# Patient Record
Sex: MALE | Race: WHITE | Employment: UNEMPLOYED | URBAN - METROPOLITAN AREA
[De-identification: names, ages, dates, MRNs, and addresses within clinical notes are randomized per-mention and may not be internally consistent; named-entity substitution may affect disease eponyms.]

---

## 2017-01-01 ENCOUNTER — APPOINTMENT (OUTPATIENT)
Dept: GENERAL RADIOLOGY | Age: 82
DRG: 329 | End: 2017-01-01
Attending: INTERNAL MEDICINE
Payer: MEDICARE

## 2017-01-01 ENCOUNTER — APPOINTMENT (OUTPATIENT)
Dept: GENERAL RADIOLOGY | Age: 82
DRG: 329 | End: 2017-01-01
Attending: PHYSICIAN ASSISTANT
Payer: MEDICARE

## 2017-01-01 ENCOUNTER — APPOINTMENT (OUTPATIENT)
Dept: CT IMAGING | Age: 82
DRG: 329 | End: 2017-01-01
Attending: NURSE PRACTITIONER
Payer: MEDICARE

## 2017-01-01 ENCOUNTER — APPOINTMENT (OUTPATIENT)
Dept: ULTRASOUND IMAGING | Age: 82
DRG: 329 | End: 2017-01-01
Attending: INTERNAL MEDICINE
Payer: MEDICARE

## 2017-01-01 ENCOUNTER — ANESTHESIA (OUTPATIENT)
Dept: SURGERY | Age: 82
DRG: 329 | End: 2017-01-01
Payer: MEDICARE

## 2017-01-01 ENCOUNTER — ANESTHESIA EVENT (OUTPATIENT)
Dept: ENDOSCOPY | Age: 82
End: 2017-01-01

## 2017-01-01 ENCOUNTER — ANESTHESIA EVENT (OUTPATIENT)
Dept: SURGERY | Age: 82
DRG: 329 | End: 2017-01-01
Payer: MEDICARE

## 2017-01-01 ENCOUNTER — APPOINTMENT (OUTPATIENT)
Dept: GENERAL RADIOLOGY | Age: 82
DRG: 329 | End: 2017-01-01
Attending: NURSE PRACTITIONER
Payer: MEDICARE

## 2017-01-01 ENCOUNTER — ANESTHESIA (OUTPATIENT)
Dept: ENDOSCOPY | Age: 82
End: 2017-01-01

## 2017-01-01 ENCOUNTER — APPOINTMENT (OUTPATIENT)
Dept: GENERAL RADIOLOGY | Age: 82
DRG: 329 | End: 2017-01-01
Attending: HOSPITALIST
Payer: MEDICARE

## 2017-01-01 ENCOUNTER — APPOINTMENT (OUTPATIENT)
Dept: CT IMAGING | Age: 82
DRG: 329 | End: 2017-01-01
Attending: PHYSICIAN ASSISTANT
Payer: MEDICARE

## 2017-01-01 ENCOUNTER — HOSPITAL ENCOUNTER (INPATIENT)
Age: 82
LOS: 27 days | DRG: 329 | End: 2017-03-12
Attending: EMERGENCY MEDICINE | Admitting: INTERNAL MEDICINE
Payer: MEDICARE

## 2017-01-01 VITALS
OXYGEN SATURATION: 68 % | HEART RATE: 92 BPM | SYSTOLIC BLOOD PRESSURE: 100 MMHG | BODY MASS INDEX: 24.52 KG/M2 | HEIGHT: 72 IN | TEMPERATURE: 97.8 F | DIASTOLIC BLOOD PRESSURE: 52 MMHG | WEIGHT: 181 LBS | RESPIRATION RATE: 22 BRPM

## 2017-01-01 DIAGNOSIS — N18.9 CHRONIC RENAL FAILURE, UNSPECIFIED STAGE: ICD-10-CM

## 2017-01-01 DIAGNOSIS — R10.12 ABDOMINAL PAIN, LUQ (LEFT UPPER QUADRANT): ICD-10-CM

## 2017-01-01 DIAGNOSIS — K56.609 SBO (SMALL BOWEL OBSTRUCTION) (HCC): Primary | ICD-10-CM

## 2017-01-01 DIAGNOSIS — N18.30 ACUTE RENAL FAILURE SUPERIMPOSED ON STAGE 3 CHRONIC KIDNEY DISEASE (HCC): ICD-10-CM

## 2017-01-01 DIAGNOSIS — D64.9 ANEMIA, UNSPECIFIED TYPE: ICD-10-CM

## 2017-01-01 DIAGNOSIS — M25.562 CHRONIC PAIN OF LEFT KNEE: ICD-10-CM

## 2017-01-01 DIAGNOSIS — N17.9 ACUTE RENAL FAILURE SUPERIMPOSED ON STAGE 3 CHRONIC KIDNEY DISEASE (HCC): ICD-10-CM

## 2017-01-01 DIAGNOSIS — R53.81 DEBILITY: ICD-10-CM

## 2017-01-01 DIAGNOSIS — G89.29 CHRONIC PAIN OF LEFT KNEE: ICD-10-CM

## 2017-01-01 DIAGNOSIS — R63.30 FEEDING DIFFICULTIES: ICD-10-CM

## 2017-01-01 LAB
ABO + RH BLD: NORMAL
ABO + RH BLD: NORMAL
ALBUMIN SERPL BCP-MCNC: 1.6 G/DL (ref 3.4–5)
ALBUMIN SERPL BCP-MCNC: 1.7 G/DL (ref 3.4–5)
ALBUMIN SERPL BCP-MCNC: 1.9 G/DL (ref 3.4–5)
ALBUMIN SERPL BCP-MCNC: 3.5 G/DL (ref 3.4–5)
ALBUMIN/GLOB SERPL: 0.5 {RATIO} (ref 0.8–1.7)
ALBUMIN/GLOB SERPL: 0.6 {RATIO} (ref 0.8–1.7)
ALBUMIN/GLOB SERPL: 0.6 {RATIO} (ref 0.8–1.7)
ALBUMIN/GLOB SERPL: 1 {RATIO} (ref 0.8–1.7)
ALP SERPL-CCNC: 147 U/L (ref 45–117)
ALP SERPL-CCNC: 70 U/L (ref 45–117)
ALP SERPL-CCNC: 93 U/L (ref 45–117)
ALP SERPL-CCNC: 97 U/L (ref 45–117)
ALT SERPL-CCNC: 16 U/L (ref 16–61)
ALT SERPL-CCNC: 16 U/L (ref 16–61)
ALT SERPL-CCNC: 17 U/L (ref 16–61)
ALT SERPL-CCNC: 23 U/L (ref 16–61)
ANION GAP BLD CALC-SCNC: 10 MMOL/L (ref 3–18)
ANION GAP BLD CALC-SCNC: 11 MMOL/L (ref 3–18)
ANION GAP BLD CALC-SCNC: 12 MMOL/L (ref 3–18)
ANION GAP BLD CALC-SCNC: 13 MMOL/L (ref 3–18)
ANION GAP BLD CALC-SCNC: 14 MMOL/L (ref 3–18)
ANION GAP BLD CALC-SCNC: 14 MMOL/L (ref 3–18)
ANION GAP BLD CALC-SCNC: 15 MMOL/L (ref 3–18)
ANION GAP BLD CALC-SCNC: 15 MMOL/L (ref 3–18)
ANION GAP BLD CALC-SCNC: 16 MMOL/L (ref 3–18)
ANION GAP BLD CALC-SCNC: 17 MMOL/L (ref 3–18)
ANION GAP BLD CALC-SCNC: 19 MMOL/L (ref 3–18)
ANION GAP BLD CALC-SCNC: 19 MMOL/L (ref 3–18)
ANION GAP BLD CALC-SCNC: 8 MMOL/L (ref 3–18)
ANION GAP BLD CALC-SCNC: 8 MMOL/L (ref 3–18)
APPEARANCE FLD: ABNORMAL
APPEARANCE UR: ABNORMAL
APPEARANCE UR: CLEAR
APTT PPP: 36.9 SEC (ref 23–36.4)
APTT PPP: 41.1 SEC (ref 23–36.4)
ARTERIAL PATENCY WRIST A: ABNORMAL
ARTERIAL PATENCY WRIST A: NO
ARTERIAL PATENCY WRIST A: NO
ARTERIAL PATENCY WRIST A: YES
AST SERPL W P-5'-P-CCNC: 11 U/L (ref 15–37)
AST SERPL W P-5'-P-CCNC: 14 U/L (ref 15–37)
AST SERPL W P-5'-P-CCNC: 20 U/L (ref 15–37)
AST SERPL W P-5'-P-CCNC: 38 U/L (ref 15–37)
ATRIAL RATE: 104 BPM
ATRIAL RATE: 119 BPM
ATRIAL RATE: 122 BPM
ATRIAL RATE: 394 BPM
ATRIAL RATE: 61 BPM
BACTERIA SPEC CULT: ABNORMAL
BACTERIA SPEC CULT: ABNORMAL
BACTERIA SPEC CULT: NORMAL
BACTERIA URNS QL MICRO: NEGATIVE /HPF
BASE DEFICIT BLD-SCNC: 4 MMOL/L
BASE DEFICIT BLD-SCNC: 4 MMOL/L
BASE DEFICIT BLD-SCNC: 5 MMOL/L
BASE DEFICIT BLD-SCNC: 6 MMOL/L
BASE DEFICIT BLD-SCNC: 6 MMOL/L
BASE DEFICIT BLD-SCNC: 8 MMOL/L
BASE DEFICIT BLD-SCNC: 8 MMOL/L
BASOPHILS # BLD AUTO: 0 K/UL (ref 0–0.06)
BASOPHILS # BLD AUTO: 0 K/UL (ref 0–0.1)
BASOPHILS # BLD AUTO: 0 K/UL (ref 0–0.1)
BASOPHILS # BLD AUTO: 0.1 K/UL (ref 0–0.06)
BASOPHILS # BLD: 0 % (ref 0–2)
BASOPHILS # BLD: 0 % (ref 0–3)
BASOPHILS # BLD: 0 % (ref 0–3)
BASOPHILS # BLD: 1 % (ref 0–2)
BDY SITE: ABNORMAL
BILIRUB DIRECT SERPL-MCNC: 0.2 MG/DL (ref 0–0.2)
BILIRUB SERPL-MCNC: 0.2 MG/DL (ref 0.2–1)
BILIRUB SERPL-MCNC: 0.3 MG/DL (ref 0.2–1)
BILIRUB SERPL-MCNC: 0.3 MG/DL (ref 0.2–1)
BILIRUB SERPL-MCNC: 0.5 MG/DL (ref 0.2–1)
BILIRUB UR QL: NEGATIVE
BILIRUB UR QL: NEGATIVE
BLD PROD TYP BPU: NORMAL
BLOOD GROUP ANTIBODIES SERPL: NORMAL
BLOOD GROUP ANTIBODIES SERPL: NORMAL
BODY TEMPERATURE: 96
BODY TEMPERATURE: 96.4
BODY TEMPERATURE: 97.4
BODY TEMPERATURE: 98
BODY TEMPERATURE: 98.4
BPU ID: NORMAL
BUN SERPL-MCNC: 103 MG/DL (ref 7–18)
BUN SERPL-MCNC: 107 MG/DL (ref 7–18)
BUN SERPL-MCNC: 108 MG/DL (ref 7–18)
BUN SERPL-MCNC: 112 MG/DL (ref 7–18)
BUN SERPL-MCNC: 116 MG/DL (ref 7–18)
BUN SERPL-MCNC: 120 MG/DL (ref 7–18)
BUN SERPL-MCNC: 121 MG/DL (ref 7–18)
BUN SERPL-MCNC: 132 MG/DL (ref 7–18)
BUN SERPL-MCNC: 134 MG/DL (ref 7–18)
BUN SERPL-MCNC: 136 MG/DL (ref 7–18)
BUN SERPL-MCNC: 149 MG/DL (ref 7–18)
BUN SERPL-MCNC: 156 MG/DL (ref 7–18)
BUN SERPL-MCNC: 157 MG/DL (ref 7–18)
BUN SERPL-MCNC: 164 MG/DL (ref 7–18)
BUN SERPL-MCNC: 32 MG/DL (ref 7–18)
BUN SERPL-MCNC: 41 MG/DL (ref 7–18)
BUN SERPL-MCNC: 49 MG/DL (ref 7–18)
BUN SERPL-MCNC: 49 MG/DL (ref 7–18)
BUN SERPL-MCNC: 50 MG/DL (ref 7–18)
BUN SERPL-MCNC: 53 MG/DL (ref 7–18)
BUN SERPL-MCNC: 56 MG/DL (ref 7–18)
BUN SERPL-MCNC: 68 MG/DL (ref 7–18)
BUN SERPL-MCNC: 91 MG/DL (ref 7–18)
BUN/CREAT SERPL: 21 (ref 12–20)
BUN/CREAT SERPL: 22 (ref 12–20)
BUN/CREAT SERPL: 23 (ref 12–20)
BUN/CREAT SERPL: 25 (ref 12–20)
BUN/CREAT SERPL: 26 (ref 12–20)
BUN/CREAT SERPL: 27 (ref 12–20)
BUN/CREAT SERPL: 28 (ref 12–20)
BUN/CREAT SERPL: 28 (ref 12–20)
BUN/CREAT SERPL: 29 (ref 12–20)
BUN/CREAT SERPL: 29 (ref 12–20)
BUN/CREAT SERPL: 30 (ref 12–20)
BUN/CREAT SERPL: 33 (ref 12–20)
BUN/CREAT SERPL: 34 (ref 12–20)
BUN/CREAT SERPL: 35 (ref 12–20)
BUN/CREAT SERPL: 36 (ref 12–20)
BUN/CREAT SERPL: 37 (ref 12–20)
BUN/CREAT SERPL: 38 (ref 12–20)
BUN/CREAT SERPL: 40 (ref 12–20)
BUN/CREAT SERPL: 41 (ref 12–20)
BUN/CREAT SERPL: 41 (ref 12–20)
BUN/CREAT SERPL: 42 (ref 12–20)
CA-I SERPL-SCNC: 0.95 MMOL/L (ref 1.12–1.32)
CA-I SERPL-SCNC: 0.99 MMOL/L (ref 1.12–1.32)
CA-I SERPL-SCNC: 1.02 MMOL/L (ref 1.12–1.32)
CA-I SERPL-SCNC: 1.03 MMOL/L (ref 1.12–1.32)
CA-I SERPL-SCNC: 1.03 MMOL/L (ref 1.12–1.32)
CA-I SERPL-SCNC: 1.04 MMOL/L (ref 1.12–1.32)
CA-I SERPL-SCNC: 1.05 MMOL/L (ref 1.12–1.32)
CA-I SERPL-SCNC: 1.06 MMOL/L (ref 1.12–1.32)
CA-I SERPL-SCNC: 1.07 MMOL/L (ref 1.12–1.32)
CA-I SERPL-SCNC: 1.07 MMOL/L (ref 1.12–1.32)
CA-I SERPL-SCNC: 1.08 MMOL/L (ref 1.12–1.32)
CA-I SERPL-SCNC: 1.09 MMOL/L (ref 1.12–1.32)
CA-I SERPL-SCNC: 1.1 MMOL/L (ref 1.12–1.32)
CA-I SERPL-SCNC: 1.1 MMOL/L (ref 1.12–1.32)
CA-I SERPL-SCNC: 1.11 MMOL/L (ref 1.12–1.32)
CA-I SERPL-SCNC: 1.11 MMOL/L (ref 1.12–1.32)
CA-I SERPL-SCNC: 1.13 MMOL/L (ref 1.12–1.32)
CALCIUM SERPL-MCNC: 6.9 MG/DL (ref 8.5–10.1)
CALCIUM SERPL-MCNC: 6.9 MG/DL (ref 8.5–10.1)
CALCIUM SERPL-MCNC: 7 MG/DL (ref 8.5–10.1)
CALCIUM SERPL-MCNC: 7.2 MG/DL (ref 8.5–10.1)
CALCIUM SERPL-MCNC: 7.2 MG/DL (ref 8.5–10.1)
CALCIUM SERPL-MCNC: 7.3 MG/DL (ref 8.5–10.1)
CALCIUM SERPL-MCNC: 7.3 MG/DL (ref 8.5–10.1)
CALCIUM SERPL-MCNC: 7.5 MG/DL (ref 8.5–10.1)
CALCIUM SERPL-MCNC: 7.6 MG/DL (ref 8.5–10.1)
CALCIUM SERPL-MCNC: 7.7 MG/DL (ref 8.5–10.1)
CALCIUM SERPL-MCNC: 7.8 MG/DL (ref 8.5–10.1)
CALCIUM SERPL-MCNC: 7.8 MG/DL (ref 8.5–10.1)
CALCIUM SERPL-MCNC: 7.9 MG/DL (ref 8.5–10.1)
CALCIUM SERPL-MCNC: 8 MG/DL (ref 8.5–10.1)
CALCIUM SERPL-MCNC: 8.1 MG/DL (ref 8.5–10.1)
CALCIUM SERPL-MCNC: 8.2 MG/DL (ref 8.5–10.1)
CALCIUM SERPL-MCNC: 8.2 MG/DL (ref 8.5–10.1)
CALCIUM SERPL-MCNC: 8.4 MG/DL (ref 8.5–10.1)
CALCIUM SERPL-MCNC: 8.9 MG/DL (ref 8.5–10.1)
CALCULATED P AXIS, ECG09: 102 DEGREES
CALCULATED R AXIS, ECG10: -66 DEGREES
CALCULATED R AXIS, ECG10: -67 DEGREES
CALCULATED R AXIS, ECG10: -75 DEGREES
CALCULATED R AXIS, ECG10: -75 DEGREES
CALCULATED R AXIS, ECG10: -76 DEGREES
CALCULATED T AXIS, ECG11: 58 DEGREES
CALCULATED T AXIS, ECG11: 63 DEGREES
CALCULATED T AXIS, ECG11: 65 DEGREES
CALCULATED T AXIS, ECG11: 81 DEGREES
CALCULATED T AXIS, ECG11: 85 DEGREES
CALLED TO:,BCALL1: NORMAL
CALLED TO:,BCALL1: NORMAL
CALLED TO:,BCALL2: NORMAL
CALLED TO:,BCALL3: NORMAL
CHLORIDE SERPL-SCNC: 102 MMOL/L (ref 100–108)
CHLORIDE SERPL-SCNC: 103 MMOL/L (ref 100–108)
CHLORIDE SERPL-SCNC: 104 MMOL/L (ref 100–108)
CHLORIDE SERPL-SCNC: 105 MMOL/L (ref 100–108)
CHLORIDE SERPL-SCNC: 106 MMOL/L (ref 100–108)
CHLORIDE SERPL-SCNC: 108 MMOL/L (ref 100–108)
CHLORIDE SERPL-SCNC: 109 MMOL/L (ref 100–108)
CHLORIDE SERPL-SCNC: 109 MMOL/L (ref 100–108)
CHLORIDE SERPL-SCNC: 110 MMOL/L (ref 100–108)
CHLORIDE SERPL-SCNC: 110 MMOL/L (ref 100–108)
CHLORIDE SERPL-SCNC: 111 MMOL/L (ref 100–108)
CHLORIDE UR-SCNC: 21 MMOL/L (ref 55–125)
CK MB CFR SERPL CALC: 7.8 % (ref 0–4)
CK MB CFR SERPL CALC: 9.2 % (ref 0–4)
CK MB SERPL-MCNC: 3.2 NG/ML (ref 5–25)
CK MB SERPL-MCNC: 4.9 NG/ML (ref 5–25)
CK SERPL-CCNC: 41 U/L (ref 39–308)
CK SERPL-CCNC: 53 U/L (ref 39–308)
CO2 SERPL-SCNC: 17 MMOL/L (ref 21–32)
CO2 SERPL-SCNC: 17 MMOL/L (ref 21–32)
CO2 SERPL-SCNC: 18 MMOL/L (ref 21–32)
CO2 SERPL-SCNC: 18 MMOL/L (ref 21–32)
CO2 SERPL-SCNC: 19 MMOL/L (ref 21–32)
CO2 SERPL-SCNC: 20 MMOL/L (ref 21–32)
CO2 SERPL-SCNC: 21 MMOL/L (ref 21–32)
CO2 SERPL-SCNC: 22 MMOL/L (ref 21–32)
CO2 SERPL-SCNC: 22 MMOL/L (ref 21–32)
CO2 SERPL-SCNC: 23 MMOL/L (ref 21–32)
CO2 SERPL-SCNC: 23 MMOL/L (ref 21–32)
CO2 SERPL-SCNC: 24 MMOL/L (ref 21–32)
CO2 SERPL-SCNC: 25 MMOL/L (ref 21–32)
CO2 SERPL-SCNC: 26 MMOL/L (ref 21–32)
COLOR FLD: YELLOW
COLOR UR: ABNORMAL
COLOR UR: YELLOW
CREAT SERPL-MCNC: 1.45 MG/DL (ref 0.6–1.3)
CREAT SERPL-MCNC: 1.85 MG/DL (ref 0.6–1.3)
CREAT SERPL-MCNC: 1.88 MG/DL (ref 0.6–1.3)
CREAT SERPL-MCNC: 1.93 MG/DL (ref 0.6–1.3)
CREAT SERPL-MCNC: 1.98 MG/DL (ref 0.6–1.3)
CREAT SERPL-MCNC: 1.99 MG/DL (ref 0.6–1.3)
CREAT SERPL-MCNC: 2 MG/DL (ref 0.6–1.3)
CREAT SERPL-MCNC: 2.29 MG/DL (ref 0.6–1.3)
CREAT SERPL-MCNC: 2.45 MG/DL (ref 0.6–1.3)
CREAT SERPL-MCNC: 2.47 MG/DL (ref 0.6–1.3)
CREAT SERPL-MCNC: 2.58 MG/DL (ref 0.6–1.3)
CREAT SERPL-MCNC: 2.62 MG/DL (ref 0.6–1.3)
CREAT SERPL-MCNC: 2.68 MG/DL (ref 0.6–1.3)
CREAT SERPL-MCNC: 2.84 MG/DL (ref 0.6–1.3)
CREAT SERPL-MCNC: 3.13 MG/DL (ref 0.6–1.3)
CREAT SERPL-MCNC: 3.3 MG/DL (ref 0.6–1.3)
CREAT SERPL-MCNC: 3.71 MG/DL (ref 0.6–1.3)
CREAT SERPL-MCNC: 3.98 MG/DL (ref 0.6–1.3)
CREAT SERPL-MCNC: 4.38 MG/DL (ref 0.6–1.3)
CREAT SERPL-MCNC: 4.55 MG/DL (ref 0.6–1.3)
CREAT SERPL-MCNC: 4.8 MG/DL (ref 0.6–1.3)
CREAT SERPL-MCNC: 5.02 MG/DL (ref 0.6–1.3)
CREAT SERPL-MCNC: 5.27 MG/DL (ref 0.6–1.3)
CREAT SERPL-MCNC: 5.3 MG/DL (ref 0.6–1.3)
CREAT SERPL-MCNC: 5.36 MG/DL (ref 0.6–1.3)
CREAT SERPL-MCNC: 5.41 MG/DL (ref 0.6–1.3)
CREAT SERPL-MCNC: 5.53 MG/DL (ref 0.6–1.3)
CREAT UR-MCNC: 45.6 MG/DL (ref 30–125)
CROSSMATCH RESULT,%XM: NORMAL
DATE LAST DOSE: NORMAL
DATE LAST DOSE: NORMAL
DIAGNOSIS, 93000: NORMAL
DIFFERENTIAL METHOD BLD: ABNORMAL
DIGOXIN SERPL-MCNC: 0.8 NG/ML (ref 0.9–2)
DIGOXIN SERPL-MCNC: 1.7 NG/ML (ref 0.9–2)
EOSINOPHIL # BLD: 0 K/UL (ref 0–0.4)
EOSINOPHIL # BLD: 0.1 K/UL (ref 0–0.4)
EOSINOPHIL # FLD: 0 %
EOSINOPHIL NFR BLD: 0 % (ref 0–5)
EOSINOPHIL NFR BLD: 1 % (ref 0–5)
EPITH CASTS URNS QL MICRO: ABNORMAL /LPF (ref 0–5)
ERYTHROCYTE [DISTWIDTH] IN BLOOD BY AUTOMATED COUNT: 15.1 % (ref 11.6–14.5)
ERYTHROCYTE [DISTWIDTH] IN BLOOD BY AUTOMATED COUNT: 15.2 % (ref 11.6–14.5)
ERYTHROCYTE [DISTWIDTH] IN BLOOD BY AUTOMATED COUNT: 15.2 % (ref 11.6–14.5)
ERYTHROCYTE [DISTWIDTH] IN BLOOD BY AUTOMATED COUNT: 15.3 % (ref 11.6–14.5)
ERYTHROCYTE [DISTWIDTH] IN BLOOD BY AUTOMATED COUNT: 15.3 % (ref 11.6–14.5)
ERYTHROCYTE [DISTWIDTH] IN BLOOD BY AUTOMATED COUNT: 15.4 % (ref 11.6–14.5)
ERYTHROCYTE [DISTWIDTH] IN BLOOD BY AUTOMATED COUNT: 15.4 % (ref 11.6–14.5)
ERYTHROCYTE [DISTWIDTH] IN BLOOD BY AUTOMATED COUNT: 15.5 % (ref 11.6–14.5)
ERYTHROCYTE [DISTWIDTH] IN BLOOD BY AUTOMATED COUNT: 15.7 % (ref 11.6–14.5)
ERYTHROCYTE [DISTWIDTH] IN BLOOD BY AUTOMATED COUNT: 15.7 % (ref 11.6–14.5)
ERYTHROCYTE [DISTWIDTH] IN BLOOD BY AUTOMATED COUNT: 15.8 % (ref 11.6–14.5)
ERYTHROCYTE [DISTWIDTH] IN BLOOD BY AUTOMATED COUNT: 15.9 % (ref 11.6–14.5)
ERYTHROCYTE [DISTWIDTH] IN BLOOD BY AUTOMATED COUNT: 15.9 % (ref 11.6–14.5)
ERYTHROCYTE [DISTWIDTH] IN BLOOD BY AUTOMATED COUNT: 16 % (ref 11.6–14.5)
ERYTHROCYTE [DISTWIDTH] IN BLOOD BY AUTOMATED COUNT: 16.1 % (ref 11.6–14.5)
ERYTHROCYTE [DISTWIDTH] IN BLOOD BY AUTOMATED COUNT: 16.2 % (ref 11.6–14.5)
ERYTHROCYTE [DISTWIDTH] IN BLOOD BY AUTOMATED COUNT: 16.3 % (ref 11.6–14.5)
ERYTHROCYTE [DISTWIDTH] IN BLOOD BY AUTOMATED COUNT: 16.5 % (ref 11.6–14.5)
ERYTHROCYTE [DISTWIDTH] IN BLOOD BY AUTOMATED COUNT: 16.8 % (ref 11.6–14.5)
ERYTHROCYTE [DISTWIDTH] IN BLOOD BY AUTOMATED COUNT: 17.6 % (ref 11.6–14.5)
ERYTHROCYTE [DISTWIDTH] IN BLOOD BY AUTOMATED COUNT: 18.5 % (ref 11.6–14.5)
EST. AVERAGE GLUCOSE BLD GHB EST-MCNC: 126 MG/DL
GAS FLOW.O2 O2 DELIVERY SYS: ABNORMAL L/MIN
GAS FLOW.O2 SETTING OXYMISER: 1 BPM
GAS FLOW.O2 SETTING OXYMISER: 10 BPM
GAS FLOW.O2 SETTING OXYMISER: 10 BPM
GAS FLOW.O2 SETTING OXYMISER: 10 L/M
GAS FLOW.O2 SETTING OXYMISER: 15 L/M
GLOBULIN SER CALC-MCNC: 2.6 G/DL (ref 2–4)
GLOBULIN SER CALC-MCNC: 3.1 G/DL (ref 2–4)
GLOBULIN SER CALC-MCNC: 3.4 G/DL (ref 2–4)
GLOBULIN SER CALC-MCNC: 3.5 G/DL (ref 2–4)
GLUCOSE BLD STRIP.AUTO-MCNC: 103 MG/DL (ref 70–110)
GLUCOSE BLD STRIP.AUTO-MCNC: 111 MG/DL (ref 70–110)
GLUCOSE BLD STRIP.AUTO-MCNC: 112 MG/DL (ref 70–110)
GLUCOSE BLD STRIP.AUTO-MCNC: 119 MG/DL (ref 70–110)
GLUCOSE BLD STRIP.AUTO-MCNC: 121 MG/DL (ref 70–110)
GLUCOSE BLD STRIP.AUTO-MCNC: 123 MG/DL (ref 70–110)
GLUCOSE BLD STRIP.AUTO-MCNC: 123 MG/DL (ref 70–110)
GLUCOSE BLD STRIP.AUTO-MCNC: 124 MG/DL (ref 70–110)
GLUCOSE BLD STRIP.AUTO-MCNC: 126 MG/DL (ref 70–110)
GLUCOSE BLD STRIP.AUTO-MCNC: 127 MG/DL (ref 70–110)
GLUCOSE BLD STRIP.AUTO-MCNC: 127 MG/DL (ref 70–110)
GLUCOSE BLD STRIP.AUTO-MCNC: 129 MG/DL (ref 70–110)
GLUCOSE BLD STRIP.AUTO-MCNC: 131 MG/DL (ref 70–110)
GLUCOSE BLD STRIP.AUTO-MCNC: 131 MG/DL (ref 70–110)
GLUCOSE BLD STRIP.AUTO-MCNC: 132 MG/DL (ref 70–110)
GLUCOSE BLD STRIP.AUTO-MCNC: 137 MG/DL (ref 70–110)
GLUCOSE BLD STRIP.AUTO-MCNC: 141 MG/DL (ref 70–110)
GLUCOSE BLD STRIP.AUTO-MCNC: 143 MG/DL (ref 70–110)
GLUCOSE BLD STRIP.AUTO-MCNC: 143 MG/DL (ref 70–110)
GLUCOSE BLD STRIP.AUTO-MCNC: 146 MG/DL (ref 70–110)
GLUCOSE BLD STRIP.AUTO-MCNC: 148 MG/DL (ref 70–110)
GLUCOSE BLD STRIP.AUTO-MCNC: 149 MG/DL (ref 70–110)
GLUCOSE BLD STRIP.AUTO-MCNC: 152 MG/DL (ref 70–110)
GLUCOSE BLD STRIP.AUTO-MCNC: 152 MG/DL (ref 70–110)
GLUCOSE BLD STRIP.AUTO-MCNC: 153 MG/DL (ref 70–110)
GLUCOSE BLD STRIP.AUTO-MCNC: 155 MG/DL (ref 70–110)
GLUCOSE BLD STRIP.AUTO-MCNC: 155 MG/DL (ref 70–110)
GLUCOSE BLD STRIP.AUTO-MCNC: 156 MG/DL (ref 70–110)
GLUCOSE BLD STRIP.AUTO-MCNC: 156 MG/DL (ref 70–110)
GLUCOSE BLD STRIP.AUTO-MCNC: 157 MG/DL (ref 70–110)
GLUCOSE BLD STRIP.AUTO-MCNC: 158 MG/DL (ref 70–110)
GLUCOSE BLD STRIP.AUTO-MCNC: 161 MG/DL (ref 70–110)
GLUCOSE BLD STRIP.AUTO-MCNC: 162 MG/DL (ref 70–110)
GLUCOSE BLD STRIP.AUTO-MCNC: 163 MG/DL (ref 70–110)
GLUCOSE BLD STRIP.AUTO-MCNC: 164 MG/DL (ref 70–110)
GLUCOSE BLD STRIP.AUTO-MCNC: 164 MG/DL (ref 70–110)
GLUCOSE BLD STRIP.AUTO-MCNC: 165 MG/DL (ref 70–110)
GLUCOSE BLD STRIP.AUTO-MCNC: 165 MG/DL (ref 70–110)
GLUCOSE BLD STRIP.AUTO-MCNC: 167 MG/DL (ref 70–110)
GLUCOSE BLD STRIP.AUTO-MCNC: 170 MG/DL (ref 70–110)
GLUCOSE BLD STRIP.AUTO-MCNC: 170 MG/DL (ref 70–110)
GLUCOSE BLD STRIP.AUTO-MCNC: 171 MG/DL (ref 70–110)
GLUCOSE BLD STRIP.AUTO-MCNC: 172 MG/DL (ref 70–110)
GLUCOSE BLD STRIP.AUTO-MCNC: 173 MG/DL (ref 70–110)
GLUCOSE BLD STRIP.AUTO-MCNC: 173 MG/DL (ref 70–110)
GLUCOSE BLD STRIP.AUTO-MCNC: 174 MG/DL (ref 70–110)
GLUCOSE BLD STRIP.AUTO-MCNC: 174 MG/DL (ref 70–110)
GLUCOSE BLD STRIP.AUTO-MCNC: 175 MG/DL (ref 70–110)
GLUCOSE BLD STRIP.AUTO-MCNC: 176 MG/DL (ref 70–110)
GLUCOSE BLD STRIP.AUTO-MCNC: 177 MG/DL (ref 70–110)
GLUCOSE BLD STRIP.AUTO-MCNC: 178 MG/DL (ref 70–110)
GLUCOSE BLD STRIP.AUTO-MCNC: 178 MG/DL (ref 70–110)
GLUCOSE BLD STRIP.AUTO-MCNC: 183 MG/DL (ref 70–110)
GLUCOSE BLD STRIP.AUTO-MCNC: 183 MG/DL (ref 70–110)
GLUCOSE BLD STRIP.AUTO-MCNC: 184 MG/DL (ref 70–110)
GLUCOSE BLD STRIP.AUTO-MCNC: 185 MG/DL (ref 70–110)
GLUCOSE BLD STRIP.AUTO-MCNC: 186 MG/DL (ref 70–110)
GLUCOSE BLD STRIP.AUTO-MCNC: 189 MG/DL (ref 70–110)
GLUCOSE BLD STRIP.AUTO-MCNC: 191 MG/DL (ref 70–110)
GLUCOSE BLD STRIP.AUTO-MCNC: 194 MG/DL (ref 70–110)
GLUCOSE BLD STRIP.AUTO-MCNC: 194 MG/DL (ref 70–110)
GLUCOSE BLD STRIP.AUTO-MCNC: 195 MG/DL (ref 70–110)
GLUCOSE BLD STRIP.AUTO-MCNC: 199 MG/DL (ref 70–110)
GLUCOSE BLD STRIP.AUTO-MCNC: 199 MG/DL (ref 70–110)
GLUCOSE BLD STRIP.AUTO-MCNC: 200 MG/DL (ref 70–110)
GLUCOSE BLD STRIP.AUTO-MCNC: 202 MG/DL (ref 70–110)
GLUCOSE BLD STRIP.AUTO-MCNC: 205 MG/DL (ref 70–110)
GLUCOSE BLD STRIP.AUTO-MCNC: 205 MG/DL (ref 70–110)
GLUCOSE BLD STRIP.AUTO-MCNC: 208 MG/DL (ref 70–110)
GLUCOSE BLD STRIP.AUTO-MCNC: 208 MG/DL (ref 70–110)
GLUCOSE BLD STRIP.AUTO-MCNC: 212 MG/DL (ref 70–110)
GLUCOSE BLD STRIP.AUTO-MCNC: 213 MG/DL (ref 70–110)
GLUCOSE BLD STRIP.AUTO-MCNC: 218 MG/DL (ref 70–110)
GLUCOSE BLD STRIP.AUTO-MCNC: 236 MG/DL (ref 70–110)
GLUCOSE BLD STRIP.AUTO-MCNC: 249 MG/DL (ref 70–110)
GLUCOSE BLD STRIP.AUTO-MCNC: 250 MG/DL (ref 70–110)
GLUCOSE BLD STRIP.AUTO-MCNC: 258 MG/DL (ref 70–110)
GLUCOSE BLD STRIP.AUTO-MCNC: 261 MG/DL (ref 70–110)
GLUCOSE BLD STRIP.AUTO-MCNC: 265 MG/DL (ref 70–110)
GLUCOSE BLD STRIP.AUTO-MCNC: 267 MG/DL (ref 70–110)
GLUCOSE BLD STRIP.AUTO-MCNC: 55 MG/DL (ref 70–110)
GLUCOSE BLD STRIP.AUTO-MCNC: 70 MG/DL (ref 70–110)
GLUCOSE BLD STRIP.AUTO-MCNC: 78 MG/DL (ref 70–110)
GLUCOSE BLD STRIP.AUTO-MCNC: 94 MG/DL (ref 70–110)
GLUCOSE BLD STRIP.AUTO-MCNC: 98 MG/DL (ref 70–110)
GLUCOSE BLD STRIP.AUTO-MCNC: <30 MG/DL (ref 70–110)
GLUCOSE FLD-MCNC: 197 MG/DL
GLUCOSE SERPL-MCNC: 103 MG/DL (ref 74–99)
GLUCOSE SERPL-MCNC: 106 MG/DL (ref 74–99)
GLUCOSE SERPL-MCNC: 109 MG/DL (ref 74–99)
GLUCOSE SERPL-MCNC: 111 MG/DL (ref 74–99)
GLUCOSE SERPL-MCNC: 115 MG/DL (ref 74–99)
GLUCOSE SERPL-MCNC: 130 MG/DL (ref 74–99)
GLUCOSE SERPL-MCNC: 131 MG/DL (ref 74–99)
GLUCOSE SERPL-MCNC: 131 MG/DL (ref 74–99)
GLUCOSE SERPL-MCNC: 133 MG/DL (ref 74–99)
GLUCOSE SERPL-MCNC: 134 MG/DL (ref 74–99)
GLUCOSE SERPL-MCNC: 143 MG/DL (ref 74–99)
GLUCOSE SERPL-MCNC: 145 MG/DL (ref 74–99)
GLUCOSE SERPL-MCNC: 145 MG/DL (ref 74–99)
GLUCOSE SERPL-MCNC: 149 MG/DL (ref 74–99)
GLUCOSE SERPL-MCNC: 149 MG/DL (ref 74–99)
GLUCOSE SERPL-MCNC: 150 MG/DL (ref 74–99)
GLUCOSE SERPL-MCNC: 155 MG/DL (ref 74–99)
GLUCOSE SERPL-MCNC: 163 MG/DL (ref 74–99)
GLUCOSE SERPL-MCNC: 174 MG/DL (ref 74–99)
GLUCOSE SERPL-MCNC: 175 MG/DL (ref 74–99)
GLUCOSE SERPL-MCNC: 189 MG/DL (ref 74–99)
GLUCOSE SERPL-MCNC: 198 MG/DL (ref 74–99)
GLUCOSE SERPL-MCNC: 230 MG/DL (ref 74–99)
GLUCOSE SERPL-MCNC: 236 MG/DL (ref 74–99)
GLUCOSE SERPL-MCNC: 31 MG/DL (ref 74–99)
GLUCOSE SERPL-MCNC: 340 MG/DL (ref 74–99)
GLUCOSE SERPL-MCNC: 50 MG/DL (ref 74–99)
GLUCOSE UR STRIP.AUTO-MCNC: NEGATIVE MG/DL
GLUCOSE UR STRIP.AUTO-MCNC: NEGATIVE MG/DL
GRAM STN SPEC: ABNORMAL
GRAM STN SPEC: NORMAL
GRAN CASTS URNS QL MICRO: ABNORMAL /LPF
HBA1C MFR BLD: 6 % (ref 4.2–5.6)
HCO3 BLD-SCNC: 16.3 MMOL/L (ref 22–26)
HCO3 BLD-SCNC: 16.7 MMOL/L (ref 22–26)
HCO3 BLD-SCNC: 18.8 MMOL/L (ref 22–26)
HCO3 BLD-SCNC: 19.2 MMOL/L (ref 22–26)
HCO3 BLD-SCNC: 19.9 MMOL/L (ref 22–26)
HCO3 BLD-SCNC: 21.8 MMOL/L (ref 22–26)
HCO3 BLD-SCNC: 23.2 MMOL/L (ref 22–26)
HCT VFR BLD AUTO: 22.5 % (ref 36–48)
HCT VFR BLD AUTO: 23.5 % (ref 36–48)
HCT VFR BLD AUTO: 23.5 % (ref 36–48)
HCT VFR BLD AUTO: 23.9 % (ref 36–48)
HCT VFR BLD AUTO: 24.1 % (ref 36–48)
HCT VFR BLD AUTO: 24.3 % (ref 36–48)
HCT VFR BLD AUTO: 24.5 % (ref 36–48)
HCT VFR BLD AUTO: 24.5 % (ref 36–48)
HCT VFR BLD AUTO: 24.6 % (ref 36–48)
HCT VFR BLD AUTO: 25.1 % (ref 36–48)
HCT VFR BLD AUTO: 25.2 % (ref 36–48)
HCT VFR BLD AUTO: 25.4 % (ref 36–48)
HCT VFR BLD AUTO: 25.6 % (ref 36–48)
HCT VFR BLD AUTO: 25.7 % (ref 36–48)
HCT VFR BLD AUTO: 25.8 % (ref 36–48)
HCT VFR BLD AUTO: 27.3 % (ref 36–48)
HCT VFR BLD AUTO: 27.4 % (ref 36–48)
HCT VFR BLD AUTO: 27.6 % (ref 36–48)
HCT VFR BLD AUTO: 29 % (ref 36–48)
HCT VFR BLD AUTO: 29.7 % (ref 36–48)
HCT VFR BLD AUTO: 30.5 % (ref 36–48)
HCT VFR BLD AUTO: 30.6 % (ref 36–48)
HCT VFR BLD AUTO: 30.7 % (ref 36–48)
HCT VFR BLD AUTO: 31.2 % (ref 36–48)
HCT VFR BLD AUTO: 31.4 % (ref 36–48)
HCT VFR BLD AUTO: 33 % (ref 36–48)
HCT VFR BLD AUTO: 33.1 % (ref 36–48)
HCT VFR BLD AUTO: 36.1 % (ref 36–48)
HGB BLD-MCNC: 10 G/DL (ref 13–16)
HGB BLD-MCNC: 10 G/DL (ref 13–16)
HGB BLD-MCNC: 10.1 G/DL (ref 13–16)
HGB BLD-MCNC: 10.4 G/DL (ref 13–16)
HGB BLD-MCNC: 10.7 G/DL (ref 13–16)
HGB BLD-MCNC: 11.1 G/DL (ref 13–16)
HGB BLD-MCNC: 12 G/DL (ref 13–16)
HGB BLD-MCNC: 7.3 G/DL (ref 13–16)
HGB BLD-MCNC: 7.5 G/DL (ref 13–16)
HGB BLD-MCNC: 7.7 G/DL (ref 13–16)
HGB BLD-MCNC: 7.8 G/DL (ref 13–16)
HGB BLD-MCNC: 7.9 G/DL (ref 13–16)
HGB BLD-MCNC: 8 G/DL (ref 13–16)
HGB BLD-MCNC: 8.2 G/DL (ref 13–16)
HGB BLD-MCNC: 8.3 G/DL (ref 13–16)
HGB BLD-MCNC: 8.5 G/DL (ref 13–16)
HGB BLD-MCNC: 8.6 G/DL (ref 13–16)
HGB BLD-MCNC: 8.6 G/DL (ref 13–16)
HGB BLD-MCNC: 8.8 G/DL (ref 13–16)
HGB BLD-MCNC: 9.2 G/DL (ref 13–16)
HGB BLD-MCNC: 9.6 G/DL (ref 13–16)
HGB BLD-MCNC: 9.7 G/DL (ref 13–16)
HGB UR QL STRIP: ABNORMAL
HGB UR QL STRIP: NEGATIVE
INR PPP: 1.2 (ref 0.8–1.2)
INR PPP: 1.4 (ref 0.8–1.2)
INR PPP: 1.4 (ref 0.8–1.2)
INR PPP: 1.6 (ref 0.8–1.2)
INSPIRATION.DURATION SETTING TIME VENT: 1 SEC
INSPIRATION.DURATION SETTING TIME VENT: 1.71 SEC
KETONES UR QL STRIP.AUTO: NEGATIVE MG/DL
KETONES UR QL STRIP.AUTO: NEGATIVE MG/DL
LACTATE SERPL-SCNC: 0.8 MMOL/L (ref 0.4–2)
LACTATE SERPL-SCNC: 1.3 MMOL/L (ref 0.4–2)
LDH FLD L TO P-CCNC: 143 U/L
LEUKOCYTE ESTERASE UR QL STRIP.AUTO: ABNORMAL
LEUKOCYTE ESTERASE UR QL STRIP.AUTO: NEGATIVE
LIPASE SERPL-CCNC: 186 U/L (ref 73–393)
LYMPHOCYTES # BLD AUTO: 10 % (ref 21–52)
LYMPHOCYTES # BLD AUTO: 3 % (ref 20–51)
LYMPHOCYTES # BLD AUTO: 3 % (ref 21–52)
LYMPHOCYTES # BLD AUTO: 4 % (ref 21–52)
LYMPHOCYTES # BLD AUTO: 5 % (ref 21–52)
LYMPHOCYTES # BLD AUTO: 6 % (ref 20–51)
LYMPHOCYTES # BLD AUTO: 6 % (ref 21–52)
LYMPHOCYTES # BLD AUTO: 7 % (ref 21–52)
LYMPHOCYTES # BLD AUTO: 8 % (ref 21–52)
LYMPHOCYTES # BLD AUTO: 8 % (ref 21–52)
LYMPHOCYTES # BLD AUTO: 9 % (ref 21–52)
LYMPHOCYTES # BLD: 0.3 K/UL (ref 0.9–3.6)
LYMPHOCYTES # BLD: 0.4 K/UL (ref 0.9–3.6)
LYMPHOCYTES # BLD: 0.5 K/UL (ref 0.8–3.5)
LYMPHOCYTES # BLD: 0.5 K/UL (ref 0.9–3.6)
LYMPHOCYTES # BLD: 0.6 K/UL (ref 0.8–3.5)
LYMPHOCYTES # BLD: 0.6 K/UL (ref 0.9–3.6)
LYMPHOCYTES # BLD: 0.9 K/UL (ref 0.9–3.6)
LYMPHOCYTES # BLD: 1.1 K/UL (ref 0.9–3.6)
LYMPHOCYTES # BLD: 1.2 K/UL (ref 0.9–3.6)
LYMPHOCYTES # BLD: 1.3 K/UL (ref 0.9–3.6)
LYMPHOCYTES NFR FLD: 21 %
MACROPHAGES NFR FLD: 13 %
MAGNESIUM SERPL-MCNC: 1.8 MG/DL (ref 1.8–2.4)
MAGNESIUM SERPL-MCNC: 1.9 MG/DL (ref 1.8–2.4)
MAGNESIUM SERPL-MCNC: 2 MG/DL (ref 1.8–2.4)
MAGNESIUM SERPL-MCNC: 2.2 MG/DL (ref 1.8–2.4)
MAGNESIUM SERPL-MCNC: 2.2 MG/DL (ref 1.8–2.4)
MAGNESIUM SERPL-MCNC: 2.3 MG/DL (ref 1.8–2.4)
MAGNESIUM SERPL-MCNC: 2.4 MG/DL (ref 1.8–2.4)
MAGNESIUM SERPL-MCNC: 2.7 MG/DL (ref 1.8–2.4)
MCH RBC QN AUTO: 27.1 PG (ref 24–34)
MCH RBC QN AUTO: 27.4 PG (ref 24–34)
MCH RBC QN AUTO: 27.5 PG (ref 24–34)
MCH RBC QN AUTO: 27.6 PG (ref 24–34)
MCH RBC QN AUTO: 27.6 PG (ref 24–34)
MCH RBC QN AUTO: 27.7 PG (ref 24–34)
MCH RBC QN AUTO: 27.8 PG (ref 24–34)
MCH RBC QN AUTO: 27.9 PG (ref 24–34)
MCH RBC QN AUTO: 28 PG (ref 24–34)
MCH RBC QN AUTO: 28.1 PG (ref 24–34)
MCH RBC QN AUTO: 28.1 PG (ref 24–34)
MCH RBC QN AUTO: 28.2 PG (ref 24–34)
MCH RBC QN AUTO: 28.4 PG (ref 24–34)
MCH RBC QN AUTO: 28.5 PG (ref 24–34)
MCHC RBC AUTO-ENTMCNC: 31.1 G/DL (ref 31–37)
MCHC RBC AUTO-ENTMCNC: 31.2 G/DL (ref 31–37)
MCHC RBC AUTO-ENTMCNC: 31.5 G/DL (ref 31–37)
MCHC RBC AUTO-ENTMCNC: 31.7 G/DL (ref 31–37)
MCHC RBC AUTO-ENTMCNC: 31.7 G/DL (ref 31–37)
MCHC RBC AUTO-ENTMCNC: 31.9 G/DL (ref 31–37)
MCHC RBC AUTO-ENTMCNC: 32 G/DL (ref 31–37)
MCHC RBC AUTO-ENTMCNC: 32 G/DL (ref 31–37)
MCHC RBC AUTO-ENTMCNC: 32.1 G/DL (ref 31–37)
MCHC RBC AUTO-ENTMCNC: 32.1 G/DL (ref 31–37)
MCHC RBC AUTO-ENTMCNC: 32.2 G/DL (ref 31–37)
MCHC RBC AUTO-ENTMCNC: 32.3 G/DL (ref 31–37)
MCHC RBC AUTO-ENTMCNC: 32.5 G/DL (ref 31–37)
MCHC RBC AUTO-ENTMCNC: 32.5 G/DL (ref 31–37)
MCHC RBC AUTO-ENTMCNC: 32.7 G/DL (ref 31–37)
MCHC RBC AUTO-ENTMCNC: 32.8 G/DL (ref 31–37)
MCHC RBC AUTO-ENTMCNC: 32.9 G/DL (ref 31–37)
MCHC RBC AUTO-ENTMCNC: 32.9 G/DL (ref 31–37)
MCHC RBC AUTO-ENTMCNC: 33.1 G/DL (ref 31–37)
MCHC RBC AUTO-ENTMCNC: 33.2 G/DL (ref 31–37)
MCHC RBC AUTO-ENTMCNC: 33.5 G/DL (ref 31–37)
MCV RBC AUTO: 84.2 FL (ref 74–97)
MCV RBC AUTO: 84.2 FL (ref 74–97)
MCV RBC AUTO: 84.4 FL (ref 74–97)
MCV RBC AUTO: 84.5 FL (ref 74–97)
MCV RBC AUTO: 84.9 FL (ref 74–97)
MCV RBC AUTO: 85.4 FL (ref 74–97)
MCV RBC AUTO: 85.9 FL (ref 74–97)
MCV RBC AUTO: 86 FL (ref 74–97)
MCV RBC AUTO: 86.4 FL (ref 74–97)
MCV RBC AUTO: 86.5 FL (ref 74–97)
MCV RBC AUTO: 86.7 FL (ref 74–97)
MCV RBC AUTO: 86.8 FL (ref 74–97)
MCV RBC AUTO: 87.1 FL (ref 74–97)
MCV RBC AUTO: 87.3 FL (ref 74–97)
MCV RBC AUTO: 87.4 FL (ref 74–97)
MCV RBC AUTO: 87.5 FL (ref 74–97)
MCV RBC AUTO: 87.7 FL (ref 74–97)
MCV RBC AUTO: 87.8 FL (ref 74–97)
MCV RBC AUTO: 87.9 FL (ref 74–97)
MCV RBC AUTO: 88.1 FL (ref 74–97)
MCV RBC AUTO: 88.1 FL (ref 74–97)
MCV RBC AUTO: 89 FL (ref 74–97)
MONOCYTES # BLD: 0 K/UL (ref 0.05–1.2)
MONOCYTES # BLD: 0.1 K/UL (ref 0.05–1.2)
MONOCYTES # BLD: 0.2 K/UL (ref 0.05–1.2)
MONOCYTES # BLD: 0.3 K/UL (ref 0.05–1.2)
MONOCYTES # BLD: 0.3 K/UL (ref 0.05–1.2)
MONOCYTES # BLD: 0.4 K/UL (ref 0.05–1.2)
MONOCYTES # BLD: 0.4 K/UL (ref 0.05–1.2)
MONOCYTES # BLD: 0.4 K/UL (ref 0–1)
MONOCYTES # BLD: 0.5 K/UL (ref 0.05–1.2)
MONOCYTES # BLD: 0.6 K/UL (ref 0.05–1.2)
MONOCYTES # BLD: 0.7 K/UL (ref 0–1)
MONOCYTES # BLD: 0.8 K/UL (ref 0.05–1.2)
MONOCYTES NFR BLD AUTO: 0 % (ref 3–10)
MONOCYTES NFR BLD AUTO: 1 % (ref 3–10)
MONOCYTES NFR BLD AUTO: 13 % (ref 3–10)
MONOCYTES NFR BLD AUTO: 2 % (ref 3–10)
MONOCYTES NFR BLD AUTO: 3 % (ref 3–10)
MONOCYTES NFR BLD AUTO: 4 % (ref 2–9)
MONOCYTES NFR BLD AUTO: 4 % (ref 2–9)
MONOCYTES NFR BLD AUTO: 5 % (ref 3–10)
MONOCYTES NFR FLD: 12 %
NEUTS BAND # FLD: 0 %
NEUTS BAND NFR BLD MANUAL: 1 % (ref 0–5)
NEUTS SEG # BLD: 10.3 K/UL (ref 1.8–8)
NEUTS SEG # BLD: 12.2 K/UL (ref 1.8–8)
NEUTS SEG # BLD: 12.4 K/UL (ref 1.8–8)
NEUTS SEG # BLD: 12.8 K/UL (ref 1.8–8)
NEUTS SEG # BLD: 13.6 K/UL (ref 1.8–8)
NEUTS SEG # BLD: 15.8 K/UL (ref 1.8–8)
NEUTS SEG # BLD: 17.4 K/UL (ref 1.8–8)
NEUTS SEG # BLD: 19.2 K/UL (ref 1.8–8)
NEUTS SEG # BLD: 4.2 K/UL (ref 1.8–8)
NEUTS SEG # BLD: 6 K/UL (ref 1.8–8)
NEUTS SEG # BLD: 6.6 K/UL (ref 1.8–8)
NEUTS SEG # BLD: 7 K/UL (ref 1.8–8)
NEUTS SEG # BLD: 7 K/UL (ref 1.8–8)
NEUTS SEG # BLD: 7.2 K/UL (ref 1.8–8)
NEUTS SEG # BLD: 7.2 K/UL (ref 1.8–8)
NEUTS SEG # BLD: 7.3 K/UL (ref 1.8–8)
NEUTS SEG # BLD: 7.4 K/UL (ref 1.8–8)
NEUTS SEG # BLD: 7.6 K/UL (ref 1.8–8)
NEUTS SEG # BLD: 7.7 K/UL (ref 1.8–8)
NEUTS SEG # BLD: 7.9 K/UL (ref 1.8–8)
NEUTS SEG # BLD: 8.2 K/UL (ref 1.8–8)
NEUTS SEG # BLD: 8.2 K/UL (ref 1.8–8)
NEUTS SEG # BLD: 8.5 K/UL (ref 1.8–8)
NEUTS SEG # BLD: 8.7 K/UL (ref 1.8–8)
NEUTS SEG NFR BLD AUTO: 75 % (ref 40–73)
NEUTS SEG NFR BLD AUTO: 89 % (ref 42–75)
NEUTS SEG NFR BLD AUTO: 90 % (ref 40–73)
NEUTS SEG NFR BLD AUTO: 91 % (ref 40–73)
NEUTS SEG NFR BLD AUTO: 92 % (ref 40–73)
NEUTS SEG NFR BLD AUTO: 93 % (ref 40–73)
NEUTS SEG NFR BLD AUTO: 93 % (ref 42–75)
NEUTS SEG NFR BLD AUTO: 94 % (ref 40–73)
NEUTS SEG NFR FLD: 54 %
NITRITE UR QL STRIP.AUTO: NEGATIVE
NITRITE UR QL STRIP.AUTO: NEGATIVE
NUC CELL # FLD: 35 /CU MM
O2/TOTAL GAS SETTING VFR VENT: 0.3 %
O2/TOTAL GAS SETTING VFR VENT: 0.35 %
O2/TOTAL GAS SETTING VFR VENT: 0.4 %
O2/TOTAL GAS SETTING VFR VENT: 100 %
O2/TOTAL GAS SETTING VFR VENT: 100 %
O2/TOTAL GAS SETTING VFR VENT: 30 %
O2/TOTAL GAS SETTING VFR VENT: 60 %
P-R INTERVAL, ECG05: 222 MS
PCO2 BLD: 25.3 MMHG (ref 35–45)
PCO2 BLD: 25.8 MMHG (ref 35–45)
PCO2 BLD: 26.2 MMHG (ref 35–45)
PCO2 BLD: 30.4 MMHG (ref 35–45)
PCO2 BLD: 34 MMHG (ref 35–45)
PCO2 BLD: 38.5 MMHG (ref 35–45)
PCO2 BLD: 47.8 MMHG (ref 35–45)
PEEP RESPIRATORY: 5 CMH2O
PH BLD: 7.29 [PH] (ref 7.35–7.45)
PH BLD: 7.36 [PH] (ref 7.35–7.45)
PH BLD: 7.37 [PH] (ref 7.35–7.45)
PH BLD: 7.4 [PH] (ref 7.35–7.45)
PH BLD: 7.41 [PH] (ref 7.35–7.45)
PH BLD: 7.43 [PH] (ref 7.35–7.45)
PH BLD: 7.47 [PH] (ref 7.35–7.45)
PH UR STRIP: 5 [PH] (ref 5–8)
PH UR STRIP: 5 [PH] (ref 5–8)
PHOSPHATE SERPL-MCNC: 2 MG/DL (ref 2.5–4.9)
PHOSPHATE SERPL-MCNC: 2.6 MG/DL (ref 2.5–4.9)
PHOSPHATE SERPL-MCNC: 3.3 MG/DL (ref 2.5–4.9)
PHOSPHATE SERPL-MCNC: 3.5 MG/DL (ref 2.5–4.9)
PHOSPHATE SERPL-MCNC: 4.7 MG/DL (ref 2.5–4.9)
PHOSPHATE SERPL-MCNC: 4.8 MG/DL (ref 2.5–4.9)
PHOSPHATE SERPL-MCNC: 5 MG/DL (ref 2.5–4.9)
PHOSPHATE SERPL-MCNC: 5 MG/DL (ref 2.5–4.9)
PHOSPHATE SERPL-MCNC: 5.1 MG/DL (ref 2.5–4.9)
PHOSPHATE SERPL-MCNC: 5.1 MG/DL (ref 2.5–4.9)
PHOSPHATE SERPL-MCNC: 5.3 MG/DL (ref 2.5–4.9)
PHOSPHATE SERPL-MCNC: 5.5 MG/DL (ref 2.5–4.9)
PHOSPHATE SERPL-MCNC: 5.6 MG/DL (ref 2.5–4.9)
PHOSPHATE SERPL-MCNC: 5.7 MG/DL (ref 2.5–4.9)
PHOSPHATE SERPL-MCNC: 5.9 MG/DL (ref 2.5–4.9)
PHOSPHATE SERPL-MCNC: 6.2 MG/DL (ref 2.5–4.9)
PHOSPHATE SERPL-MCNC: 6.3 MG/DL (ref 2.5–4.9)
PHOSPHATE SERPL-MCNC: 6.5 MG/DL (ref 2.5–4.9)
PHOSPHATE SERPL-MCNC: 6.9 MG/DL (ref 2.5–4.9)
PHOSPHATE SERPL-MCNC: 7.1 MG/DL (ref 2.5–4.9)
PIP ISTAT,IPIP: 18
PLATELET # BLD AUTO: 236 K/UL (ref 135–420)
PLATELET # BLD AUTO: 243 K/UL (ref 135–420)
PLATELET # BLD AUTO: 245 K/UL (ref 135–420)
PLATELET # BLD AUTO: 245 K/UL (ref 135–420)
PLATELET # BLD AUTO: 248 K/UL (ref 135–420)
PLATELET # BLD AUTO: 259 K/UL (ref 135–420)
PLATELET # BLD AUTO: 262 K/UL (ref 135–420)
PLATELET # BLD AUTO: 262 K/UL (ref 135–420)
PLATELET # BLD AUTO: 267 K/UL (ref 135–420)
PLATELET # BLD AUTO: 270 K/UL (ref 135–420)
PLATELET # BLD AUTO: 278 K/UL (ref 135–420)
PLATELET # BLD AUTO: 281 K/UL (ref 135–420)
PLATELET # BLD AUTO: 292 K/UL (ref 135–420)
PLATELET # BLD AUTO: 295 K/UL (ref 135–420)
PLATELET # BLD AUTO: 298 K/UL (ref 135–420)
PLATELET # BLD AUTO: 304 K/UL (ref 135–420)
PLATELET # BLD AUTO: 323 K/UL (ref 135–420)
PLATELET # BLD AUTO: 331 K/UL (ref 135–420)
PLATELET # BLD AUTO: 332 K/UL (ref 135–420)
PLATELET # BLD AUTO: 352 K/UL (ref 135–420)
PLATELET # BLD AUTO: 362 K/UL (ref 135–420)
PLATELET # BLD AUTO: 366 K/UL (ref 135–420)
PLATELET # BLD AUTO: 371 K/UL (ref 135–420)
PLATELET # BLD AUTO: 385 K/UL (ref 135–420)
PLATELET # BLD AUTO: 387 K/UL (ref 135–420)
PLATELET COMMENTS,PCOM: ABNORMAL
PMV BLD AUTO: 10 FL (ref 9.2–11.8)
PMV BLD AUTO: 10.2 FL (ref 9.2–11.8)
PMV BLD AUTO: 10.3 FL (ref 9.2–11.8)
PMV BLD AUTO: 10.5 FL (ref 9.2–11.8)
PMV BLD AUTO: 10.6 FL (ref 9.2–11.8)
PMV BLD AUTO: 10.7 FL (ref 9.2–11.8)
PMV BLD AUTO: 10.8 FL (ref 9.2–11.8)
PMV BLD AUTO: 10.9 FL (ref 9.2–11.8)
PMV BLD AUTO: 10.9 FL (ref 9.2–11.8)
PMV BLD AUTO: 11 FL (ref 9.2–11.8)
PMV BLD AUTO: 11.1 FL (ref 9.2–11.8)
PMV BLD AUTO: 11.1 FL (ref 9.2–11.8)
PMV BLD AUTO: 11.2 FL (ref 9.2–11.8)
PMV BLD AUTO: 11.3 FL (ref 9.2–11.8)
PMV BLD AUTO: 11.4 FL (ref 9.2–11.8)
PMV BLD AUTO: 11.4 FL (ref 9.2–11.8)
PMV BLD AUTO: 9.5 FL (ref 9.2–11.8)
PMV BLD AUTO: 9.6 FL (ref 9.2–11.8)
PO2 BLD: 120 MMHG (ref 80–100)
PO2 BLD: 135 MMHG (ref 80–100)
PO2 BLD: 147 MMHG (ref 80–100)
PO2 BLD: 76 MMHG (ref 80–100)
PO2 BLD: 82 MMHG (ref 80–100)
PO2 BLD: 82 MMHG (ref 80–100)
PO2 BLD: 99 MMHG (ref 80–100)
POTASSIUM SERPL-SCNC: 2.8 MMOL/L (ref 3.5–5.5)
POTASSIUM SERPL-SCNC: 2.9 MMOL/L (ref 3.5–5.5)
POTASSIUM SERPL-SCNC: 3.1 MMOL/L (ref 3.5–5.5)
POTASSIUM SERPL-SCNC: 3.3 MMOL/L (ref 3.5–5.5)
POTASSIUM SERPL-SCNC: 3.4 MMOL/L (ref 3.5–5.5)
POTASSIUM SERPL-SCNC: 3.4 MMOL/L (ref 3.5–5.5)
POTASSIUM SERPL-SCNC: 3.5 MMOL/L (ref 3.5–5.5)
POTASSIUM SERPL-SCNC: 3.5 MMOL/L (ref 3.5–5.5)
POTASSIUM SERPL-SCNC: 3.6 MMOL/L (ref 3.5–5.5)
POTASSIUM SERPL-SCNC: 3.6 MMOL/L (ref 3.5–5.5)
POTASSIUM SERPL-SCNC: 3.7 MMOL/L (ref 3.5–5.5)
POTASSIUM SERPL-SCNC: 3.8 MMOL/L (ref 3.5–5.5)
POTASSIUM SERPL-SCNC: 3.9 MMOL/L (ref 3.5–5.5)
POTASSIUM SERPL-SCNC: 4 MMOL/L (ref 3.5–5.5)
POTASSIUM SERPL-SCNC: 4 MMOL/L (ref 3.5–5.5)
POTASSIUM SERPL-SCNC: 4.1 MMOL/L (ref 3.5–5.5)
POTASSIUM SERPL-SCNC: 4.1 MMOL/L (ref 3.5–5.5)
POTASSIUM SERPL-SCNC: 4.2 MMOL/L (ref 3.5–5.5)
POTASSIUM SERPL-SCNC: 4.8 MMOL/L (ref 3.5–5.5)
POTASSIUM SERPL-SCNC: 4.8 MMOL/L (ref 3.5–5.5)
POTASSIUM SERPL-SCNC: 5 MMOL/L (ref 3.5–5.5)
POTASSIUM SERPL-SCNC: 5.2 MMOL/L (ref 3.5–5.5)
POTASSIUM SERPL-SCNC: 5.3 MMOL/L (ref 3.5–5.5)
POTASSIUM SERPL-SCNC: 5.3 MMOL/L (ref 3.5–5.5)
POTASSIUM SERPL-SCNC: 5.9 MMOL/L (ref 3.5–5.5)
POTASSIUM SERPL-SCNC: 6 MMOL/L (ref 3.5–5.5)
POTASSIUM UR-SCNC: 61 MMOL/L (ref 12–62)
PRESSURE SUPPORT SETTING VENT: 7 CMH2O
PROT FLD-MCNC: 2.1 G/DL
PROT SERPL-MCNC: 4.2 G/DL (ref 6.4–8.2)
PROT SERPL-MCNC: 5 G/DL (ref 6.4–8.2)
PROT SERPL-MCNC: 5.1 G/DL (ref 6.4–8.2)
PROT SERPL-MCNC: 7 G/DL (ref 6.4–8.2)
PROT UR STRIP-MCNC: 30 MG/DL
PROT UR STRIP-MCNC: NEGATIVE MG/DL
PROTHROMBIN TIME: 15.1 SEC (ref 11.5–15.2)
PROTHROMBIN TIME: 16.7 SEC (ref 11.5–15.2)
PROTHROMBIN TIME: 16.8 SEC (ref 11.5–15.2)
PROTHROMBIN TIME: 18.3 SEC (ref 11.5–15.2)
Q-T INTERVAL, ECG07: 340 MS
Q-T INTERVAL, ECG07: 342 MS
Q-T INTERVAL, ECG07: 344 MS
Q-T INTERVAL, ECG07: 352 MS
Q-T INTERVAL, ECG07: 472 MS
QRS DURATION, ECG06: 110 MS
QRS DURATION, ECG06: 132 MS
QRS DURATION, ECG06: 136 MS
QRS DURATION, ECG06: 138 MS
QRS DURATION, ECG06: 140 MS
QTC CALCULATION (BEZET), ECG08: 467 MS
QTC CALCULATION (BEZET), ECG08: 474 MS
QTC CALCULATION (BEZET), ECG08: 475 MS
QTC CALCULATION (BEZET), ECG08: 480 MS
QTC CALCULATION (BEZET), ECG08: 513 MS
RBC # BLD AUTO: 2.71 M/UL (ref 4.7–5.5)
RBC # BLD AUTO: 2.79 M/UL (ref 4.7–5.5)
RBC # BLD AUTO: 2.79 M/UL (ref 4.7–5.5)
RBC # BLD AUTO: 2.83 M/UL (ref 4.7–5.5)
RBC # BLD AUTO: 2.85 M/UL (ref 4.7–5.5)
RBC # BLD AUTO: 2.85 M/UL (ref 4.7–5.5)
RBC # BLD AUTO: 2.86 M/UL (ref 4.7–5.5)
RBC # BLD AUTO: 2.88 M/UL (ref 4.7–5.5)
RBC # BLD AUTO: 2.92 M/UL (ref 4.7–5.5)
RBC # BLD AUTO: 2.93 M/UL (ref 4.7–5.5)
RBC # BLD AUTO: 2.95 M/UL (ref 4.7–5.5)
RBC # BLD AUTO: 2.95 M/UL (ref 4.7–5.5)
RBC # BLD AUTO: 2.96 M/UL (ref 4.7–5.5)
RBC # BLD AUTO: 2.98 M/UL (ref 4.7–5.5)
RBC # BLD AUTO: 2.99 M/UL (ref 4.7–5.5)
RBC # BLD AUTO: 3.01 M/UL (ref 4.7–5.5)
RBC # BLD AUTO: 3.1 M/UL (ref 4.7–5.5)
RBC # BLD AUTO: 3.12 M/UL (ref 4.7–5.5)
RBC # BLD AUTO: 3.14 M/UL (ref 4.7–5.5)
RBC # BLD AUTO: 3.32 M/UL (ref 4.7–5.5)
RBC # BLD AUTO: 3.4 M/UL (ref 4.7–5.5)
RBC # BLD AUTO: 3.44 M/UL (ref 4.7–5.5)
RBC # BLD AUTO: 3.54 M/UL (ref 4.7–5.5)
RBC # BLD AUTO: 3.55 M/UL (ref 4.7–5.5)
RBC # BLD AUTO: 3.55 M/UL (ref 4.7–5.5)
RBC # FLD: 3425 /CU MM
RBC #/AREA URNS HPF: ABNORMAL /HPF (ref 0–5)
RBC MORPH BLD: ABNORMAL
REPORTED DOSE,DOSE: 1000 UNITS
REPORTED DOSE,DOSE: NORMAL UNITS
REPORTED DOSE/TIME,TMG: 206
REPORTED DOSE/TIME,TMG: 500
SAO2 % BLD: 96 % (ref 92–97)
SAO2 % BLD: 96 % (ref 92–97)
SAO2 % BLD: 97 % (ref 92–97)
SAO2 % BLD: 98 % (ref 92–97)
SAO2 % BLD: 99 % (ref 92–97)
SERVICE CMNT-IMP: ABNORMAL
SERVICE CMNT-IMP: NORMAL
SODIUM SERPL-SCNC: 136 MMOL/L (ref 136–145)
SODIUM SERPL-SCNC: 137 MMOL/L (ref 136–145)
SODIUM SERPL-SCNC: 138 MMOL/L (ref 136–145)
SODIUM SERPL-SCNC: 139 MMOL/L (ref 136–145)
SODIUM SERPL-SCNC: 140 MMOL/L (ref 136–145)
SODIUM SERPL-SCNC: 140 MMOL/L (ref 136–145)
SODIUM SERPL-SCNC: 141 MMOL/L (ref 136–145)
SODIUM SERPL-SCNC: 143 MMOL/L (ref 136–145)
SODIUM SERPL-SCNC: 145 MMOL/L (ref 136–145)
SODIUM SERPL-SCNC: 146 MMOL/L (ref 136–145)
SODIUM SERPL-SCNC: 147 MMOL/L (ref 136–145)
SODIUM UR-SCNC: 15 MMOL/L (ref 20–110)
SP GR UR REFRACTOMETRY: 1.02 (ref 1–1.03)
SP GR UR REFRACTOMETRY: 1.02 (ref 1–1.03)
SPECIMEN EXP DATE BLD: NORMAL
SPECIMEN EXP DATE BLD: NORMAL
SPECIMEN SOURCE FLD: ABNORMAL
SPECIMEN SOURCE FLD: NORMAL
SPECIMEN TYPE: ABNORMAL
STATUS OF UNIT,%ST: NORMAL
T3FREE SERPL-MCNC: 0.9 PG/ML (ref 2.3–4.2)
T4 FREE SERPL-MCNC: 0.8 NG/DL (ref 0.7–1.5)
TOTAL RESP. RATE, ITRR: 19
TOTAL RESP. RATE, ITRR: 23
TOTAL RESP. RATE, ITRR: 26
TOTAL RESP. RATE, ITRR: 27
TOTAL RESP. RATE, ITRR: 28
TOTAL RESP. RATE, ITRR: 29
TOTAL RESP. RATE, ITRR: 40
TROPONIN I SERPL-MCNC: 0.14 NG/ML (ref 0–0.04)
TROPONIN I SERPL-MCNC: 0.14 NG/ML (ref 0–0.04)
TSH SERPL DL<=0.05 MIU/L-ACNC: 3.92 UIU/ML (ref 0.36–3.74)
TSH SERPL DL<=0.05 MIU/L-ACNC: 8.07 UIU/ML (ref 0.36–3.74)
UNIT DIVISION, %UDIV: 0
UROBILINOGEN UR QL STRIP.AUTO: 0.2 EU/DL (ref 0.2–1)
UROBILINOGEN UR QL STRIP.AUTO: 0.2 EU/DL (ref 0.2–1)
VANCOMYCIN TROUGH SERPL-MCNC: 16.2 UG/ML (ref 10–20)
VANCOMYCIN TROUGH SERPL-MCNC: 19.7 UG/ML (ref 10–20)
VENTILATION MODE VENT: ABNORMAL
VENTRICULAR RATE, ECG03: 111 BPM
VENTRICULAR RATE, ECG03: 112 BPM
VENTRICULAR RATE, ECG03: 117 BPM
VENTRICULAR RATE, ECG03: 135 BPM
VENTRICULAR RATE, ECG03: 61 BPM
VOLUME CONTROL PLUS IVLCP: YES
VT SETTING VENT: 500 ML
WBC # BLD AUTO: 11.3 K/UL (ref 4.6–13.2)
WBC # BLD AUTO: 12.7 K/UL (ref 4.6–13.2)
WBC # BLD AUTO: 13 K/UL (ref 4.6–13.2)
WBC # BLD AUTO: 13.7 K/UL (ref 4.6–13.2)
WBC # BLD AUTO: 14.2 K/UL (ref 4.6–13.2)
WBC # BLD AUTO: 14.6 K/UL (ref 4.6–13.2)
WBC # BLD AUTO: 17 K/UL (ref 4.6–13.2)
WBC # BLD AUTO: 18.6 K/UL (ref 4.6–13.2)
WBC # BLD AUTO: 20.9 K/UL (ref 4.6–13.2)
WBC # BLD AUTO: 5.6 K/UL (ref 4.6–13.2)
WBC # BLD AUTO: 6.6 K/UL (ref 4.6–13.2)
WBC # BLD AUTO: 7.3 K/UL (ref 4.6–13.2)
WBC # BLD AUTO: 7.6 K/UL (ref 4.6–13.2)
WBC # BLD AUTO: 7.7 K/UL (ref 4.6–13.2)
WBC # BLD AUTO: 7.8 K/UL (ref 4.6–13.2)
WBC # BLD AUTO: 7.8 K/UL (ref 4.6–13.2)
WBC # BLD AUTO: 8 K/UL (ref 4.6–13.2)
WBC # BLD AUTO: 8.3 K/UL (ref 4.6–13.2)
WBC # BLD AUTO: 8.3 K/UL (ref 4.6–13.2)
WBC # BLD AUTO: 8.4 K/UL (ref 4.6–13.2)
WBC # BLD AUTO: 8.7 K/UL (ref 4.6–13.2)
WBC # BLD AUTO: 8.8 K/UL (ref 4.6–13.2)
WBC # BLD AUTO: 9 K/UL (ref 4.6–13.2)
WBC # BLD AUTO: 9.5 K/UL (ref 4.6–13.2)
WBC # BLD AUTO: 9.6 K/UL (ref 4.6–13.2)
WBC URNS QL MICRO: ABNORMAL /HPF (ref 0–4)

## 2017-01-01 PROCEDURE — 74011250636 HC RX REV CODE- 250/636: Performed by: HOSPITALIST

## 2017-01-01 PROCEDURE — 80048 BASIC METABOLIC PNL TOTAL CA: CPT | Performed by: NURSE PRACTITIONER

## 2017-01-01 PROCEDURE — 96374 THER/PROPH/DIAG INJ IV PUSH: CPT

## 2017-01-01 PROCEDURE — 71010 XR CHEST PORT: CPT

## 2017-01-01 PROCEDURE — 74011250636 HC RX REV CODE- 250/636: Performed by: PHYSICIAN ASSISTANT

## 2017-01-01 PROCEDURE — 74011250636 HC RX REV CODE- 250/636: Performed by: SPECIALIST

## 2017-01-01 PROCEDURE — C9113 INJ PANTOPRAZOLE SODIUM, VIA: HCPCS | Performed by: HOSPITALIST

## 2017-01-01 PROCEDURE — 73564 X-RAY EXAM KNEE 4 OR MORE: CPT

## 2017-01-01 PROCEDURE — 74011250636 HC RX REV CODE- 250/636: Performed by: INTERNAL MEDICINE

## 2017-01-01 PROCEDURE — 71010 XR CHEST SNGL V: CPT

## 2017-01-01 PROCEDURE — 77030020263 HC SOL INJ SOD CL0.9% LFCR 1000ML

## 2017-01-01 PROCEDURE — 74011250637 HC RX REV CODE- 250/637: Performed by: INTERNAL MEDICINE

## 2017-01-01 PROCEDURE — 84100 ASSAY OF PHOSPHORUS: CPT | Performed by: HOSPITALIST

## 2017-01-01 PROCEDURE — 77030018702 HC CLP LIG TI TELE -A: Performed by: SPECIALIST

## 2017-01-01 PROCEDURE — 36592 COLLECT BLOOD FROM PICC: CPT

## 2017-01-01 PROCEDURE — 80048 BASIC METABOLIC PNL TOTAL CA: CPT | Performed by: INTERNAL MEDICINE

## 2017-01-01 PROCEDURE — 0DH63UZ INSERTION OF FEEDING DEVICE INTO STOMACH, PERCUTANEOUS APPROACH: ICD-10-PCS | Performed by: INTERNAL MEDICINE

## 2017-01-01 PROCEDURE — 82962 GLUCOSE BLOOD TEST: CPT

## 2017-01-01 PROCEDURE — 83735 ASSAY OF MAGNESIUM: CPT | Performed by: PHYSICIAN ASSISTANT

## 2017-01-01 PROCEDURE — 83735 ASSAY OF MAGNESIUM: CPT | Performed by: SURGERY

## 2017-01-01 PROCEDURE — 74011250636 HC RX REV CODE- 250/636

## 2017-01-01 PROCEDURE — 65660000000 HC RM CCU STEPDOWN

## 2017-01-01 PROCEDURE — 74011250637 HC RX REV CODE- 250/637: Performed by: PHYSICIAN ASSISTANT

## 2017-01-01 PROCEDURE — 82803 BLOOD GASES ANY COMBINATION: CPT

## 2017-01-01 PROCEDURE — 92610 EVALUATE SWALLOWING FUNCTION: CPT

## 2017-01-01 PROCEDURE — 76937 US GUIDE VASCULAR ACCESS: CPT | Performed by: INTERNAL MEDICINE

## 2017-01-01 PROCEDURE — 36600 WITHDRAWAL OF ARTERIAL BLOOD: CPT

## 2017-01-01 PROCEDURE — 77030008477 HC STYL SATN SLP COVD -A: Performed by: ANESTHESIOLOGY

## 2017-01-01 PROCEDURE — 77030020262 HC SOL INJ SOD CL 0.9% 100ML

## 2017-01-01 PROCEDURE — 77030010293 HC STPLR INT TI J&J -B: Performed by: SPECIALIST

## 2017-01-01 PROCEDURE — 74011000258 HC RX REV CODE- 258: Performed by: HOSPITALIST

## 2017-01-01 PROCEDURE — 85025 COMPLETE CBC W/AUTO DIFF WBC: CPT | Performed by: HOSPITALIST

## 2017-01-01 PROCEDURE — 84100 ASSAY OF PHOSPHORUS: CPT | Performed by: INTERNAL MEDICINE

## 2017-01-01 PROCEDURE — 74011000258 HC RX REV CODE- 258: Performed by: PHYSICIAN ASSISTANT

## 2017-01-01 PROCEDURE — 31500 INSERT EMERGENCY AIRWAY: CPT

## 2017-01-01 PROCEDURE — 82330 ASSAY OF CALCIUM: CPT | Performed by: INTERNAL MEDICINE

## 2017-01-01 PROCEDURE — 83735 ASSAY OF MAGNESIUM: CPT | Performed by: INTERNAL MEDICINE

## 2017-01-01 PROCEDURE — 99024 POSTOP FOLLOW-UP VISIT: CPT | Performed by: PHYSICIAN ASSISTANT

## 2017-01-01 PROCEDURE — 80202 ASSAY OF VANCOMYCIN: CPT | Performed by: INTERNAL MEDICINE

## 2017-01-01 PROCEDURE — 65610000006 HC RM INTENSIVE CARE

## 2017-01-01 PROCEDURE — 74011250637 HC RX REV CODE- 250/637: Performed by: HOSPITALIST

## 2017-01-01 PROCEDURE — 74011000250 HC RX REV CODE- 250: Performed by: HOSPITALIST

## 2017-01-01 PROCEDURE — 77030018521 HC STPLR ENDOSCOPIC J&J -C: Performed by: SPECIALIST

## 2017-01-01 PROCEDURE — 74011250637 HC RX REV CODE- 250/637: Performed by: FAMILY MEDICINE

## 2017-01-01 PROCEDURE — 77010033678 HC OXYGEN DAILY

## 2017-01-01 PROCEDURE — C9113 INJ PANTOPRAZOLE SODIUM, VIA: HCPCS | Performed by: INTERNAL MEDICINE

## 2017-01-01 PROCEDURE — 77030009978 HC RELD STPLR TCR J&J -B: Performed by: SPECIALIST

## 2017-01-01 PROCEDURE — 84132 ASSAY OF SERUM POTASSIUM: CPT | Performed by: NURSE PRACTITIONER

## 2017-01-01 PROCEDURE — 84132 ASSAY OF SERUM POTASSIUM: CPT | Performed by: INTERNAL MEDICINE

## 2017-01-01 PROCEDURE — 77030011256 HC DRSG MEPILEX <16IN NO BORD MOLN -A

## 2017-01-01 PROCEDURE — 77030032490 HC SLV COMPR SCD KNE COVD -B: Performed by: SPECIALIST

## 2017-01-01 PROCEDURE — P9016 RBC LEUKOCYTES REDUCED: HCPCS | Performed by: NURSE ANESTHETIST, CERTIFIED REGISTERED

## 2017-01-01 PROCEDURE — 36415 COLL VENOUS BLD VENIPUNCTURE: CPT | Performed by: INTERNAL MEDICINE

## 2017-01-01 PROCEDURE — 93005 ELECTROCARDIOGRAM TRACING: CPT

## 2017-01-01 PROCEDURE — 87070 CULTURE OTHR SPECIMN AEROBIC: CPT | Performed by: INTERNAL MEDICINE

## 2017-01-01 PROCEDURE — 83735 ASSAY OF MAGNESIUM: CPT | Performed by: HOSPITALIST

## 2017-01-01 PROCEDURE — 97530 THERAPEUTIC ACTIVITIES: CPT

## 2017-01-01 PROCEDURE — 74011000250 HC RX REV CODE- 250: Performed by: INTERNAL MEDICINE

## 2017-01-01 PROCEDURE — 77030020253 HC SOL INJ D545NS .05 DEX .45 SAL

## 2017-01-01 PROCEDURE — 97535 SELF CARE MNGMENT TRAINING: CPT

## 2017-01-01 PROCEDURE — 65270000029 HC RM PRIVATE

## 2017-01-01 PROCEDURE — 93306 TTE W/DOPPLER COMPLETE: CPT

## 2017-01-01 PROCEDURE — 77030020245 HC SOL INJ 5% D/0.9%NACL

## 2017-01-01 PROCEDURE — P9047 ALBUMIN (HUMAN), 25%, 50ML: HCPCS | Performed by: INTERNAL MEDICINE

## 2017-01-01 PROCEDURE — 74011000258 HC RX REV CODE- 258: Performed by: INTERNAL MEDICINE

## 2017-01-01 PROCEDURE — 77030018846 HC SOL IRR STRL H20 ICUM -A

## 2017-01-01 PROCEDURE — 86920 COMPATIBILITY TEST SPIN: CPT | Performed by: NURSE ANESTHETIST, CERTIFIED REGISTERED

## 2017-01-01 PROCEDURE — 36415 COLL VENOUS BLD VENIPUNCTURE: CPT | Performed by: HOSPITALIST

## 2017-01-01 PROCEDURE — 87186 SC STD MICRODIL/AGAR DIL: CPT | Performed by: PHYSICIAN ASSISTANT

## 2017-01-01 PROCEDURE — 82550 ASSAY OF CK (CPK): CPT | Performed by: PHYSICIAN ASSISTANT

## 2017-01-01 PROCEDURE — 85025 COMPLETE CBC W/AUTO DIFF WBC: CPT | Performed by: NURSE PRACTITIONER

## 2017-01-01 PROCEDURE — 87077 CULTURE AEROBIC IDENTIFY: CPT | Performed by: PHYSICIAN ASSISTANT

## 2017-01-01 PROCEDURE — 0DB80ZZ EXCISION OF SMALL INTESTINE, OPEN APPROACH: ICD-10-PCS | Performed by: SPECIALIST

## 2017-01-01 PROCEDURE — 92611 MOTION FLUOROSCOPY/SWALLOW: CPT

## 2017-01-01 PROCEDURE — 85025 COMPLETE CBC W/AUTO DIFF WBC: CPT | Performed by: INTERNAL MEDICINE

## 2017-01-01 PROCEDURE — 92526 ORAL FUNCTION THERAPY: CPT

## 2017-01-01 PROCEDURE — 85025 COMPLETE CBC W/AUTO DIFF WBC: CPT | Performed by: PHYSICIAN ASSISTANT

## 2017-01-01 PROCEDURE — 83735 ASSAY OF MAGNESIUM: CPT | Performed by: SPECIALIST

## 2017-01-01 PROCEDURE — 77030033263 HC DRSG MEPILEX 16-48IN BORD MOLN -B

## 2017-01-01 PROCEDURE — C1751 CATH, INF, PER/CENT/MIDLINE: HCPCS

## 2017-01-01 PROCEDURE — 83690 ASSAY OF LIPASE: CPT | Performed by: NURSE PRACTITIONER

## 2017-01-01 PROCEDURE — 77030010516 HC APPL HEMA CLP TELE -B: Performed by: SPECIALIST

## 2017-01-01 PROCEDURE — 77030031139 HC SUT VCRL2 J&J -A: Performed by: SPECIALIST

## 2017-01-01 PROCEDURE — 74011636637 HC RX REV CODE- 636/637: Performed by: HOSPITALIST

## 2017-01-01 PROCEDURE — 74011000258 HC RX REV CODE- 258: Performed by: FAMILY MEDICINE

## 2017-01-01 PROCEDURE — 74011000250 HC RX REV CODE- 250

## 2017-01-01 PROCEDURE — 5A1955Z RESPIRATORY VENTILATION, GREATER THAN 96 CONSECUTIVE HOURS: ICD-10-PCS | Performed by: INTERNAL MEDICINE

## 2017-01-01 PROCEDURE — 76450000000

## 2017-01-01 PROCEDURE — 74011000250 HC RX REV CODE- 250: Performed by: PHYSICIAN ASSISTANT

## 2017-01-01 PROCEDURE — 84157 ASSAY OF PROTEIN OTHER: CPT | Performed by: INTERNAL MEDICINE

## 2017-01-01 PROCEDURE — 74011250636 HC RX REV CODE- 250/636: Performed by: NURSE PRACTITIONER

## 2017-01-01 PROCEDURE — 97110 THERAPEUTIC EXERCISES: CPT

## 2017-01-01 PROCEDURE — 84133 ASSAY OF URINE POTASSIUM: CPT | Performed by: INTERNAL MEDICINE

## 2017-01-01 PROCEDURE — 74011000255 HC RX REV CODE- 255: Performed by: RADIOLOGY

## 2017-01-01 PROCEDURE — 77030013079 HC BLNKT BAIR HGGR 3M -A: Performed by: ANESTHESIOLOGY

## 2017-01-01 PROCEDURE — 87086 URINE CULTURE/COLONY COUNT: CPT | Performed by: PHYSICIAN ASSISTANT

## 2017-01-01 PROCEDURE — 80048 BASIC METABOLIC PNL TOTAL CA: CPT | Performed by: PHYSICIAN ASSISTANT

## 2017-01-01 PROCEDURE — 87070 CULTURE OTHR SPECIMN AEROBIC: CPT | Performed by: SPECIALIST

## 2017-01-01 PROCEDURE — 86920 COMPATIBILITY TEST SPIN: CPT | Performed by: NURSE PRACTITIONER

## 2017-01-01 PROCEDURE — 83615 LACTATE (LD) (LDH) ENZYME: CPT | Performed by: INTERNAL MEDICINE

## 2017-01-01 PROCEDURE — 85018 HEMOGLOBIN: CPT | Performed by: INTERNAL MEDICINE

## 2017-01-01 PROCEDURE — 74011000258 HC RX REV CODE- 258: Performed by: SPECIALIST

## 2017-01-01 PROCEDURE — 77030018846 HC SOL IRR STRL H20 ICUM -A: Performed by: SPECIALIST

## 2017-01-01 PROCEDURE — 74011250636 HC RX REV CODE- 250/636: Performed by: FAMILY MEDICINE

## 2017-01-01 PROCEDURE — 96372 THER/PROPH/DIAG INJ SC/IM: CPT

## 2017-01-01 PROCEDURE — 76060000034 HC ANESTHESIA 1.5 TO 2 HR: Performed by: SPECIALIST

## 2017-01-01 PROCEDURE — 99218 HC RM OBSERVATION: CPT

## 2017-01-01 PROCEDURE — 80076 HEPATIC FUNCTION PANEL: CPT | Performed by: NURSE PRACTITIONER

## 2017-01-01 PROCEDURE — 0BH17EZ INSERTION OF ENDOTRACHEAL AIRWAY INTO TRACHEA, VIA NATURAL OR ARTIFICIAL OPENING: ICD-10-PCS | Performed by: INTERNAL MEDICINE

## 2017-01-01 PROCEDURE — 82945 GLUCOSE OTHER FLUID: CPT | Performed by: INTERNAL MEDICINE

## 2017-01-01 PROCEDURE — 89051 BODY FLUID CELL COUNT: CPT | Performed by: INTERNAL MEDICINE

## 2017-01-01 PROCEDURE — 77030018836 HC SOL IRR NACL ICUM -A: Performed by: SPECIALIST

## 2017-01-01 PROCEDURE — 85027 COMPLETE CBC AUTOMATED: CPT | Performed by: SPECIALIST

## 2017-01-01 PROCEDURE — 94660 CPAP INITIATION&MGMT: CPT

## 2017-01-01 PROCEDURE — 86900 BLOOD TYPING SEROLOGIC ABO: CPT | Performed by: NURSE ANESTHETIST, CERTIFIED REGISTERED

## 2017-01-01 PROCEDURE — 85610 PROTHROMBIN TIME: CPT | Performed by: PHYSICIAN ASSISTANT

## 2017-01-01 PROCEDURE — 74011636637 HC RX REV CODE- 636/637: Performed by: INTERNAL MEDICINE

## 2017-01-01 PROCEDURE — 74176 CT ABD & PELVIS W/O CONTRAST: CPT

## 2017-01-01 PROCEDURE — 87086 URINE CULTURE/COLONY COUNT: CPT | Performed by: INTERNAL MEDICINE

## 2017-01-01 PROCEDURE — 94760 N-INVAS EAR/PLS OXIMETRY 1: CPT

## 2017-01-01 PROCEDURE — 77030018719 HC DRSG PTCH ANTIMIC J&J -A

## 2017-01-01 PROCEDURE — 43752 NASAL/OROGASTRIC W/TUBE PLMT: CPT

## 2017-01-01 PROCEDURE — 82570 ASSAY OF URINE CREATININE: CPT | Performed by: INTERNAL MEDICINE

## 2017-01-01 PROCEDURE — 74011000255 HC RX REV CODE- 255: Performed by: INTERNAL MEDICINE

## 2017-01-01 PROCEDURE — P9016 RBC LEUKOCYTES REDUCED: HCPCS | Performed by: NURSE PRACTITIONER

## 2017-01-01 PROCEDURE — 76210000016 HC OR PH I REC 1 TO 1.5 HR: Performed by: SPECIALIST

## 2017-01-01 PROCEDURE — 36415 COLL VENOUS BLD VENIPUNCTURE: CPT | Performed by: PHYSICIAN ASSISTANT

## 2017-01-01 PROCEDURE — 77030020256 HC SOL INJ NACL 0.9%  500ML

## 2017-01-01 PROCEDURE — 87070 CULTURE OTHR SPECIMN AEROBIC: CPT | Performed by: PHYSICIAN ASSISTANT

## 2017-01-01 PROCEDURE — 80053 COMPREHEN METABOLIC PANEL: CPT | Performed by: PHYSICIAN ASSISTANT

## 2017-01-01 PROCEDURE — 85730 THROMBOPLASTIN TIME PARTIAL: CPT | Performed by: INTERNAL MEDICINE

## 2017-01-01 PROCEDURE — 76010000162 HC OR TIME 1.5 TO 2 HR INTENSV-TIER 1: Performed by: SPECIALIST

## 2017-01-01 PROCEDURE — 85610 PROTHROMBIN TIME: CPT | Performed by: SPECIALIST

## 2017-01-01 PROCEDURE — 83605 ASSAY OF LACTIC ACID: CPT | Performed by: PHYSICIAN ASSISTANT

## 2017-01-01 PROCEDURE — 94003 VENT MGMT INPAT SUBQ DAY: CPT

## 2017-01-01 PROCEDURE — 77030020250 HC SOL INJ D 5% LFCR 1000ML BG LF

## 2017-01-01 PROCEDURE — 77030010545

## 2017-01-01 PROCEDURE — 82436 ASSAY OF URINE CHLORIDE: CPT | Performed by: INTERNAL MEDICINE

## 2017-01-01 PROCEDURE — 36415 COLL VENOUS BLD VENIPUNCTURE: CPT | Performed by: SPECIALIST

## 2017-01-01 PROCEDURE — 74230 X-RAY XM SWLNG FUNCJ C+: CPT

## 2017-01-01 PROCEDURE — 82330 ASSAY OF CALCIUM: CPT | Performed by: HOSPITALIST

## 2017-01-01 PROCEDURE — 77030032490 HC SLV COMPR SCD KNE COVD -B

## 2017-01-01 PROCEDURE — 77030002996 HC SUT SLK J&J -A: Performed by: SPECIALIST

## 2017-01-01 PROCEDURE — 77030011266 HC ELECTRD BLD INSL COVD -A: Performed by: SPECIALIST

## 2017-01-01 PROCEDURE — 84443 ASSAY THYROID STIM HORMONE: CPT | Performed by: NURSE PRACTITIONER

## 2017-01-01 PROCEDURE — 0WUF0JZ SUPPLEMENT ABDOMINAL WALL WITH SYNTHETIC SUBSTITUTE, OPEN APPROACH: ICD-10-PCS | Performed by: SPECIALIST

## 2017-01-01 PROCEDURE — 85730 THROMBOPLASTIN TIME PARTIAL: CPT | Performed by: PHYSICIAN ASSISTANT

## 2017-01-01 PROCEDURE — 51701 INSERT BLADDER CATHETER: CPT

## 2017-01-01 PROCEDURE — 77030008462 HC STPLR SKN PROX J&J -A: Performed by: SPECIALIST

## 2017-01-01 PROCEDURE — 74011636320 HC RX REV CODE- 636/320: Performed by: HOSPITALIST

## 2017-01-01 PROCEDURE — 84439 ASSAY OF FREE THYROXINE: CPT | Performed by: NURSE PRACTITIONER

## 2017-01-01 PROCEDURE — 96361 HYDRATE IV INFUSION ADD-ON: CPT

## 2017-01-01 PROCEDURE — 77030034850

## 2017-01-01 PROCEDURE — 77010033711 HC HIGH FLOW OXYGEN

## 2017-01-01 PROCEDURE — 80048 BASIC METABOLIC PNL TOTAL CA: CPT | Performed by: HOSPITALIST

## 2017-01-01 PROCEDURE — 77030027138 HC INCENT SPIROMETER -A

## 2017-01-01 PROCEDURE — 80053 COMPREHEN METABOLIC PANEL: CPT | Performed by: INTERNAL MEDICINE

## 2017-01-01 PROCEDURE — 84100 ASSAY OF PHOSPHORUS: CPT | Performed by: PHYSICIAN ASSISTANT

## 2017-01-01 PROCEDURE — 99285 EMERGENCY DEPT VISIT HI MDM: CPT

## 2017-01-01 PROCEDURE — 77030012058: Performed by: INTERNAL MEDICINE

## 2017-01-01 PROCEDURE — 77030008771 HC TU NG SALEM SUMP -A: Performed by: ANESTHESIOLOGY

## 2017-01-01 PROCEDURE — P9041 ALBUMIN (HUMAN),5%, 50ML: HCPCS

## 2017-01-01 PROCEDURE — 74011000250 HC RX REV CODE- 250: Performed by: FAMILY MEDICINE

## 2017-01-01 PROCEDURE — 87040 BLOOD CULTURE FOR BACTERIA: CPT | Performed by: PHYSICIAN ASSISTANT

## 2017-01-01 PROCEDURE — 97163 PT EVAL HIGH COMPLEX 45 MIN: CPT

## 2017-01-01 PROCEDURE — 74011000258 HC RX REV CODE- 258: Performed by: NURSE PRACTITIONER

## 2017-01-01 PROCEDURE — 77030003028 HC SUT VCRL J&J -A: Performed by: SPECIALIST

## 2017-01-01 PROCEDURE — 76040000019: Performed by: INTERNAL MEDICINE

## 2017-01-01 PROCEDURE — 77030020257 HC SOL INJ SOD CL 0.45% 1000ML BG

## 2017-01-01 PROCEDURE — 93308 TTE F-UP OR LMTD: CPT

## 2017-01-01 PROCEDURE — 97165 OT EVAL LOW COMPLEX 30 MIN: CPT

## 2017-01-01 PROCEDURE — 36430 TRANSFUSION BLD/BLD COMPNT: CPT

## 2017-01-01 PROCEDURE — 81003 URINALYSIS AUTO W/O SCOPE: CPT | Performed by: NURSE PRACTITIONER

## 2017-01-01 PROCEDURE — 77030005122 HC CATH GASTMY PEG BSC -B: Performed by: INTERNAL MEDICINE

## 2017-01-01 PROCEDURE — 77030008683 HC TU ET CUF COVD -A: Performed by: ANESTHESIOLOGY

## 2017-01-01 PROCEDURE — 82330 ASSAY OF CALCIUM: CPT | Performed by: SURGERY

## 2017-01-01 PROCEDURE — P9045 ALBUMIN (HUMAN), 5%, 250 ML: HCPCS | Performed by: PHYSICIAN ASSISTANT

## 2017-01-01 PROCEDURE — 88307 TISSUE EXAM BY PATHOLOGIST: CPT | Performed by: SPECIALIST

## 2017-01-01 PROCEDURE — 83036 HEMOGLOBIN GLYCOSYLATED A1C: CPT | Performed by: INTERNAL MEDICINE

## 2017-01-01 PROCEDURE — 77030009834 HC MSK O2 TRACH VYRM -A

## 2017-01-01 PROCEDURE — 96375 TX/PRO/DX INJ NEW DRUG ADDON: CPT

## 2017-01-01 PROCEDURE — 3E0336Z INTRODUCTION OF NUTRITIONAL SUBSTANCE INTO PERIPHERAL VEIN, PERCUTANEOUS APPROACH: ICD-10-PCS | Performed by: INTERNAL MEDICINE

## 2017-01-01 PROCEDURE — 93971 EXTREMITY STUDY: CPT

## 2017-01-01 PROCEDURE — 77030019908 HC STETH ESOPH SIMS -A: Performed by: ANESTHESIOLOGY

## 2017-01-01 PROCEDURE — 97162 PT EVAL MOD COMPLEX 30 MIN: CPT

## 2017-01-01 PROCEDURE — L4396 STATIC OR DYNAMI AFO PRE CST: HCPCS

## 2017-01-01 PROCEDURE — 51798 US URINE CAPACITY MEASURE: CPT

## 2017-01-01 PROCEDURE — 77030018720 HC DVC LIGSR ATLS COVD -E: Performed by: SPECIALIST

## 2017-01-01 PROCEDURE — 84100 ASSAY OF PHOSPHORUS: CPT | Performed by: SPECIALIST

## 2017-01-01 PROCEDURE — 80162 ASSAY OF DIGOXIN TOTAL: CPT | Performed by: INTERNAL MEDICINE

## 2017-01-01 PROCEDURE — 36569 INSJ PICC 5 YR+ W/O IMAGING: CPT | Performed by: INTERNAL MEDICINE

## 2017-01-01 PROCEDURE — 77030018836 HC SOL IRR NACL ICUM -A

## 2017-01-01 PROCEDURE — 80048 BASIC METABOLIC PNL TOTAL CA: CPT | Performed by: SPECIALIST

## 2017-01-01 PROCEDURE — 74011250636 HC RX REV CODE- 250/636: Performed by: SURGERY

## 2017-01-01 PROCEDURE — 81001 URINALYSIS AUTO W/SCOPE: CPT | Performed by: PHYSICIAN ASSISTANT

## 2017-01-01 PROCEDURE — 77030020365 HC SOL INJ SOD CL 0.9% 50ML

## 2017-01-01 PROCEDURE — 87075 CULTR BACTERIA EXCEPT BLOOD: CPT | Performed by: SPECIALIST

## 2017-01-01 PROCEDURE — 97164 PT RE-EVAL EST PLAN CARE: CPT

## 2017-01-01 PROCEDURE — 82330 ASSAY OF CALCIUM: CPT | Performed by: PHYSICIAN ASSISTANT

## 2017-01-01 PROCEDURE — 77030008771 HC TU NG SALEM SUMP -A

## 2017-01-01 PROCEDURE — 74011000250 HC RX REV CODE- 250: Performed by: ANESTHESIOLOGY

## 2017-01-01 PROCEDURE — 80202 ASSAY OF VANCOMYCIN: CPT | Performed by: HOSPITALIST

## 2017-01-01 PROCEDURE — 94002 VENT MGMT INPAT INIT DAY: CPT

## 2017-01-01 PROCEDURE — 77030004927 HC CATH ELECHEMSTAS BSC -C: Performed by: INTERNAL MEDICINE

## 2017-01-01 PROCEDURE — 86900 BLOOD TYPING SEROLOGIC ABO: CPT | Performed by: NURSE PRACTITIONER

## 2017-01-01 PROCEDURE — 84300 ASSAY OF URINE SODIUM: CPT | Performed by: INTERNAL MEDICINE

## 2017-01-01 PROCEDURE — 84443 ASSAY THYROID STIM HORMONE: CPT | Performed by: PHYSICIAN ASSISTANT

## 2017-01-01 PROCEDURE — 84481 FREE ASSAY (FT-3): CPT | Performed by: NURSE PRACTITIONER

## 2017-01-01 PROCEDURE — 76604 US EXAM CHEST: CPT

## 2017-01-01 PROCEDURE — 76775 US EXAM ABDO BACK WALL LIM: CPT

## 2017-01-01 PROCEDURE — 77030011943

## 2017-01-01 RX ORDER — HEPARIN SODIUM 5000 [USP'U]/ML
5000 INJECTION, SOLUTION INTRAVENOUS; SUBCUTANEOUS EVERY 12 HOURS
Status: DISCONTINUED | OUTPATIENT
Start: 2017-01-01 | End: 2017-01-01

## 2017-01-01 RX ORDER — CALCIUM GLUCONATE 94 MG/ML
INJECTION, SOLUTION INTRAVENOUS
Status: COMPLETED
Start: 2017-01-01 | End: 2017-01-01

## 2017-01-01 RX ORDER — LANOLIN ALCOHOL/MO/W.PET/CERES
1 CREAM (GRAM) TOPICAL
Status: DISCONTINUED | OUTPATIENT
Start: 2017-01-01 | End: 2017-01-01

## 2017-01-01 RX ORDER — INSULIN LISPRO 100 [IU]/ML
3 INJECTION, SOLUTION INTRAVENOUS; SUBCUTANEOUS EVERY 6 HOURS
Status: DISCONTINUED | OUTPATIENT
Start: 2017-01-01 | End: 2017-01-01

## 2017-01-01 RX ORDER — TAMSULOSIN HYDROCHLORIDE 0.4 MG/1
0.4 CAPSULE ORAL DAILY
Status: DISCONTINUED | OUTPATIENT
Start: 2017-01-01 | End: 2017-01-01 | Stop reason: HOSPADM

## 2017-01-01 RX ORDER — MAGNESIUM SULFATE 100 %
4 CRYSTALS MISCELLANEOUS AS NEEDED
Status: DISCONTINUED | OUTPATIENT
Start: 2017-01-01 | End: 2017-01-01 | Stop reason: HOSPADM

## 2017-01-01 RX ORDER — FENTANYL CITRATE 50 UG/ML
INJECTION, SOLUTION INTRAMUSCULAR; INTRAVENOUS
Status: DISPENSED
Start: 2017-01-01 | End: 2017-01-01

## 2017-01-01 RX ORDER — MAGNESIUM SULFATE 1 G/100ML
1 INJECTION INTRAVENOUS ONCE
Status: COMPLETED | OUTPATIENT
Start: 2017-01-01 | End: 2017-01-01

## 2017-01-01 RX ORDER — DEXTROSE MONOHYDRATE AND SODIUM CHLORIDE 5; .9 G/100ML; G/100ML
100 INJECTION, SOLUTION INTRAVENOUS CONTINUOUS
Status: DISCONTINUED | OUTPATIENT
Start: 2017-01-01 | End: 2017-01-01

## 2017-01-01 RX ORDER — ACETAMINOPHEN 650 MG/1
650 SUPPOSITORY RECTAL
Status: DISCONTINUED | OUTPATIENT
Start: 2017-01-01 | End: 2017-01-01 | Stop reason: HOSPADM

## 2017-01-01 RX ORDER — DEXTROSE, SODIUM CHLORIDE, AND POTASSIUM CHLORIDE 5; .45; .3 G/100ML; G/100ML; G/100ML
INJECTION INTRAVENOUS CONTINUOUS
Status: DISCONTINUED | OUTPATIENT
Start: 2017-01-01 | End: 2017-01-01

## 2017-01-01 RX ORDER — MIDAZOLAM HYDROCHLORIDE 1 MG/ML
1-4 INJECTION, SOLUTION INTRAMUSCULAR; INTRAVENOUS
Status: DISCONTINUED | OUTPATIENT
Start: 2017-01-01 | End: 2017-01-01

## 2017-01-01 RX ORDER — SODIUM CHLORIDE 0.9 % (FLUSH) 0.9 %
5-10 SYRINGE (ML) INJECTION AS NEEDED
Status: DISCONTINUED | OUTPATIENT
Start: 2017-01-01 | End: 2017-01-01 | Stop reason: HOSPADM

## 2017-01-01 RX ORDER — POTASSIUM CHLORIDE 7.45 MG/ML
10 INJECTION INTRAVENOUS ONCE
Status: COMPLETED | OUTPATIENT
Start: 2017-01-01 | End: 2017-01-01

## 2017-01-01 RX ORDER — LANOLIN ALCOHOL/MO/W.PET/CERES
1 CREAM (GRAM) TOPICAL
Status: DISCONTINUED | OUTPATIENT
Start: 2017-01-01 | End: 2017-01-01 | Stop reason: HOSPADM

## 2017-01-01 RX ORDER — POTASSIUM CHLORIDE 750 MG/1
30 TABLET, EXTENDED RELEASE ORAL
Status: DISCONTINUED | OUTPATIENT
Start: 2017-01-01 | End: 2017-01-01 | Stop reason: CLARIF

## 2017-01-01 RX ORDER — ALBUMIN HUMAN 50 G/1000ML
SOLUTION INTRAVENOUS AS NEEDED
Status: DISCONTINUED | OUTPATIENT
Start: 2017-01-01 | End: 2017-01-01 | Stop reason: HOSPADM

## 2017-01-01 RX ORDER — DEXTROSE 50 % IN WATER (D50W) INTRAVENOUS SYRINGE
50 ONCE
Status: COMPLETED | OUTPATIENT
Start: 2017-01-01 | End: 2017-01-01

## 2017-01-01 RX ORDER — FENTANYL CITRATE 50 UG/ML
25 INJECTION, SOLUTION INTRAMUSCULAR; INTRAVENOUS AS NEEDED
Status: DISCONTINUED | OUTPATIENT
Start: 2017-01-01 | End: 2017-01-01

## 2017-01-01 RX ORDER — SODIUM CHLORIDE 0.9 % (FLUSH) 0.9 %
5-10 SYRINGE (ML) INJECTION EVERY 8 HOURS
Status: DISPENSED | OUTPATIENT
Start: 2017-01-01 | End: 2017-01-01

## 2017-01-01 RX ORDER — BACITRACIN 500 UNIT/G
1 PACKET (EA) TOPICAL AS NEEDED
Status: DISCONTINUED | OUTPATIENT
Start: 2017-01-01 | End: 2017-01-01 | Stop reason: HOSPADM

## 2017-01-01 RX ORDER — POTASSIUM CHLORIDE 750 MG/1
10 TABLET, EXTENDED RELEASE ORAL ONCE
Status: DISCONTINUED | OUTPATIENT
Start: 2017-01-01 | End: 2017-01-01

## 2017-01-01 RX ORDER — HYDROMORPHONE HYDROCHLORIDE 1 MG/ML
0.5 INJECTION, SOLUTION INTRAMUSCULAR; INTRAVENOUS; SUBCUTANEOUS
Status: DISCONTINUED | OUTPATIENT
Start: 2017-01-01 | End: 2017-01-01 | Stop reason: HOSPADM

## 2017-01-01 RX ORDER — FENTANYL CITRATE 50 UG/ML
INJECTION, SOLUTION INTRAMUSCULAR; INTRAVENOUS AS NEEDED
Status: DISCONTINUED | OUTPATIENT
Start: 2017-01-01 | End: 2017-01-01 | Stop reason: HOSPADM

## 2017-01-01 RX ORDER — ONDANSETRON 2 MG/ML
4 INJECTION INTRAMUSCULAR; INTRAVENOUS
Status: DISCONTINUED | OUTPATIENT
Start: 2017-01-01 | End: 2017-01-01

## 2017-01-01 RX ORDER — HEPARIN SODIUM 5000 [USP'U]/ML
5000 INJECTION, SOLUTION INTRAVENOUS; SUBCUTANEOUS EVERY 8 HOURS
Status: DISCONTINUED | OUTPATIENT
Start: 2017-01-01 | End: 2017-01-01 | Stop reason: HOSPADM

## 2017-01-01 RX ORDER — SODIUM CHLORIDE, SODIUM LACTATE, POTASSIUM CHLORIDE, CALCIUM CHLORIDE 600; 310; 30; 20 MG/100ML; MG/100ML; MG/100ML; MG/100ML
75 INJECTION, SOLUTION INTRAVENOUS CONTINUOUS
Status: DISCONTINUED | OUTPATIENT
Start: 2017-01-01 | End: 2017-01-01

## 2017-01-01 RX ORDER — MIDAZOLAM HYDROCHLORIDE 1 MG/ML
.5-1 INJECTION, SOLUTION INTRAMUSCULAR; INTRAVENOUS
Status: DISCONTINUED | OUTPATIENT
Start: 2017-01-01 | End: 2017-01-01

## 2017-01-01 RX ORDER — SODIUM CHLORIDE 0.9 % (FLUSH) 0.9 %
5-10 SYRINGE (ML) INJECTION AS NEEDED
Status: DISCONTINUED | OUTPATIENT
Start: 2017-01-01 | End: 2017-01-01

## 2017-01-01 RX ORDER — ONDANSETRON 2 MG/ML
4 INJECTION INTRAMUSCULAR; INTRAVENOUS
Status: DISCONTINUED | OUTPATIENT
Start: 2017-01-01 | End: 2017-01-01 | Stop reason: HOSPADM

## 2017-01-01 RX ORDER — SODIUM CHLORIDE 9 MG/ML
250 INJECTION, SOLUTION INTRAVENOUS AS NEEDED
Status: DISCONTINUED | OUTPATIENT
Start: 2017-01-01 | End: 2017-01-01 | Stop reason: HOSPADM

## 2017-01-01 RX ORDER — FUROSEMIDE 10 MG/ML
40 INJECTION INTRAMUSCULAR; INTRAVENOUS ONCE
Status: COMPLETED | OUTPATIENT
Start: 2017-01-01 | End: 2017-01-01

## 2017-01-01 RX ORDER — FUROSEMIDE 10 MG/ML
20 INJECTION INTRAMUSCULAR; INTRAVENOUS ONCE
Status: COMPLETED | OUTPATIENT
Start: 2017-01-01 | End: 2017-01-01

## 2017-01-01 RX ORDER — SEVELAMER CARBONATE FOR ORAL SUSPENSION 2400 MG/1
2.4 POWDER, FOR SUSPENSION ORAL
Status: DISCONTINUED | OUTPATIENT
Start: 2017-01-01 | End: 2017-01-01 | Stop reason: HOSPADM

## 2017-01-01 RX ORDER — HYDROMORPHONE HYDROCHLORIDE 1 MG/ML
1 INJECTION, SOLUTION INTRAMUSCULAR; INTRAVENOUS; SUBCUTANEOUS
Status: DISCONTINUED | OUTPATIENT
Start: 2017-01-01 | End: 2017-01-01

## 2017-01-01 RX ORDER — SODIUM CHLORIDE 0.9 % (FLUSH) 0.9 %
5-10 SYRINGE (ML) INJECTION EVERY 8 HOURS
Status: DISCONTINUED | OUTPATIENT
Start: 2017-01-01 | End: 2017-01-01

## 2017-01-01 RX ORDER — ACETAMINOPHEN 650 MG/1
650 SUPPOSITORY RECTAL
Status: DISCONTINUED | OUTPATIENT
Start: 2017-01-01 | End: 2017-01-01

## 2017-01-01 RX ORDER — MIDAZOLAM HYDROCHLORIDE 5 MG/ML
INJECTION INTRAMUSCULAR; INTRAVENOUS AS NEEDED
Status: DISCONTINUED | OUTPATIENT
Start: 2017-01-01 | End: 2017-01-01

## 2017-01-01 RX ORDER — ONDANSETRON 2 MG/ML
4 INJECTION INTRAMUSCULAR; INTRAVENOUS
Status: COMPLETED | OUTPATIENT
Start: 2017-01-01 | End: 2017-01-01

## 2017-01-01 RX ORDER — METOPROLOL TARTRATE 5 MG/5ML
1.25 INJECTION INTRAVENOUS ONCE
Status: COMPLETED | OUTPATIENT
Start: 2017-01-01 | End: 2017-01-01

## 2017-01-01 RX ORDER — SODIUM CHLORIDE 0.9 % (FLUSH) 0.9 %
5-10 SYRINGE (ML) INJECTION EVERY 8 HOURS
Status: DISCONTINUED | OUTPATIENT
Start: 2017-01-01 | End: 2017-01-01 | Stop reason: HOSPADM

## 2017-01-01 RX ORDER — SODIUM CHLORIDE 450 MG/100ML
75 INJECTION, SOLUTION INTRAVENOUS CONTINUOUS
Status: DISPENSED | OUTPATIENT
Start: 2017-01-01 | End: 2017-01-01

## 2017-01-01 RX ORDER — METOPROLOL TARTRATE 25 MG/1
12.5 TABLET, FILM COATED ORAL EVERY 12 HOURS
Status: DISCONTINUED | OUTPATIENT
Start: 2017-01-01 | End: 2017-01-01

## 2017-01-01 RX ORDER — SODIUM CHLORIDE 9 MG/ML
100 INJECTION, SOLUTION INTRAVENOUS CONTINUOUS
Status: DISCONTINUED | OUTPATIENT
Start: 2017-01-01 | End: 2017-01-01

## 2017-01-01 RX ORDER — CEFAZOLIN SODIUM 2 G/50ML
2 SOLUTION INTRAVENOUS ONCE
Status: COMPLETED | OUTPATIENT
Start: 2017-01-01 | End: 2017-01-01

## 2017-01-01 RX ORDER — MIDAZOLAM IN 0.9 % SOD.CHLORID 1 MG/ML
1-4 PLASTIC BAG, INJECTION (ML) INTRAVENOUS CONTINUOUS
Status: DISCONTINUED | OUTPATIENT
Start: 2017-01-01 | End: 2017-01-01

## 2017-01-01 RX ORDER — POTASSIUM CHLORIDE 20 MEQ/1
40 TABLET, EXTENDED RELEASE ORAL DAILY
Status: DISCONTINUED | OUTPATIENT
Start: 2017-01-01 | End: 2017-01-01

## 2017-01-01 RX ORDER — LANOLIN ALCOHOL/MO/W.PET/CERES
325 CREAM (GRAM) TOPICAL
Status: DISCONTINUED | OUTPATIENT
Start: 2017-01-01 | End: 2017-01-01

## 2017-01-01 RX ORDER — ALBUMIN HUMAN 250 G/1000ML
25 SOLUTION INTRAVENOUS ONCE
Status: COMPLETED | OUTPATIENT
Start: 2017-01-01 | End: 2017-01-01

## 2017-01-01 RX ORDER — GLYCOPYRROLATE 0.2 MG/ML
INJECTION INTRAMUSCULAR; INTRAVENOUS AS NEEDED
Status: DISCONTINUED | OUTPATIENT
Start: 2017-01-01 | End: 2017-01-01 | Stop reason: HOSPADM

## 2017-01-01 RX ORDER — SUCCINYLCHOLINE CHLORIDE 20 MG/ML
INJECTION INTRAMUSCULAR; INTRAVENOUS AS NEEDED
Status: DISCONTINUED | OUTPATIENT
Start: 2017-01-01 | End: 2017-01-01 | Stop reason: HOSPADM

## 2017-01-01 RX ORDER — HALOPERIDOL 5 MG/ML
1-2 INJECTION INTRAMUSCULAR
Status: DISCONTINUED | OUTPATIENT
Start: 2017-01-01 | End: 2017-01-01 | Stop reason: HOSPADM

## 2017-01-01 RX ORDER — FENTANYL CITRATE 50 UG/ML
50 INJECTION, SOLUTION INTRAMUSCULAR; INTRAVENOUS
Status: DISCONTINUED | OUTPATIENT
Start: 2017-01-01 | End: 2017-01-01

## 2017-01-01 RX ORDER — PANTOPRAZOLE SODIUM 40 MG/1
40 GRANULE, DELAYED RELEASE ORAL 2 TIMES DAILY
Status: DISCONTINUED | OUTPATIENT
Start: 2017-01-01 | End: 2017-01-01 | Stop reason: HOSPADM

## 2017-01-01 RX ORDER — POTASSIUM CHLORIDE 7.45 MG/ML
10 INJECTION INTRAVENOUS
Status: DISCONTINUED | OUTPATIENT
Start: 2017-01-01 | End: 2017-01-01

## 2017-01-01 RX ORDER — POTASSIUM CHLORIDE 1.5 G/1.77G
10 POWDER, FOR SOLUTION ORAL ONCE
Status: COMPLETED | OUTPATIENT
Start: 2017-01-01 | End: 2017-01-01

## 2017-01-01 RX ORDER — SODIUM CHLORIDE 9 MG/ML
400 INJECTION, SOLUTION INTRAVENOUS ONCE
Status: COMPLETED | OUTPATIENT
Start: 2017-01-01 | End: 2017-01-01

## 2017-01-01 RX ORDER — HYDROCODONE BITARTRATE AND ACETAMINOPHEN 7.5; 325 MG/15ML; MG/15ML
5-7.5 SOLUTION ORAL
Status: DISCONTINUED | OUTPATIENT
Start: 2017-01-01 | End: 2017-01-01 | Stop reason: HOSPADM

## 2017-01-01 RX ORDER — DEXTROSE 50 % IN WATER (D50W) INTRAVENOUS SYRINGE
25 ONCE
Status: COMPLETED | OUTPATIENT
Start: 2017-01-01 | End: 2017-01-01

## 2017-01-01 RX ORDER — VASOPRESSIN 20 U/ML
INJECTION PARENTERAL AS NEEDED
Status: DISCONTINUED | OUTPATIENT
Start: 2017-01-01 | End: 2017-01-01 | Stop reason: HOSPADM

## 2017-01-01 RX ORDER — ZOLPIDEM TARTRATE 5 MG/1
5 TABLET ORAL
Status: DISCONTINUED | OUTPATIENT
Start: 2017-01-01 | End: 2017-01-01 | Stop reason: HOSPADM

## 2017-01-01 RX ORDER — ALBUMIN HUMAN 50 G/1000ML
25 SOLUTION INTRAVENOUS ONCE
Status: COMPLETED | OUTPATIENT
Start: 2017-01-01 | End: 2017-01-01

## 2017-01-01 RX ORDER — SODIUM CHLORIDE 0.9 % (FLUSH) 0.9 %
20 SYRINGE (ML) INJECTION EVERY 24 HOURS
Status: DISCONTINUED | OUTPATIENT
Start: 2017-01-01 | End: 2017-01-01

## 2017-01-01 RX ORDER — POTASSIUM CHLORIDE 1.5 G/1.77G
30 POWDER, FOR SOLUTION ORAL
Status: COMPLETED | OUTPATIENT
Start: 2017-01-01 | End: 2017-01-01

## 2017-01-01 RX ORDER — DIGOXIN 125 MCG
0.12 TABLET ORAL EVERY OTHER DAY
Status: DISCONTINUED | OUTPATIENT
Start: 2017-01-01 | End: 2017-01-01

## 2017-01-01 RX ORDER — NOREPINEPHRINE BITARTRATE/D5W 8 MG/250ML
2-30 PLASTIC BAG, INJECTION (ML) INTRAVENOUS
Status: DISCONTINUED | OUTPATIENT
Start: 2017-01-01 | End: 2017-01-01

## 2017-01-01 RX ORDER — ZOLPIDEM TARTRATE 5 MG/1
5 TABLET ORAL
Status: DISCONTINUED | OUTPATIENT
Start: 2017-01-01 | End: 2017-01-01 | Stop reason: SDUPTHER

## 2017-01-01 RX ORDER — GUAIFENESIN 100 MG/5ML
100 SOLUTION ORAL
Status: DISCONTINUED | OUTPATIENT
Start: 2017-01-01 | End: 2017-01-01 | Stop reason: HOSPADM

## 2017-01-01 RX ORDER — METOPROLOL TARTRATE 5 MG/5ML
2.5 INJECTION INTRAVENOUS EVERY 6 HOURS
Status: DISCONTINUED | OUTPATIENT
Start: 2017-01-01 | End: 2017-01-01

## 2017-01-01 RX ORDER — CEFAZOLIN SODIUM 2 G/50ML
2 SOLUTION INTRAVENOUS EVERY 8 HOURS
Status: DISCONTINUED | OUTPATIENT
Start: 2017-01-01 | End: 2017-01-01

## 2017-01-01 RX ORDER — DEXTROSE MONOHYDRATE AND SODIUM CHLORIDE 5; .9 G/100ML; G/100ML
250 INJECTION, SOLUTION INTRAVENOUS ONCE
Status: COMPLETED | OUTPATIENT
Start: 2017-01-01 | End: 2017-01-01

## 2017-01-01 RX ORDER — SODIUM CHLORIDE 9 MG/ML
999 INJECTION, SOLUTION INTRAVENOUS ONCE
Status: COMPLETED | OUTPATIENT
Start: 2017-01-01 | End: 2017-01-01

## 2017-01-01 RX ORDER — POTASSIUM CHLORIDE 7.45 MG/ML
10 INJECTION INTRAVENOUS
Status: COMPLETED | OUTPATIENT
Start: 2017-01-01 | End: 2017-01-01

## 2017-01-01 RX ORDER — SODIUM CHLORIDE 9 MG/ML
INJECTION, SOLUTION INTRAVENOUS
Status: DISCONTINUED | OUTPATIENT
Start: 2017-01-01 | End: 2017-01-01 | Stop reason: HOSPADM

## 2017-01-01 RX ORDER — PROPOFOL 10 MG/ML
INJECTION, EMULSION INTRAVENOUS AS NEEDED
Status: DISCONTINUED | OUTPATIENT
Start: 2017-01-01 | End: 2017-01-01 | Stop reason: HOSPADM

## 2017-01-01 RX ORDER — MORPHINE SULFATE 2 MG/ML
1 INJECTION, SOLUTION INTRAMUSCULAR; INTRAVENOUS
Status: DISCONTINUED | OUTPATIENT
Start: 2017-01-01 | End: 2017-01-01

## 2017-01-01 RX ORDER — DIGOXIN 0.25 MG/ML
250 INJECTION INTRAMUSCULAR; INTRAVENOUS EVERY 6 HOURS
Status: ACTIVE | OUTPATIENT
Start: 2017-01-01 | End: 2017-01-01

## 2017-01-01 RX ORDER — ONDANSETRON 2 MG/ML
INJECTION INTRAMUSCULAR; INTRAVENOUS AS NEEDED
Status: DISCONTINUED | OUTPATIENT
Start: 2017-01-01 | End: 2017-01-01 | Stop reason: HOSPADM

## 2017-01-01 RX ORDER — INSULIN LISPRO 100 [IU]/ML
INJECTION, SOLUTION INTRAVENOUS; SUBCUTANEOUS ONCE
Status: ACTIVE | OUTPATIENT
Start: 2017-01-01 | End: 2017-01-01

## 2017-01-01 RX ORDER — FENTANYL CITRATE 50 UG/ML
50-100 INJECTION, SOLUTION INTRAMUSCULAR; INTRAVENOUS
Status: DISCONTINUED | OUTPATIENT
Start: 2017-01-01 | End: 2017-01-01 | Stop reason: HOSPADM

## 2017-01-01 RX ORDER — NYSTATIN 100000 [USP'U]/ML
500000 SUSPENSION ORAL 4 TIMES DAILY
Status: DISCONTINUED | OUTPATIENT
Start: 2017-01-01 | End: 2017-01-01 | Stop reason: HOSPADM

## 2017-01-01 RX ORDER — ROCURONIUM BROMIDE 10 MG/ML
INJECTION, SOLUTION INTRAVENOUS AS NEEDED
Status: DISCONTINUED | OUTPATIENT
Start: 2017-01-01 | End: 2017-01-01 | Stop reason: HOSPADM

## 2017-01-01 RX ORDER — ONDANSETRON 2 MG/ML
4 INJECTION INTRAMUSCULAR; INTRAVENOUS ONCE
Status: DISCONTINUED | OUTPATIENT
Start: 2017-01-01 | End: 2017-01-01

## 2017-01-01 RX ORDER — DEXTROSE MONOHYDRATE 5 G/100ML
INJECTION INTRAVENOUS
Status: DISPENSED
Start: 2017-01-01 | End: 2017-01-01

## 2017-01-01 RX ORDER — PHENYLEPHRINE HCL IN 0.9% NACL 30MG/250ML
10-100 PLASTIC BAG, INJECTION (ML) INTRAVENOUS
Status: DISCONTINUED | OUTPATIENT
Start: 2017-01-01 | End: 2017-01-01

## 2017-01-01 RX ORDER — SODIUM CHLORIDE 0.9 % (FLUSH) 0.9 %
10 SYRINGE (ML) INJECTION AS NEEDED
Status: DISCONTINUED | OUTPATIENT
Start: 2017-01-01 | End: 2017-01-01 | Stop reason: HOSPADM

## 2017-01-01 RX ORDER — LIDOCAINE HYDROCHLORIDE 20 MG/ML
INJECTION, SOLUTION EPIDURAL; INFILTRATION; INTRACAUDAL; PERINEURAL AS NEEDED
Status: DISCONTINUED | OUTPATIENT
Start: 2017-01-01 | End: 2017-01-01 | Stop reason: HOSPADM

## 2017-01-01 RX ORDER — FUROSEMIDE 10 MG/ML
40 INJECTION INTRAMUSCULAR; INTRAVENOUS EVERY 12 HOURS
Status: DISCONTINUED | OUTPATIENT
Start: 2017-01-01 | End: 2017-01-01

## 2017-01-01 RX ORDER — SODIUM CHLORIDE 0.9 % (FLUSH) 0.9 %
10-30 SYRINGE (ML) INJECTION AS NEEDED
Status: DISCONTINUED | OUTPATIENT
Start: 2017-01-01 | End: 2017-01-01

## 2017-01-01 RX ORDER — MIDAZOLAM HYDROCHLORIDE 1 MG/ML
INJECTION, SOLUTION INTRAMUSCULAR; INTRAVENOUS
Status: COMPLETED
Start: 2017-01-01 | End: 2017-01-01

## 2017-01-01 RX ORDER — POTASSIUM CHLORIDE 7.45 MG/ML
10 INJECTION INTRAVENOUS ONCE
Status: DISCONTINUED | OUTPATIENT
Start: 2017-01-01 | End: 2017-01-01

## 2017-01-01 RX ORDER — MIDAZOLAM HYDROCHLORIDE 1 MG/ML
INJECTION, SOLUTION INTRAMUSCULAR; INTRAVENOUS
Status: DISPENSED
Start: 2017-01-01 | End: 2017-01-01

## 2017-01-01 RX ORDER — SODIUM CHLORIDE 0.9 % (FLUSH) 0.9 %
10-40 SYRINGE (ML) INJECTION EVERY 8 HOURS
Status: DISCONTINUED | OUTPATIENT
Start: 2017-01-01 | End: 2017-01-01

## 2017-01-01 RX ORDER — CALCIUM GLUCONATE 94 MG/ML
4 INJECTION, SOLUTION INTRAVENOUS ONCE
Status: COMPLETED | OUTPATIENT
Start: 2017-01-01 | End: 2017-01-01

## 2017-01-01 RX ORDER — KETOROLAC TROMETHAMINE 30 MG/ML
INJECTION, SOLUTION INTRAMUSCULAR; INTRAVENOUS
Status: DISPENSED
Start: 2017-01-01 | End: 2017-01-01

## 2017-01-01 RX ORDER — MORPHINE SULFATE 2 MG/ML
1 INJECTION, SOLUTION INTRAMUSCULAR; INTRAVENOUS
Status: DISCONTINUED | OUTPATIENT
Start: 2017-01-01 | End: 2017-01-01 | Stop reason: HOSPADM

## 2017-01-01 RX ORDER — SODIUM CHLORIDE 0.9 % (FLUSH) 0.9 %
10 SYRINGE (ML) INJECTION EVERY 24 HOURS
Status: DISCONTINUED | OUTPATIENT
Start: 2017-01-01 | End: 2017-01-01

## 2017-01-01 RX ORDER — NEOSTIGMINE METHYLSULFATE 5 MG/5 ML
SYRINGE (ML) INTRAVENOUS AS NEEDED
Status: DISCONTINUED | OUTPATIENT
Start: 2017-01-01 | End: 2017-01-01 | Stop reason: HOSPADM

## 2017-01-01 RX ORDER — SODIUM BICARBONATE 650 MG/1
650 TABLET ORAL 3 TIMES DAILY
Status: DISCONTINUED | OUTPATIENT
Start: 2017-01-01 | End: 2017-01-01 | Stop reason: HOSPADM

## 2017-01-01 RX ORDER — DEXTROSE MONOHYDRATE 100 MG/ML
1000 INJECTION, SOLUTION INTRAVENOUS CONTINUOUS
Status: DISCONTINUED | OUTPATIENT
Start: 2017-01-01 | End: 2017-01-01

## 2017-01-01 RX ORDER — SODIUM CHLORIDE 9 MG/ML
75 INJECTION, SOLUTION INTRAVENOUS CONTINUOUS
Status: DISCONTINUED | OUTPATIENT
Start: 2017-01-01 | End: 2017-01-01

## 2017-01-01 RX ORDER — SODIUM CHLORIDE 0.9 % (FLUSH) 0.9 %
5-10 SYRINGE (ML) INJECTION AS NEEDED
Status: DISPENSED | OUTPATIENT
Start: 2017-01-01 | End: 2017-01-01

## 2017-01-01 RX ORDER — FERROUS SULFATE 300 MG/5ML
300 LIQUID (ML) ORAL
Status: DISCONTINUED | OUTPATIENT
Start: 2017-01-01 | End: 2017-01-01

## 2017-01-01 RX ORDER — DIGOXIN 0.25 MG/ML
250 INJECTION INTRAMUSCULAR; INTRAVENOUS
Status: COMPLETED | OUTPATIENT
Start: 2017-01-01 | End: 2017-01-01

## 2017-01-01 RX ORDER — HEPARIN SODIUM 5000 [USP'U]/ML
5000 INJECTION, SOLUTION INTRAVENOUS; SUBCUTANEOUS EVERY 8 HOURS
Status: DISCONTINUED | OUTPATIENT
Start: 2017-01-01 | End: 2017-01-01

## 2017-01-01 RX ORDER — SODIUM CHLORIDE, SODIUM LACTATE, POTASSIUM CHLORIDE, CALCIUM CHLORIDE 600; 310; 30; 20 MG/100ML; MG/100ML; MG/100ML; MG/100ML
50 INJECTION, SOLUTION INTRAVENOUS CONTINUOUS
Status: DISCONTINUED | OUTPATIENT
Start: 2017-01-01 | End: 2017-01-01

## 2017-01-01 RX ORDER — DEXTROSE 50 % IN WATER (D50W) INTRAVENOUS SYRINGE
Status: DISPENSED
Start: 2017-01-01 | End: 2017-01-01

## 2017-01-01 RX ORDER — PHENYLEPHRINE HCL IN 0.9% NACL 30MG/250ML
0-200 PLASTIC BAG, INJECTION (ML) INTRAVENOUS
Status: DISCONTINUED | OUTPATIENT
Start: 2017-01-01 | End: 2017-01-01

## 2017-01-01 RX ORDER — POTASSIUM CHLORIDE 20 MEQ/1
20 TABLET, EXTENDED RELEASE ORAL ONCE
Status: COMPLETED | OUTPATIENT
Start: 2017-01-01 | End: 2017-01-01

## 2017-01-01 RX ORDER — DEXTROMETHORPHAN/PSEUDOEPHED 2.5-7.5/.8
1.2 DROPS ORAL
Status: DISCONTINUED | OUTPATIENT
Start: 2017-01-01 | End: 2017-01-01 | Stop reason: HOSPADM

## 2017-01-01 RX ORDER — SODIUM BICARBONATE 1 MEQ/ML
SYRINGE (ML) INTRAVENOUS
Status: DISPENSED
Start: 2017-01-01 | End: 2017-01-01

## 2017-01-01 RX ORDER — SODIUM CHLORIDE 9 MG/ML
25 INJECTION, SOLUTION INTRAVENOUS CONTINUOUS
Status: DISPENSED | OUTPATIENT
Start: 2017-01-01 | End: 2017-01-01

## 2017-01-01 RX ORDER — DILTIAZEM HYDROCHLORIDE 5 MG/ML
10 INJECTION INTRAVENOUS ONCE
Status: COMPLETED | OUTPATIENT
Start: 2017-01-01 | End: 2017-01-01

## 2017-01-01 RX ORDER — INSULIN LISPRO 100 [IU]/ML
INJECTION, SOLUTION INTRAVENOUS; SUBCUTANEOUS EVERY 6 HOURS
Status: DISCONTINUED | OUTPATIENT
Start: 2017-01-01 | End: 2017-01-01 | Stop reason: HOSPADM

## 2017-01-01 RX ORDER — FAMOTIDINE 10 MG/ML
20 INJECTION INTRAVENOUS ONCE
Status: COMPLETED | OUTPATIENT
Start: 2017-01-01 | End: 2017-01-01

## 2017-01-01 RX ORDER — CHLORHEXIDINE GLUCONATE 1.2 MG/ML
15 RINSE ORAL 2 TIMES DAILY
Status: DISCONTINUED | OUTPATIENT
Start: 2017-01-01 | End: 2017-01-01 | Stop reason: HOSPADM

## 2017-01-01 RX ORDER — METOPROLOL TARTRATE 5 MG/5ML
1.25 INJECTION INTRAVENOUS EVERY 6 HOURS
Status: DISCONTINUED | OUTPATIENT
Start: 2017-01-01 | End: 2017-01-01

## 2017-01-01 RX ORDER — LORAZEPAM 2 MG/ML
1 INJECTION INTRAMUSCULAR ONCE
Status: COMPLETED | OUTPATIENT
Start: 2017-01-01 | End: 2017-01-01

## 2017-01-01 RX ORDER — SODIUM CHLORIDE 9 MG/ML
125 INJECTION, SOLUTION INTRAVENOUS CONTINUOUS
Status: DISCONTINUED | OUTPATIENT
Start: 2017-01-01 | End: 2017-01-01 | Stop reason: SDUPTHER

## 2017-01-01 RX ORDER — DIGOXIN 0.25 MG/ML
125 INJECTION INTRAMUSCULAR; INTRAVENOUS DAILY
Status: DISCONTINUED | OUTPATIENT
Start: 2017-01-01 | End: 2017-01-01

## 2017-01-01 RX ORDER — DEXTROSE MONOHYDRATE AND SODIUM CHLORIDE 5; .45 G/100ML; G/100ML
100 INJECTION, SOLUTION INTRAVENOUS CONTINUOUS
Status: DISCONTINUED | OUTPATIENT
Start: 2017-01-01 | End: 2017-01-01

## 2017-01-01 RX ORDER — METOPROLOL TARTRATE 5 MG/5ML
5 INJECTION INTRAVENOUS EVERY 6 HOURS
Status: DISCONTINUED | OUTPATIENT
Start: 2017-01-01 | End: 2017-01-01

## 2017-01-01 RX ORDER — FUROSEMIDE 10 MG/ML
40 INJECTION INTRAMUSCULAR; INTRAVENOUS ONCE
Status: DISCONTINUED | OUTPATIENT
Start: 2017-01-01 | End: 2017-01-01

## 2017-01-01 RX ORDER — DEXTROSE 50 % IN WATER (D50W) INTRAVENOUS SYRINGE
25-50 AS NEEDED
Status: DISCONTINUED | OUTPATIENT
Start: 2017-01-01 | End: 2017-01-01 | Stop reason: HOSPADM

## 2017-01-01 RX ORDER — SODIUM BICARBONATE 1 MEQ/ML
SYRINGE (ML) INTRAVENOUS
Status: COMPLETED
Start: 2017-01-01 | End: 2017-01-01

## 2017-01-01 RX ORDER — DEXTROSE MONOHYDRATE AND SODIUM CHLORIDE 5; .45 G/100ML; G/100ML
75 INJECTION, SOLUTION INTRAVENOUS CONTINUOUS
Status: DISCONTINUED | OUTPATIENT
Start: 2017-01-01 | End: 2017-01-01

## 2017-01-01 RX ADMIN — SEVELAMER CARBONATE 2400 MG: 2400 POWDER, FOR SUSPENSION ORAL at 08:09

## 2017-01-01 RX ADMIN — TAMSULOSIN HYDROCHLORIDE 0.4 MG: 0.4 CAPSULE ORAL at 08:49

## 2017-01-01 RX ADMIN — METOPROLOL TARTRATE 5 MG: 5 INJECTION INTRAVENOUS at 05:00

## 2017-01-01 RX ADMIN — INSULIN LISPRO 3 UNITS: 100 INJECTION, SOLUTION INTRAVENOUS; SUBCUTANEOUS at 05:49

## 2017-01-01 RX ADMIN — BARIUM SULFATE 30 ML: 0.81 POWDER, FOR SUSPENSION ORAL at 12:00

## 2017-01-01 RX ADMIN — PIPERACILLIN SODIUM,TAZOBACTAM SODIUM 3.38 G: 3; .375 INJECTION, POWDER, FOR SOLUTION INTRAVENOUS at 09:07

## 2017-01-01 RX ADMIN — CHLORHEXIDINE GLUCONATE 15 ML: 1.2 RINSE ORAL at 08:59

## 2017-01-01 RX ADMIN — Medication 10 ML: at 22:05

## 2017-01-01 RX ADMIN — HYDROCODONE BITARTRATE AND ACETAMINOPHEN 7.5 MG: 7.5; 325 SOLUTION ORAL at 17:33

## 2017-01-01 RX ADMIN — SODIUM BICARBONATE 650 MG TABLET 650 MG: at 09:00

## 2017-01-01 RX ADMIN — Medication 10 ML: at 17:31

## 2017-01-01 RX ADMIN — SEVELAMER CARBONATE 2400 MG: 2400 POWDER, FOR SUSPENSION ORAL at 17:33

## 2017-01-01 RX ADMIN — HEPARIN SODIUM 5000 UNITS: 5000 INJECTION, SOLUTION INTRAVENOUS; SUBCUTANEOUS at 20:27

## 2017-01-01 RX ADMIN — HEPARIN SODIUM 5000 UNITS: 5000 INJECTION, SOLUTION INTRAVENOUS; SUBCUTANEOUS at 12:43

## 2017-01-01 RX ADMIN — CEFAZOLIN SODIUM 2 G: 2 SOLUTION INTRAVENOUS at 10:00

## 2017-01-01 RX ADMIN — HYDROMORPHONE HYDROCHLORIDE 0.5 MG: 1 INJECTION, SOLUTION INTRAMUSCULAR; INTRAVENOUS; SUBCUTANEOUS at 15:14

## 2017-01-01 RX ADMIN — CALCIUM GLUCONATE: 94 INJECTION, SOLUTION INTRAVENOUS at 05:00

## 2017-01-01 RX ADMIN — Medication 10 ML: at 22:23

## 2017-01-01 RX ADMIN — PIPERACILLIN SODIUM,TAZOBACTAM SODIUM 3.38 G: 3; .375 INJECTION, POWDER, FOR SOLUTION INTRAVENOUS at 21:29

## 2017-01-01 RX ADMIN — PIPERACILLIN SODIUM,TAZOBACTAM SODIUM 3.38 G: 3; .375 INJECTION, POWDER, FOR SOLUTION INTRAVENOUS at 22:54

## 2017-01-01 RX ADMIN — PIPERACILLIN SODIUM,TAZOBACTAM SODIUM 3.38 G: 3; .375 INJECTION, POWDER, FOR SOLUTION INTRAVENOUS at 21:30

## 2017-01-01 RX ADMIN — TAMSULOSIN HYDROCHLORIDE 0.4 MG: 0.4 CAPSULE ORAL at 09:35

## 2017-01-01 RX ADMIN — Medication 10 ML: at 05:55

## 2017-01-01 RX ADMIN — HEPARIN SODIUM 5000 UNITS: 5000 INJECTION, SOLUTION INTRAVENOUS; SUBCUTANEOUS at 04:01

## 2017-01-01 RX ADMIN — FENTANYL CITRATE 50 MCG: 50 INJECTION, SOLUTION INTRAMUSCULAR; INTRAVENOUS at 12:34

## 2017-01-01 RX ADMIN — Medication 10 ML: at 06:37

## 2017-01-01 RX ADMIN — HYDROMORPHONE HYDROCHLORIDE 1 MG: 2 INJECTION, SOLUTION INTRAMUSCULAR; INTRAVENOUS; SUBCUTANEOUS at 11:52

## 2017-01-01 RX ADMIN — METOPROLOL TARTRATE 1.25 MG: 5 INJECTION INTRAVENOUS at 03:12

## 2017-01-01 RX ADMIN — Medication 5 ML: at 14:00

## 2017-01-01 RX ADMIN — SEVELAMER CARBONATE 2400 MG: 2400 POWDER, FOR SUSPENSION ORAL at 12:44

## 2017-01-01 RX ADMIN — INSULIN LISPRO 4 UNITS: 100 INJECTION, SOLUTION INTRAVENOUS; SUBCUTANEOUS at 07:05

## 2017-01-01 RX ADMIN — PIPERACILLIN SODIUM,TAZOBACTAM SODIUM 3.38 G: 3; .375 INJECTION, POWDER, FOR SOLUTION INTRAVENOUS at 10:59

## 2017-01-01 RX ADMIN — Medication 10 ML: at 13:33

## 2017-01-01 RX ADMIN — Medication 10 ML: at 13:37

## 2017-01-01 RX ADMIN — Medication 10 ML: at 05:43

## 2017-01-01 RX ADMIN — HALOPERIDOL LACTATE 2 MG: 5 INJECTION, SOLUTION INTRAMUSCULAR at 23:00

## 2017-01-01 RX ADMIN — CEFAZOLIN SODIUM 2 G: 2 SOLUTION INTRAVENOUS at 02:23

## 2017-01-01 RX ADMIN — Medication 10 ML: at 23:15

## 2017-01-01 RX ADMIN — DEXTROSE MONOHYDRATE 25 G: 25 INJECTION, SOLUTION INTRAVENOUS at 22:05

## 2017-01-01 RX ADMIN — SODIUM CHLORIDE 40 MG: 9 INJECTION INTRAMUSCULAR; INTRAVENOUS; SUBCUTANEOUS at 17:05

## 2017-01-01 RX ADMIN — HEPARIN SODIUM 5000 UNITS: 5000 INJECTION, SOLUTION INTRAVENOUS; SUBCUTANEOUS at 03:59

## 2017-01-01 RX ADMIN — METOPROLOL TARTRATE 1.25 MG: 5 INJECTION INTRAVENOUS at 18:17

## 2017-01-01 RX ADMIN — SEVELAMER CARBONATE 2400 MG: 2400 POWDER, FOR SUSPENSION ORAL at 09:34

## 2017-01-01 RX ADMIN — BARIUM SULFATE 20 ML: 400 SUSPENSION ORAL at 13:30

## 2017-01-01 RX ADMIN — Medication 10 ML: at 14:37

## 2017-01-01 RX ADMIN — INSULIN LISPRO 9 UNITS: 100 INJECTION, SOLUTION INTRAVENOUS; SUBCUTANEOUS at 18:15

## 2017-01-01 RX ADMIN — INSULIN DETEMIR 8 UNITS: 100 INJECTION, SOLUTION SUBCUTANEOUS at 09:31

## 2017-01-01 RX ADMIN — ALBUMIN HUMAN 250 ML: 50 SOLUTION INTRAVENOUS at 06:45

## 2017-01-01 RX ADMIN — NYSTATIN 500000 UNITS: 100000 SUSPENSION ORAL at 17:04

## 2017-01-01 RX ADMIN — Medication 325 MG: at 07:21

## 2017-01-01 RX ADMIN — DEXTROSE MONOHYDRATE 1000 ML: 10 INJECTION, SOLUTION INTRAVENOUS at 05:15

## 2017-01-01 RX ADMIN — SODIUM BICARBONATE 650 MG TABLET 650 MG: at 18:26

## 2017-01-01 RX ADMIN — Medication 10 ML: at 14:00

## 2017-01-01 RX ADMIN — Medication 10 ML: at 06:27

## 2017-01-01 RX ADMIN — Medication 325 MG: at 09:31

## 2017-01-01 RX ADMIN — POTASSIUM CHLORIDE 10 MEQ: 10 INJECTION, SOLUTION INTRAVENOUS at 11:05

## 2017-01-01 RX ADMIN — HEPARIN SODIUM 5000 UNITS: 5000 INJECTION, SOLUTION INTRAVENOUS; SUBCUTANEOUS at 13:44

## 2017-01-01 RX ADMIN — HEPARIN SODIUM 5000 UNITS: 5000 INJECTION, SOLUTION INTRAVENOUS; SUBCUTANEOUS at 19:58

## 2017-01-01 RX ADMIN — MIDAZOLAM HYDROCHLORIDE 1 MG: 1 INJECTION, SOLUTION INTRAMUSCULAR; INTRAVENOUS at 14:10

## 2017-01-01 RX ADMIN — Medication 10 ML: at 22:04

## 2017-01-01 RX ADMIN — CHLORHEXIDINE GLUCONATE 15 ML: 1.2 RINSE ORAL at 22:00

## 2017-01-01 RX ADMIN — SODIUM CHLORIDE: 450 INJECTION, SOLUTION INTRAVENOUS at 11:06

## 2017-01-01 RX ADMIN — SEVELAMER CARBONATE 2400 MG: 2400 POWDER, FOR SUSPENSION ORAL at 08:51

## 2017-01-01 RX ADMIN — PIPERACILLIN SODIUM,TAZOBACTAM SODIUM 3.38 G: 3; .375 INJECTION, POWDER, FOR SOLUTION INTRAVENOUS at 10:08

## 2017-01-01 RX ADMIN — Medication 10 ML: at 14:52

## 2017-01-01 RX ADMIN — DEXTROSE MONOHYDRATE 25 G: 25 INJECTION, SOLUTION INTRAVENOUS at 22:35

## 2017-01-01 RX ADMIN — HYDROMORPHONE HYDROCHLORIDE 0.5 MG: 1 INJECTION, SOLUTION INTRAMUSCULAR; INTRAVENOUS; SUBCUTANEOUS at 21:06

## 2017-01-01 RX ADMIN — PIPERACILLIN SODIUM,TAZOBACTAM SODIUM 3.38 G: 3; .375 INJECTION, POWDER, FOR SOLUTION INTRAVENOUS at 21:47

## 2017-01-01 RX ADMIN — PIPERACILLIN SODIUM,TAZOBACTAM SODIUM 3.38 G: 3; .375 INJECTION, POWDER, FOR SOLUTION INTRAVENOUS at 00:05

## 2017-01-01 RX ADMIN — PANTOPRAZOLE SODIUM 40 MG: 40 GRANULE, DELAYED RELEASE ORAL at 20:46

## 2017-01-01 RX ADMIN — CALCIUM GLUCONATE 2 G: 94 INJECTION, SOLUTION INTRAVENOUS at 12:21

## 2017-01-01 RX ADMIN — INSULIN LISPRO 3 UNITS: 100 INJECTION, SOLUTION INTRAVENOUS; SUBCUTANEOUS at 12:38

## 2017-01-01 RX ADMIN — HALOPERIDOL LACTATE 2 MG: 5 INJECTION, SOLUTION INTRAMUSCULAR at 15:55

## 2017-01-01 RX ADMIN — METOPROLOL TARTRATE 5 MG: 5 INJECTION INTRAVENOUS at 18:06

## 2017-01-01 RX ADMIN — Medication 10 ML: at 21:55

## 2017-01-01 RX ADMIN — Medication 10 ML: at 23:52

## 2017-01-01 RX ADMIN — AMIODARONE HYDROCHLORIDE 1 MG/MIN: 1.8 INJECTION, SOLUTION INTRAVENOUS at 18:52

## 2017-01-01 RX ADMIN — INSULIN LISPRO 3 UNITS: 100 INJECTION, SOLUTION INTRAVENOUS; SUBCUTANEOUS at 12:39

## 2017-01-01 RX ADMIN — Medication 10 ML: at 22:00

## 2017-01-01 RX ADMIN — SODIUM BICARBONATE 650 MG TABLET 650 MG: at 22:37

## 2017-01-01 RX ADMIN — Medication 10 ML: at 22:12

## 2017-01-01 RX ADMIN — BARIUM SULFATE 30 ML: 0.81 POWDER, FOR SUSPENSION ORAL at 13:30

## 2017-01-01 RX ADMIN — TAMSULOSIN HYDROCHLORIDE 0.4 MG: 0.4 CAPSULE ORAL at 10:26

## 2017-01-01 RX ADMIN — Medication 10 ML: at 08:52

## 2017-01-01 RX ADMIN — Medication 10 ML: at 05:46

## 2017-01-01 RX ADMIN — CEFAZOLIN SODIUM 2 G: 2 SOLUTION INTRAVENOUS at 06:42

## 2017-01-01 RX ADMIN — SUCCINYLCHOLINE CHLORIDE 80 MG: 20 INJECTION INTRAMUSCULAR; INTRAVENOUS at 06:26

## 2017-01-01 RX ADMIN — SODIUM CHLORIDE 75 ML/HR: 900 INJECTION, SOLUTION INTRAVENOUS at 05:43

## 2017-01-01 RX ADMIN — MIDAZOLAM HYDROCHLORIDE 2 MG: 1 INJECTION, SOLUTION INTRAMUSCULAR; INTRAVENOUS at 23:55

## 2017-01-01 RX ADMIN — Medication 10 ML: at 21:38

## 2017-01-01 RX ADMIN — SEVELAMER CARBONATE 2400 MG: 2400 POWDER, FOR SUSPENSION ORAL at 12:43

## 2017-01-01 RX ADMIN — FENTANYL CITRATE 50 MCG: 50 INJECTION, SOLUTION INTRAMUSCULAR; INTRAVENOUS at 06:22

## 2017-01-01 RX ADMIN — Medication 10 ML: at 11:00

## 2017-01-01 RX ADMIN — SODIUM BICARBONATE 650 MG TABLET 650 MG: at 16:20

## 2017-01-01 RX ADMIN — NYSTATIN 500000 UNITS: 100000 SUSPENSION ORAL at 21:35

## 2017-01-01 RX ADMIN — SODIUM BICARBONATE 75 MEQ: 84 INJECTION INTRAVENOUS at 00:39

## 2017-01-01 RX ADMIN — LORAZEPAM 1 MG: 2 INJECTION, SOLUTION INTRAMUSCULAR; INTRAVENOUS at 02:09

## 2017-01-01 RX ADMIN — PANTOPRAZOLE SODIUM 40 MG: 40 GRANULE, DELAYED RELEASE ORAL at 18:16

## 2017-01-01 RX ADMIN — SODIUM CHLORIDE 100 ML/HR: 900 INJECTION, SOLUTION INTRAVENOUS at 16:56

## 2017-01-01 RX ADMIN — INSULIN LISPRO 6 UNITS: 100 INJECTION, SOLUTION INTRAVENOUS; SUBCUTANEOUS at 06:35

## 2017-01-01 RX ADMIN — SODIUM CHLORIDE 10 MG/HR: 900 INJECTION, SOLUTION INTRAVENOUS at 00:14

## 2017-01-01 RX ADMIN — HEPARIN SODIUM 5000 UNITS: 5000 INJECTION, SOLUTION INTRAVENOUS; SUBCUTANEOUS at 20:24

## 2017-01-01 RX ADMIN — Medication 10 ML: at 06:00

## 2017-01-01 RX ADMIN — NYSTATIN 500000 UNITS: 100000 SUSPENSION ORAL at 18:02

## 2017-01-01 RX ADMIN — CALCIUM GLUCONATE: 94 INJECTION, SOLUTION INTRAVENOUS at 18:17

## 2017-01-01 RX ADMIN — HYDROMORPHONE HYDROCHLORIDE 0.5 MG: 1 INJECTION, SOLUTION INTRAMUSCULAR; INTRAVENOUS; SUBCUTANEOUS at 02:54

## 2017-01-01 RX ADMIN — HYDROMORPHONE HYDROCHLORIDE 1 MG: 2 INJECTION, SOLUTION INTRAMUSCULAR; INTRAVENOUS; SUBCUTANEOUS at 13:30

## 2017-01-01 RX ADMIN — INSULIN LISPRO 3 UNITS: 100 INJECTION, SOLUTION INTRAVENOUS; SUBCUTANEOUS at 20:06

## 2017-01-01 RX ADMIN — NYSTATIN 500000 UNITS: 100000 SUSPENSION ORAL at 20:44

## 2017-01-01 RX ADMIN — PIPERACILLIN SODIUM,TAZOBACTAM SODIUM 3.38 G: 3; .375 INJECTION, POWDER, FOR SOLUTION INTRAVENOUS at 22:07

## 2017-01-01 RX ADMIN — SEVELAMER CARBONATE 2400 MG: 2400 POWDER, FOR SUSPENSION ORAL at 17:51

## 2017-01-01 RX ADMIN — HYDROCODONE BITARTRATE AND ACETAMINOPHEN 5 MG: 7.5; 325 SOLUTION ORAL at 23:32

## 2017-01-01 RX ADMIN — SEVELAMER CARBONATE 2400 MG: 2400 POWDER, FOR SUSPENSION ORAL at 10:27

## 2017-01-01 RX ADMIN — INSULIN DETEMIR 5 UNITS: 100 INJECTION, SOLUTION SUBCUTANEOUS at 13:04

## 2017-01-01 RX ADMIN — TAMSULOSIN HYDROCHLORIDE 0.4 MG: 0.4 CAPSULE ORAL at 08:35

## 2017-01-01 RX ADMIN — NYSTATIN 500000 UNITS: 100000 SUSPENSION ORAL at 18:16

## 2017-01-01 RX ADMIN — Medication 10 ML: at 20:37

## 2017-01-01 RX ADMIN — Medication 10 ML: at 07:45

## 2017-01-01 RX ADMIN — HYDROMORPHONE HYDROCHLORIDE 0.5 MG: 1 INJECTION, SOLUTION INTRAMUSCULAR; INTRAVENOUS; SUBCUTANEOUS at 23:21

## 2017-01-01 RX ADMIN — Medication 10 ML: at 15:04

## 2017-01-01 RX ADMIN — PANTOPRAZOLE SODIUM 40 MG: 40 GRANULE, DELAYED RELEASE ORAL at 17:16

## 2017-01-01 RX ADMIN — FENTANYL CITRATE 50 MCG: 50 INJECTION, SOLUTION INTRAMUSCULAR; INTRAVENOUS at 19:52

## 2017-01-01 RX ADMIN — PIPERACILLIN SODIUM,TAZOBACTAM SODIUM 3.38 G: 3; .375 INJECTION, POWDER, FOR SOLUTION INTRAVENOUS at 21:04

## 2017-01-01 RX ADMIN — PIPERACILLIN SODIUM,TAZOBACTAM SODIUM 3.38 G: 3; .375 INJECTION, POWDER, FOR SOLUTION INTRAVENOUS at 22:03

## 2017-01-01 RX ADMIN — HEPARIN SODIUM 5000 UNITS: 5000 INJECTION, SOLUTION INTRAVENOUS; SUBCUTANEOUS at 22:11

## 2017-01-01 RX ADMIN — Medication 10 ML: at 23:02

## 2017-01-01 RX ADMIN — HEPARIN SODIUM 5000 UNITS: 5000 INJECTION, SOLUTION INTRAVENOUS; SUBCUTANEOUS at 13:22

## 2017-01-01 RX ADMIN — CEFAZOLIN SODIUM 2 G: 2 SOLUTION INTRAVENOUS at 10:03

## 2017-01-01 RX ADMIN — INSULIN LISPRO 3 UNITS: 100 INJECTION, SOLUTION INTRAVENOUS; SUBCUTANEOUS at 13:03

## 2017-01-01 RX ADMIN — Medication 10 ML: at 14:53

## 2017-01-01 RX ADMIN — NYSTATIN 500000 UNITS: 100000 SUSPENSION ORAL at 17:33

## 2017-01-01 RX ADMIN — HEPARIN SODIUM 5000 UNITS: 5000 INJECTION, SOLUTION INTRAVENOUS; SUBCUTANEOUS at 20:19

## 2017-01-01 RX ADMIN — POTASSIUM CHLORIDE 10 MEQ: 10 INJECTION, SOLUTION INTRAVENOUS at 17:35

## 2017-01-01 RX ADMIN — PIPERACILLIN SODIUM,TAZOBACTAM SODIUM 3.38 G: 3; .375 INJECTION, POWDER, FOR SOLUTION INTRAVENOUS at 22:37

## 2017-01-01 RX ADMIN — Medication 10 ML: at 00:06

## 2017-01-01 RX ADMIN — GLYCOPYRROLATE 0.4 MG: 0.2 INJECTION INTRAMUSCULAR; INTRAVENOUS at 07:37

## 2017-01-01 RX ADMIN — INSULIN LISPRO 3 UNITS: 100 INJECTION, SOLUTION INTRAVENOUS; SUBCUTANEOUS at 18:39

## 2017-01-01 RX ADMIN — SEVELAMER CARBONATE 2400 MG: 2400 POWDER, FOR SUSPENSION ORAL at 12:50

## 2017-01-01 RX ADMIN — NYSTATIN 500000 UNITS: 100000 SUSPENSION ORAL at 21:00

## 2017-01-01 RX ADMIN — Medication 10 ML: at 16:11

## 2017-01-01 RX ADMIN — APIXABAN 2.5 MG: 2.5 TABLET, FILM COATED ORAL at 08:35

## 2017-01-01 RX ADMIN — TAMSULOSIN HYDROCHLORIDE 0.4 MG: 0.4 CAPSULE ORAL at 09:31

## 2017-01-01 RX ADMIN — MIDAZOLAM HYDROCHLORIDE 2 MG: 1 INJECTION, SOLUTION INTRAMUSCULAR; INTRAVENOUS at 22:57

## 2017-01-01 RX ADMIN — NYSTATIN 500000 UNITS: 100000 SUSPENSION ORAL at 21:30

## 2017-01-01 RX ADMIN — HEPARIN SODIUM 5000 UNITS: 5000 INJECTION, SOLUTION INTRAVENOUS; SUBCUTANEOUS at 12:40

## 2017-01-01 RX ADMIN — SODIUM CHLORIDE 500 ML: 900 INJECTION, SOLUTION INTRAVENOUS at 09:38

## 2017-01-01 RX ADMIN — CHLORHEXIDINE GLUCONATE 15 ML: 1.2 RINSE ORAL at 09:31

## 2017-01-01 RX ADMIN — INSULIN LISPRO 3 UNITS: 100 INJECTION, SOLUTION INTRAVENOUS; SUBCUTANEOUS at 18:44

## 2017-01-01 RX ADMIN — INSULIN LISPRO 3 UNITS: 100 INJECTION, SOLUTION INTRAVENOUS; SUBCUTANEOUS at 12:40

## 2017-01-01 RX ADMIN — INSULIN LISPRO 3 UNITS: 100 INJECTION, SOLUTION INTRAVENOUS; SUBCUTANEOUS at 01:23

## 2017-01-01 RX ADMIN — HYDROCODONE BITARTRATE AND ACETAMINOPHEN 7.5 MG: 7.5; 325 SOLUTION ORAL at 12:13

## 2017-01-01 RX ADMIN — NYSTATIN 500000 UNITS: 100000 SUSPENSION ORAL at 22:09

## 2017-01-01 RX ADMIN — Medication 20 ML: at 12:36

## 2017-01-01 RX ADMIN — Medication 10 ML: at 05:31

## 2017-01-01 RX ADMIN — CEFAZOLIN SODIUM 2 G: 2 SOLUTION INTRAVENOUS at 03:35

## 2017-01-01 RX ADMIN — Medication 10 ML: at 00:15

## 2017-01-01 RX ADMIN — SEVELAMER CARBONATE 2400 MG: 2400 POWDER, FOR SUSPENSION ORAL at 18:44

## 2017-01-01 RX ADMIN — INSULIN LISPRO 3 UNITS: 100 INJECTION, SOLUTION INTRAVENOUS; SUBCUTANEOUS at 06:00

## 2017-01-01 RX ADMIN — Medication 10 ML: at 07:07

## 2017-01-01 RX ADMIN — METOPROLOL TARTRATE 1.25 MG: 5 INJECTION INTRAVENOUS at 11:55

## 2017-01-01 RX ADMIN — SODIUM CHLORIDE: 450 INJECTION, SOLUTION INTRAVENOUS at 22:01

## 2017-01-01 RX ADMIN — PHENOL 1 SPRAY: 1.5 LIQUID ORAL at 11:58

## 2017-01-01 RX ADMIN — CALCIUM GLUCONATE 2 G: 94 INJECTION, SOLUTION INTRAVENOUS at 10:08

## 2017-01-01 RX ADMIN — HYDROCODONE BITARTRATE AND ACETAMINOPHEN 5 MG: 7.5; 325 SOLUTION ORAL at 14:12

## 2017-01-01 RX ADMIN — Medication 10 ML: at 06:42

## 2017-01-01 RX ADMIN — HYDROMORPHONE HYDROCHLORIDE 0.5 MG: 1 INJECTION, SOLUTION INTRAMUSCULAR; INTRAVENOUS; SUBCUTANEOUS at 05:47

## 2017-01-01 RX ADMIN — SEVELAMER CARBONATE 2400 MG: 2400 POWDER, FOR SUSPENSION ORAL at 18:25

## 2017-01-01 RX ADMIN — HEPARIN SODIUM 5000 UNITS: 5000 INJECTION, SOLUTION INTRAVENOUS; SUBCUTANEOUS at 04:49

## 2017-01-01 RX ADMIN — NYSTATIN 500000 UNITS: 100000 SUSPENSION ORAL at 13:19

## 2017-01-01 RX ADMIN — SODIUM CHLORIDE 40 MG: 9 INJECTION INTRAMUSCULAR; INTRAVENOUS; SUBCUTANEOUS at 09:56

## 2017-01-01 RX ADMIN — Medication 20 ML: at 12:33

## 2017-01-01 RX ADMIN — POTASSIUM CHLORIDE 10 MEQ: 10 INJECTION, SOLUTION INTRAVENOUS at 04:04

## 2017-01-01 RX ADMIN — HEPARIN SODIUM 5000 UNITS: 5000 INJECTION, SOLUTION INTRAVENOUS; SUBCUTANEOUS at 12:15

## 2017-01-01 RX ADMIN — METOPROLOL TARTRATE 1.25 MG: 5 INJECTION INTRAVENOUS at 17:39

## 2017-01-01 RX ADMIN — Medication 10 ML: at 05:56

## 2017-01-01 RX ADMIN — HEPARIN SODIUM 5000 UNITS: 5000 INJECTION, SOLUTION INTRAVENOUS; SUBCUTANEOUS at 12:50

## 2017-01-01 RX ADMIN — FUROSEMIDE 40 MG: 10 INJECTION, SOLUTION INTRAMUSCULAR; INTRAVENOUS at 21:53

## 2017-01-01 RX ADMIN — SODIUM BICARBONATE 650 MG TABLET 650 MG: at 18:16

## 2017-01-01 RX ADMIN — Medication 325 MG: at 10:35

## 2017-01-01 RX ADMIN — NYSTATIN 500000 UNITS: 100000 SUSPENSION ORAL at 00:06

## 2017-01-01 RX ADMIN — INSULIN LISPRO 3 UNITS: 100 INJECTION, SOLUTION INTRAVENOUS; SUBCUTANEOUS at 00:56

## 2017-01-01 RX ADMIN — Medication 10 ML: at 21:23

## 2017-01-01 RX ADMIN — INSULIN LISPRO 3 UNITS: 100 INJECTION, SOLUTION INTRAVENOUS; SUBCUTANEOUS at 13:51

## 2017-01-01 RX ADMIN — INSULIN LISPRO 3 UNITS: 100 INJECTION, SOLUTION INTRAVENOUS; SUBCUTANEOUS at 12:43

## 2017-01-01 RX ADMIN — LIDOCAINE HYDROCHLORIDE 50 MG: 20 INJECTION, SOLUTION EPIDURAL; INFILTRATION; INTRACAUDAL; PERINEURAL at 06:26

## 2017-01-01 RX ADMIN — METOPROLOL TARTRATE 1.25 MG: 5 INJECTION INTRAVENOUS at 06:58

## 2017-01-01 RX ADMIN — Medication 10 ML: at 21:43

## 2017-01-01 RX ADMIN — NYSTATIN 500000 UNITS: 100000 SUSPENSION ORAL at 12:50

## 2017-01-01 RX ADMIN — Medication 10 ML: at 18:47

## 2017-01-01 RX ADMIN — NYSTATIN 500000 UNITS: 100000 SUSPENSION ORAL at 08:49

## 2017-01-01 RX ADMIN — NYSTATIN 500000 UNITS: 100000 SUSPENSION ORAL at 18:44

## 2017-01-01 RX ADMIN — CALCIUM GLUCONATE: 94 INJECTION, SOLUTION INTRAVENOUS at 19:27

## 2017-01-01 RX ADMIN — NYSTATIN 500000 UNITS: 100000 SUSPENSION ORAL at 13:43

## 2017-01-01 RX ADMIN — SODIUM CHLORIDE 5 MG/HR: 900 INJECTION, SOLUTION INTRAVENOUS at 04:10

## 2017-01-01 RX ADMIN — Medication 18 MCG/MIN: at 08:50

## 2017-01-01 RX ADMIN — NYSTATIN 500000 UNITS: 100000 SUSPENSION ORAL at 12:41

## 2017-01-01 RX ADMIN — SODIUM BICARBONATE 650 MG TABLET 650 MG: at 08:51

## 2017-01-01 RX ADMIN — CALCIUM GLUCONATE 2 G: 94 INJECTION, SOLUTION INTRAVENOUS at 20:26

## 2017-01-01 RX ADMIN — Medication 325 MG: at 08:23

## 2017-01-01 RX ADMIN — Medication 10 ML: at 13:44

## 2017-01-01 RX ADMIN — METOPROLOL TARTRATE 1.25 MG: 5 INJECTION INTRAVENOUS at 23:48

## 2017-01-01 RX ADMIN — Medication 10 ML: at 13:31

## 2017-01-01 RX ADMIN — Medication 30 ML: at 22:37

## 2017-01-01 RX ADMIN — HEPARIN SODIUM 5000 UNITS: 5000 INJECTION, SOLUTION INTRAVENOUS; SUBCUTANEOUS at 21:23

## 2017-01-01 RX ADMIN — MIDAZOLAM HYDROCHLORIDE 2 MG: 1 INJECTION, SOLUTION INTRAMUSCULAR; INTRAVENOUS at 01:35

## 2017-01-01 RX ADMIN — CALCIUM GLUCONATE 2 G: 94 INJECTION, SOLUTION INTRAVENOUS at 18:48

## 2017-01-01 RX ADMIN — DIGOXIN 250 MCG: 250 INJECTION, SOLUTION INTRAMUSCULAR; INTRAVENOUS; PARENTERAL at 11:25

## 2017-01-01 RX ADMIN — INSULIN LISPRO 3 UNITS: 100 INJECTION, SOLUTION INTRAVENOUS; SUBCUTANEOUS at 00:00

## 2017-01-01 RX ADMIN — INSULIN LISPRO 6 UNITS: 100 INJECTION, SOLUTION INTRAVENOUS; SUBCUTANEOUS at 07:14

## 2017-01-01 RX ADMIN — METOPROLOL TARTRATE 1.25 MG: 5 INJECTION INTRAVENOUS at 11:07

## 2017-01-01 RX ADMIN — ACETAMINOPHEN 650 MG: 650 SUPPOSITORY RECTAL at 22:26

## 2017-01-01 RX ADMIN — INSULIN LISPRO 3 UNITS: 100 INJECTION, SOLUTION INTRAVENOUS; SUBCUTANEOUS at 17:34

## 2017-01-01 RX ADMIN — FUROSEMIDE 40 MG: 10 INJECTION, SOLUTION INTRAMUSCULAR; INTRAVENOUS at 20:42

## 2017-01-01 RX ADMIN — PANTOPRAZOLE SODIUM 40 MG: 40 GRANULE, DELAYED RELEASE ORAL at 09:34

## 2017-01-01 RX ADMIN — HEPARIN SODIUM 5000 UNITS: 5000 INJECTION, SOLUTION INTRAVENOUS; SUBCUTANEOUS at 12:38

## 2017-01-01 RX ADMIN — HEPARIN SODIUM 5000 UNITS: 5000 INJECTION, SOLUTION INTRAVENOUS; SUBCUTANEOUS at 00:21

## 2017-01-01 RX ADMIN — SODIUM CHLORIDE: 234 INJECTION INTRAMUSCULAR; INTRAVENOUS; SUBCUTANEOUS at 01:33

## 2017-01-01 RX ADMIN — NYSTATIN 500000 UNITS: 100000 SUSPENSION ORAL at 14:00

## 2017-01-01 RX ADMIN — INSULIN LISPRO 3 UNITS: 100 INJECTION, SOLUTION INTRAVENOUS; SUBCUTANEOUS at 17:48

## 2017-01-01 RX ADMIN — HEPARIN SODIUM 5000 UNITS: 5000 INJECTION, SOLUTION INTRAVENOUS; SUBCUTANEOUS at 20:38

## 2017-01-01 RX ADMIN — HYDROMORPHONE HYDROCHLORIDE 0.5 MG: 1 INJECTION, SOLUTION INTRAMUSCULAR; INTRAVENOUS; SUBCUTANEOUS at 04:31

## 2017-01-01 RX ADMIN — Medication 20 ML: at 11:36

## 2017-01-01 RX ADMIN — INSULIN LISPRO 3 UNITS: 100 INJECTION, SOLUTION INTRAVENOUS; SUBCUTANEOUS at 18:17

## 2017-01-01 RX ADMIN — CALCIUM GLUCONATE: 94 INJECTION, SOLUTION INTRAVENOUS at 20:37

## 2017-01-01 RX ADMIN — NYSTATIN 500000 UNITS: 100000 SUSPENSION ORAL at 21:29

## 2017-01-01 RX ADMIN — HEPARIN SODIUM 5000 UNITS: 5000 INJECTION, SOLUTION INTRAVENOUS; SUBCUTANEOUS at 04:14

## 2017-01-01 RX ADMIN — SODIUM CHLORIDE 40 MG: 9 INJECTION INTRAMUSCULAR; INTRAVENOUS; SUBCUTANEOUS at 18:34

## 2017-01-01 RX ADMIN — DEXTROSE MONOHYDRATE AND SODIUM CHLORIDE 75 ML/HR: 5; .45 INJECTION, SOLUTION INTRAVENOUS at 10:43

## 2017-01-01 RX ADMIN — CALCIUM GLUCONATE 4 G: 94 INJECTION, SOLUTION INTRAVENOUS at 06:21

## 2017-01-01 RX ADMIN — Medication 10 ML: at 13:45

## 2017-01-01 RX ADMIN — INSULIN LISPRO 3 UNITS: 100 INJECTION, SOLUTION INTRAVENOUS; SUBCUTANEOUS at 06:53

## 2017-01-01 RX ADMIN — PIPERACILLIN SODIUM,TAZOBACTAM SODIUM 3.38 G: 3; .375 INJECTION, POWDER, FOR SOLUTION INTRAVENOUS at 10:09

## 2017-01-01 RX ADMIN — Medication 325 MG: at 09:36

## 2017-01-01 RX ADMIN — HYDROCODONE BITARTRATE AND ACETAMINOPHEN 5 MG: 7.5; 325 SOLUTION ORAL at 21:35

## 2017-01-01 RX ADMIN — INSULIN LISPRO 3 UNITS: 100 INJECTION, SOLUTION INTRAVENOUS; SUBCUTANEOUS at 12:36

## 2017-01-01 RX ADMIN — SODIUM CHLORIDE 40 MG: 9 INJECTION INTRAMUSCULAR; INTRAVENOUS; SUBCUTANEOUS at 08:16

## 2017-01-01 RX ADMIN — BARIUM SULFATE 15 ML: 400 SUSPENSION ORAL at 11:01

## 2017-01-01 RX ADMIN — METOPROLOL TARTRATE 1.25 MG: 5 INJECTION INTRAVENOUS at 00:16

## 2017-01-01 RX ADMIN — SODIUM CHLORIDE 40 MG: 9 INJECTION INTRAMUSCULAR; INTRAVENOUS; SUBCUTANEOUS at 08:28

## 2017-01-01 RX ADMIN — ONDANSETRON 4 MG: 2 INJECTION INTRAMUSCULAR; INTRAVENOUS at 20:18

## 2017-01-01 RX ADMIN — Medication 10 ML: at 14:44

## 2017-01-01 RX ADMIN — Medication 10 ML: at 06:08

## 2017-01-01 RX ADMIN — INSULIN LISPRO 3 UNITS: 100 INJECTION, SOLUTION INTRAVENOUS; SUBCUTANEOUS at 00:41

## 2017-01-01 RX ADMIN — HEPARIN SODIUM 5000 UNITS: 5000 INJECTION, SOLUTION INTRAVENOUS; SUBCUTANEOUS at 12:21

## 2017-01-01 RX ADMIN — POTASSIUM CHLORIDE 20 MEQ: 20 TABLET, EXTENDED RELEASE ORAL at 08:27

## 2017-01-01 RX ADMIN — NYSTATIN 500000 UNITS: 100000 SUSPENSION ORAL at 12:33

## 2017-01-01 RX ADMIN — HYDROMORPHONE HYDROCHLORIDE 0.5 MG: 1 INJECTION, SOLUTION INTRAMUSCULAR; INTRAVENOUS; SUBCUTANEOUS at 23:51

## 2017-01-01 RX ADMIN — FUROSEMIDE 40 MG: 10 INJECTION, SOLUTION INTRAMUSCULAR; INTRAVENOUS at 11:01

## 2017-01-01 RX ADMIN — SODIUM CHLORIDE 1000 MG: 900 INJECTION, SOLUTION INTRAVENOUS at 02:06

## 2017-01-01 RX ADMIN — HYDROMORPHONE HYDROCHLORIDE 1 MG: 2 INJECTION, SOLUTION INTRAMUSCULAR; INTRAVENOUS; SUBCUTANEOUS at 18:26

## 2017-01-01 RX ADMIN — HEPARIN SODIUM 5000 UNITS: 5000 INJECTION, SOLUTION INTRAVENOUS; SUBCUTANEOUS at 04:32

## 2017-01-01 RX ADMIN — CEFAZOLIN SODIUM 2 G: 2 SOLUTION INTRAVENOUS at 17:42

## 2017-01-01 RX ADMIN — PIPERACILLIN SODIUM,TAZOBACTAM SODIUM 3.38 G: 3; .375 INJECTION, POWDER, FOR SOLUTION INTRAVENOUS at 10:22

## 2017-01-01 RX ADMIN — HEPARIN SODIUM 5000 UNITS: 5000 INJECTION, SOLUTION INTRAVENOUS; SUBCUTANEOUS at 21:53

## 2017-01-01 RX ADMIN — INSULIN LISPRO 3 UNITS: 100 INJECTION, SOLUTION INTRAVENOUS; SUBCUTANEOUS at 00:20

## 2017-01-01 RX ADMIN — INSULIN LISPRO 6 UNITS: 100 INJECTION, SOLUTION INTRAVENOUS; SUBCUTANEOUS at 13:04

## 2017-01-01 RX ADMIN — SEVELAMER CARBONATE 2400 MG: 2400 POWDER, FOR SUSPENSION ORAL at 18:16

## 2017-01-01 RX ADMIN — SODIUM CHLORIDE 40 MG: 9 INJECTION INTRAMUSCULAR; INTRAVENOUS; SUBCUTANEOUS at 08:15

## 2017-01-01 RX ADMIN — NYSTATIN 500000 UNITS: 100000 SUSPENSION ORAL at 08:15

## 2017-01-01 RX ADMIN — INSULIN DETEMIR 10 UNITS: 100 INJECTION, SOLUTION SUBCUTANEOUS at 09:39

## 2017-01-01 RX ADMIN — Medication 10 ML: at 05:57

## 2017-01-01 RX ADMIN — METOPROLOL TARTRATE 2.5 MG: 5 INJECTION INTRAVENOUS at 01:03

## 2017-01-01 RX ADMIN — POTASSIUM CHLORIDE 10 MEQ: 10 INJECTION, SOLUTION INTRAVENOUS at 14:15

## 2017-01-01 RX ADMIN — HEPARIN SODIUM 5000 UNITS: 5000 INJECTION, SOLUTION INTRAVENOUS; SUBCUTANEOUS at 09:53

## 2017-01-01 RX ADMIN — ERYTHROPOIETIN 40000 UNITS: 40000 INJECTION, SOLUTION INTRAVENOUS; SUBCUTANEOUS at 21:33

## 2017-01-01 RX ADMIN — MIDAZOLAM HYDROCHLORIDE 2 MG: 1 INJECTION, SOLUTION INTRAMUSCULAR; INTRAVENOUS at 23:07

## 2017-01-01 RX ADMIN — Medication 10 ML: at 06:04

## 2017-01-01 RX ADMIN — HYDROCODONE BITARTRATE AND ACETAMINOPHEN 7.5 MG: 7.5; 325 SOLUTION ORAL at 00:53

## 2017-01-01 RX ADMIN — Medication 10 ML: at 05:44

## 2017-01-01 RX ADMIN — Medication 325 MG: at 09:16

## 2017-01-01 RX ADMIN — HEPARIN SODIUM 5000 UNITS: 5000 INJECTION, SOLUTION INTRAVENOUS; SUBCUTANEOUS at 20:56

## 2017-01-01 RX ADMIN — Medication 10 ML: at 22:06

## 2017-01-01 RX ADMIN — GUAIFENESIN 100 MG: 100 SOLUTION ORAL at 16:20

## 2017-01-01 RX ADMIN — Medication 10 ML: at 07:06

## 2017-01-01 RX ADMIN — MAGNESIUM SULFATE HEPTAHYDRATE 1 G: 1 INJECTION, SOLUTION INTRAVENOUS at 09:19

## 2017-01-01 RX ADMIN — POTASSIUM CHLORIDE 10 MEQ: 20 POWDER ORAL at 11:00

## 2017-01-01 RX ADMIN — HEPARIN SODIUM 5000 UNITS: 5000 INJECTION, SOLUTION INTRAVENOUS; SUBCUTANEOUS at 11:07

## 2017-01-01 RX ADMIN — Medication 10 ML: at 16:57

## 2017-01-01 RX ADMIN — SEVELAMER CARBONATE 2400 MG: 2400 POWDER, FOR SUSPENSION ORAL at 09:31

## 2017-01-01 RX ADMIN — HEPARIN SODIUM 5000 UNITS: 5000 INJECTION, SOLUTION INTRAVENOUS; SUBCUTANEOUS at 03:27

## 2017-01-01 RX ADMIN — Medication 10 ML: at 06:25

## 2017-01-01 RX ADMIN — HYDROMORPHONE HYDROCHLORIDE 0.5 MG: 1 INJECTION, SOLUTION INTRAMUSCULAR; INTRAVENOUS; SUBCUTANEOUS at 11:15

## 2017-01-01 RX ADMIN — Medication 10 ML: at 10:22

## 2017-01-01 RX ADMIN — Medication 10 ML: at 14:55

## 2017-01-01 RX ADMIN — DEXTROSE MONOHYDRATE AND SODIUM CHLORIDE 100 ML/HR: 5; .45 INJECTION, SOLUTION INTRAVENOUS at 08:13

## 2017-01-01 RX ADMIN — SEVELAMER CARBONATE 2400 MG: 2400 POWDER, FOR SUSPENSION ORAL at 12:13

## 2017-01-01 RX ADMIN — NYSTATIN 500000 UNITS: 100000 SUSPENSION ORAL at 17:16

## 2017-01-01 RX ADMIN — SEVELAMER CARBONATE 2400 MG: 2400 POWDER, FOR SUSPENSION ORAL at 13:43

## 2017-01-01 RX ADMIN — MIDAZOLAM HYDROCHLORIDE 2 MG: 1 INJECTION, SOLUTION INTRAMUSCULAR; INTRAVENOUS at 03:50

## 2017-01-01 RX ADMIN — NYSTATIN 500000 UNITS: 100000 SUSPENSION ORAL at 22:13

## 2017-01-01 RX ADMIN — SEVELAMER CARBONATE 2400 MG: 2400 POWDER, FOR SUSPENSION ORAL at 16:17

## 2017-01-01 RX ADMIN — Medication 30 ML: at 04:32

## 2017-01-01 RX ADMIN — HEPARIN SODIUM 5000 UNITS: 5000 INJECTION, SOLUTION INTRAVENOUS; SUBCUTANEOUS at 12:32

## 2017-01-01 RX ADMIN — HEPARIN SODIUM 5000 UNITS: 5000 INJECTION, SOLUTION INTRAVENOUS; SUBCUTANEOUS at 21:51

## 2017-01-01 RX ADMIN — INSULIN LISPRO 3 UNITS: 100 INJECTION, SOLUTION INTRAVENOUS; SUBCUTANEOUS at 17:58

## 2017-01-01 RX ADMIN — SODIUM CHLORIDE 40 MG: 9 INJECTION INTRAMUSCULAR; INTRAVENOUS; SUBCUTANEOUS at 09:36

## 2017-01-01 RX ADMIN — INSULIN LISPRO 3 UNITS: 100 INJECTION, SOLUTION INTRAVENOUS; SUBCUTANEOUS at 13:19

## 2017-01-01 RX ADMIN — Medication 10 ML: at 22:24

## 2017-01-01 RX ADMIN — INSULIN LISPRO 6 UNITS: 100 INJECTION, SOLUTION INTRAVENOUS; SUBCUTANEOUS at 00:17

## 2017-01-01 RX ADMIN — SEVELAMER CARBONATE 2400 MG: 2400 POWDER, FOR SUSPENSION ORAL at 20:44

## 2017-01-01 RX ADMIN — HEPARIN SODIUM 5000 UNITS: 5000 INJECTION, SOLUTION INTRAVENOUS; SUBCUTANEOUS at 05:17

## 2017-01-01 RX ADMIN — INSULIN LISPRO 3 UNITS: 100 INJECTION, SOLUTION INTRAVENOUS; SUBCUTANEOUS at 12:51

## 2017-01-01 RX ADMIN — NYSTATIN 500000 UNITS: 100000 SUSPENSION ORAL at 13:37

## 2017-01-01 RX ADMIN — MIDAZOLAM HYDROCHLORIDE 2 MG: 1 INJECTION, SOLUTION INTRAMUSCULAR; INTRAVENOUS at 00:06

## 2017-01-01 RX ADMIN — PIPERACILLIN SODIUM,TAZOBACTAM SODIUM 3.38 G: 3; .375 INJECTION, POWDER, FOR SOLUTION INTRAVENOUS at 22:13

## 2017-01-01 RX ADMIN — ALBUMIN (HUMAN) 25 G: 12.5 INJECTION, SOLUTION INTRAVENOUS at 01:20

## 2017-01-01 RX ADMIN — Medication 10 ML: at 21:54

## 2017-01-01 RX ADMIN — FENTANYL CITRATE 50 MCG: 50 INJECTION, SOLUTION INTRAMUSCULAR; INTRAVENOUS at 20:12

## 2017-01-01 RX ADMIN — SODIUM BICARBONATE 650 MG TABLET 650 MG: at 16:17

## 2017-01-01 RX ADMIN — PIPERACILLIN SODIUM,TAZOBACTAM SODIUM 3.38 G: 3; .375 INJECTION, POWDER, FOR SOLUTION INTRAVENOUS at 10:40

## 2017-01-01 RX ADMIN — HEPARIN SODIUM 5000 UNITS: 5000 INJECTION, SOLUTION INTRAVENOUS; SUBCUTANEOUS at 20:45

## 2017-01-01 RX ADMIN — SODIUM CHLORIDE 0.2 MCG/KG/HR: 900 INJECTION, SOLUTION INTRAVENOUS at 23:00

## 2017-01-01 RX ADMIN — PIPERACILLIN SODIUM,TAZOBACTAM SODIUM 3.38 G: 3; .375 INJECTION, POWDER, FOR SOLUTION INTRAVENOUS at 09:30

## 2017-01-01 RX ADMIN — Medication 20 MCG/MIN: at 03:54

## 2017-01-01 RX ADMIN — HALOPERIDOL LACTATE 2 MG: 5 INJECTION, SOLUTION INTRAMUSCULAR at 00:23

## 2017-01-01 RX ADMIN — DEXTROSE MONOHYDRATE AND SODIUM CHLORIDE 100 ML/HR: 5; .45 INJECTION, SOLUTION INTRAVENOUS at 22:10

## 2017-01-01 RX ADMIN — CALCIUM GLUCONATE 2 G: 94 INJECTION, SOLUTION INTRAVENOUS at 08:29

## 2017-01-01 RX ADMIN — PANTOPRAZOLE SODIUM 40 MG: 40 GRANULE, DELAYED RELEASE ORAL at 17:30

## 2017-01-01 RX ADMIN — FUROSEMIDE 40 MG: 10 INJECTION, SOLUTION INTRAMUSCULAR; INTRAVENOUS at 12:38

## 2017-01-01 RX ADMIN — SEVELAMER CARBONATE 2400 MG: 2400 POWDER, FOR SUSPENSION ORAL at 17:16

## 2017-01-01 RX ADMIN — BARIUM SULFATE 15 ML: 400 PASTE ORAL at 10:52

## 2017-01-01 RX ADMIN — DEXTROSE MONOHYDRATE AND SODIUM CHLORIDE 100 ML/HR: 5; .45 INJECTION, SOLUTION INTRAVENOUS at 10:18

## 2017-01-01 RX ADMIN — CHLORHEXIDINE GLUCONATE 15 ML: 1.2 RINSE ORAL at 17:33

## 2017-01-01 RX ADMIN — PANTOPRAZOLE SODIUM 40 MG: 40 GRANULE, DELAYED RELEASE ORAL at 09:16

## 2017-01-01 RX ADMIN — Medication 10 ML: at 23:46

## 2017-01-01 RX ADMIN — HEPARIN SODIUM 5000 UNITS: 5000 INJECTION, SOLUTION INTRAVENOUS; SUBCUTANEOUS at 05:46

## 2017-01-01 RX ADMIN — METOPROLOL TARTRATE 12.5 MG: 25 TABLET ORAL at 11:00

## 2017-01-01 RX ADMIN — TAMSULOSIN HYDROCHLORIDE 0.4 MG: 0.4 CAPSULE ORAL at 08:27

## 2017-01-01 RX ADMIN — HYDROCODONE BITARTRATE AND ACETAMINOPHEN 7.5 MG: 7.5; 325 SOLUTION ORAL at 03:23

## 2017-01-01 RX ADMIN — FUROSEMIDE 40 MG: 10 INJECTION, SOLUTION INTRAMUSCULAR; INTRAVENOUS at 09:30

## 2017-01-01 RX ADMIN — Medication 20 ML: at 10:22

## 2017-01-01 RX ADMIN — HEPARIN SODIUM 5000 UNITS: 5000 INJECTION, SOLUTION INTRAVENOUS; SUBCUTANEOUS at 03:51

## 2017-01-01 RX ADMIN — NYSTATIN 500000 UNITS: 100000 SUSPENSION ORAL at 21:07

## 2017-01-01 RX ADMIN — Medication 10 ML: at 21:00

## 2017-01-01 RX ADMIN — PANTOPRAZOLE SODIUM 40 MG: 40 GRANULE, DELAYED RELEASE ORAL at 08:51

## 2017-01-01 RX ADMIN — INSULIN LISPRO 3 UNITS: 100 INJECTION, SOLUTION INTRAVENOUS; SUBCUTANEOUS at 17:14

## 2017-01-01 RX ADMIN — BARIUM SULFATE 20 ML: 400 PASTE ORAL at 13:00

## 2017-01-01 RX ADMIN — Medication 325 MG: at 09:35

## 2017-01-01 RX ADMIN — SODIUM CHLORIDE 40 MG: 9 INJECTION INTRAMUSCULAR; INTRAVENOUS; SUBCUTANEOUS at 08:09

## 2017-01-01 RX ADMIN — TAMSULOSIN HYDROCHLORIDE 0.4 MG: 0.4 CAPSULE ORAL at 09:18

## 2017-01-01 RX ADMIN — METOPROLOL TARTRATE 1.25 MG: 5 INJECTION INTRAVENOUS at 12:48

## 2017-01-01 RX ADMIN — INSULIN LISPRO 3 UNITS: 100 INJECTION, SOLUTION INTRAVENOUS; SUBCUTANEOUS at 00:15

## 2017-01-01 RX ADMIN — Medication 10 ML: at 05:47

## 2017-01-01 RX ADMIN — PIPERACILLIN SODIUM,TAZOBACTAM SODIUM 3.38 G: 3; .375 INJECTION, POWDER, FOR SOLUTION INTRAVENOUS at 21:35

## 2017-01-01 RX ADMIN — HEPARIN SODIUM 5000 UNITS: 5000 INJECTION, SOLUTION INTRAVENOUS; SUBCUTANEOUS at 03:10

## 2017-01-01 RX ADMIN — INSULIN LISPRO 3 UNITS: 100 INJECTION, SOLUTION INTRAVENOUS; SUBCUTANEOUS at 06:27

## 2017-01-01 RX ADMIN — PIPERACILLIN SODIUM,TAZOBACTAM SODIUM 3.38 G: 3; .375 INJECTION, POWDER, FOR SOLUTION INTRAVENOUS at 09:15

## 2017-01-01 RX ADMIN — PANTOPRAZOLE SODIUM 40 MG: 40 GRANULE, DELAYED RELEASE ORAL at 17:51

## 2017-01-01 RX ADMIN — Medication 10 ML: at 21:32

## 2017-01-01 RX ADMIN — Medication 10 ML: at 13:54

## 2017-01-01 RX ADMIN — SODIUM BICARBONATE 650 MG TABLET 650 MG: at 21:22

## 2017-01-01 RX ADMIN — HALOPERIDOL LACTATE 2 MG: 5 INJECTION, SOLUTION INTRAMUSCULAR at 15:46

## 2017-01-01 RX ADMIN — PHENYLEPHRINE HYDROCHLORIDE 20 MCG/MIN: 10 INJECTION, SOLUTION INTRAMUSCULAR; INTRAVENOUS; SUBCUTANEOUS at 00:04

## 2017-01-01 RX ADMIN — AMIODARONE HYDROCHLORIDE 0.5 MG/MIN: 1.8 INJECTION, SOLUTION INTRAVENOUS at 03:26

## 2017-01-01 RX ADMIN — PIPERACILLIN SODIUM,TAZOBACTAM SODIUM 3.38 G: 3; .375 INJECTION, POWDER, FOR SOLUTION INTRAVENOUS at 10:35

## 2017-01-01 RX ADMIN — SODIUM CHLORIDE: 234 INJECTION INTRAMUSCULAR; INTRAVENOUS; SUBCUTANEOUS at 20:49

## 2017-01-01 RX ADMIN — Medication 10 ML: at 13:21

## 2017-01-01 RX ADMIN — TAMSULOSIN HYDROCHLORIDE 0.4 MG: 0.4 CAPSULE ORAL at 09:16

## 2017-01-01 RX ADMIN — CHLORHEXIDINE GLUCONATE 15 ML: 1.2 RINSE ORAL at 20:44

## 2017-01-01 RX ADMIN — CALCIUM GLUCONATE 2 G: 94 INJECTION, SOLUTION INTRAVENOUS at 20:50

## 2017-01-01 RX ADMIN — HALOPERIDOL LACTATE 2 MG: 5 INJECTION, SOLUTION INTRAMUSCULAR at 16:14

## 2017-01-01 RX ADMIN — DEXTROSE MONOHYDRATE AND SODIUM CHLORIDE 50 ML/HR: 5; .9 INJECTION, SOLUTION INTRAVENOUS at 18:51

## 2017-01-01 RX ADMIN — DEXTROSE MONOHYDRATE AND SODIUM CHLORIDE 75 ML/HR: 5; .45 INJECTION, SOLUTION INTRAVENOUS at 00:21

## 2017-01-01 RX ADMIN — DEXTROSE MONOHYDRATE AND SODIUM CHLORIDE: 5; .9 INJECTION, SOLUTION INTRAVENOUS at 20:00

## 2017-01-01 RX ADMIN — CALCIUM GLUCONATE: 94 INJECTION, SOLUTION INTRAVENOUS at 22:03

## 2017-01-01 RX ADMIN — Medication 10 ML: at 13:34

## 2017-01-01 RX ADMIN — NYSTATIN 500000 UNITS: 100000 SUSPENSION ORAL at 09:35

## 2017-01-01 RX ADMIN — MAGNESIUM SULFATE IN DEXTROSE 1 G: 10 INJECTION, SOLUTION INTRAVENOUS at 10:15

## 2017-01-01 RX ADMIN — Medication 10 ML: at 09:16

## 2017-01-01 RX ADMIN — Medication 10 ML: at 01:35

## 2017-01-01 RX ADMIN — APIXABAN 2.5 MG: 2.5 TABLET, FILM COATED ORAL at 11:00

## 2017-01-01 RX ADMIN — HEPARIN SODIUM 5000 UNITS: 5000 INJECTION, SOLUTION INTRAVENOUS; SUBCUTANEOUS at 05:19

## 2017-01-01 RX ADMIN — SODIUM CHLORIDE 999 ML/HR: 900 INJECTION, SOLUTION INTRAVENOUS at 08:16

## 2017-01-01 RX ADMIN — Medication 325 MG: at 08:51

## 2017-01-01 RX ADMIN — POTASSIUM CHLORIDE 30 MEQ: 1.5 POWDER, FOR SOLUTION ORAL at 13:52

## 2017-01-01 RX ADMIN — NYSTATIN 500000 UNITS: 100000 SUSPENSION ORAL at 08:59

## 2017-01-01 RX ADMIN — HEPARIN SODIUM 5000 UNITS: 5000 INJECTION, SOLUTION INTRAVENOUS; SUBCUTANEOUS at 19:56

## 2017-01-01 RX ADMIN — NYSTATIN 500000 UNITS: 100000 SUSPENSION ORAL at 17:05

## 2017-01-01 RX ADMIN — INSULIN LISPRO 3 UNITS: 100 INJECTION, SOLUTION INTRAVENOUS; SUBCUTANEOUS at 17:30

## 2017-01-01 RX ADMIN — PIPERACILLIN SODIUM,TAZOBACTAM SODIUM 3.38 G: 3; .375 INJECTION, POWDER, FOR SOLUTION INTRAVENOUS at 21:22

## 2017-01-01 RX ADMIN — Medication 10 ML: at 00:05

## 2017-01-01 RX ADMIN — Medication 40 ML: at 02:54

## 2017-01-01 RX ADMIN — HALOPERIDOL LACTATE 2 MG: 5 INJECTION, SOLUTION INTRAMUSCULAR at 01:12

## 2017-01-01 RX ADMIN — HALOPERIDOL LACTATE 2 MG: 5 INJECTION, SOLUTION INTRAMUSCULAR at 00:14

## 2017-01-01 RX ADMIN — SODIUM BICARBONATE 650 MG TABLET 650 MG: at 17:51

## 2017-01-01 RX ADMIN — METOPROLOL TARTRATE 2.5 MG: 5 INJECTION INTRAVENOUS at 13:28

## 2017-01-01 RX ADMIN — INSULIN DETEMIR 5 UNITS: 100 INJECTION, SOLUTION SUBCUTANEOUS at 22:19

## 2017-01-01 RX ADMIN — FUROSEMIDE 40 MG: 10 INJECTION, SOLUTION INTRAMUSCULAR; INTRAVENOUS at 09:18

## 2017-01-01 RX ADMIN — IOHEXOL 50 ML: 240 INJECTION, SOLUTION INTRATHECAL; INTRAVASCULAR; INTRAVENOUS; ORAL at 00:00

## 2017-01-01 RX ADMIN — INSULIN LISPRO 4 UNITS: 100 INJECTION, SOLUTION INTRAVENOUS; SUBCUTANEOUS at 06:42

## 2017-01-01 RX ADMIN — NYSTATIN 500000 UNITS: 100000 SUSPENSION ORAL at 13:22

## 2017-01-01 RX ADMIN — POTASSIUM CHLORIDE 10 MEQ: 7.46 INJECTION, SOLUTION INTRAVENOUS at 09:17

## 2017-01-01 RX ADMIN — HEPARIN SODIUM 5000 UNITS: 5000 INJECTION, SOLUTION INTRAVENOUS; SUBCUTANEOUS at 13:42

## 2017-01-01 RX ADMIN — NYSTATIN 500000 UNITS: 100000 SUSPENSION ORAL at 09:56

## 2017-01-01 RX ADMIN — NYSTATIN 500000 UNITS: 100000 SUSPENSION ORAL at 09:30

## 2017-01-01 RX ADMIN — TAMSULOSIN HYDROCHLORIDE 0.4 MG: 0.4 CAPSULE ORAL at 08:51

## 2017-01-01 RX ADMIN — INSULIN LISPRO 3 UNITS: 100 INJECTION, SOLUTION INTRAVENOUS; SUBCUTANEOUS at 00:21

## 2017-01-01 RX ADMIN — Medication 10 ML: at 00:44

## 2017-01-01 RX ADMIN — HEPARIN SODIUM 5000 UNITS: 5000 INJECTION, SOLUTION INTRAVENOUS; SUBCUTANEOUS at 05:53

## 2017-01-01 RX ADMIN — HEPARIN SODIUM 5000 UNITS: 5000 INJECTION, SOLUTION INTRAVENOUS; SUBCUTANEOUS at 20:01

## 2017-01-01 RX ADMIN — CEFAZOLIN SODIUM 2 G: 2 SOLUTION INTRAVENOUS at 09:53

## 2017-01-01 RX ADMIN — FENTANYL CITRATE 50 MCG: 50 INJECTION, SOLUTION INTRAMUSCULAR; INTRAVENOUS at 06:20

## 2017-01-01 RX ADMIN — SODIUM BICARBONATE 650 MG TABLET 650 MG: at 21:59

## 2017-01-01 RX ADMIN — Medication 10 ML: at 01:33

## 2017-01-01 RX ADMIN — HEPARIN SODIUM 5000 UNITS: 5000 INJECTION, SOLUTION INTRAVENOUS; SUBCUTANEOUS at 21:16

## 2017-01-01 RX ADMIN — CHLORHEXIDINE GLUCONATE 15 ML: 1.2 RINSE ORAL at 18:26

## 2017-01-01 RX ADMIN — CALCIUM GLUCONATE 2 G: 94 INJECTION, SOLUTION INTRAVENOUS at 18:59

## 2017-01-01 RX ADMIN — PHENYLEPHRINE HYDROCHLORIDE 30 MCG/MIN: 10 INJECTION, SOLUTION INTRAMUSCULAR; INTRAVENOUS; SUBCUTANEOUS at 20:21

## 2017-01-01 RX ADMIN — Medication 10 ML: at 11:13

## 2017-01-01 RX ADMIN — Medication 10 ML: at 20:36

## 2017-01-01 RX ADMIN — SODIUM CHLORIDE 0.1 MCG/KG/HR: 900 INJECTION, SOLUTION INTRAVENOUS at 20:04

## 2017-01-01 RX ADMIN — Medication 10 ML: at 05:00

## 2017-01-01 RX ADMIN — SODIUM CHLORIDE 40 MG: 9 INJECTION INTRAMUSCULAR; INTRAVENOUS; SUBCUTANEOUS at 09:29

## 2017-01-01 RX ADMIN — INSULIN LISPRO 3 UNITS: 100 INJECTION, SOLUTION INTRAVENOUS; SUBCUTANEOUS at 18:27

## 2017-01-01 RX ADMIN — HEPARIN SODIUM 5000 UNITS: 5000 INJECTION, SOLUTION INTRAVENOUS; SUBCUTANEOUS at 12:48

## 2017-01-01 RX ADMIN — SODIUM CHLORIDE 40 MG: 9 INJECTION INTRAMUSCULAR; INTRAVENOUS; SUBCUTANEOUS at 08:49

## 2017-01-01 RX ADMIN — METOPROLOL TARTRATE 1.25 MG: 5 INJECTION INTRAVENOUS at 06:05

## 2017-01-01 RX ADMIN — INSULIN LISPRO 9 UNITS: 100 INJECTION, SOLUTION INTRAVENOUS; SUBCUTANEOUS at 11:49

## 2017-01-01 RX ADMIN — Medication 10 ML: at 06:53

## 2017-01-01 RX ADMIN — INSULIN LISPRO 3 UNITS: 100 INJECTION, SOLUTION INTRAVENOUS; SUBCUTANEOUS at 00:07

## 2017-01-01 RX ADMIN — HEPARIN SODIUM 5000 UNITS: 5000 INJECTION, SOLUTION INTRAVENOUS; SUBCUTANEOUS at 05:03

## 2017-01-01 RX ADMIN — NYSTATIN 500000 UNITS: 100000 SUSPENSION ORAL at 12:13

## 2017-01-01 RX ADMIN — CALCIUM GLUCONATE 2 G: 94 INJECTION, SOLUTION INTRAVENOUS at 23:32

## 2017-01-01 RX ADMIN — ERYTHROPOIETIN 40000 UNITS: 40000 INJECTION, SOLUTION INTRAVENOUS; SUBCUTANEOUS at 22:55

## 2017-01-01 RX ADMIN — PHENYLEPHRINE HYDROCHLORIDE 60 MCG/MIN: 10 INJECTION, SOLUTION INTRAMUSCULAR; INTRAVENOUS; SUBCUTANEOUS at 05:00

## 2017-01-01 RX ADMIN — FENTANYL CITRATE 50 MCG: 50 INJECTION, SOLUTION INTRAMUSCULAR; INTRAVENOUS at 10:21

## 2017-01-01 RX ADMIN — SODIUM CHLORIDE 500 MG: 900 INJECTION, SOLUTION INTRAVENOUS at 06:25

## 2017-01-01 RX ADMIN — HYDROMORPHONE HYDROCHLORIDE 0.5 MG: 1 INJECTION, SOLUTION INTRAMUSCULAR; INTRAVENOUS; SUBCUTANEOUS at 14:36

## 2017-01-01 RX ADMIN — PHENYLEPHRINE HYDROCHLORIDE 40 MCG/MIN: 10 INJECTION, SOLUTION INTRAMUSCULAR; INTRAVENOUS; SUBCUTANEOUS at 16:31

## 2017-01-01 RX ADMIN — Medication 10 ML: at 17:30

## 2017-01-01 RX ADMIN — SODIUM CHLORIDE 75 ML/HR: 900 INJECTION, SOLUTION INTRAVENOUS at 20:31

## 2017-01-01 RX ADMIN — Medication 325 MG: at 08:09

## 2017-01-01 RX ADMIN — Medication 20 ML: at 21:36

## 2017-01-01 RX ADMIN — METOPROLOL TARTRATE 5 MG: 5 INJECTION INTRAVENOUS at 12:10

## 2017-01-01 RX ADMIN — VASOPRESSIN 2 UNITS: 20 INJECTION PARENTERAL at 06:38

## 2017-01-01 RX ADMIN — Medication 10 ML: at 00:14

## 2017-01-01 RX ADMIN — INSULIN LISPRO 3 UNITS: 100 INJECTION, SOLUTION INTRAVENOUS; SUBCUTANEOUS at 00:59

## 2017-01-01 RX ADMIN — HEPARIN SODIUM 5000 UNITS: 5000 INJECTION, SOLUTION INTRAVENOUS; SUBCUTANEOUS at 17:40

## 2017-01-01 RX ADMIN — Medication 10 ML: at 21:37

## 2017-01-01 RX ADMIN — SODIUM BICARBONATE 650 MG TABLET 650 MG: at 17:33

## 2017-01-01 RX ADMIN — CALCIUM CHLORIDE 2 G: 100 INJECTION, SOLUTION INTRAVENOUS at 20:52

## 2017-01-01 RX ADMIN — Medication 10 ML: at 21:24

## 2017-01-01 RX ADMIN — SODIUM BICARBONATE 650 MG TABLET 650 MG: at 08:16

## 2017-01-01 RX ADMIN — HEPARIN SODIUM 5000 UNITS: 5000 INJECTION, SOLUTION INTRAVENOUS; SUBCUTANEOUS at 03:52

## 2017-01-01 RX ADMIN — SODIUM CHLORIDE: 234 INJECTION INTRAMUSCULAR; INTRAVENOUS; SUBCUTANEOUS at 00:04

## 2017-01-01 RX ADMIN — INSULIN LISPRO 3 UNITS: 100 INJECTION, SOLUTION INTRAVENOUS; SUBCUTANEOUS at 13:21

## 2017-01-01 RX ADMIN — Medication 10 ML: at 06:07

## 2017-01-01 RX ADMIN — SODIUM CHLORIDE 75 ML/HR: 450 INJECTION, SOLUTION INTRAVENOUS at 11:29

## 2017-01-01 RX ADMIN — POTASSIUM CHLORIDE 10 MEQ: 10 INJECTION, SOLUTION INTRAVENOUS at 05:48

## 2017-01-01 RX ADMIN — FUROSEMIDE 40 MG: 10 INJECTION, SOLUTION INTRAMUSCULAR; INTRAVENOUS at 12:15

## 2017-01-01 RX ADMIN — NYSTATIN 500000 UNITS: 100000 SUSPENSION ORAL at 12:59

## 2017-01-01 RX ADMIN — Medication 10 ML: at 00:57

## 2017-01-01 RX ADMIN — INSULIN LISPRO 6 UNITS: 100 INJECTION, SOLUTION INTRAVENOUS; SUBCUTANEOUS at 12:27

## 2017-01-01 RX ADMIN — PIPERACILLIN SODIUM,TAZOBACTAM SODIUM 3.38 G: 3; .375 INJECTION, POWDER, FOR SOLUTION INTRAVENOUS at 22:12

## 2017-01-01 RX ADMIN — CHLORHEXIDINE GLUCONATE 15 ML: 1.2 RINSE ORAL at 17:30

## 2017-01-01 RX ADMIN — HEPARIN SODIUM 5000 UNITS: 5000 INJECTION, SOLUTION INTRAVENOUS; SUBCUTANEOUS at 09:54

## 2017-01-01 RX ADMIN — Medication 13 MCG/MIN: at 10:50

## 2017-01-01 RX ADMIN — PANTOPRAZOLE SODIUM 40 MG: 40 GRANULE, DELAYED RELEASE ORAL at 17:22

## 2017-01-01 RX ADMIN — METOPROLOL TARTRATE 5 MG: 5 INJECTION INTRAVENOUS at 00:59

## 2017-01-01 RX ADMIN — HEPARIN SODIUM 5000 UNITS: 5000 INJECTION, SOLUTION INTRAVENOUS; SUBCUTANEOUS at 11:00

## 2017-01-01 RX ADMIN — INSULIN DETEMIR 10 UNITS: 100 INJECTION, SOLUTION SUBCUTANEOUS at 22:05

## 2017-01-01 RX ADMIN — HEPARIN SODIUM 5000 UNITS: 5000 INJECTION, SOLUTION INTRAVENOUS; SUBCUTANEOUS at 04:38

## 2017-01-01 RX ADMIN — CALCIUM GLUCONATE 2 G: 94 INJECTION, SOLUTION INTRAVENOUS at 09:56

## 2017-01-01 RX ADMIN — PIPERACILLIN SODIUM,TAZOBACTAM SODIUM 3.38 G: 3; .375 INJECTION, POWDER, FOR SOLUTION INTRAVENOUS at 09:13

## 2017-01-01 RX ADMIN — PANTOPRAZOLE SODIUM 40 MG: 40 GRANULE, DELAYED RELEASE ORAL at 18:26

## 2017-01-01 RX ADMIN — Medication 10 ML: at 16:21

## 2017-01-01 RX ADMIN — TAMSULOSIN HYDROCHLORIDE 0.4 MG: 0.4 CAPSULE ORAL at 12:46

## 2017-01-01 RX ADMIN — SODIUM BICARBONATE 650 MG TABLET 650 MG: at 10:26

## 2017-01-01 RX ADMIN — HEPARIN SODIUM 5000 UNITS: 5000 INJECTION, SOLUTION INTRAVENOUS; SUBCUTANEOUS at 03:23

## 2017-01-01 RX ADMIN — CEFAZOLIN SODIUM 2 G: 2 SOLUTION INTRAVENOUS at 02:26

## 2017-01-01 RX ADMIN — SODIUM CHLORIDE 500 MG: 900 INJECTION, SOLUTION INTRAVENOUS at 06:43

## 2017-01-01 RX ADMIN — POTASSIUM CHLORIDE 10 MEQ: 10 INJECTION, SOLUTION INTRAVENOUS at 16:26

## 2017-01-01 RX ADMIN — HEPARIN SODIUM 5000 UNITS: 5000 INJECTION, SOLUTION INTRAVENOUS; SUBCUTANEOUS at 20:44

## 2017-01-01 RX ADMIN — NYSTATIN 500000 UNITS: 100000 SUSPENSION ORAL at 22:03

## 2017-01-01 RX ADMIN — PIPERACILLIN SODIUM,TAZOBACTAM SODIUM 3.38 G: 3; .375 INJECTION, POWDER, FOR SOLUTION INTRAVENOUS at 09:55

## 2017-01-01 RX ADMIN — INSULIN DETEMIR 8 UNITS: 100 INJECTION, SOLUTION SUBCUTANEOUS at 22:20

## 2017-01-01 RX ADMIN — HEPARIN SODIUM 5000 UNITS: 5000 INJECTION, SOLUTION INTRAVENOUS; SUBCUTANEOUS at 12:35

## 2017-01-01 RX ADMIN — HEPARIN SODIUM 5000 UNITS: 5000 INJECTION, SOLUTION INTRAVENOUS; SUBCUTANEOUS at 04:19

## 2017-01-01 RX ADMIN — HEPARIN SODIUM 5000 UNITS: 5000 INJECTION, SOLUTION INTRAVENOUS; SUBCUTANEOUS at 11:44

## 2017-01-01 RX ADMIN — CHLORHEXIDINE GLUCONATE 15 ML: 1.2 RINSE ORAL at 09:36

## 2017-01-01 RX ADMIN — HALOPERIDOL LACTATE 2 MG: 5 INJECTION, SOLUTION INTRAMUSCULAR at 11:00

## 2017-01-01 RX ADMIN — HEPARIN SODIUM 5000 UNITS: 5000 INJECTION, SOLUTION INTRAVENOUS; SUBCUTANEOUS at 23:43

## 2017-01-01 RX ADMIN — INSULIN LISPRO 3 UNITS: 100 INJECTION, SOLUTION INTRAVENOUS; SUBCUTANEOUS at 13:41

## 2017-01-01 RX ADMIN — MIDAZOLAM HYDROCHLORIDE 1 MG: 1 INJECTION, SOLUTION INTRAMUSCULAR; INTRAVENOUS at 08:23

## 2017-01-01 RX ADMIN — PIPERACILLIN SODIUM,TAZOBACTAM SODIUM 3.38 G: 3; .375 INJECTION, POWDER, FOR SOLUTION INTRAVENOUS at 22:05

## 2017-01-01 RX ADMIN — Medication 10 ML: at 08:55

## 2017-01-01 RX ADMIN — Medication 10 ML: at 22:37

## 2017-01-01 RX ADMIN — ONDANSETRON 4 MG: 2 INJECTION INTRAMUSCULAR; INTRAVENOUS at 07:39

## 2017-01-01 RX ADMIN — HEPARIN SODIUM 5000 UNITS: 5000 INJECTION, SOLUTION INTRAVENOUS; SUBCUTANEOUS at 22:05

## 2017-01-01 RX ADMIN — TAMSULOSIN HYDROCHLORIDE 0.4 MG: 0.4 CAPSULE ORAL at 08:09

## 2017-01-01 RX ADMIN — INSULIN LISPRO 3 UNITS: 100 INJECTION, SOLUTION INTRAVENOUS; SUBCUTANEOUS at 00:44

## 2017-01-01 RX ADMIN — Medication 10 ML: at 20:10

## 2017-01-01 RX ADMIN — Medication 10 ML: at 16:20

## 2017-01-01 RX ADMIN — NYSTATIN 500000 UNITS: 100000 SUSPENSION ORAL at 17:51

## 2017-01-01 RX ADMIN — Medication 10 ML: at 06:05

## 2017-01-01 RX ADMIN — HYDROMORPHONE HYDROCHLORIDE 1 MG: 2 INJECTION, SOLUTION INTRAMUSCULAR; INTRAVENOUS; SUBCUTANEOUS at 02:54

## 2017-01-01 RX ADMIN — TAMSULOSIN HYDROCHLORIDE 0.4 MG: 0.4 CAPSULE ORAL at 08:16

## 2017-01-01 RX ADMIN — HYDROMORPHONE HYDROCHLORIDE 1 MG: 2 INJECTION, SOLUTION INTRAMUSCULAR; INTRAVENOUS; SUBCUTANEOUS at 09:31

## 2017-01-01 RX ADMIN — PIPERACILLIN SODIUM,TAZOBACTAM SODIUM 3.38 G: 3; .375 INJECTION, POWDER, FOR SOLUTION INTRAVENOUS at 10:19

## 2017-01-01 RX ADMIN — INSULIN LISPRO 3 UNITS: 100 INJECTION, SOLUTION INTRAVENOUS; SUBCUTANEOUS at 07:37

## 2017-01-01 RX ADMIN — HYDROCODONE BITARTRATE AND ACETAMINOPHEN 7.5 MG: 7.5; 325 SOLUTION ORAL at 05:54

## 2017-01-01 RX ADMIN — NYSTATIN 500000 UNITS: 100000 SUSPENSION ORAL at 08:51

## 2017-01-01 RX ADMIN — DIGOXIN 250 MCG: 250 INJECTION, SOLUTION INTRAMUSCULAR; INTRAVENOUS; PARENTERAL at 20:56

## 2017-01-01 RX ADMIN — PIPERACILLIN SODIUM,TAZOBACTAM SODIUM 3.38 G: 3; .375 INJECTION, POWDER, FOR SOLUTION INTRAVENOUS at 10:29

## 2017-01-01 RX ADMIN — PANTOPRAZOLE SODIUM 40 MG: 40 GRANULE, DELAYED RELEASE ORAL at 08:21

## 2017-01-01 RX ADMIN — HEPARIN SODIUM 5000 UNITS: 5000 INJECTION, SOLUTION INTRAVENOUS; SUBCUTANEOUS at 04:00

## 2017-01-01 RX ADMIN — PIPERACILLIN SODIUM,TAZOBACTAM SODIUM 3.38 G: 3; .375 INJECTION, POWDER, FOR SOLUTION INTRAVENOUS at 09:44

## 2017-01-01 RX ADMIN — HEPARIN SODIUM 5000 UNITS: 5000 INJECTION, SOLUTION INTRAVENOUS; SUBCUTANEOUS at 20:26

## 2017-01-01 RX ADMIN — Medication 10 ML: at 13:22

## 2017-01-01 RX ADMIN — Medication 10 ML: at 14:56

## 2017-01-01 RX ADMIN — SODIUM CHLORIDE 10 MG/HR: 900 INJECTION, SOLUTION INTRAVENOUS at 09:30

## 2017-01-01 RX ADMIN — HEPARIN SODIUM 5000 UNITS: 5000 INJECTION, SOLUTION INTRAVENOUS; SUBCUTANEOUS at 18:16

## 2017-01-01 RX ADMIN — CHLORHEXIDINE GLUCONATE 15 ML: 1.2 RINSE ORAL at 08:16

## 2017-01-01 RX ADMIN — CHLORHEXIDINE GLUCONATE 15 ML: 1.2 RINSE ORAL at 09:18

## 2017-01-01 RX ADMIN — INSULIN LISPRO 3 UNITS: 100 INJECTION, SOLUTION INTRAVENOUS; SUBCUTANEOUS at 00:52

## 2017-01-01 RX ADMIN — ONDANSETRON 4 MG: 2 INJECTION INTRAMUSCULAR; INTRAVENOUS at 00:28

## 2017-01-01 RX ADMIN — CALCIUM GLUCONATE: 94 INJECTION, SOLUTION INTRAVENOUS at 19:34

## 2017-01-01 RX ADMIN — CEFAZOLIN SODIUM 2 G: 2 SOLUTION INTRAVENOUS at 21:22

## 2017-01-01 RX ADMIN — SODIUM CHLORIDE 40 MG: 9 INJECTION INTRAMUSCULAR; INTRAVENOUS; SUBCUTANEOUS at 18:44

## 2017-01-01 RX ADMIN — HEPARIN SODIUM 5000 UNITS: 5000 INJECTION, SOLUTION INTRAVENOUS; SUBCUTANEOUS at 14:52

## 2017-01-01 RX ADMIN — Medication 10 ML: at 12:19

## 2017-01-01 RX ADMIN — ONDANSETRON 4 MG: 2 INJECTION INTRAMUSCULAR; INTRAVENOUS at 14:36

## 2017-01-01 RX ADMIN — CEFAZOLIN SODIUM 2 G: 2 SOLUTION INTRAVENOUS at 18:09

## 2017-01-01 RX ADMIN — CALCIUM GLUCONATE 2 G: 94 INJECTION, SOLUTION INTRAVENOUS at 09:35

## 2017-01-01 RX ADMIN — METOPROLOL TARTRATE 1.25 MG: 5 INJECTION INTRAVENOUS at 00:07

## 2017-01-01 RX ADMIN — PROPOFOL 50 MG: 10 INJECTION, EMULSION INTRAVENOUS at 06:26

## 2017-01-01 RX ADMIN — SODIUM CHLORIDE 10 MG/HR: 900 INJECTION, SOLUTION INTRAVENOUS at 14:56

## 2017-01-01 RX ADMIN — CALCIUM GLUCONATE 2 G: 94 INJECTION, SOLUTION INTRAVENOUS at 10:34

## 2017-01-01 RX ADMIN — ONDANSETRON 4 MG: 2 INJECTION INTRAMUSCULAR; INTRAVENOUS at 02:25

## 2017-01-01 RX ADMIN — FUROSEMIDE 20 MG: 10 INJECTION, SOLUTION INTRAMUSCULAR; INTRAVENOUS at 22:00

## 2017-01-01 RX ADMIN — SODIUM BICARBONATE 650 MG TABLET 650 MG: at 21:37

## 2017-01-01 RX ADMIN — HYDROMORPHONE HYDROCHLORIDE 1 MG: 2 INJECTION, SOLUTION INTRAMUSCULAR; INTRAVENOUS; SUBCUTANEOUS at 18:34

## 2017-01-01 RX ADMIN — DIGOXIN 250 MCG: 250 INJECTION, SOLUTION INTRAMUSCULAR; INTRAVENOUS; PARENTERAL at 02:25

## 2017-01-01 RX ADMIN — SODIUM BICARBONATE 650 MG TABLET 650 MG: at 21:31

## 2017-01-01 RX ADMIN — PANTOPRAZOLE SODIUM 40 MG: 40 GRANULE, DELAYED RELEASE ORAL at 09:30

## 2017-01-01 RX ADMIN — Medication 10 ML: at 21:36

## 2017-01-01 RX ADMIN — SODIUM CHLORIDE 100 ML/HR: 900 INJECTION, SOLUTION INTRAVENOUS at 12:35

## 2017-01-01 RX ADMIN — MIDAZOLAM HYDROCHLORIDE 2 MG: 1 INJECTION, SOLUTION INTRAMUSCULAR; INTRAVENOUS at 02:22

## 2017-01-01 RX ADMIN — SODIUM CHLORIDE 125 ML/HR: 900 INJECTION, SOLUTION INTRAVENOUS at 10:00

## 2017-01-01 RX ADMIN — ROCURONIUM BROMIDE 30 MG: 10 INJECTION, SOLUTION INTRAVENOUS at 06:36

## 2017-01-01 RX ADMIN — ONDANSETRON 4 MG: 2 INJECTION INTRAMUSCULAR; INTRAVENOUS at 04:31

## 2017-01-01 RX ADMIN — SODIUM BICARBONATE 650 MG TABLET 650 MG: at 17:16

## 2017-01-01 RX ADMIN — INSULIN LISPRO 3 UNITS: 100 INJECTION, SOLUTION INTRAVENOUS; SUBCUTANEOUS at 18:31

## 2017-01-01 RX ADMIN — SODIUM CHLORIDE 40 MG: 9 INJECTION INTRAMUSCULAR; INTRAVENOUS; SUBCUTANEOUS at 19:02

## 2017-01-01 RX ADMIN — HALOPERIDOL LACTATE 2 MG: 5 INJECTION, SOLUTION INTRAMUSCULAR at 20:08

## 2017-01-01 RX ADMIN — SEVELAMER CARBONATE 2400 MG: 2400 POWDER, FOR SUSPENSION ORAL at 13:53

## 2017-01-01 RX ADMIN — SEVELAMER CARBONATE 2400 MG: 2400 POWDER, FOR SUSPENSION ORAL at 08:16

## 2017-01-01 RX ADMIN — NYSTATIN 500000 UNITS: 100000 SUSPENSION ORAL at 08:40

## 2017-01-01 RX ADMIN — CEFAZOLIN SODIUM 1 G: 1 INJECTION, POWDER, FOR SOLUTION INTRAMUSCULAR; INTRAVENOUS at 12:37

## 2017-01-01 RX ADMIN — INSULIN LISPRO 3 UNITS: 100 INJECTION, SOLUTION INTRAVENOUS; SUBCUTANEOUS at 18:18

## 2017-01-01 RX ADMIN — INSULIN LISPRO 6 UNITS: 100 INJECTION, SOLUTION INTRAVENOUS; SUBCUTANEOUS at 17:58

## 2017-01-01 RX ADMIN — NYSTATIN 500000 UNITS: 100000 SUSPENSION ORAL at 22:34

## 2017-01-01 RX ADMIN — PHENYLEPHRINE HYDROCHLORIDE 70 MCG/MIN: 10 INJECTION, SOLUTION INTRAMUSCULAR; INTRAVENOUS; SUBCUTANEOUS at 12:22

## 2017-01-01 RX ADMIN — HYDROCODONE BITARTRATE AND ACETAMINOPHEN 5 MG: 7.5; 325 SOLUTION ORAL at 23:55

## 2017-01-01 RX ADMIN — Medication 10 ML: at 23:44

## 2017-01-01 RX ADMIN — HEPARIN SODIUM 5000 UNITS: 5000 INJECTION, SOLUTION INTRAVENOUS; SUBCUTANEOUS at 12:13

## 2017-01-01 RX ADMIN — SODIUM CHLORIDE 75 ML/HR: 900 INJECTION, SOLUTION INTRAVENOUS at 10:08

## 2017-01-01 RX ADMIN — NYSTATIN 500000 UNITS: 100000 SUSPENSION ORAL at 08:09

## 2017-01-01 RX ADMIN — NYSTATIN 500000 UNITS: 100000 SUSPENSION ORAL at 22:55

## 2017-01-01 RX ADMIN — INSULIN LISPRO 6 UNITS: 100 INJECTION, SOLUTION INTRAVENOUS; SUBCUTANEOUS at 23:47

## 2017-01-01 RX ADMIN — SODIUM CHLORIDE 10 MG/HR: 900 INJECTION, SOLUTION INTRAVENOUS at 12:35

## 2017-01-01 RX ADMIN — INSULIN LISPRO 3 UNITS: 100 INJECTION, SOLUTION INTRAVENOUS; SUBCUTANEOUS at 01:40

## 2017-01-01 RX ADMIN — SODIUM BICARBONATE 650 MG TABLET 650 MG: at 09:16

## 2017-01-01 RX ADMIN — SODIUM BICARBONATE 650 MG TABLET 650 MG: at 17:27

## 2017-01-01 RX ADMIN — SEVELAMER CARBONATE 2400 MG: 2400 POWDER, FOR SUSPENSION ORAL at 17:27

## 2017-01-01 RX ADMIN — ONDANSETRON 4 MG: 2 INJECTION INTRAMUSCULAR; INTRAVENOUS at 08:54

## 2017-01-01 RX ADMIN — INSULIN LISPRO 3 UNITS: 100 INJECTION, SOLUTION INTRAVENOUS; SUBCUTANEOUS at 01:16

## 2017-01-01 RX ADMIN — Medication 10 ML: at 16:36

## 2017-01-01 RX ADMIN — NYSTATIN 500000 UNITS: 100000 SUSPENSION ORAL at 21:22

## 2017-01-01 RX ADMIN — Medication: at 00:14

## 2017-01-01 RX ADMIN — PROPOFOL 20 MG: 10 INJECTION, EMULSION INTRAVENOUS at 06:24

## 2017-01-01 RX ADMIN — NYSTATIN 500000 UNITS: 100000 SUSPENSION ORAL at 17:53

## 2017-01-01 RX ADMIN — CHLORHEXIDINE GLUCONATE 15 ML: 1.2 RINSE ORAL at 10:26

## 2017-01-01 RX ADMIN — PIPERACILLIN SODIUM,TAZOBACTAM SODIUM 3.38 G: 3; .375 INJECTION, POWDER, FOR SOLUTION INTRAVENOUS at 10:42

## 2017-01-01 RX ADMIN — HYDROMORPHONE HYDROCHLORIDE 1 MG: 2 INJECTION, SOLUTION INTRAMUSCULAR; INTRAVENOUS; SUBCUTANEOUS at 03:28

## 2017-01-01 RX ADMIN — HEPARIN SODIUM 5000 UNITS: 5000 INJECTION, SOLUTION INTRAVENOUS; SUBCUTANEOUS at 20:51

## 2017-01-01 RX ADMIN — INSULIN LISPRO 6 UNITS: 100 INJECTION, SOLUTION INTRAVENOUS; SUBCUTANEOUS at 20:45

## 2017-01-01 RX ADMIN — HYDROMORPHONE HYDROCHLORIDE 0.5 MG: 1 INJECTION, SOLUTION INTRAMUSCULAR; INTRAVENOUS; SUBCUTANEOUS at 20:18

## 2017-01-01 RX ADMIN — INSULIN LISPRO 3 UNITS: 100 INJECTION, SOLUTION INTRAVENOUS; SUBCUTANEOUS at 07:06

## 2017-01-01 RX ADMIN — NYSTATIN 500000 UNITS: 100000 SUSPENSION ORAL at 12:43

## 2017-01-01 RX ADMIN — Medication 10 ML: at 21:30

## 2017-01-01 RX ADMIN — CHLORHEXIDINE GLUCONATE 15 ML: 1.2 RINSE ORAL at 17:51

## 2017-01-01 RX ADMIN — HEPARIN SODIUM 5000 UNITS: 5000 INJECTION, SOLUTION INTRAVENOUS; SUBCUTANEOUS at 21:29

## 2017-01-01 RX ADMIN — SEVELAMER CARBONATE 2400 MG: 2400 POWDER, FOR SUSPENSION ORAL at 13:51

## 2017-01-01 RX ADMIN — HYDROCODONE BITARTRATE AND ACETAMINOPHEN 7.5 MG: 7.5; 325 SOLUTION ORAL at 02:54

## 2017-01-01 RX ADMIN — NYSTATIN 500000 UNITS: 100000 SUSPENSION ORAL at 22:00

## 2017-01-01 RX ADMIN — HYDROMORPHONE HYDROCHLORIDE 0.5 MG: 2 INJECTION, SOLUTION INTRAMUSCULAR; INTRAVENOUS; SUBCUTANEOUS at 09:13

## 2017-01-01 RX ADMIN — PIPERACILLIN SODIUM,TAZOBACTAM SODIUM 3.38 G: 3; .375 INJECTION, POWDER, FOR SOLUTION INTRAVENOUS at 10:44

## 2017-01-01 RX ADMIN — SODIUM CHLORIDE 40 MG: 9 INJECTION INTRAMUSCULAR; INTRAVENOUS; SUBCUTANEOUS at 08:37

## 2017-01-01 RX ADMIN — CHLORHEXIDINE GLUCONATE 15 ML: 1.2 RINSE ORAL at 08:55

## 2017-01-01 RX ADMIN — MIDAZOLAM HYDROCHLORIDE 1 MG: 1 INJECTION, SOLUTION INTRAMUSCULAR; INTRAVENOUS at 12:15

## 2017-01-01 RX ADMIN — SODIUM CHLORIDE 40 MG: 9 INJECTION INTRAMUSCULAR; INTRAVENOUS; SUBCUTANEOUS at 17:59

## 2017-01-01 RX ADMIN — SEVELAMER CARBONATE 2400 MG: 2400 POWDER, FOR SUSPENSION ORAL at 09:16

## 2017-01-01 RX ADMIN — INSULIN DETEMIR 10 UNITS: 100 INJECTION, SOLUTION SUBCUTANEOUS at 20:27

## 2017-01-01 RX ADMIN — SODIUM BICARBONATE 650 MG TABLET 650 MG: at 09:34

## 2017-01-01 RX ADMIN — DILTIAZEM HYDROCHLORIDE 10 MG: 5 INJECTION, SOLUTION INTRAVENOUS at 12:45

## 2017-01-01 RX ADMIN — INSULIN LISPRO 3 UNITS: 100 INJECTION, SOLUTION INTRAVENOUS; SUBCUTANEOUS at 06:42

## 2017-01-01 RX ADMIN — Medication 20 ML: at 12:19

## 2017-01-01 RX ADMIN — PIPERACILLIN SODIUM,TAZOBACTAM SODIUM 3.38 G: 3; .375 INJECTION, POWDER, FOR SOLUTION INTRAVENOUS at 21:54

## 2017-01-01 RX ADMIN — MORPHINE SULFATE 1 MG: 2 INJECTION, SOLUTION INTRAMUSCULAR; INTRAVENOUS at 09:54

## 2017-01-01 RX ADMIN — CALCIUM GLUCONATE 2 G: 94 INJECTION, SOLUTION INTRAVENOUS at 04:46

## 2017-01-01 RX ADMIN — POTASSIUM CHLORIDE 10 MEQ: 10 INJECTION, SOLUTION INTRAVENOUS at 17:53

## 2017-01-01 RX ADMIN — INSULIN LISPRO 3 UNITS: 100 INJECTION, SOLUTION INTRAVENOUS; SUBCUTANEOUS at 01:06

## 2017-01-01 RX ADMIN — SODIUM CHLORIDE 75 ML/HR: 900 INJECTION, SOLUTION INTRAVENOUS at 02:27

## 2017-01-01 RX ADMIN — PIPERACILLIN SODIUM,TAZOBACTAM SODIUM 3.38 G: 3; .375 INJECTION, POWDER, FOR SOLUTION INTRAVENOUS at 22:01

## 2017-01-01 RX ADMIN — AMIODARONE HYDROCHLORIDE 0.5 MG/MIN: 1.8 INJECTION, SOLUTION INTRAVENOUS at 11:33

## 2017-01-01 RX ADMIN — SODIUM BICARBONATE 650 MG TABLET 650 MG: at 09:18

## 2017-01-01 RX ADMIN — FUROSEMIDE 40 MG: 10 INJECTION, SOLUTION INTRAMUSCULAR; INTRAVENOUS at 12:43

## 2017-01-01 RX ADMIN — NYSTATIN 500000 UNITS: 100000 SUSPENSION ORAL at 21:59

## 2017-01-01 RX ADMIN — NYSTATIN 500000 UNITS: 100000 SUSPENSION ORAL at 00:41

## 2017-01-01 RX ADMIN — CALCIUM GLUCONATE 2 G: 94 INJECTION, SOLUTION INTRAVENOUS at 08:35

## 2017-01-01 RX ADMIN — HYPROMELLOSE 2910 1 DROP: 5 SOLUTION OPHTHALMIC at 00:52

## 2017-01-01 RX ADMIN — HEPARIN SODIUM 5000 UNITS: 5000 INJECTION, SOLUTION INTRAVENOUS; SUBCUTANEOUS at 20:21

## 2017-01-01 RX ADMIN — Medication 325 MG: at 08:16

## 2017-01-01 RX ADMIN — METOPROLOL TARTRATE 1.25 MG: 5 INJECTION INTRAVENOUS at 12:33

## 2017-01-01 RX ADMIN — HYPROMELLOSE 2910 1 DROP: 5 SOLUTION OPHTHALMIC at 21:29

## 2017-01-01 RX ADMIN — HEPARIN SODIUM 5000 UNITS: 5000 INJECTION, SOLUTION INTRAVENOUS; SUBCUTANEOUS at 13:52

## 2017-01-01 RX ADMIN — Medication 10 ML: at 12:30

## 2017-01-01 RX ADMIN — METOPROLOL TARTRATE 1.25 MG: 5 INJECTION INTRAVENOUS at 00:21

## 2017-01-01 RX ADMIN — METOPROLOL TARTRATE 2.5 MG: 5 INJECTION INTRAVENOUS at 18:57

## 2017-01-01 RX ADMIN — Medication 10 ML: at 06:06

## 2017-01-01 RX ADMIN — HEPARIN SODIUM 5000 UNITS: 5000 INJECTION, SOLUTION INTRAVENOUS; SUBCUTANEOUS at 03:58

## 2017-01-01 RX ADMIN — PANTOPRAZOLE SODIUM 40 MG: 40 GRANULE, DELAYED RELEASE ORAL at 09:18

## 2017-01-01 RX ADMIN — HEPARIN SODIUM 5000 UNITS: 5000 INJECTION, SOLUTION INTRAVENOUS; SUBCUTANEOUS at 21:22

## 2017-01-01 RX ADMIN — HALOPERIDOL LACTATE 1 MG: 5 INJECTION, SOLUTION INTRAMUSCULAR at 04:50

## 2017-01-01 RX ADMIN — INSULIN LISPRO 3 UNITS: 100 INJECTION, SOLUTION INTRAVENOUS; SUBCUTANEOUS at 06:43

## 2017-01-01 RX ADMIN — NYSTATIN 500000 UNITS: 100000 SUSPENSION ORAL at 09:16

## 2017-01-01 RX ADMIN — PIPERACILLIN SODIUM,TAZOBACTAM SODIUM 3.38 G: 3; .375 INJECTION, POWDER, FOR SOLUTION INTRAVENOUS at 21:51

## 2017-01-01 RX ADMIN — HEPARIN SODIUM 5000 UNITS: 5000 INJECTION, SOLUTION INTRAVENOUS; SUBCUTANEOUS at 02:09

## 2017-01-01 RX ADMIN — SODIUM CHLORIDE 750 MG: 900 INJECTION, SOLUTION INTRAVENOUS at 05:02

## 2017-01-01 RX ADMIN — HEPARIN SODIUM 5000 UNITS: 5000 INJECTION, SOLUTION INTRAVENOUS; SUBCUTANEOUS at 06:42

## 2017-01-01 RX ADMIN — PANTOPRAZOLE SODIUM 40 MG: 40 GRANULE, DELAYED RELEASE ORAL at 10:26

## 2017-01-01 RX ADMIN — BARIUM SULFATE 30 ML: 0.81 POWDER, FOR SUSPENSION ORAL at 11:01

## 2017-01-01 RX ADMIN — AMIODARONE HYDROCHLORIDE 0.5 MG/MIN: 1.8 INJECTION, SOLUTION INTRAVENOUS at 13:13

## 2017-01-01 RX ADMIN — CHLORHEXIDINE GLUCONATE 15 ML: 1.2 RINSE ORAL at 17:59

## 2017-01-01 RX ADMIN — POTASSIUM CHLORIDE 10 MEQ: 10 INJECTION, SOLUTION INTRAVENOUS at 09:20

## 2017-01-01 RX ADMIN — ACETAMINOPHEN 650 MG: 650 SUPPOSITORY RECTAL at 10:37

## 2017-01-01 RX ADMIN — CHLORHEXIDINE GLUCONATE 15 ML: 1.2 RINSE ORAL at 09:16

## 2017-01-01 RX ADMIN — CHLORHEXIDINE GLUCONATE 15 ML: 1.2 RINSE ORAL at 09:34

## 2017-01-01 RX ADMIN — PHENOL 1 SPRAY: 1.5 LIQUID ORAL at 04:32

## 2017-01-01 RX ADMIN — INSULIN LISPRO 3 UNITS: 100 INJECTION, SOLUTION INTRAVENOUS; SUBCUTANEOUS at 12:21

## 2017-01-01 RX ADMIN — Medication 10 ML: at 09:40

## 2017-01-01 RX ADMIN — HEPARIN SODIUM 5000 UNITS: 5000 INJECTION, SOLUTION INTRAVENOUS; SUBCUTANEOUS at 21:00

## 2017-01-01 RX ADMIN — Medication 325 MG: at 08:27

## 2017-01-01 RX ADMIN — SODIUM BICARBONATE 650 MG TABLET 650 MG: at 21:30

## 2017-01-01 RX ADMIN — SODIUM BICARBONATE 650 MG TABLET 650 MG: at 00:49

## 2017-01-01 RX ADMIN — BARIUM SULFATE 20 ML: 400 SUSPENSION ORAL at 13:00

## 2017-01-01 RX ADMIN — INSULIN DETEMIR 10 UNITS: 100 INJECTION, SOLUTION SUBCUTANEOUS at 12:15

## 2017-01-01 RX ADMIN — HEPARIN SODIUM 5000 UNITS: 5000 INJECTION, SOLUTION INTRAVENOUS; SUBCUTANEOUS at 12:08

## 2017-01-01 RX ADMIN — FAMOTIDINE 20 MG: 10 INJECTION, SOLUTION INTRAVENOUS at 20:08

## 2017-01-01 RX ADMIN — NYSTATIN 500000 UNITS: 100000 SUSPENSION ORAL at 17:59

## 2017-01-01 RX ADMIN — SODIUM CHLORIDE 400 ML/HR: 900 INJECTION, SOLUTION INTRAVENOUS at 15:00

## 2017-01-01 RX ADMIN — INSULIN LISPRO 4 UNITS: 100 INJECTION, SOLUTION INTRAVENOUS; SUBCUTANEOUS at 17:39

## 2017-01-01 RX ADMIN — INSULIN DETEMIR 5 UNITS: 100 INJECTION, SOLUTION SUBCUTANEOUS at 08:36

## 2017-01-01 RX ADMIN — METOPROLOL TARTRATE 1.25 MG: 5 INJECTION INTRAVENOUS at 17:36

## 2017-01-01 RX ADMIN — CHLORHEXIDINE GLUCONATE 15 ML: 1.2 RINSE ORAL at 18:16

## 2017-01-01 RX ADMIN — Medication 325 MG: at 08:49

## 2017-01-01 RX ADMIN — HYDROMORPHONE HYDROCHLORIDE 1 MG: 2 INJECTION, SOLUTION INTRAMUSCULAR; INTRAVENOUS; SUBCUTANEOUS at 03:10

## 2017-01-01 RX ADMIN — CHLORHEXIDINE GLUCONATE 15 ML: 1.2 RINSE ORAL at 17:53

## 2017-01-01 RX ADMIN — SODIUM CHLORIDE 8 MG/HR: 900 INJECTION INTRAVENOUS at 12:35

## 2017-01-01 RX ADMIN — ALBUMIN (HUMAN) 25 G: 0.25 INJECTION, SOLUTION INTRAVENOUS at 12:36

## 2017-01-01 RX ADMIN — METOPROLOL TARTRATE 12.5 MG: 25 TABLET ORAL at 20:32

## 2017-01-01 RX ADMIN — Medication 3 MG: at 07:37

## 2017-01-01 RX ADMIN — HEPARIN SODIUM 5000 UNITS: 5000 INJECTION, SOLUTION INTRAVENOUS; SUBCUTANEOUS at 10:15

## 2017-01-01 RX ADMIN — INSULIN LISPRO 3 UNITS: 100 INJECTION, SOLUTION INTRAVENOUS; SUBCUTANEOUS at 17:11

## 2017-01-01 RX ADMIN — METOPROLOL TARTRATE 1.25 MG: 5 INJECTION INTRAVENOUS at 05:37

## 2017-01-01 RX ADMIN — Medication 15 MCG/MIN: at 10:15

## 2017-01-01 RX ADMIN — HEPARIN SODIUM 5000 UNITS: 5000 INJECTION, SOLUTION INTRAVENOUS; SUBCUTANEOUS at 11:34

## 2017-01-01 RX ADMIN — METOPROLOL TARTRATE 1.25 MG: 5 INJECTION INTRAVENOUS at 18:27

## 2017-01-01 RX ADMIN — SODIUM CHLORIDE 40 MG: 9 INJECTION INTRAMUSCULAR; INTRAVENOUS; SUBCUTANEOUS at 18:17

## 2017-01-01 RX ADMIN — NYSTATIN 500000 UNITS: 100000 SUSPENSION ORAL at 13:44

## 2017-01-01 RX ADMIN — CHLORHEXIDINE GLUCONATE 15 ML: 1.2 RINSE ORAL at 08:51

## 2017-01-01 RX ADMIN — HUMAN INSULIN 8 UNITS: 100 INJECTION, SOLUTION SUBCUTANEOUS at 13:00

## 2017-01-01 RX ADMIN — DEXTROSE MONOHYDRATE 1000 ML: 10 INJECTION, SOLUTION INTRAVENOUS at 18:00

## 2017-01-01 RX ADMIN — TAMSULOSIN HYDROCHLORIDE 0.4 MG: 0.4 CAPSULE ORAL at 08:15

## 2017-01-01 RX ADMIN — NYSTATIN 500000 UNITS: 100000 SUSPENSION ORAL at 22:37

## 2017-01-01 RX ADMIN — SODIUM BICARBONATE 650 MG TABLET 650 MG: at 21:35

## 2017-01-01 RX ADMIN — INSULIN LISPRO 3 UNITS: 100 INJECTION, SOLUTION INTRAVENOUS; SUBCUTANEOUS at 18:12

## 2017-01-01 RX ADMIN — NYSTATIN 500000 UNITS: 100000 SUSPENSION ORAL at 08:21

## 2017-01-01 RX ADMIN — SODIUM CHLORIDE: 9 INJECTION, SOLUTION INTRAVENOUS at 06:18

## 2017-01-01 RX ADMIN — NYSTATIN 500000 UNITS: 100000 SUSPENSION ORAL at 18:15

## 2017-01-01 RX ADMIN — MIDAZOLAM HYDROCHLORIDE 2 MG: 1 INJECTION, SOLUTION INTRAMUSCULAR; INTRAVENOUS at 00:35

## 2017-01-01 RX ADMIN — Medication 10 ML: at 21:31

## 2017-01-01 RX ADMIN — SODIUM CHLORIDE 40 MG: 9 INJECTION INTRAMUSCULAR; INTRAVENOUS; SUBCUTANEOUS at 17:18

## 2017-01-01 RX ADMIN — HYDROCODONE BITARTRATE AND ACETAMINOPHEN 7.5 MG: 7.5; 325 SOLUTION ORAL at 22:27

## 2017-01-01 RX ADMIN — AMIODARONE HYDROCHLORIDE 0.5 MG/MIN: 1.8 INJECTION, SOLUTION INTRAVENOUS at 00:04

## 2017-01-01 RX ADMIN — NYSTATIN 500000 UNITS: 100000 SUSPENSION ORAL at 09:34

## 2017-01-01 RX ADMIN — Medication 10 ML: at 12:33

## 2017-01-01 RX ADMIN — SODIUM CHLORIDE 500 MG: 900 INJECTION, SOLUTION INTRAVENOUS at 04:59

## 2017-01-01 RX ADMIN — Medication 325 MG: at 08:15

## 2017-01-01 RX ADMIN — SODIUM CHLORIDE 1000 MG: 900 INJECTION, SOLUTION INTRAVENOUS at 01:27

## 2017-01-01 RX ADMIN — DEXTROSE MONOHYDRATE 25 G: 25 INJECTION, SOLUTION INTRAVENOUS at 13:32

## 2017-01-01 RX ADMIN — SODIUM CHLORIDE 40 MG: 9 INJECTION INTRAMUSCULAR; INTRAVENOUS; SUBCUTANEOUS at 18:00

## 2017-01-01 RX ADMIN — Medication 325 MG: at 10:27

## 2017-01-01 RX ADMIN — HYDROMORPHONE HYDROCHLORIDE 0.5 MG: 2 INJECTION, SOLUTION INTRAMUSCULAR; INTRAVENOUS; SUBCUTANEOUS at 16:25

## 2017-01-01 RX ADMIN — HEPARIN SODIUM 5000 UNITS: 5000 INJECTION, SOLUTION INTRAVENOUS; SUBCUTANEOUS at 13:19

## 2017-01-01 RX ADMIN — POTASSIUM CHLORIDE 10 MEQ: 10 INJECTION, SOLUTION INTRAVENOUS at 17:27

## 2017-01-01 RX ADMIN — Medication 10 ML: at 21:44

## 2017-02-12 NOTE — ED NOTES
Patient experienced an episode of emesis. Smells like bile. No new episodes noted. Received Zofran in within the last hour.

## 2017-02-12 NOTE — DISCHARGE INSTRUCTIONS
EyeSee360 Activation    Thank you for requesting access to [facility]See[facility]. Please follow the instructions below to securely access and download your online medical record. EyeSee360 allows you to send messages to your doctor, view your test results, renew your prescriptions, schedule appointments, and more. How Do I Sign Up? 1. In your internet browser, go to [email or web address]  2. Click on the First Time User? Click Here link in the Sign In box. You will be redirect to the New Member Sign Up page. 3. Enter your EyeSee360 Access Code exactly as it appears below. You will not need to use this code after youve completed the sign-up process. If you do not sign up before the expiration date, you must request a new code. EyeSee360 Access Code: [email or web address]  Expires: 3/1/2017  3:57 PM (This is the date your EyeSee360 access code will )    4. Enter the last four digits of your Social Security Number (xxxx) and Date of Birth (mm/dd/yyyy) as indicated and click Submit. You will be taken to the next sign-up page. 5. Create a EyeSee360 ID. This will be your [facility]See360 login ID and cannot be changed, so think of one that is secure and easy to remember. 6. Create a EyeSee360 password. You can change your password at any time. 7. Enter your Password Reset Question and Answer. This can be used at a later time if you forget your password. 8. Enter your e-mail address. You will receive e-mail notification when new information is available in [address]. 9. Click Sign Up. You can now view and download portions of your medical record. 10. Click the Download Summary menu link to download a portable copy of your medical information. Additional Information    If you have questions, please visit the Frequently Asked Questions section of the [email or web address] website at [email or web address] Remember, EyeSee360 is NOT to be used for urgent needs. For medical emergencies, dial 911.

## 2017-02-12 NOTE — ED NOTES
Patient tolerated ambulation with full x1 assist well from stretcher to bed well. Unsteady gait. CNA made aware and confirmed room placement with this nurse upon arrival. Oncoming primary nurse not available. No acute distress noted. Call bell, phone, and belongings placed within reach.

## 2017-02-12 NOTE — CONSULTS
Alonso Zapata    Name:  Isabelle Tripathi  MR#:  [id number]  :  [id number]  Account #:  [de-identified]  Date of Adm:  2017  Date of Consultation:  2017      SURGICAL CONSULTATION    HISTORY OF PRESENT ILLNESS: The patient is an 63-year-old  gentleman who, in about 2 months, will be 80years old. The patient  was awakened this morning at about 5 a.m. with significant left-sided  abdominal pain. It was severe enough and he stated he had been  through this before that he knew he needed to come to the emergency  room, so EMS was called and brought him in. The patient states that  over the last few days he has been eating normally, but had been a  little more thirsty than usual. He also states that he had a hard bowel  movement the last 2 times, including yesterday, which is a little bit  unusual for him. He has not had any nausea or vomiting prior to this  morning. With the onset of this pain, it has been fairly constant and he described  it as about an 8/10. Does not seem to wax and wane and he describes  it as a sharp pain. It is located mostly in the left side of the abdomen. There is no radiation to the back although he states that lying on a  stretcher the way he has been doing for the last few hours has caused  him some back discomfort. Earlier, he was also apparently complaining  of some left knee pain. The patient has not had any nausea or vomiting, but he has had some  belching and just in the last hour, some gagging, but no vomiting. The  patient has not passed any gas or bowel movement today. Of significance is the fact that the patient apparently has had a ventral  hernia repair with mesh in the past. In addition, about 12 years ago,  there is a history of a small bowel obstruction that apparently required  resection of about a foot of small bowel.  In addition, there is a history  apparently of the patient having another episode, possibly of a bowel  obstruction and according to the daughter, there was \"twisting,\"  according to the notes, but when he was treated, he got an enema and  his symptoms relieved. ALLERGIES: NONE KNOWN. MEDICATIONS PRIOR TO ADMISSION  1. He was on Eliquis 2.5 mg daily for a previous TIA. 2. Norvasc 10 mg a day. 3. Prinivil 10 mg a day. 4. Hydrochlorothiazide 12.5 mg a day. 5. Iron 65 mg per day. PAST SURGICAL HISTORY: Includes repair of a ventral hernia and  apparently resection of small bowel for an obstruction. Apparently does  have a mesh for previous hernia repair of a ventral hernia noted on CT  scan. Ventral hernia repair, exploratory laparotomy, and small bowel  resection for a small bowel obstruction. PAST MEDICAL HISTORY: No history of diabetes, heart disease, or  cancer. He does have a history of hypertension and is on a couple of  different medications for that. In addition, apparently had a TIA several  years ago and has been on Eliquis for that. REVIEW OF SYSTEMS: He denies any chronic constitutional,  respiratory, or cardiovascular problems. Gastrointestinal problems are  as mentioned above in the HPI. He denies any genitourinary,  neurologic, or hematologic problems, although he has had a previous  TIA. No chronic musculoskeletal or neurologic problems other than the  TIA. The patient does apparently have some intermittent left knee pain. FAMILY HISTORY AND SOCIAL HISTORY: As noted in the chart. The  patient apparently lives with his daughter, will soon be 80years old,  and states that he does have some memory loss. He used to smoke in  the past.    PHYSICAL EXAMINATION  GENERAL: The patient is certainly alert and cooperative. He does not  appear short of breath, but just looks like he gets tired as we talk and  has to take a few moments to rest before continuing the conversation. VITAL SIGNS: The patient's blood pressure has been variable, but  generally running in the 120s to the 160s.  His pulse when he came in  and has been normal, running in the 60s but recently has about  doubled from that. The patient does appear as if he is having some  discomfort with slight grimacing on his face. HEAD, EYES, EARS, NOSE, AND THROAT: Normal.  NECK: Normal.  LUNGS: Clear. HEART: Regular, but slightly tachycardic. No murmur or extra heart  sounds are heard. ABDOMEN: Has an infraumbilical midline scar without any hernia  defect in that location. He does have some slight fullness in the left  groin area which seems reducible and consistent with suggestion of a  small hernia in the left with a little bit of sigmoid colon in it, but   certainly  nothing that goes down into the scrotum. The abdomen is not  significantly distended. It is full, but soft and really nontender at the  present time without guarding or rebound. Bowel sounds are fairly  normally active. It is tympanitic consistent with some gas filled bowel. EXTREMITIES: Lower extremities fail to reveal any edema. LABORATORY DATA: Shows that his hemoglobin is 9.2, slightly lower  than the last value that we have from January of this year. His white  count is 5.6 with no significant left shift. The chemistry profile is normal  with a slightly elevated creatinine of 1.45, which is better than it has  been in recent studies. Liver function tests are normal.    The patient's CT scan was reviewed by myself and the report. I agree  with the report, which shows some dilated loops of small bowel with  air-fluid levels, but no transition point and the suggestion of possible  small bowel obstruction. There is a small amount of intraperitoneal  fluid. No free air. Gallstones are present. Small left inguinal hernia. ASSESSMENT  1. Sharp, left-sided abdominal pain, sudden onset this morning. Most  consistent with probable small bowel obstruction. Clinically, I do not  believe there is any evidence of ischemic bowel or embolic process. 2. Gallstones.   3. History of transient ischemic attack. 4. History of previous abdominal surgery and resection of small bowel  and placement of an abdominal wall mesh for ventral hernia. 5. Small left inguinal hernia seen on CT scan. RECOMMENDATIONS: At the present time, I believe that patient  needs to be admitted and kept n.p.o. with some IV fluids. I do not  believe presently that he necessarily needs an NG tube, but if he does  vomit, then I would place an NG tube. Would recommend serial  abdominal examinations and abdominal films. Stopping the Eliquis  may be needed certainly, especially if the patient is needing any  surgery. I do not see any immediate need for surgery, but will follow along in his  care and one of my partners will be assuming that care on Monday.         Danilo Berger MD    CI / TB  D:  02/12/2017   10:58  T:  02/12/2017   13:37  Job #:  [id number]

## 2017-02-12 NOTE — ED NOTES
Patient is to be given a dose of dilaudid per MD Kandi Sampson due to in adequate pain control from morphine. No acute distress noted. Needs met.

## 2017-02-12 NOTE — ED PROVIDER NOTES
HPI Comments: Alissa Gomez is a 80year old white male who presents to the ED today with a c/o left upper abdominal pain and left knee pain. Pt states the abdominal pain started approx 0500 this morning, and awoke him from his sleep. Reports no N&V or diarrhea. The pain is constant. Describes his left knee pain as a \"twitch or kink. \"  Due to the sudden onset, Pt summoned [facility] to his home, and was brought to the ED for evaluation. Pt states his daughter is on the way in. 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 Denies current chest pain and headache pain. The Pt's daughter has advised that Pt had a bowel obstruction 12 years ago, that required surgical intervention  At that time, 12 inches of bowel were removed during the surgery. Four years ago, the Pt developed another bowel obstruction, and is described as a twisting. States the Pt went to [facility] was given an enema, spent a few days in the hospital and the bowel obstruction clear up without surgery. Pt states he had a well formed, but hard stool yesterday during a BM. Patient is a 80 y.o. male presenting with abdominal pain and leg pain. The history is provided by the patient. History limited by: No Communication barrier. Abdominal Pain    This is a new problem. The current episode started 1 to 2 hours ago. The problem occurs constantly. The problem has not changed since onset. Associated with: Pt makes no association. The pain is located in the LUQ (Left knee). The pain is moderate. Associated symptoms include arthralgias (left knee pain). Nothing worsens the pain. The pain is relieved by nothing. Leg Pain           Past Medical History:   Diagnosis Date    Hypertension     TIA (transient ischemic attack)        History reviewed. No pertinent past surgical history. History reviewed. No pertinent family history.     Social History     Social History    Marital status:      Spouse name: N/A    Number of children: N/A    Years of education: N/A     Occupational History    Not on file. Social History Main Topics    Smoking status: Former Smoker    Smokeless tobacco: Not on file    Alcohol use No    Drug use: No    Sexual activity: Not on file     Other Topics Concern    Not on file     Social History Narrative         ALLERGIES: Review of patient's allergies indicates no known allergies. Review of Systems   Constitutional: Negative. HENT: Negative. Eyes: Negative. Respiratory: Negative. Cardiovascular: Negative. Gastrointestinal: Positive for abdominal pain (LUQ). Endocrine: Negative. Genitourinary: Negative. Musculoskeletal: Positive for arthralgias (left knee pain). Skin: Negative. Allergic/Immunologic: Negative. Neurological: Negative. Psychiatric/Behavioral: Negative. Vitals:    02/12/17 0603   BP: 140/59   Pulse: 69   Resp: 18   Temp: 98.6 °F (37 °C)   SpO2: 100%   Weight: 63.5 kg (140 lb)   Height: 6' (1.829 m)            Physical Exam   Constitutional: He is oriented to person, place, and time. He appears well-developed and well-nourished. No distress. HENT:   Head: Normocephalic and atraumatic. Eyes: Pupils are equal, round, and reactive to light. Neck: Normal range of motion. Neck supple. Cardiovascular: Normal rate, regular rhythm, normal heart sounds and intact distal pulses. Pulmonary/Chest: Effort normal and breath sounds normal. He has no wheezes. He has no rales. Abdominal: Soft. Bowel sounds are normal. There is tenderness (Left upper quadrant). There is no rebound and no guarding. Genitourinary:   Genitourinary Comments: NE   Musculoskeletal: Normal range of motion. Left knee non TTP. Full ROM. Distal neurovascular remains intact. Neurological: He is alert and oriented to person, place, and time. No cranial nerve deficit. He exhibits normal muscle tone. Coordination normal.   Normal speech pattern. No facial drooping.   Normal strength in the bilateral upper and lower extremities. Skin: Skin is warm and dry. Psychiatric: He has a normal mood and affect. Nursing note and vitals reviewed. MDM  Number of Diagnoses or Management Options  Abdominal pain, LUQ (left upper quadrant):   Chronic pain of left knee:   Chronic renal failure, unspecified stage:   SBO (small bowel obstruction) [facility]):   Diagnosis management comments: CONSULT:  Spoke with Dr Susan Valentin from radiology. He advised that this is a high grade obstruction, but it is unable to tell if it is a partial or complete obstruction at this time. Gerald Foss NP  8:17 AM    PROGRESS NOTE:  Pt last ate solids/liquids at 1830 last evening. Gerald Foss NP  8:11 AM    CONSULT:  Spoke with Dr Uriel Monet, who has agreed to consult on this Pt. He will see him this morning. Gerald Foss NP  8:14 AM    CONSULT:  Spoke with Dr Ishmael Yap, who has agreed to admit the Pt. He is with the Pt at this time. Gerald Foss NP  8:15 AM    PROGRESS NOTE:  The knee film was reviewed. There is significant degenerative changes without acute findings. Gerald Foss NP  8:30 AM             Amount and/or Complexity of Data Reviewed  Clinical lab tests: ordered and reviewed  Tests in the radiology section of CPT®: ordered and reviewed  Discuss the patient with other providers: yes (DR Valerie Rodrigues, Dr Uriel Monet,, Dr Ishmael Yap.  )    Risk of Complications, Morbidity, and/or Mortality  Presenting problems: moderate  Diagnostic procedures: moderate  Management options: moderate    Patient Progress  Patient progress: stable    ED Course       Procedures    Diagnosis:   1. SBO (small bowel obstruction) (HCC)    2. Abdominal pain, LUQ (left upper quadrant)    3. Chronic pain of left knee    4.  Chronic renal failure, unspecified stage          Disposition:   ADMITTED - DR Ishmael Yap      Follow-up Information     None          Patient's Medications   Start Taking    No medications on file   Continue Taking    AMLODIPINE (NORVASC) 10 MG TABLET Take 10 mg by mouth daily. APIXABAN (ELIQUIS) 2.5 MG TABLET    Take 2.5 mg by mouth every twelve (12) hours. FERROUS SULFATE 325 MG (65 MG IRON) TABLET    Take 325 mg by mouth Daily (before breakfast). HYDROCHLOROTHIAZIDE (HYDRODIURIL) 12.5 MG TABLET    Take 12.5 mg by mouth daily. LISINOPRIL (PRINIVIL, ZESTRIL) 10 MG TABLET    Take 10 mg by mouth daily. These Medications have changed    No medications on file   Stop Taking    CLOPIDOGREL (PLAVIX) 75 MG TABLET    Take 75 mg by mouth daily. MECLIZINE (ANTIVERT) 25 MG TABLET    Take 1 tablet by mouth every 6 hours for the next 3 days. Then stop taking the meclizine. Restart the meclizine for 3 day intervals if vertigo/ dizziness returns.

## 2017-02-12 NOTE — H&P
Internal Medicine History and Physical          Subjective     HPI: Doreen Lancaster is a 80 y.o. male with a PMHx of HTN, TIA, SIDNEY, SBO and abdominal hernia who presented to the ED with the acute onset of left upper quadrant abdominal pain. The pt states around 5 AM this morning he awoke from sleep from it. The pain was constant. He denies any associated nausea or vomiting. He admits to a history of bowel obstruction requiring surgical intervention w/ another episode 4 years ago that resolved with medical mgmt. He denies any diarrhea. He had a slightly harder bowel movement yesterday. He admits to flatus. He also complains of left knee pain that is chronic. He had a CT scan of the abd/pelvis in the ED that was positive for a SBO. We were consulted for evaluation and admission of the patient to the hospital.    PMHx:  HTN  TIA  SIDNEY  SBO  Abdominal hernia    PSurgHx:  Abd hernia repair  Colon resection? Small bowel resection? SocialHx:  Tobacco: Denies  EtOH: Denies  Drugs: Denies    FamilyHx:  M - MI    Prior to Admission Medications   Prescriptions Last Dose Informant Patient Reported? Taking? Hydrochlorothiazide (HYDRODIURIL) 12.5 mg tablet 2/11/2017 at Unknown time  Yes Yes   Sig: Take 12.5 mg by mouth daily. amLODIPine (NORVASC) 10 mg tablet 2/11/2017 at Unknown time  Yes Yes   Sig: Take 10 mg by mouth daily. apixaban (ELIQUIS) 2.5 mg tablet 2/11/2017 at Unknown time  Yes Yes   Sig: Take 2.5 mg by mouth every twelve (12) hours. ferrous sulfate 325 mg (65 mg iron) tablet 2/11/2017 at Unknown time  Yes Yes   Sig: Take 325 mg by mouth Daily (before breakfast). lisinopril (PRINIVIL, ZESTRIL) 10 mg tablet 2/11/2017 at Unknown time  Yes Yes   Sig: Take 10 mg by mouth daily.       Facility-Administered Medications: None       Review of Systems:  Constitutional:  Denies fever, chills or weight loss  Eyes: Denies vision loss or changes, denies pain  Ears, Nose, Mouth, Throat: Denies hearing loss, denies sore throat  Cardiovascular:  Denies chest pain or diaphoresis  Respiratory:  Denies coughing, wheezing, or shortness of breath. Gastrointestinal:  Denies nausea or vomitting. Denies diarrhea or constipation. Admits abd pain  Genitourinary:  Denies hematuria or dysuria  Musculoskeletal: Denies back pain or muscle/joint pain.  Admits chronic left knee pain  Skin:  Denies rashes or moles  Neuro:  Denies seizures, loss of sensation or motor function or syncope      Objective      Visit Vitals    /76    Pulse (!) 118    Temp 98.5 °F (36.9 °C)    Resp 24    Ht 6' (1.829 m)    Wt 63.5 kg (140 lb)    SpO2 98%    BMI 18.99 kg/m2       Physical Exam:  General Appearance: NAD, conversant  HENT: normocephalic/atraumatic, moist mucus membranes  Lungs: CTA with normal respiratory effort  Cardiovascular: RRR, no m/r/g  Abdomen: soft, non-tender, normal bowel sounds  Extremities: no cyanosis, no peripheral edema  Neuro: moves all extremities, no focal deficits  Psych: appropriate affect, alert and oriented to person, place and time    Laboratory Studies:  CMP:   Lab Results   Component Value Date/Time     02/12/2017 06:05 AM    K 4.1 02/12/2017 06:05 AM     02/12/2017 06:05 AM    CO2 25 02/12/2017 06:05 AM    AGAP 11 02/12/2017 06:05 AM     (H) 02/12/2017 06:05 AM    BUN 32 (H) 02/12/2017 06:05 AM    CREA 1.45 (H) 02/12/2017 06:05 AM    GFRAA 56 (L) 02/12/2017 06:05 AM    GFRNA 46 (L) 02/12/2017 06:05 AM    CA 8.9 02/12/2017 06:05 AM    ALB 3.5 02/12/2017 06:05 AM    TP 7.0 02/12/2017 06:05 AM    GLOB 3.5 02/12/2017 06:05 AM    AGRAT 1.0 02/12/2017 06:05 AM    SGOT 14 (L) 02/12/2017 06:05 AM    ALT 17 02/12/2017 06:05 AM     CBC:   Lab Results   Component Value Date/Time    WBC 5.6 02/12/2017 06:05 AM    HGB 9.2 (L) 02/12/2017 06:05 AM    HCT 29.0 (L) 02/12/2017 06:05 AM     02/12/2017 06:05 AM       Imaging Reviewed:  Xr Knee Lt Min 4 V    Result Date: 2/12/2017  Left knee x-ray HISTORY: Left knee pain COMPARISON: No prior study available for comparison. FINDINGS: Four views of the left knee were performed. No fracture or dislocation is present. There is no soft tissue swelling or joint effusion. There is tricompartmental joint space narrowing with mild juxta articular spurring. Additionally, there is chondrocalcinosis, particularly involving the medial compartment. IMPRESSION: Degenerative changes but negative for fracture or dislocation of the left knee. Ct Abd Pelv Wo Cont    Result Date: 2/12/2017  EXAM: CT of the abdomen and pelvis INDICATION: Abdominal pain COMPARISON: 9/19/2006 TECHNIQUE: Axial CT imaging of the abdomen and pelvis was performed without intravenous contrast. Multiplanar reformats were generated. One or more dose reduction techniques were used on this CT: automated exposure control, adjustment of the mAs and/or kVp according to patient's size, and iterative reconstruction techniques. The specific techniques utilized on this CT exam have been documented in the patient's electronic medical record. _______________ FINDINGS: LOWER CHEST: Linear areas of atelectasis and/or scarring seen at the lung bases. Atherosclerosis noted in the descending thoracic aorta. LIVER, BILIARY: Liver is unremarkable. No biliary dilation. Cholelithiasis noted in the gallbladder with mild gallbladder wall thickening seen. PANCREAS: Unremarkable. SPLEEN: Unremarkable. ADRENALS: Unremarkable. KIDNEYS: 1.7 cm calcification identified in the left renal pelvis with mild pelviectasis noted. No other renal or ureteral calculi identified. Hemorrhagic cyst is seen in the right kidney measuring approximately 2 cm. No right-sided hydronephrosis identified. LYMPH NODES: No enlarged lymph nodes. GASTROINTESTINAL TRACT: Multiple dilated and fluid-filled small bowel loops are identified with poor delineation of a specific transition point.  There is a left inguinal hernia identified containing a portion of the descending/sigmoid colon which does not appear to be the transition point of the small bowel obstruction. Mild mesenteric congestion and interloop mesenteric fluid identified related to the small bowel obstruction. No free intraperitoneal air identified. PELVIC ORGANS: Unremarkable. VASCULATURE: Atherosclerosis. BONES: Degenerative changes identified in the spine and hips. OTHER: Free fluid identified in the abdomen and pelvis. Left inguinal hernia identified containing a portion of the colon as well as a small amount of fluid. Prior ventral hernia repair with mesh noted. _______________     IMPRESSION: 1. Small bowel obstruction with poor delineation of a specific transition point. There is a left inguinal hernia containing a portion of colon but this does not appear to be the transition point of the small bowel obstruction. Interloop mesenteric fluid and congestion identified related to the obstruction. Free fluid also identified in the abdomen and pelvis. Prior ventral hernia repair with mesh noted. 2. Large left renal pelvic calcification with mild pelviectasis. Hemorrhagic cyst seen in the right kidney. 3. Cholelithiasis with mild gallbladder wall thickening. Assessment/Plan     #SBO  #L Inguinal Hernia  #HTN  #TIA on Long-term AC  #Iron Deficiency Anemia  #CKD Stage IIIb  #DVT PPx    - Anticipate resolution w/ medical mgmt. Surgery consult. Cont IVFs. Pain control PRN. Anti-emetics if needed. Trend abd imaging  - Surgery to eval hernia  - Hold PO meds for now. Clinically monitor  - Eliquis on hold while NPO. OK for pt to be off for day as risk of stroke if lower than bleeding risk associated with starting heparin gtt  - Restart ferrous sulfate once diet advanced  - Cr appears at baseline. Cont to monitor BMP.  - SCDs as covered from eliquis today. Can start PPx zhou    I have personally reviewed all pertinent labs, films and EKGs that have officially resulted.  I reviewed available electronic documentation outlining the initial presentation as well as the emergency room physician's encounter.     Frida Felder DO  Internal Medicine, Hospitalist  Pager: [phone] [facility]

## 2017-02-12 NOTE — PROGRESS NOTES
1300   Received from ER, NPo instructed, feeling nauseated, alert and oriented but not comprehending today, daughter at bedside. 1600  Assisted to void, no pain , alert and oriented. 1800  Sleeping soundly , bell  With in reach.

## 2017-02-13 PROBLEM — K56.609 SMALL BOWEL OBSTRUCTION (HCC): Status: ACTIVE | Noted: 2017-01-01

## 2017-02-13 NOTE — PROGRESS NOTES
[facility]   Discharge Planning/ Assessment    Reasons for Intervention: Chart reviewed. Met with pt/dtr., verified all demographics. [facility] has MCR/Fep BC ins. [facility] Dr. Brody Corona is his PCP. : Anat Lundberg dtr, whom he designates can participate in his discharge process. Pt lives with his wife Luis Gee. Dtr lives next door to pt & assists with her parents, provides breakfast & dinner for them. Has walker & cane. Independent with ADL's prior to admit. Asked her about SNF, states will be bringing him home & she will assist him as needed. PLAN: home with family support when medically stable. Will cont to follow for any needs. Pat [address] [facility] [address]. 8461.      High Risk Criteria  [x] Yes  []No   Physician Referral  [] Yes  [x]No        Date    Nursing Referral  [] Yes  [x]No        Date    Patient/Family Request  [] Yes  [x]No        Date       Resources:    Medicare  [x] Yes  []No   Medicaid  [] Yes  [x]No   No Resources  [] Yes  [x]No   Private Insurance  [x] Yes  []No    Name/Phone Number    Other  [] Yes  [x]No        (i.e. Workman's Comp)         Prior Services:    Prior Services  [] Yes  [x]No   Home Health  [] Yes  [x]No   [address]  [] Yes  [x]No        Number of [address]  [] Yes  [x]No       Meals on Wheels  [] Yes  [x]No   Office on Aging  [] Yes  [x]No   Transportation Services  [] Yes  [x]No   Nursing Home  [] Yes  [x]No        Nursing Home Name    [facility]  [] Yes  [x]No        [address] Name    Other       Information Source:      Information obtained from  [x] Patient  [] Parent   [] Guardian  [x] Child  [] Spouse   [] Significant Other/Partner   [] Friend      [] EMS    [] Nursing Home Chart          [] Other:   Chart Review  [x] Yes  []No     Family/Support System:    Patient lives with  [] Alone    [x] Spouse   [] Significant Other  [] Children  [] Caretaker   [] Parent  [] Sibling [] Other       Other Support System:    Is the patient responsible for care of others  [] Yes  [x]No   Information of person caring for patient on  discharge    Managers financial affairs independently  [x] Yes  []No   If no, explain:      Status Prior to Admission:    Mental Status  [x] Awake  [x] Alert  [x] Oriented  [x] Quiet/Calm [] Lethargic/Sedated   [] Disoriented  [] Restless/Anxious  [] Combative   Personal Care  [] Dependent  [x] [address]  [] Requires Assistance   Meal Preparation Ability  [] Independent   [] Standby Assistance   [] Minimal Assistance   [] Moderate Assistance  [x] Maximum Assistance     [] Total Assistance   Chores  [] Independent with Chores   [] [facility] Resident   [x] Requires Assistance   Bowel/Bladder  [x] Continent  [] Catheter  [] Incontinent  [] Ostomy Self-Care    [] Urine Diversion Self-Care  [] Maximum Assistance     [] Total Assistance   Number of Persons needed for assistance    DME at home  [] Deepali Gold Bending  [x] Yung Gold   [] Commode    [] Bathroom/Grab Bars  [] Hospital Bed  [] Nebulizer  [] Oxygen           [] Raised Toilet Seat  [] Shower Chair  [] Side Rails for Bed   [] Tub Transfer Bench   [x] Rhina Fito  [] Colonel Cruz, Standard      [] Other:   Vendor      Treatment Presently Receiving:    Current Treatments  [] Chemotherapy  [] Dialysis  [] Insulin  [] IVAB [x] IVF   [] O2  [] PCA   [] PT   [] RT   [] Tube Feedings   [] Wound Care     Psychosocial Evaluation:    Verbalized Knowledge of Disease Process  [] Patient  []Family   Coping with Disease Process  [] Patient  []Family   Requires Further Counseling Coping with Disease Process  [] Patient  []Family     Identified Projected Needs:    Home Health Aid  [] Yes  [x]No   Transportation  [] Yes  [x]No   Education  [] Yes  [x]No        Specific Education     Financial Counseling  [] Yes  [x]No   Inability to Care for Self/Will Require 24 hour care  [] Yes  [x]No   Pain Management  [] Yes [x]No   Home Infusion Therapy  [] Yes  [x]No   Oxygen Therapy  [] Yes  [x]No   DME  [] Yes  [x]No   Long Term Care Placement  [] Yes  [x]No   Rehab  [] Yes  [x]No   Physical Therapy  [] Yes  [x]No   Needs Anticipated At This Time  [] Yes  [x]No     Intra-Hospital Referral:    [address] [facility]  [] Yes  [x]No     [] Yes  [x]No   Patient Representative  [] Yes  [x]No   Staff for Teaching Needs  [] Yes  [x]No   Specialty Teaching Needs     Diabetic Educator  [] Yes  [x]No   Referral for Diabetic Educator Needed  [] Yes  [x]No  If Yes, place order for Nutritionist or Diabetic Consult     Tentative Discharge Plan:    Home with No Services  [x] Yes  []No   Home with [address]  [] Yes  [x]No        If Yes, specify type    Home Care Program  [] Yes  [x]No        If Yes, specify type    Meals on Wheels  [] Yes  [x]No   Office of Aging  [] Yes  [x]No   NHP  [] Yes  [x]No   Return to the Nursing Home  [] Yes  [x]No   Rehab Therapy  [] Yes  [x]No   Acute Rehab  [] Yes  [x]No   Subacute Rehab  [] Yes  [x]No   Private Care  [] Yes  [x]No   Substance Abuse Referral  [] Yes  [x]No   Transportation  [] Yes  [x]No   Chore Service  [] Yes  [x]No   Inpatient Hospice  [] Yes  [x]No   OP RT  [] Yes  [x] No   OP Hemo  [] Yes  [x] No   OP PT  [] Yes  [x]No   Support Group  [] Yes  [x]No   Reach to Recovery  [] Yes  [x]No   OP Oncology Clinic  [] Yes  [x]No   Clinic Appointment  [] Yes  [x]No   DME  [] Yes  [x]No   Comments    Name of D/C Planner or  Given to Patient or Family Li Bermeo   Phone Number Pager: [phone]        Extension Ext. [phone]. VM [phone]   Date 2-   Time    If you are discharged home, whom do you designate to participate in your discharge plan and receive any information needed?      Enter name of Maki Tarango        Phone # of designee [phone] / [phone]        Address of designee         Updated         Patient refused to designate any           individual

## 2017-02-13 NOTE — PROGRESS NOTES
Pt vomited about 50-100ml of bile colored emesis. Pt also complain of abdominal pain 10/10. Dr Lizette Rowe notified. NG tube & CBC ordered.

## 2017-02-13 NOTE — PROGRESS NOTES
Received verbal report from [address] [employer]. Patient is off the floor for surgery. 1000 Received bedside verbal report from Iggy Lowell is running on 75ml/hr on his left IV site,phillips's present and clear,dressing c/d/i,patient is little drowsy,daughter at bedside,call bell within reach,white board updated. 1130 Patient resting on bed,no any complain of pain and nausea,pt states that he is having belching and he is feeling better than before surgery,will continue to monitor him. 1300 Patient is resting in bed,bed in the lowest position,will continue to monitor him. 0 Daughter at bedside,pt is having scant amount of bleeding in the corner of the lip,daughter is concerned about that,reassured her that its from his dry and cracky lips,student doctor is in room and she is  aware of that too,encouraged pt to do the ICS. 1655 Second unit of blood is started,verified with Allegra,RN,V/S taken and within normal range. 1830 Pt is resting,blood is running at 100 ml/hr,phillips's is draining clear urine,no any complain of pain from pt's side. Bedside and Verbal shift change report given to Tate Hewitt (oncoming nurse) by Kosta Bautista (offgoing nurse). Report included the following information SBAR, Kardex, OR Summary, Intake/Output, MAR and Recent Results.

## 2017-02-13 NOTE — PERIOP NOTES
TRANSFER - OUT REPORT:    Verbal report given to Belkys Stinson RN on Kris Driscoll  being transferred to [address] [place] [address] ([id number]) for routine post - op       Report consisted of patients Situation, Background, Assessment and   Recommendations(SBAR). Information from the following report(s) SBAR, Kardex, Procedure Summary, MAR, Recent Results and Med Rec Status was reviewed with the receiving nurse. Lines:   Peripheral IV 02/12/17 Left Antecubital (Active)   Site Assessment Clean, dry, & intact 2/12/2017  8:00 PM   Phlebitis Assessment 0 2/12/2017  8:00 PM   Infiltration Assessment 0 2/12/2017  8:00 PM   Dressing Status Clean, dry, & intact 2/12/2017  8:00 PM   Dressing Type Transparent;Tape 2/12/2017  8:00 PM   Hub Color/Line Status Pink; Infusing 2/12/2017  8:00 PM   Action Taken Open ports on tubing capped 2/12/2017  8:00 PM   Alcohol Cap Used Yes 2/12/2017  8:00 PM       Peripheral IV 02/13/17 Right Forearm (Active)       Arterial Line 02/13/17 (Active)        Opportunity for questions and clarification was provided.       Patient transported with:   Registered Nurse  Tech

## 2017-02-13 NOTE — BRIEF OP NOTE
BRIEF OPERATIVE NOTE    Date of Procedure: 2/13/2017   Preoperative Diagnosis: Small Bowel Obstruction, Rapidly worsening condition  Postoperative Diagnosis: Small Bowel Obstruction , closed loop obstruction from adhesions  Procedure(s):  EXPLORATORY LAPAROTOMY POSSIBLE SMALL BOWEL RESECTION; stapled end-anastomosis   Surgeon(s) and Role:     * Moo Costa MD - Primary            Surgical Staff:  Circ-1: Antolin Reyez  Scrub Tech-1: Kelley Martinez  Scrub Tech-2: Armisaac Shed  Surg Asst-1: Jackson Dudley  Surg Asst-Relief: [address] Student: Sid Chavez  Event Time In   Incision Start 0645   Incision Close 0744     Anesthesia: General   Estimated Blood Loss: 50 ml  Specimens:   ID Type Source Tests Collected by Time Destination   1 : SMALL BOWEL  Preservative Small Bowel  Moo Costa MD 2/13/2017 2841 Pathology   1 : PERITONEAL FLUID Body Fluid Abdominal Fluid CULTURE, ANAEROBIC, CULTURE, BODY FLUID, Marifer Escobar MD 2/13/2017 0701 Microbiology      Findings: 191 cm small bowel of bowel resected- dead bowel; 150 cc's of normal small bowel remaining  Complications: none  Implants: none

## 2017-02-13 NOTE — PERIOP NOTES
2772:  Patient received from OR on a bed. Attached to monitors. VSS. Attached to oxygen via oxygen mask at 10L. Oral airway in place. OR report, anesthesia report and MAR acknowledged. Will continue to monitor.

## 2017-02-13 NOTE — ANESTHESIA PROCEDURE NOTES
Peripheral IV    Start time: 2/13/2017 6:46 AM  End time: 2/13/2017 6:47 AM  Performed by: Misha Haywood  Authorized by: Kyleigh Deleon     Pre-Procedure    Procedure:   Prep:  Alcohol  Seldinger Technique?: No    Number of attempts:  1  Cont Cardiac Output Sensor: No      Assessment:   Post-procedure:  Line secured and sterile dressing applied  Patient Tolerance:  Patient tolerated the procedure well with no immediate complications  Comment:   #18 G PIV by Dr. Yevgeniy Marshall in Right FA

## 2017-02-13 NOTE — ACP (ADVANCE CARE PLANNING)
Patient has designated ____his daughter____________________ to participate in his/her discharge plan and to receive any needed information.      Name: Joel Ngo  Address:  Phone number:  [phone] / [phone]

## 2017-02-13 NOTE — PROGRESS NOTES
Sue Coronado RN & I tried twice to insert NG tube but not able to insert NG tube. Tube keeps on coiling in pt's mouth. Ying Lemon RN in ICU to try to insert NG tube but the tube also kept on curling in pt's mouth. Pt refused for us to try to have NG tube reinserted again. Dr Eddie Concepcion notified. Results of CBC also given to Dr Eddie Concepcion. No further orders given at this time.

## 2017-02-13 NOTE — ROUTINE PROCESS
Bedside and Verbal shift change report given to marisol Schwarz   (oncoming nurse) by Damián Leon RN   (offgoing nurse). Report included the following information SBAR, Kardex and Recent Results.

## 2017-02-13 NOTE — PROGRESS NOTES
Daily Progress Note: 2017 10:10 AM   Admit Date: 2017    Patient seen in follow up for multiple medical problems as listed below: There is no problem list on file for this patient. Assesment   80 y.o. male with a PMHx of HTN, TIA, SIDNEY, SBO and abdominal hernia who presented to the ED with the acute onset of left upper quadrant abdominal pain. CT scan of the abd/pelvis in the ED that was positive for a SBO.  s/p Exlap and small bowel resection  am Dr Jocelyne Foster    SBO s/p Exlap and small bowel resection  am Dr Jocelyne Foster  L Inguinal Hernia  HTN -  TIA -restart eliquis when tolerating PO  Iron Deficiency Anemia -restart with PO  CKD Stage IIIb      DVT Protocol Active: yes  Code Status:  Full Code     Disposition: requires 48h hosp    Subjective:     CC: Abdominal Pain (left lower abdomen) and Leg Pain (left leg)    Interval History: Pt quite well after surgery. He is very nice. Objective:     Visit Vitals    /67 (BP 1 Location: Right arm, BP Patient Position: At rest)    Pulse 63    Temp 96.5 °F (35.8 °C)    Resp 16    Ht 6' (1.829 m)    Wt 63.5 kg (140 lb)    SpO2 99%    BMI 18.99 kg/m2       Temp (24hrs), Av.8 °F (36.6 °C), Min:96.5 °F (35.8 °C), Max:98.4 °F (36.9 °C)        Intake/Output Summary (Last 24 hours) at 17 1010  Last data filed at 17 0756   Gross per 24 hour   Intake             2005 ml   Output              615 ml   Net             1390 ml       Gen: AOx3, NAD  HEENT:  PATRICIA, EOMI. Neck: No Bruits/JVD   Lungs:   CTAB. Good respiratory effort  Heart:   RR S1 S2 without M/R/G  Abdomen: ND,tender, bandage midline, BSX4,   Extremities:   No LE edema. No cyanosis.   Skin:  no jaundice/lesions      Data Review:     Meds/Labs/Tests reviewed    Current Shift:  701 - 1900  In: 600 [I.V.:600]  Out: 210 [Urine:160]  Last three shifts:  1901 -  0700  In: 7008 [I.V.:1405]  Out: 405 [Urine:405]  Recent Labs      17   5886 02/12/17   2119  02/12/17   0605   WBC  14.2*  9.6  5.6   RBC  2.95*  3.14*  3.32*   HGB  8.0*  8.6*  9.2*   HCT  25.7*  27.6*  29.0*   PLT  362  331  298   GRANS  90*  89*  75*   LYMPH  8*  6*  10*   EOS  0  0  1       Recent Labs      02/13/17   0420  02/12/17   0605   BUN  41*  32*   CREA  1.99*  1.45*   CA  8.4*  8.9   ALB   --   3.5   K  4.8  4.1   NA  136  138   CL  102  102   CO2  21  25   GLU  340*  143*        Lab Results   Component Value Date/Time    Glucose 340 02/13/2017 04:20 AM    Glucose 143 02/12/2017 06:05 AM    Glucose 157 01/04/2016 12:40 PM    Glucose 107 11/08/2015 10:44 AM    Glucose 107 09/19/2010 04:50 AM          Care coordination with Nursing/Consultants/staff: 5  Prior history, labs, and charting reviewed: 15    Procedures/Imaging:  s/p Exlap and small bowel resection 2/13 am Dr Susan Valencia    Total time spent with chart review, patient examination/education, discussion with staff on case,documentation and medication management / adjustment  :  30 Minutes      Dr Lexus Trujillo  Pager: [phone]

## 2017-02-13 NOTE — PROGRESS NOTES
I have just examined the patient. He is having significant abdominal pain. Abdomen is much more distended than it was previously, and he is tight and tender. This is a significant change-needs to go to OR. Pt. Agreeable and I have talked to the daughter. Protime pending. May need to give kcentra since pt. Has been on apixaban.   Juan Robertson MD  2/13/2017  0530

## 2017-02-13 NOTE — ANESTHESIA PREPROCEDURE EVALUATION
Anesthetic History   No history of anesthetic complications            Review of Systems / Medical History  Patient summary reviewed and pertinent labs reviewed    Pulmonary      Recent URI             Neuro/Psych       CVA  TIA     Cardiovascular    Hypertension              Exercise tolerance: <4 METS     GI/Hepatic/Renal         Renal disease: CRI       Endo/Other        Anemia     Other Findings   Comments: Current Smoker? NO       Elective Surgery? Yes       Abstained from smoking 24 hours prior to anesthesia? NA    Risk Factors for Postoperative nausea/vomiting:       History of postoperative nausea/vomiting? NO       Female? NO       Motion sickness? NO       Intended opioid administration for postoperative analgesia?   YES         Physical Exam    Airway  Mallampati: III  TM Distance: 4 - 6 cm  Neck ROM: decreased range of motion   Mouth opening: Diminished (comment)     Cardiovascular    Rhythm: regular  Rate: normal         Dental    Dentition: Poor dentition     Pulmonary  Breath sounds clear to auscultation               Abdominal  GI exam deferred       Other Findings            Anesthetic Plan    ASA: 3, emergent  Anesthesia type: general            Anesthetic plan and risks discussed with: Patient, Healthcare power of  and Son / Daughter

## 2017-02-13 NOTE — MED STUDENT NOTES
Progress Note    Patient: Alissa Carlton MRN: [id number]  SSN: [id number]    YOB: 1927  Age: 80 y.o. Sex: male      Admit Date: 2/12/2017    LOS: 0 days     Subjective:   Pt is a pleasant 81 y/o man who presented to the ER on 2/12/17 with a small bowel obstruction and was taken to surgery this morning by Dr. Dru Logan who resected 191 cm of dead small bowel. Pt is AO x4, sitting upright in bed and conversing with daughter and nursing staff. Pt reports no pain or nausea, pt has not yet passed flatus but has burped several times. Pt has slight blood tinged sputum at the corners of mouth, likely from multiple NGT placement attempts. Pt has NPO but does take small \"sips\" of water via sponge and reports that it is \"sitting well on his stomach\"     PMHx: DM, Afib(last elloquis 2 days ago) HTN, Fe deficiency anemia (per daughter), hx of frequent kidney stones      Objective:     Visit Vitals    /71 (BP 1 Location: Right arm, BP Patient Position: At rest)    Pulse 73    Temp 97.5 °F (36.4 °C)    Resp 16    Ht 6' (1.829 m)    Wt 63.5 kg (140 lb)    SpO2 99%    BMI 18.99 kg/m2     Intake and Output:  Current Shift: 02/13 0701 - 02/13 1900  In: 600 [I.V.:600]  Out: 210 [Urine:160]  Last three shifts: 02/11 1901 - 02/13 0700  In: 1405 [I.V.:1405]  Out: 405 [Urine:405]    Physical Exam:   GENERAL: alert, cooperative, no distress, appears stated age  LUNG: clear to auscultation bilaterally  HEART: regular rate and rhythm, S1, S2 normal, no murmur, click, rub or gallop  ABDOMEN: soft, mildly tender at incision site, no seepage of wound through the bandage.  Minimal Bowel sounds No masses,  no organomegaly  EXTREMITIES:  extremities normal, atraumatic, no cyanosis or edema  SKIN: no rash or abnormalities      Lab/Data Review:  WBC: 14.2 (increased from 9.6 yesterday), Hgb 8.0, Cr. 1.99  Abdominal fluid gram stain- no organisms, few WBC culture - anareobes : pending         Assessment:   Acute:   S/P SBO: exlap and small bowel resection with Dr. Alyx Vidal this morning  Chronic:   L inguinal Hernia  HTN- managed with medication  TIA: restart eliquis when tolerating PO  SIDNEY: restart Fe PO- Pt received 1 unit PRBC's. Hgb is 8.0  CKD stage IIIb  DVT prophylaxis with heparin and SCD    Plan:          Signed By: Ciaran Savage     February 13, 2017          *ATTENTION:  This note has been created by a medical student for educational purposes only. Please do not refer to the content of this note for clinical decision-making, billing, or other purposes. Please see attending physicians note to obtain clinical information on this patient. *

## 2017-02-14 PROBLEM — D64.9 POSTOPERATIVE ANEMIA: Status: ACTIVE | Noted: 2017-01-01

## 2017-02-14 PROBLEM — I48.91 ATRIAL FIBRILLATION WITH RAPID VENTRICULAR RESPONSE (HCC): Status: ACTIVE | Noted: 2017-01-01

## 2017-02-14 NOTE — ROUTINE PROCESS
Bedside and Verbal shift change report given to Kendra Diamond RN (oncoming nurse) by Vanita Rock (offgoing nurse). Report included the following information SBAR, Kardex and MAR. Patient had no acute events overnight. Currently resting in bed in NAD. No express needs reported at this time.

## 2017-02-14 NOTE — PROGRESS NOTES
conducted an initial consultation and Spiritual Assessment for Kaelyn Gaona, who is a 80 y.o.,male. Patients Primary Language is: Merril Oxford. According to the patients EMR Temple Affiliation is: Sabianism. The reason the Patient came to the hospital is:   Patient Active Problem List    Diagnosis Date Noted    Small bowel obstruction ([facility] [address] 02/13/2017        The  provided the following Interventions:  Initiated a relationship of care and support. Explored issues of cecilia, belief, spirituality and Orthodoxy/ritual needs while hospitalized. Listened empathically. Provided information about Spiritual Care Services. Offered prayer and assurance of continued prayers on patient's behalf. Chart reviewed. The following outcomes were achieved:  Patient shared limited information about both their medical narrative and spiritual journey/beliefs. Patient processed feeling about current hospitalization. Patient expressed gratitude for 's visit. Assessment:  Patient does not have any Orthodoxy/cultural needs that will affect patients preferences in health care. There are no further spiritual or Orthodoxy issues which require intervention at this time. Plan:  Chaplains will continue to follow and will provide pastoral care on an as needed/requested basis.  recommends bedside caregivers page  on duty if patient shows signs of acute spiritual or emotional distress. Nena Fong M.Div.   [facility]  [phone]

## 2017-02-14 NOTE — PROGRESS NOTES
Pt. Alert oriented. Sore throat. Denies much abd. Pain. Abd. Soft , slightly distended, hypoactive BS. Drsg dry. Output good. Pulse has significantly increased-140. Possible fib/flutter. I have discussed with Dr. Matt who will evaluate. Pt. Uses walker at home and wants OOB.     Reina Polk MD  2/14/2017

## 2017-02-14 NOTE — PROGRESS NOTES
Nutrition initial assessment/Plan of care      RECOMMENDATIONS:   1. NPO. Advance diet when medically indicated  2. Monitor weight and PO intake  3. RD to follow     GOALS:   1. PO intake meets >75% of protein/calorie needs by 2/17  2. Weight Maintenance (+/- 1-2 lb) by 2/21       ASSESSMENT:   Per BMI of 19.0, weight is in the normal classification. PO intake is poor vs NPO order. Labs noted. Average BG in the last 24 hours: 131-340. Nutrition recommendations listed. RD to follow. Nutrition Diagnoses: Altered GI function related to SBO as evidenced by NPO order. Nutrition Risk:  [x] High  [] Moderate []  Low    SUBJECTIVE/OBJECTIVE:   Pt admitted for SBO. He has hx of SBO and HTN. He had exlap and small bowel resection (about 191 cm) on 2/13. Pt lives wit his daughter and she takes care of his meals. He eats more of a Mediterranean diet. Pt doesn't eat red meats. He eats fish and legumes as main source of protein. He also can eat yogurt, eggs, milk or chicken occassionally. No known food allergy. Pt has UBW around 140 lb and denies any recent weight change. Pt reports some nausea and sore throat (vs tentative of NGT placement). Information Obtained from:    [x] Chart Review   [x] Patient   [x] Family/Caregiver   [x] Nurse/Physician   [] Interdisciplinary Meeting/Rounds    Diet: NPO  Medications: [x] Reviewed     Allergies: [x] Reviewed   Encounter Diagnoses     ICD-10-CM ICD-9-CM   1. SBO (small bowel obstruction) ([facility]) K56.69 560.9   2. Abdominal pain, LUQ (left upper quadrant) R10.12 789.02   3. Chronic pain of left knee M25.562 719.46    G89.29 338.29   4. Chronic renal failure, unspecified stage N18.9 585.9   5.  Anemia, unspecified type D64.9 285.9     Past Medical History   Diagnosis Date    Hypertension     TIA (transient ischemic attack)       Labs:    Lab Results   Component Value Date/Time    Sodium 139 02/14/2017 03:50 AM    Potassium 4.0 02/14/2017 03:50 AM    Chloride 105 02/14/2017 03:50 AM    CO2 24 02/14/2017 03:50 AM    Anion gap 10 02/14/2017 03:50 AM    Glucose 131 02/14/2017 03:50 AM    BUN 53 02/14/2017 03:50 AM    Creatinine 2.29 02/14/2017 03:50 AM    Calcium 7.3 02/14/2017 03:50 AM    Magnesium 1.8 02/14/2017 03:50 AM    Phosphorus 3.3 02/14/2017 03:50 AM    Albumin 3.5 02/12/2017 06:05 AM     Anthropometrics: BMI (calculated): 19  Last 3 Recorded Weights in this Encounter    02/12/17 0603 02/13/17 2359   Weight: 63.5 kg (140 lb) 64.1 kg (141 lb 5 oz)      Ht Readings from Last 1 Encounters:   02/12/17 6' (1.829 m)     IBW: 178 lb %IBW: 79% UBW: 140 lb %UBW: 100%   [] Weight Loss [] Weight Gain [x] Weight Stable    Estimated Nutrition Needs: [x] MSJ  [] Other:  Calories: 6865-2276 kcal Based on:   [x] Actual BW    Protein:   70-80 g Based on:   [x] Actual BW    Fluid:       5944-3100 ml Based on:   [x] Actual BW      [x] No Cultural, Synagogue or ethnic dietary need identified.     [] Cultural, Synagogue and ethnic food preferences identified and addressed     Wt Status:  [x] Normal (18.6 - 24.9) [] Underweight (<18.5) [] Overweight (25 - 29.9) [] Mild Obesity (30 - 34.9)  [] Moderate Obesity (35 - 39.9) [] Morbid Obesity (40+)   [] Moderate Malnutrition [] Severe Malnutrition in the context of :     Nutrition Problems Identified:   [x] Suboptimal PO intake   [] Food Allergies  [] Difficulty chewing/swallowing/poor dentition  [] Constipation/Diarrhea   [x] Nausea   [] None  [] Other:     Plan:   [] Therapeutic Diet  []  Obtained/adjusted food preferences/tolerances and/or snacks options   []  Supplements added   [] Occupational therapy following for feeding techniques  []  HS snack added   []  Modify diet texture   []  Modify diet for food allergies   []  Educate patient   []  Assist with menu selection   [x]  Monitor PO intake on meal rounds   [x]  Continue inpatient monitoring and intervention   []  Participated in discharge planning/Interdisciplinary rounds/Team meetings   [] Other:     Education Needs:   [] Not appropriate for teaching at this time due to:   [x] Identified and addressed    Nutrition Monitoring and Evaluation:  [x] Continue ongoing monitoring and intervention  [] Other    Julian Espinoza, [address] [place] [address]  Pager: [phone]

## 2017-02-14 NOTE — PROGRESS NOTES
Received bedside shift report from Thiago Maguire RN. Pt resting in bed, white board updated, call bell within reach. 1546 Checked on pt, no complaints of pain. 1000 Spoke with Dr. Eddie Concepcion concerning pt's desire to drink more water. Received the okay to give roughly 60mL of water an hour. Brought pt water and explained to him how much he can drink and tips on how to pace himself. 1045 CNA attempted to get pt OOB to chair, was unable. Pt stated that he felt too weak. 800 [facility] by charge nurse, Sada Rodrigues, that pt was in A fib with RVR from routine EKG that was completed. Dr. Vernell reid. Pt placed on 2L O2.    1300 Pt will be transferred to 3018.     1340 TRANSFER - OUT REPORT:    Verbal report given to Sid Anderson RN (name) on [facility]  being transferred to [facility] (unit) for urgent transfer       Report consisted of patients Situation, Background, Assessment and   Recommendations(SBAR). Information from the following report(s) SBAR, Kardex, OR Summary, Intake/Output, Recent Results and Cardiac Rhythm a-fib wt RVR was reviewed with the receiving nurse. Lines:   Peripheral IV 02/12/17 Left Antecubital (Active)   Site Assessment Clean, dry, & intact 2/13/2017 10:00 AM   Phlebitis Assessment 0 2/13/2017 10:00 AM   Infiltration Assessment 0 2/13/2017 10:00 AM   Dressing Status Clean, dry, & intact 2/13/2017 10:00 AM   Dressing Type Transparent;Tape 2/13/2017 10:00 AM   Hub Color/Line Status Pink;Flushed;Capped 2/13/2017 10:00 AM   Action Taken Open ports on tubing capped 2/13/2017 10:00 AM   Alcohol Cap Used Yes 2/13/2017 10:00 AM       Peripheral IV 02/13/17 Right Forearm (Active)   Site Assessment Clean, dry, & intact 2/13/2017 10:00 AM   Phlebitis Assessment 0 2/13/2017 10:00 AM   Infiltration Assessment 0 2/13/2017 10:00 AM   Dressing Status Clean, dry, & intact 2/13/2017 10:00 AM   Dressing Type Transparent;Tape 2/13/2017 10:00 AM   Hub Color/Line Status Green; Infusing 2/13/2017 10:00 AM Action Taken Open ports on tubing capped 2/13/2017 10:00 AM   Alcohol Cap Used Yes 2/13/2017 10:00 AM       Arterial Line 02/13/17 (Active)        Opportunity for questions and clarification was provided.       Patient transported with:   Registered Nurse

## 2017-02-14 NOTE — PROGRESS NOTES
Daily Progress Note: 2017 10:10 AM   Admit Date: 2017    Patient seen in follow up for multiple medical problems as listed below:  Patient Active Problem List   Diagnosis Code    [facility] K56.69       Assesment   80 y.o. male with a PMHx of HTN, TIA, SIDNEY, SBO and abdominal hernia who presented to the ED with the acute onset of left upper quadrant abdominal pain. CT scan of the abd/pelvis in the ED that was positive for a SBO.  s/p Exlap and small bowel resection (191cm)  am Dr Ace Del Real. Course complicated by Afib+RVR  am requiring rate control and cardiology involvement. Afib+RVR: Hx of pAfib per daughter follows with Dr Tasia Torres not on rate control. SBO s/p Exlap and small bowel resection  am Dr Ace Del Real  HTN - has been low normal BP  TIA -restart eliquis when tolerating PO  Post-op on Iron Deficiency Anemia -restart iron when toelrating PO. Hg stable @ 8. transfuse <7  CKD Stage IIIb -@ baseline  Hx of L Inguinal Hernia      DVT Protocol Active: yes  Code Status:  Full Code     Disposition: Home 1-2 days     Subjective:     CC: Abdominal Pain (left lower abdomen) and Leg Pain (left leg)    Interval History: Afib and RVR began this am. ECG performed to verify. -140 peak at 850 BP  systolic.  5mg then another 5mg IV cardizem given 10min apart HR did respond to 60's but only temporarily. Transferred to  on Tele with planned further rate control. Daughter requesting cardiology involvement.    No BM or Flatus yet today      Objective:     Visit Vitals    /52 (BP 1 Location: Left arm, BP Patient Position: At rest)    Pulse (!) 142    Temp 97.8 °F (36.6 °C)    Resp 18    Ht 6' (1.829 m)    Wt 64.1 kg (141 lb 5 oz)    SpO2 95%    BMI 19.17 kg/m2       Temp (24hrs), Av.7 °F (36.5 °C), Min:96.5 °F (35.8 °C), Max:98.3 °F (36.8 °C)        Intake/Output Summary (Last 24 hours) at 17 0944  Last data filed at 17 0649   Gross per 24 hour   Intake            348.3 ml   Output             2250 ml   Net          -1901.7 ml       Gen: AOx3, NAD  HEENT:  PATRICIA, EOMI. Neck: No Bruits/JVD   Lungs:   CTAB. Good respiratory effort  Heart:   iRR Tachy   Abdomen: ND,tender, bandage midline, BSX4,   Extremities:   No LE edema. No cyanosis.   Skin:  no jaundice/lesions      Data Review:     Meds/Labs/Tests reviewed    Current Shift:     Last three shifts:  02/12 1901 - 02/14 0700  In: 1773.3 [I.V.:1425]  Out: 2585 [Urine:2535]  Recent Labs      02/14/17   0350  02/13/17   0420  02/12/17   2119  02/12/17   0605   WBC  12.7  14.2*  9.6  5.6   RBC  2.88*  2.95*  3.14*  3.32*   HGB  8.0*  8.0*  8.6*  9.2*   HCT  24.3*  25.7*  27.6*  29.0*   PLT  236  362  331  298   GRANS   --   90*  89*  75*   LYMPH   --   8*  6*  10*   EOS   --   0  0  1       Recent Labs      02/14/17   0350  02/13/17   0420  02/12/17   0605   BUN  53*  41*  32*   CREA  2.29*  1.99*  1.45*   CA  7.3*  8.4*  8.9   ALB   --    --   3.5   K  4.0  4.8  4.1   NA  139  136  138   CL  105  102  102   CO2  24  21  25   PHOS  3.3   --    --    GLU  131*  340*  143*        Lab Results   Component Value Date/Time    Glucose 131 02/14/2017 03:50 AM    Glucose 340 02/13/2017 04:20 AM    Glucose 143 02/12/2017 06:05 AM    Glucose 157 01/04/2016 12:40 PM    Glucose 107 11/08/2015 10:44 AM          Care coordination with Nursing/Consultants/staff: 5  Prior history, labs, and charting reviewed: 15    Procedures/Imaging:  s/p Exlap and small bowel resection 2/13 am Dr Genny Rdz    Total time spent with chart review, patient examination/education, discussion with staff on case,documentation and medication management / adjustment  :  30 Minutes      Dr Portillo [place]  Pager: [phone]

## 2017-02-14 NOTE — CONSULTS
[facility] - Consult Note    Date of  Admission: 2/12/2017  6:03 AM   Primary Care Physician:  Donato Trejo MD  Patient seen and examined independently. Patient has a history of PAF and a history of TIAs. Would resume Eliquis as soon as possible. Agree with assessment and plan as noted below. Larry Lomas MD   Assessment:     Patient Active Problem List   Diagnosis Code    Small bowel obstruction ([facility] K56.69         - Small bowel obstruction- closed loop obstruction from internal hernia with necrotic bowel. S/p resection of 191 cm small bowel 02/13/17  - Paroxysmal atrial fibrillation, was on Eliquis prior to admission. [id number] score: 5 (+age, HTN, TIA history). Stroke risk 7.2% per year untreated. - Hypertension  - History of TIA  - CKD     Plan:     - Agree with Cardizem gtt as BP tolerates  - Echocardiogram when afib rates are showing improvement. Will add PO beta blocker as BP allows   - Resume Eliquis when safe from postoperative standpoint     History of Present Illness: This is a 80 y.o. male admitted for SBO  Small Bowel Obstruction  Small bowel obstruction ([facility] Patient complains of:  Post op bowel obstruction, afib    80year old male with known history of afib is admitted to [facility] due to small bowel obstruction. He had 191 cm of bowel resected 02/13/17. Cardiology is involved in his care due to afib. He has a known history of afib and was on Eliquis prior to admission. Denies any chest pain, dyspnea, lightheadedness at present. His cardiologist is Dr. Blanca Nowak. He lives at home with his wife, his daughter assists in his care as she lives next door. He has a history of HTN and TIA, no known history of MI or CAD. Denies smoking, ETOH or illicit drug use.       Cardiac risk factors: male gender, hypertension      Review of Symptoms:  Except as stated above include:  Constitutional:  negative  Respiratory:  negative  Cardiovascular:  Per HPI  Gastrointestinal: +ABD pain  Genitourinary:  negative  Musculoskeletal:  Negative  Neurological:  Negative  Dermatological:  Negative  Endocrinological: Negative  Psychological:  Negative    A comprehensive review of systems was negative except for that written in the HPI. Past Medical History:     Past Medical History   Diagnosis Date    Hypertension     TIA (transient ischemic attack)          Social History:     Social History     Social History    Marital status:      Spouse name: N/A    Number of children: N/A    Years of education: N/A     Social History Main Topics    Smoking status: Former Smoker    Smokeless tobacco: None    Alcohol use No    Drug use: No    Sexual activity: Not Asked     Other Topics Concern    None     Social History Narrative        Family History:   History reviewed. No pertinent family history.      Medications:   No Known Allergies     Current Facility-Administered Medications   Medication Dose Route Frequency    dilTIAZem (CARDIZEM) 100 mg in 0.9% sodium chloride (MBP/ADV) 100 mL infusion  5 mg/hr IntraVENous CONTINUOUS    sodium chloride (NS) flush 5-10 mL  5-10 mL IntraVENous Q8H    sodium chloride (NS) flush 5-10 mL  5-10 mL IntraVENous PRN    0.9% sodium chloride infusion 250 mL  250 mL IntraVENous PRN    heparin (porcine) injection 5,000 Units  5,000 Units SubCUTAneous Q12H    sodium chloride (NS) flush 5-10 mL  5-10 mL IntraVENous Q8H    sodium chloride (NS) flush 5-10 mL  5-10 mL IntraVENous PRN    0.9% sodium chloride infusion  75 mL/hr IntraVENous CONTINUOUS    HYDROmorphone (PF) (DILAUDID) injection 1 mg  1 mg IntraVENous Q3H PRN    ceFAZolin (ANCEF) 2g IVPB in 50 mL D5W  2 g IntraVENous Q8H    ondansetron (ZOFRAN) injection 4 mg  4 mg IntraVENous Q4H PRN    0.9% sodium chloride infusion 250 mL  250 mL IntraVENous PRN         Physical Exam:     Visit Vitals    /68    Pulse (!) 130    Temp 97.7 °F (36.5 °C)    Resp 18    Ht 6' (1.829 m)    Wt 141 lb 5 oz (64.1 kg)    SpO2 96%    BMI 19.17 kg/m2     BP Readings from Last 3 Encounters:   02/14/17 108/68   01/04/16 110/64   11/08/15 132/68     Pulse Readings from Last 3 Encounters:   02/14/17 (!) 130   01/04/16 61   11/08/15 60     Wt Readings from Last 3 Encounters:   02/13/17 141 lb 5 oz (64.1 kg)   01/04/16 145 lb (65.8 kg)   11/08/15 140 lb (63.5 kg)       General:  alert, cooperative, no distress  Neck:  nontender, no JVD  Lungs:  clear to auscultation bilaterally  Heart:  irregularly irregular rhythm  Abdomen:  abdomen is soft without significant tenderness, masses, organomegaly or guarding  Extremities:  extremities normal, atraumatic, no cyanosis or edema  Skin: Warm and dry.  no hyperpigmentation, vitiligo, or suspicious lesions  Neuro: alert, oriented x3, affect appropriate  Psych: non focal     Data Review:     Recent Labs      02/14/17   0350  02/13/17   0420  02/12/17   2119   WBC  12.7  14.2*  9.6   HGB  8.0*  8.0*  8.6*   HCT  24.3*  25.7*  27.6*   PLT  236  362  331     Recent Labs      02/14/17   0350  02/13/17   0530  02/13/17   0420  02/12/17   0605   NA  139   --   136  138   K  4.0   --   4.8  4.1   CL  105   --   102  102   CO2  24   --   21  25   GLU  131*   --   340*  143*   BUN  53*   --   41*  32*   CREA  2.29*   --   1.99*  1.45*   CA  7.3*   --   8.4*  8.9   MG  1.8   --    --    --    PHOS  3.3   --    --    --    ALB   --    --    --   3.5   SGOT   --    --    --   14*   ALT   --    --    --   17   INR   --   1.2   --    --        Results for orders placed or performed during the hospital encounter of 02/12/17   EKG, 12 LEAD, INITIAL   Result Value Ref Range    Ventricular Rate 61 BPM    Atrial Rate 61 BPM    P-R Interval 222 ms    QRS Duration 136 ms    Q-T Interval 472 ms    QTC Calculation (Bezet) 475 ms    Calculated P Axis 102 degrees    Calculated R Axis -75 degrees    Calculated T Axis 63 degrees    Diagnosis       Sinus rhythm with 1st degree AV block  Right bundle branch block  Left anterior fascicular block  Bifascicular block  Abnormal ECG  When compared with ECG of 04-JAN-2016 12:21,  Previous ECG has undetermined rhythm, needs review  T wave amplitude has decreased in Inferior leads  Nonspecific T wave abnormality has replaced inverted T waves in Lateral leads  Confirmed by Dulce Maria Branham ([id number]) on 2/12/2017 9:45:02 PM         All Cardiac Markers in the last 24 hours:  No results found for: CPK, CKMMB, CKMB, RCK3, CKMBT, CKNDX, CKND1, KEVIN, TROPT, TROIQ, GINETTE, TROPT, TNIPOC, BNP, BNPP    Last Lipid:    Lab Results   Component Value Date/Time    Cholesterol, total 136 09/19/2010 04:50 AM    HDL Cholesterol 54 09/19/2010 04:50 AM    LDL, calculated 71.4 09/19/2010 04:50 AM    Triglyceride 53 09/19/2010 04:50 AM    CHOL/HDL Ratio 2.5 09/19/2010 04:50 AM       Signed By: Reece Celeste     February 14, 2017

## 2017-02-14 NOTE — PROGRESS NOTES
Inpatient Daily Progress Note    Subjective:   Pt is c/o of being thirsty and having a dry, sore throat. Pain is well controlled. He has not had nausea, has not vomitted, has not passed flatus, and has not had a bowel movement. Objective:     Physical Exam:  Visit Vitals    /72 (BP 1 Location: Right arm, BP Patient Position: At rest)    Pulse (!) 138    Temp 97.7 °F (36.5 °C)    Resp 18    Ht 6' (1.829 m)    Wt 64.1 kg (141 lb 5 oz)    SpO2 96%    BMI 19.17 kg/m2       Gen:  Well developed, well nourished 80 y.o. male in no acute distress  Resp: clear to auscultation bilaterally, no wheezes   Cardio: Regular rate and rhythm, no murmurs, clicks, gallops or rubs, no edema  Abdomen: soft, non-tender, nondistended, with active bowel sounds, no hernias  Wound: clean and intact  Psych: alert and oriented to person, place and time      Recent Results (from the past 12 hour(s))   CBC W/O DIFF    Collection Time: 02/14/17  3:50 AM   Result Value Ref Range    WBC 12.7 4.6 - 13.2 K/uL    RBC 2.88 (L) 4.70 - 5.50 M/uL    HGB 8.0 (L) 13.0 - 16.0 g/dL    HCT 24.3 (L) 36.0 - 48.0 %    MCV 84.4 74.0 - 97.0 FL    MCH 27.8 24.0 - 34.0 PG    MCHC 32.9 31.0 - 37.0 g/dL    RDW 15.2 (H) 11.6 - 14.5 %    PLATELET 089 523 - 568 K/uL    MPV 10.2 9.2 - 11.8 FL   METABOLIC PANEL, BASIC    Collection Time: 02/14/17  3:50 AM   Result Value Ref Range    Sodium 139 136 - 145 mmol/L    Potassium 4.0 3.5 - 5.5 mmol/L    Chloride 105 100 - 108 mmol/L    CO2 24 21 - 32 mmol/L    Anion gap 10 3.0 - 18 mmol/L    Glucose 131 (H) 74 - 99 mg/dL    BUN 53 (H) 7.0 - 18 MG/DL    Creatinine 2.29 (H) 0.6 - 1.3 MG/DL    BUN/Creatinine ratio 23 (H) 12 - 20      GFR est AA 33 (L) >60 ml/min/1.73m2    GFR est non-AA 27 (L) >60 ml/min/1.73m2    Calcium 7.3 (L) 8.5 - 10.1 MG/DL   MAGNESIUM    Collection Time: 02/14/17  3:50 AM   Result Value Ref Range    Magnesium 1.8 1.8 - 2.4 mg/dL   PHOSPHORUS    Collection Time: 02/14/17  3:50 AM   Result Value Ref Range    Phosphorus 3.3 2.5 - 4.9 MG/DL   EKG, 12 LEAD, SUBSEQUENT    Collection Time: 02/14/17 11:22 AM   Result Value Ref Range    Ventricular Rate 135 BPM    Atrial Rate 119 BPM    QRS Duration 132 ms    Q-T Interval 342 ms    QTC Calculation (Bezet) 513 ms    Calculated R Axis -75 degrees    Calculated T Axis 81 degrees    Diagnosis       Atrial fibrillation with rapid ventricular response with premature   ventricular or aberrantly conducted complexes  Right bundle branch block  Left anterior fascicular block  Bifascicular block  Abnormal ECG  When compared with ECG of 12-FEB-2017 06:41,  Atrial fibrillation has replaced Sinus rhythm  Vent. rate has increased BY  74 BPM  T wave inversion now evident in Anterior leads         Date 02/13/17 0700 - 02/14/17 0659 02/14/17 0700 - 02/15/17 0659   Shift 1845-6256 6257-7689 24 Hour Total 1347-1731 9601-8245 24 Hour Total   I  N  T  A  K  E   I.V.  (mL/kg/hr) 600  (0.8)  600  (0.4)         Volume (0.9% sodium chloride infusion) 600  600       Blood  348. 3 348. 3         Volume (TRANSFUSE PACKED RBC'S)  348. 3 348. 3       Shift Total  (mL/kg) 600  (9.4) 348.3  (5.4) 948.3  (14.8)      O  U  T  P  U  T   Urine  (mL/kg/hr) 610  (0.8) 1800  (2.3) 2410  (1.6)         Urine Output 160  160         Urine Output (mL) ([REMOVED] Urinary Catheter 02/13/17 [place]) 450 1800 2250       Blood 50  50         Estimated Blood Loss 50  50       Shift Total  (mL/kg) 660  (10.4) 1800  (28.1) 2460  (38.4)      NET -60 -1451.7 -1511.7      Weight (kg) 63.5 64.1 64.1 64.1 64.1 64.1     Assessment:     Deninse Shah is a 80 y.o. male s/p small bowel resection      Plan:     -Continue current medications  -consider clear liquid diet

## 2017-02-14 NOTE — ROUTINE PROCESS
notified of abnormal ekg and low pulse ox. Oxygen was started at 2 liters prior to notifying physician

## 2017-02-14 NOTE — ROUTINE PROCESS
4219 Dr. Jose Hutchison returned page regarding significant change in vitals. SBAR provided. Will continue to monitor patient and wait for evaluation of morning labs. 1079 Dr. Jose Hutchison present on the unit. Will administer 500 cc fluid bolus and d/c phillips. Patient resting in NAD.

## 2017-02-14 NOTE — OP NOTES
Fracisco Mulligan    Name:  Noe Lopez  MR#:  [id number]  :  [id number]  Account #:  [de-identified]  Date of Adm:  2017  Date of Surgery:  2017      PREOPERATIVE DIAGNOSIS: Small bowel obstruction, possible  closed loop. POSTOPERATIVE DIAGNOSIS: Small bowel obstruction - closed loop  obstruction from internal hernia with necrotic bowel. PROCEDURES PERFORMED  1. Exploratory laparotomy. 2. Resection of large portion of central small bowel that included about  191 cm of bowel. 3. Stapled anastomosis carried out. SURGEON: Mariposa Gan MD    ASSISTANT: Artem HERNANDES and medical student, David Ruiz    ANESTHESIA: General.    ESTIMATED BLOOD LOSS: 50 mL. SPECIMENS REMOVED: About 191 cm section of small bowel. OPERATIVE FINDINGS: The patient is an 70-year-old gentleman who  came to the hospital through the emergency room with abdominal pain  of sudden onset. His initial exam showed normal white count and a flat,  nondistended, nontender abdomen. His pain persisted. White count  later in the day had gone to 9.6, but was still in the normal range, but  several hours later, his examination had changed significantly and he  had a distended abdomen which was quite tender. It was felt  immediate surgery was indicated. In the operating room, the patient  was found to have a distended loop of small bowel that was black,  consistent with necrotic closed loop obstruction. There was a ring-  shaped area measuring about 4-5 cm diameter, through which bowel  had herniated and become ischemic. A total of about 191 cm of small bowel was resected. We had over 150  cm of small bowel remaining after the resection. The resection was  mostly central portion of the bowel. OPERATIVE PROCEDURE: The patient was brought to the operating  room. He received 2 g of Ancef preoperatively.  The patient had been  on Eliquis and we had reversal medication available because he had  only been off medication 1 day, however, we got a pro time back on  him just before the surgery and it was 1.2. Therefore, we did not give  the medication, but determined that if he had bleeding while we were  operating, that we would give it. He did not have significant bleeding  during surgery. The patient had SCDs placed and he was placed under general  endotracheal anesthesia. Additional IV was started. The abdomen was  prepped and draped in the appropriate manner. A midline incision was  made extending from above the umbilicus to the pubis; was made  through the skin down to subcutaneous tissue to the fascia. The fascia  was divided and we found, as expected, a mesh that had been  implanted previously for hernia repair. We divided through this mesh. There were a few adhesions to that and we had to gently dissect those  away without too much difficulty. When we opened the abdomen,  it became evident that he had necrotic bowel, and there was some  bloody fluid in the abdomen. That was aspirated out and cultures were  taken of the fluid. We gently eviscerated the dark, black bowel without any leakage,  found the ring through which it had herniated and divided that ring,  allowing freedom of that portion of the bowel. Then we quickly used a  stapler to divide through the small bowel through normal small bowel  just a couple centimeters proximal to the area of obstruction. That  having been divided, it allowed us to straighten things out a little bit  more so that we could come across the mesentery all the way up to  the area where we felt that the bowel was viable on the proximal side. This allowed us both to quickly take control of the venous drainage of  that area of the bowel to prevent some of the toxins from going into the  venous system, but in fact, some of those veins were thrombosed.   Then, having determined the area where the proximal bowel was  viable, we divided across that with a FELIPA stapler as well. Specimens removed from the table and measured separately off the  table. We then measured the bowel that was in the abdomen finding at  least a little over 150 cm of normal small bowel remaining. The patient  obviously had some surgery before and it appeared that he did have a  small bowel resection because we found an area of anastomosis. We  did lyse some adhesions. Then, I re-anastomosed the small bowel  together using stapled side-to-side functional end-to-end anastomosis. The area of crossing staple lines was reinforced with interrupted 3-0  silk sutures. The mesentery defect was closed with a running 3-0  Vicryl. We copiously irrigated the abdomen out with warm saline. Then, we proceeded to close the abdomen with a running #1 Prolene  stitch and in the process, we included not only the fascia, but the mesh  so that the mesh was closed back in the midline. It was noted that the  circular tissue through which the bowel had herniated was some  omentum and fibrotic tissue and there were 2 tackers in that tissue  from the previously placed mesh. Subcutaneous tissue was irrigated and the skin closed and  approximated with staples. Sterile dressing was applied. The patient  was taken to recovery room. It should be mentioned that early on in the procedure, when he was  first placed under anesthesia, the patient did drop his blood pressure,  but with some medication support and fluids, his pressure came up  and stayed up. It should also be mentioned that Conner catheter was  inserted after the patient was asleep and before proceeding with the  surgery. Anesthesia tried to get an NG tube in and they could get it in  orally, but could not get one in nasally, which had been the case on the  floor before his surgery.         MD LEYDI Randolph / JERRICA  D:  02/14/2017   10:26  T:  02/14/2017   11:38  Job #:  [id number]

## 2017-02-15 NOTE — ROUTINE PROCESS
Bedside and Verbal shift change report given to [address] (oncoming nurse) by Jody Rodrigues (offgoing nurse). Report included the following information SBAR, Kardex and MAR.

## 2017-02-15 NOTE — PROGRESS NOTES
[facility] - Progress Note  Admit Date: 2/12/2017      I saw, evaluated, interviewed and examined the patient personally. I agree with the findings and plan of care as documented below with PA-C note  Chronic A.fib,  Currenatly HR stable after IV digoxin  Would use digoxin every otherday  BP marginal to use other AV renita blocking agent  Would recommend to start anticoagulation with ok by surgical team. Long term decision for anticoagulation by primary cardiologist.       Rasheed Kwok MD          Assessment:     - Small bowel obstruction- closed loop obstruction from internal hernia with necrotic bowel. S/p resection of 191 cm small bowel 02/13/17  - Paroxysmal atrial fibrillation, was on Eliquis prior to admission. [id number] score: 5 (+age, HTN, TIA history). Stroke risk 7.2% per year untreated. - Hypertension  - History of TIA  - CKD    Plan:     - Cardizem stopped and received two doses of Digoxin overnight. Afib controlled this AM. Will start PO tomorrow AM  - Resume Eliquis when safe from post-op, will follow surgery recommendations  - BP too marginal to consider PO BB at this time  - Echo pending    Subjective:     No new complaints.      Objective:      Patient Vitals for the past 8 hrs:   Temp Pulse Resp BP SpO2   02/15/17 0550 97.9 °F (36.6 °C) 66 20 109/54 96 %   02/15/17 0225 - 63 - 128/68 -   02/15/17 0200 97.8 °F (36.6 °C) 63 18 128/68 100 %         Patient Vitals for the past 96 hrs:   Weight   02/15/17 0600 141 lb 9.6 oz (64.2 kg)   02/13/17 2359 141 lb 5 oz (64.1 kg)   02/12/17 0603 140 lb (63.5 kg)                    Intake/Output Summary (Last 24 hours) at 02/15/17 0948  Last data filed at 02/15/17 0550   Gross per 24 hour   Intake           1652.5 ml   Output              920 ml   Net            732.5 ml       Physical Exam:  General:  alert, cooperative, no distress  Neck:  nontender, no JVD  Lungs:  clear to auscultation bilaterally  Heart:  irregularly irregular rhythm  Abdomen:  abdomen is soft without significant tenderness, masses, organomegaly or guarding  Extremities:  extremities normal, atraumatic, no cyanosis or edema    Data Review:     Labs: Results:       Chemistry Recent Labs      02/14/17   1508  02/14/17   0350  02/13/17   0420   GLU  109*  131*  340*   NA  141  139  136   K  3.6  4.0  4.8   CL  110*  105  102   CO2  23  24  21   BUN  49*  53*  41*   CREA  2.00*  2.29*  1.99*   CA  7.2*  7.3*  8.4*   MG  1.8  1.8   --    PHOS   --   3.3   --    AGAP  8  10  13   BUCR  25*  23*  21*      CBC w/Diff Recent Labs      02/14/17   1508  02/14/17   0350  02/13/17   0420  02/12/17   2119   WBC   --   12.7  14.2*  9.6   RBC   --   2.88*  2.95*  3.14*   HGB  7.3*  8.0*  8.0*  8.6*   HCT  22.5*  24.3*  25.7*  27.6*   PLT   --   236  362  331   GRANS   --    --   90*  89*   LYMPH   --    --   8*  6*   EOS   --    --   0  0      Cardiac Enzymes No results found for: CPK, CKMMB, CKMB, RCK3, CKMBT, CKNDX, CKND1, KEVIN, TROPT, TROIQ, GINETTE, TROPT, TNIPOC, BNP, BNPP   Coagulation Recent Labs      02/13/17   0530   PTP  15.1   INR  1.2       Lipid Panel Lab Results   Component Value Date/Time    Cholesterol, total 136 09/19/2010 04:50 AM    HDL Cholesterol 54 09/19/2010 04:50 AM    LDL, calculated 71.4 09/19/2010 04:50 AM    VLDL, calculated 10.6 09/19/2010 04:50 AM    Triglyceride 53 09/19/2010 04:50 AM    CHOL/HDL Ratio 2.5 09/19/2010 04:50 AM      BNP No results found for: BNP, BNPP, XBNPT   Liver Enzymes No results for input(s): TP, ALB, TBIL, AP, SGOT, GPT in the last 72 hours. No lab exists for component: DBIL   Digoxin    Thyroid Studies No results found for: T4, T3U, TSH, TSHEXT       Signed By: Derrick Mak.  Olamide Graves     February 15, 2017

## 2017-02-15 NOTE — ROUTINE PROCESS
Bedside and Verbal shift change report given to Renato Matias RN (oncoming nurse) by Geovanni Vela RN (offgoing nurse). Report included the following information SBAR, Recent Results and Med Rec Status. 0100 - shift assessment performed    0225 - Pt C/O nausea, zofran administered without incidents. 0328 - Pt  C/O pain in his legs. Pain med given without incidents. Bedside and Verbal shift change report given to Ting Anguiano RN (oncoming nurse) by Renato Matias RN (offgoing nurse). Report included the following information SBAR, Recent Results and Med Rec Status.

## 2017-02-15 NOTE — PROGRESS NOTES
Problem: Self Care Deficits Care Plan (Adult)  Goal: *Acute Goals and Plan of Care (Insert Text)  Occupational Therapy Goals  Initiated 2/15/2017 within 7 day(s). 1. Patient will perform grooming tasks while standing at sink with supervision/set-up. 2. Patient will perform lower body dressing with supervision/set-up. 3. Patient will perform bathing with supervision/set-up. 4. Patient will perform toilet transfers with modified independence. 5. Patient will perform all aspects of toileting with modified independence. 6. Patient will participate in upper extremity therapeutic exercise/activities with supervision/set-up for 8 minutes to increase/maintain strength/edurance of BUEs for ADLs. 7. Patient will utilize energy conservation techniques during functional activities with verbal cues. Outcome: Progressing Towards Goal  OCCUPATIONAL THERAPY EVALUATION     Patient: Jodene Siemens (66 y.o. male)  Date: 2/15/2017  Primary Diagnosis: SBO  Small Bowel Obstruction  Small bowel obstruction (HCC)  Procedure(s) (LRB):  EXPLORATORY LAPAROTOMY POSSIBLE SMALL BOWEL RESECTION (N/A) 2 Days Post-Op   Precautions:  Fall      ASSESSMENT :  Based on the objective data described below, the patient presents with impairments with regard to bed mobility in preparation for ADLs, BUE function/strength, activity tolerance and endurance, and overall independence in ADLs. Patient supine with daughter present upon arrival. Patient responded to most questions, however, daughter provided answers as well secondary to patient's decreased memory. Per patient/daughter report, patient was independent in ADLs PTA; completed bathing and LB dressing (velcro shoes) independently. Patient requires assistance with all IADLs from wife and daughter (who lives next door). Min A provided for bed mobility; patient engaged in therapeutic activity sitting at EOB which involved dynamic reaching towards feet in preparation for LB dressing.  Min/mod A x2 provided to stand from EOB with decreased activity tolerance; patient requested to sit down after approx 30 seconds of standing. Patient reported that he felt ill and was guided back to supine; Aleksander Bautista RN present. Patient/daughter educated on role of OT, POC, and helpful adaptive equipment to facilitate patient's independence in bed mobility and ADLs; patient/daughter verbalized understanding. Patient will benefit from skilled OT services during acute care stay to facilitate safety during functional mobility and maximize independence in ADLs. Aleksander Bautista RN and patient's daughter present at end of session. Patient will benefit from skilled intervention to address the above impairments.   Patients rehabilitation potential is considered to be Good  Factors which may influence rehabilitation potential include:   [ ]             None noted  [ ]             Mental ability/status  [X]             Medical condition  [ ]             Home/family situation and support systems  [ ]             Safety awareness  [ ]             Pain tolerance/management  [ ]             Other:      Recommendations for nursing: up at EOB for meals (when no longer NPO)  Written on communication board: No  Verbally communicated to: Aleksander Bautista RN          PLAN :  Recommendations and Planned Interventions:  [X]               Self Care Training                  [X]        Therapeutic Activities  [X]               Functional Mobility Training    [ ]        Cognitive Retraining  [X]               Therapeutic Exercises           [X]        Endurance Activities  [X]               Balance Training                   [ ]        Neuromuscular Re-Education  [ ]               Visual/Perceptual Training     [X]   Home Safety Training  [X]               Patient Education                 [X]        Family Training/Education  [ ]               Other (comment):     Frequency/Duration: Patient will be followed by occupational therapy 3-5 times a week to address goals.  Discharge Recommendations: Fracisco Lemus  Further Equipment Recommendations for Discharge: bedside commode       SUBJECTIVE:   Patient stated This is the most difficult part for me\" (referring to standing up from EOB)      OBJECTIVE DATA SUMMARY:       Past Medical History   Diagnosis Date    Hypertension      TIA (transient ischemic attack)     History reviewed. No pertinent past surgical history. Barriers to Learning/Limitations: None  Compensate with: visual, verbal, tactile, kinesthetic cues/model     GCODES:  Self Care  Current  CK= 40-59%   Goal  CI= 1-19%. The severity rating is based on the Other Functional Assessment, MMT, ROM     Eval Complexity: History: LOW Complexity : Brief history review ; Examination: LOW Complexity : 1-3 performance deficits relating to physical, cognitive , or psychosocial skils that result in activity limitations and / or participation restrictions ; Decision Making:MEDIUM Complexity : Patient may present with comorbidities that affect occupational performnce. Miniml to moderate modification of tasks or assistance (eg, physical or verbal ) with assesment(s) is necessary to enable patient to complete evaluation      Prior Level of Function/Home Situation: Pt was independent with basic self care tasks and utilized RW for functional mobility PTA. Per patient/daughter report, patient required assistance with IADLs PTA.      Home Situation  Home Environment: Private residence  # Steps to Enter: 6  Rails to Enter: Yes  Hand Rails : Bilateral  Wheelchair Ramp: Yes (with bilateral rails)  One/Two Story Residence: Two story, live on 1st floor  Living Alone: No  Support Systems: Child(carolina), Spouse/Significant Other/Partner  Patient Expects to be Discharged to[de-identified] Skilled nursing facility  Current DME Used/Available at Home: Grab bars, Cane, straight, Shower chair, Walker  Tub or Shower Type: Tub/Shower combination (has grab bars and shower seat)  [X]  Right hand dominant          [ ]  Left hand dominant  Cognitive/Behavioral Status:  Neurologic State: Alert  Orientation Level: Oriented X4  Cognition: Follows commands  Safety/Judgement: Fall prevention      Skin: Intact (BUEs)  Edema: None noted (BUEs)     Vision/Perceptual:    Acuity: Able to read clock/calendar on wall without difficulty    Corrective Lenses: Glasses      Coordination:  Coordination: Within functional limits (BUEs)  Fine Motor Skills-Upper: Right Intact; Left Intact    Gross Motor Skills-Upper: Right Intact; Left Intact      Balance:  Sitting: Impaired  Sitting - Static: Good (unsupported)  Sitting - Dynamic: Fair (occasional)  Standing: Impaired  Standing - Static: Fair  Standing - Dynamic : Poor (Fair-)     Strength:  Strength: Generally decreased, functional (BUEs: approx 3/5)     Tone & Sensation:  Tone: Normal (BUEs)  Sensation: Intact (BUEs)     Range of Motion:  AROM: Generally decreased, functional (BUEs: approx 3/4 range shoulder flexion)  PROM: Within functional limits (BUEs)     Functional Mobility and Transfers for ADLs:  Bed Mobility:  Rolling: Minimum assistance; Additional time  Supine to Sit: Minimum assistance; Additional time  Sit to Supine: Moderate assistance; Additional time;Assist x2  Scooting: Minimum assistance; Additional time      Transfers:  Sit to Stand: Minimum assistance; Moderate assistance;Assist x2              Toilet Transfer :  (not assessed secondary to fair/poor static/dynamic standing)                ADL Assessment:  Feeding: Setup;Supervision (based on functional observation and daughter's report)  Oral Facial Hygiene/Grooming: Minimum assistance  Bathing: Moderate assistance  Upper Body Dressing: Setup;Supervision  Lower Body Dressing: Moderate assistance  Toileting:  Moderate assistance     Cognitive Retraining  Safety/Judgement: Fall prevention     Therapeutic Activity:  During bed mobility in preparation for ADLs, patient educated on proper positioning of BUEs to facilitate independence in preparation for self care tasks. While sitting at EOB, patient engaged in dynamic reaching task by crossing midline with BUEs and reaching for opposite foot in preparation for LB dressing such as donning socks/shoes. While supine, patient participated in reaching activity with BUEs in preparation for donning overhead shirt and overhead grooming tasks such as washing hair/face. Pain:  Pre treatment pain level: 0/10  Post treatment pain level: 0/10  Pain Scale 1: Visual  Pain Intensity 1: 0     Activity Tolerance:   Please refer to the flowsheet for vital signs taken during this treatment. After treatment:   [ ] Patient left in no apparent distress sitting up in chair  [X] Patient left in no apparent distress in bed  [X] Call bell left within reach  [X] Nursing notified/present  [X] Caregiver present  [ ] Bed alarm activated      COMMUNICATION/EDUCATION: Patient/family educated on role of OT, POC, and home safety/adaptive equipment. Patient/family verbalized understanding.   [X] Home safety education was provided and the patient/caregiver indicated understanding. [X] Patient/family have participated as able in goal setting and plan of care. [X] Patient/family agree to work toward stated goals and plan of care. [ ] Patient understands intent and goals of therapy, but is neutral about his/her participation. [ ] Patient is unable to participate in goal setting and plan of care.      Thank you for this referral.     Royal Andrea MS OTR/L  Time Calculation: 34 mins

## 2017-02-15 NOTE — MED STUDENT NOTES
Progress Note    Patient: Rama Carlton MRN: [id number]  SSN: [id number]    YOB: 1927  Age: 80 y.o. Sex: male      Admit Date: 2/12/2017    LOS: 2 days     Subjective:   Pt appears more fatigued today than yesterday, he was sleeping but woke and was able to answer questions with prompting. States that he was \"very sick last night\" denies vomiting but had nausea. Pt denies any pain, has not passed any stool or flatus. Pt states that he is feeling better now, but is very tired. Pt complaining of left leg pain. Objective:     Visit Vitals    /54 (BP 1 Location: Left arm, BP Patient Position: At rest)    Pulse 66    Temp 97.9 °F (36.6 °C)    Resp 20    Ht 6' (1.829 m)    Wt 64.1 kg (141 lb 5 oz)    SpO2 96%    BMI 19.17 kg/m2     Intake and Output:  Current Shift:    Last three shifts: 02/13 1901 - 02/15 0700  In: 2060.8 [P.O.:300; I.V.:1412.5]  Out: 2720 [Urine:2720]    Physical Exam:   GENERAL: fatigued, cooperative, no distress, appears stated age  THROAT & NECK: normal and mild erythema  ENT exam normal, no neck nodes or sinus tenderness  Heart: Tachycardia, regular rate, no murmurs or rubs  Lungs: CTA  Abdomen: Minimal bowel sounds, non tender, soft, wound site looks clean  Extremities: no pain to palpation, neither calf were swollen, good pulses    Lab/Data Review:  Hgb: 7.3, Cr 2.00 (yesterday 2.29) GFR is stable 32. Urine culture is pending    Assessment:   Acute: s/p SBO obstruction with resection of 191 cm necrotic bowel with end to end anastamosis: incision site is clean, pt tolerating small sips of water today, but had nausea last night. No food yet, no flatus or BM to date. Anemia: received pRBC's to bring hgb to 8 yesterday. Is hovering at 7.3 today. Pt appears fatigued. I would be okay with another unit of pRBC's to stabilize and optimize oxygen delivery    Chronic:   Paroxysmal AFib: per cardio Cardiazem gtt as BP tolerates, BB as tolerates.  Of note pt's HR was around 140-150   HTN  TIA,   SIDNEY  CKD III  DVT prophylaxis: pt did not have SCD attached when I rounded. Mentioned this to his nurse. Atelectasis prophylaxis: encouraged pt to continue Incentive spirometry       Plan:       Signed By: Tamika Memos     February 15, 2017          *ATTENTION:  This note has been created by a medical student for educational purposes only. Please do not refer to the content of this note for clinical decision-making, billing, or other purposes. Please see attending physicians note to obtain clinical information on this patient. *

## 2017-02-15 NOTE — PROGRESS NOTES
Daily Progress Note: 2/15/2017 10:10 AM   Admit Date: 2/12/2017    Patient seen in follow up for multiple medical problems as listed below:  Patient Active Problem List   Diagnosis Code    Small bowel obstruction ([facility] [address] K56.69    Atrial fibrillation with rapid ventricular response ([facility] [address] I48.91    Postoperative anemia D64.9       Assesment   80 y.o. male with a PMHx of HTN, TIA, SIDNEY, SBO and abdominal hernia who presented to the ED with the acute onset of left upper quadrant abdominal pain. CT scan of the abd/pelvis in the ED that was positive for a SBO.  s/p Exlap and small bowel resection (191cm) 2/13 am Dr David Gongora. Course complicated by Afib+RVR 1/70 am requiring rate control and cardiology involvement. Patient only responded temporarily to diltiazem boluses and diltiazem drip to 5mg. The evening of 2/14 the patients rate remained 130-140 and he was becoming increasingly hypotensive, cardizem gtt was stopped and patient was given 250mcg of IV Digoxin with HR resolution into 60's    Afib+RVR: Hx of pAfib follows with Dr Isiah Taylor not on rate control. Failed cardizem IV, now on Dig. TTE-P  SBO s/p Exlap and small bowel resection 2/13 am Dr David Gongora  HTN - has been low normal BP  TIA -restart eliquis when tolerating PO  Post-op on Iron Deficiency Anemia -restart iron when toelrating PO. Hg stable @ 8. transfuse <7  CKD Stage IIIb -@ baseline  Hx of L Inguinal Hernia  Delerium - transient, expected      DVT Protocol Active: yes  Code Status:  Full Code     Disposition: Home 1-2 days  Vs SNF    Subjective:     CC: Abdominal Pain (left lower abdomen) and Leg Pain (left leg)    Interval History: some confusion/derlirium. Responding quite well to Digoxin. Hg to 7.3 will transfuse  Good bowel sounds no BM/Flatus reported needs to get out of bed.  PT/OT    Objective:     Visit Vitals    /54 (BP 1 Location: Left arm, BP Patient Position: At rest)    Pulse 66    Temp 97.9 °F (36.6 °C)    Resp 20    Ht 6' (1.829 m)    Wt 64.1 kg (141 lb 5 oz)    SpO2 96%    BMI 19.17 kg/m2       Temp (24hrs), Av.8 °F (36.6 °C), Min:97.4 °F (36.3 °C), Max:98.4 °F (36.9 °C)        Intake/Output Summary (Last 24 hours) at 02/15/17 0847  Last data filed at 02/15/17 0550   Gross per 24 hour   Intake           1652.5 ml   Output              920 ml   Net            732.5 ml       Gen: Alert some confusion, NAD  HEENT:  PATRICIA, EOMI. Neck: No Bruits/JVD   Lungs:   CTAB. Good respiratory effort  Heart:   RR s1 s2 distant sounds  Abdomen: ND,tender, bandage midline, BSX normo/hyper,   Extremities:   No LE edema. No cyanosis. Skin:  no jaundice/lesions      Data Review:     Meds/Labs/Tests reviewed    Current Shift:     Last three shifts:   1901 - 02/15 0700  In: 2060.8 [P.O.:300;  I.V.:1412.5]  Out: 2720 [Urine:2720]  Recent Labs      17   1508  17   0350  17   0420  17   2119   WBC   --   12.7  14.2*  9.6   RBC   --   2.88*  2.95*  3.14*   HGB  7.3*  8.0*  8.0*  8.6*   HCT  22.5*  24.3*  25.7*  27.6*   PLT   --   236  362  331   GRANS   --    --   90*  89*   LYMPH   --    --   8*  6*   EOS   --    --   0  0       Recent Labs      17   1508  17   0350  17   0420   BUN  49*  53*  41*   CREA  2.00*  2.29*  1.99*   CA  7.2*  7.3*  8.4*   K  3.6  4.0  4.8   NA  141  139  136   CL  110*  105  102   CO2  23  24  21   PHOS   --   3.3   --    GLU  109*  131*  340*        Lab Results   Component Value Date/Time    Glucose 109 2017 03:08 PM    Glucose 131 2017 03:50 AM    Glucose 340 2017 04:20 AM    Glucose 143 2017 06:05 AM    Glucose 157 2016 12:40 PM          Care coordination with Nursing/Consultants/staff: 5  Prior history, labs, and charting reviewed: 15    Procedures/Imaging:  s/p Exlap and small bowel resection  am Dr Ace Del Real  TTE-p    Total time spent with chart review, patient examination/education, discussion with staff on case,documentation and medication management / adjustment  :  30 Minutes      Dr Daryl Butts  Pager: [phone]

## 2017-02-15 NOTE — PROGRESS NOTES
Attempted to  Complete evaluation  Patient heart rate 135-150 with IV  Medication will hold for today and attempt tomorrow.

## 2017-02-15 NOTE — PROGRESS NOTES
0940 - Pt alert x 3. States he woke up feeling \"better. \" Denies abdominal pain or nausea. ~ 1000 - AM dose of IV digoxin held d/t no recorded Dig level. Dig level ordered. Will hold till result. 1100 - 1st of 2 units of PRBCs started. 1202 - Pt converted to NSR, per tele. 1329 - 1st of 2 units PRBCs completed (not able to document completion in Flowsheets). ~ 1430 - Phlebotomy drawing dig level. 1530 - Bedside and Verbal shift change report given to Sunita Pennington RN (oncoming nurse) by Luis Fernando Lynne RN (offgoing nurse). Report included the following information SBAR, Kardex, Intake/Output, MAR and Recent Results. Pt handed off in stable condition.

## 2017-02-15 NOTE — PROGRESS NOTES
200 pm-  Spoke to patient on rounds, he agreed that he will need rehab at discharge. He agreed to matching to [facility] area [employer] and will select a facility based on those who have accepted his insurance. Matched in e discharge. Patient has a therapy order but has yet to be seen by therapy. Therapy reminded during rounds that the patient needs to be evaluated. Eve Puckett RN    Patient transferred to  from ICU, discharge plan is to return home. Will place in e discharge band hold matching in the event he require SNF.   Eve Puckett RN

## 2017-02-15 NOTE — PROGRESS NOTES
Inpatient Daily Progress Note    Subjective:   Pain is well controlled. He has not had nausea, has not vomitted, has not passed flatus, and has not had a bowel movement. Objective:     Physical Exam:  Visit Vitals    /68    Pulse 64    Temp 97.6 °F (36.4 °C)    Resp 20    Ht 6' (1.829 m)    Wt 64.2 kg (141 lb 9.6 oz)    SpO2 97%    BMI 19.2 kg/m2       Gen: Well developed, well nourished 80 y.o. male in no acute distress  Resp: clear to auscultation bilaterally, no wheezes   Cardio: Regular rate and rhythm, no murmurs, clicks, gallops or rubs, no edema  Abdomen: soft, non-tender, nondistended, with active bowel sounds, no hernias  Psych: alert and oriented to person, place and time  Wound: clean and intact      Date 02/14/17 0700 - 02/15/17 0659 02/15/17 0700 - 02/16/17 0659   Shift 8189-6920 7455-0663 24 Hour Total 4990-7450 3198-1160 24 Hour Total   I  N  T  A  K  E   P.O. 300  300         P. O. 300  300       I.V.  (mL/kg/hr) 1112.5  (1.4) 873.8  (1.1) 1986.3  (1.3)         I.V. 912.5 300 1212.5         Volume (0.9% sodium chloride infusion)  523.8 523.8         Volume (ceFAZolin (ANCEF) 2g IVPB in 50 mL D5W) 200 50 250       Shift Total  (mL/kg) 1412.5  (22) 873.8  (13.6) 2286.3  (35.6)      O  U  T  P  U  T   Urine  (mL/kg/hr) 600  (0.8) 320  (0.4) 920  (0.6)         Urine Output (mL) (Urinary Catheter 02/14/17) 600 320 920       Shift Total  (mL/kg) 600  (9.4) 320  (5) 920  (14.3)      .5 553.8 1366.3      Weight (kg) 64.1 64.2 64.2 64.2 64.2 64.2     No results found for this or any previous visit (from the past 12 hour(s)).     Assessment:     Mary Hill is a 80 y.o. male POD 2 from small bowel resection      Plan:     -Continue current medications  -Clear liquid diet

## 2017-02-16 NOTE — PROGRESS NOTES
[facility] - Progress Note  Admit Date: 2/12/2017  Patient seen and examined independently. Agree with assessment and plan as noted below. Will sign off and be available. Felipe Phoenix MD  Assessment:     - Small bowel obstruction- closed loop obstruction from internal hernia with necrotic bowel. S/p resection of 191 cm small bowel 02/13/17  - Paroxysmal atrial fibrillation, was on Eliquis prior to admission. [id number] score: 5 (+age, HTN, TIA history). Stroke risk 7.2% per year untreated. - Hypertension  - History of TIA  - CKD    Plan:     - BP improved over the last 24 hours, SBP around 130's-140's. Will start very low dose beta blocker and discontinue Digoxin. He has remained in sinus, but with paroxysmal afib, would need something for rate control. Would not resume Amlodipine as he is also on HCTZ and Lisinopril. Will need close follow up with his outpatient cardiologist  ([facility]) with new regimen. - Eliquis restarted  - Will sign off and be available as needed. Please call with any questions. Subjective:     No new complaints.      Objective:      Patient Vitals for the past 8 hrs:   Temp Pulse Resp BP SpO2   02/16/17 0624 98.2 °F (36.8 °C) 64 18 146/84 99 %   02/16/17 0153 97.8 °F (36.6 °C) 74 18 147/75 100 %         Patient Vitals for the past 96 hrs:   Weight   02/16/17 0530 153 lb 3.2 oz (69.5 kg)   02/15/17 0600 141 lb 9.6 oz (64.2 kg)   02/13/17 2359 141 lb 5 oz (64.1 kg)                    Intake/Output Summary (Last 24 hours) at 02/16/17 9813  Last data filed at 02/16/17 0700   Gross per 24 hour   Intake          2608.75 ml   Output             2050 ml   Net           558.75 ml       Physical Exam:  General:  alert, cooperative, no distress  Neck:  nontender, no JVD  Lungs:  clear to auscultation bilaterally  Heart:  regular rate and rhythm, S1, S2 normal, no murmur, click, rub or gallop  Abdomen:  abdomen is soft with appropriate post op tenderness  Extremities:  extremities normal, atraumatic, no cyanosis or edema    Data Review:     Labs: Results:       Chemistry Recent Labs      02/14/17   1508  02/14/17   0350   GLU  109*  131*   NA  141  139   K  3.6  4.0   CL  110*  105   CO2  23  24   BUN  49*  53*   CREA  2.00*  2.29*   CA  7.2*  7.3*   MG  1.8  1.8   PHOS   --   3.3   AGAP  8  10   BUCR  25*  23*      CBC w/Diff Recent Labs      02/16/17   0450  02/14/17   1508  02/14/17   0350   WBC   --    --   12.7   RBC   --    --   2.88*   HGB  12.0*  7.3*  8.0*   HCT  36.1  22.5*  24.3*   PLT   --    --   236      Cardiac Enzymes No results found for: CPK, CKMMB, CKMB, RCK3, CKMBT, CKNDX, CKND1, KEVIN, TROPT, TROIQ, GINETTE, TROPT, TNIPOC, BNP, BNPP   Coagulation No results for input(s): PTP, INR, APTT in the last 72 hours. No lab exists for component: INREXT    Lipid Panel Lab Results   Component Value Date/Time    Cholesterol, total 136 09/19/2010 04:50 AM    HDL Cholesterol 54 09/19/2010 04:50 AM    LDL, calculated 71.4 09/19/2010 04:50 AM    VLDL, calculated 10.6 09/19/2010 04:50 AM    Triglyceride 53 09/19/2010 04:50 AM    CHOL/HDL Ratio 2.5 09/19/2010 04:50 AM      BNP No results found for: BNP, BNPP, XBNPT   Liver Enzymes No results for input(s): TP, ALB, TBIL, AP, SGOT, GPT in the last 72 hours. No lab exists for component: DBIL   Digoxin    Thyroid Studies No results found for: T4, T3U, TSH, TSHEXT       Signed By: Funmi Rai     February 16, 2017

## 2017-02-16 NOTE — PROGRESS NOTES
OT attempted 2x, pt off the floor for MBS and now refusing c/o fatigue. Will f/u 2/17/17.     LEONEL Angela/KWAME

## 2017-02-16 NOTE — PROGRESS NOTES
Problem: Mobility Impaired (Adult and Pediatric)  Goal: *Acute Goals and Plan of Care (Insert Text)  Physical Therapy Goals  Initiated 2/15/2017 and to be accomplished within 7 day(s)  1. Patient will move from supine to sit and sit to supine , scoot up and down and roll side to side in bed with modified independence. 2. Patient will transfer from bed to chair and chair to bed with supervision/set-up using the least restrictive device. 3. Patient will perform sit to stand with supervision/set-up. 4. Patient will ambulate with minimal assistance/contact guard assist for 75 feet with the least restrictive device. Outcome: Progressing Towards Goal  PHYSICAL THERAPY EVALUATION     Patient: Crystal Jovel (62 y.o. male)  Date: 2/15/2017  Primary Diagnosis: SBO  Small Bowel Obstruction  Small bowel obstruction (HCC)  Procedure(s) (LRB):  EXPLORATORY LAPAROTOMY POSSIBLE SMALL BOWEL RESECTION (N/A) 2 Days Post-Op   Precautions:   Fall      PROBLEM LIST:  Patient presents with the following problems:   Bed Mobility, Transfers, Gait, Strength, Balance and Precautions  ASSESSMENT :   Patient requires between moderate assistance  and minimal assistance/contact guard assist  Of two  for bed mobility, transfers and ambulation. Patient seen between getting units of blood with OT for maximum safety . Patient worked on [place] [id number] sitting  Balance endurance and posture to work toward improving functional mobility and transfers for preparation of gait . Education  On plan of care and mobility in bed with log rolling needs reinforcement  Patient left in bed with call light in reach. .    Patient will benefit from skilled intervention to address the above impairments.   Patients rehabilitation potential is considered to be Fair  Factors which may influence rehabilitation potential include:   [ ]         None noted  [ ]         Mental ability/status  [X]         Medical condition  [ ]         Home/family situation and support systems  [ ]         Safety awareness  [ ]         Pain tolerance/management  [ ]         Other:        PLAN :  Recommendations and Planned Interventions:  [X]           Bed Mobility Training             [X]    Neuromuscular Re-Education  [X]           Transfer Training                   [ ]    Orthotic/Prosthetic Training  [X]           Gait Training                          [ ]    Modalities  [X]           Therapeutic Exercises          [ ]    Edema Management/Control  [X]           Therapeutic Activities            [X]    Patient and Family Training/Education  [ ]           Other (comment):     Frequency/Duration: Patient will be followed by physical therapy 3-5 times a week to address goals. Discharge Recommendations: Rehab and [facility]  Further Equipment Recommendations for Discharge: tbd prior to going home has rolling walker        SUBJECTIVE:   Patient stated .      OBJECTIVE DATA SUMMARY:       Past Medical History   Diagnosis Date    Hypertension      TIA (transient ischemic attack)     History reviewed. No pertinent past surgical history. Barriers to Learning/Limitations: yes;  sensory deficits-vision/hearing/speech and physical  Compensate with: visual, verbal, tactile, kinesthetic cues/model  G CODES:Mobility  Current  CK= 40-59%   Goal  CI= 1-19%. The severity rating is based on the Other [facility] Sitting Balance Scale2+/5   Robert Controls Balance Scale2+/5  0: Pt performs 25% or less of sitting activity (Max assist) CN, 100% impaired. 1: Pt supports self with upper extremities but requires therapist assistance. Pt performs 25-50% of effort (Mod assist) CM, 80% to <100% impaired. 1+: Pt supports self with upper extremities but requires therapist assistance. Pt performs >50% effort. (Min assist). CL, 60% to <80% impaired. 2: Pt supports self independently with both upper extremities. CL, 60% to <80% impaired.   2+: Pt support self independently with 1 upper extremity. CK, 40% to <60% impaired. 3: Pt sits without upper extremity support for up to 30 seconds. CK, 40% to <60% impaired. 3+: Pt sits without upper extremity support for 30 seconds or greater. CJ, 20% to <40% impaired. 4: Pt moves and returns trunkal midpoint 1-2 inches in one plane. CJ, 20% to <40% impaired. 4+: Pt moves and returns trunkal midpoint 1-2 inches in multiple planes. CI, 1% to <20% impaired. 5: Pt moves and returns trunkal midpoint in all planes greater than 2 inches. CH, 0% impaired. Eval Complexity: History: HIGH Complexity :3+ comorbidities / personal factors will impact the outcome/ POC Exam:MEDIUM Complexity : 3 Standardized tests and measures addressing body structure, function, activity limitation and / or participation in recreation  Presentation: MEDIUM Complexity : Evolving with changing characteristics  Clinical Decision Making:Medium Complexity [facility] Sitting Balance Scale2+/5 Overall Complexity:MEDIUM     Prior Level of Function/Home Situation: I with adl's and ambulation  With rolling walker or straight cane   Home Situation  Home Environment: Private residence  # Steps to Enter: 6  Rails to Enter: Yes  Hand Rails : Bilateral  Wheelchair Ramp: Yes (with bilateral rails)  One/Two Story Residence: Two story, live on 1st floor  Living Alone: No  Support Systems: Child(carolnia), Spouse/Significant Other/Partner  Patient Expects to be Discharged to[de-identified] Skilled nursing facility  Current DME Used/Available at Home: Grab bars, Girtha Valarie, straight, Shower chair, Walker  Tub or Shower Type: Tub/Shower combination (has grab bars and shower seat)  Critical Behavior:  Neurologic State: Alert  Orientation Level: Oriented X4  Cognition: Follows commands  Safety/Judgement: Fall prevention  Psychosocial  Patient Behaviors: Calm; Cooperative  Family  Behaviors: Calm; Cooperative;Supportive  Purposeful Interaction: Yes  Pt Identified Daily Priority: Clinical issues (comment)  Alyshas Process: Nurture loving kindness;Enable cecilia/hope;Establish trust;Teaching/learning; Attend basic human needs;Create healing environment  Caring Interventions: Reassure  Reassure: Therapeutic listening; Informing; Acceptance; Instilling cecilia and hope;Caring rounds  Family  Behaviors: Calm; Cooperative;Supportive  Skin Integrity: Intact  Skin Integumentary  Skin Color: Appropriate for ethnicity  Skin Integrity: Intact  Strength:    Strength: Generally decreased, functional (3+/5 left <right )  Tone & Sensation:   Tone: Normal  Sensation: Intact  Range Of Motion:  AROM: Generally decreased, functional (ble's )  PROM: Within functional limits  Functional Mobility:  Bed Mobility:  Rolling: Minimum assistance; Additional time  Supine to Sit: Minimum assistance; Additional time  Sit to Supine: Moderate assistance; Additional time;Assist x2  Scooting: Minimum assistance; Additional time  Transfers:  Sit to Stand: Minimum assistance; Moderate assistance;Assist x2  Stand to Sit: Minimum assistance;Assist x2  Balance:   Sitting: Impaired  Sitting - Static: Good (unsupported)  Sitting - Dynamic: Fair (occasional)  Standing: Impaired  Standing - Static: Fair  Standing - Dynamic : Poor (Fair-)  Ambulation/Gait Training:  Distance (ft): 3 Feet (ft)  Assistive Device: Walker, rolling  Ambulation - Level of Assistance: Minimal assistance;Assist x2; Additional time; Moderate assistance  Gait Description (WDL): Exceptions to WDL  Gait Abnormalities: Decreased step clearance (side steps )  Base of Support: Center of gravity altered  Speed/Veronica: Delayed;Slow;Pace decreased (<100 feet/min)  Step Length: Left shortened;Right shortened  Interventions: Safety awareness training;Verbal cues; Visual/Demos  Therapeutic Exercises:    Ankle pumps laq's hip flex   Pain:  Pre treatment pain level:0  Post treatment pain level:0  Pain Scale 1: Visual  Activity Tolerance:   Fair reports feeling sick with sitting up at edge of bed   Please refer to the flowsheet for vital signs taken during this treatment. After treatment:   [ ]         Patient left in no apparent distress sitting up in chair  [X]         Patient left in no apparent distress in bed  [X]         Call bell left within reach  [X]         Nursing notified  [ ]         Caregiver present  [ ]         Bed alarm activated      COMMUNICATION/EDUCATION:   [X]         Fall prevention education was provided and the patient/caregiver indicated understanding. [X]         Patient/family have participated as able in goal setting and plan of care. [X]         Patient/family agree to work toward stated goals and plan of care. [ ]         Patient understands intent and goals of therapy, but is neutral about his/her participation. [ ]         Patient is unable to participate in goal setting and plan of care. Patient educated on the role of physical therapy during the acute stay  and the importance of mobility. needs reinforcement.        Thank you for this referral.  Lisa Miller, PT   Time Calculation: 39 mins

## 2017-02-16 NOTE — PROGRESS NOTES
Surgery    Pt awake and alert. No complaints except hungry nd wants phillips out. Passing flatus. Visit Vitals    /84 (BP 1 Location: Left arm, BP Patient Position: At rest)    Pulse 64    Temp 98.2 °F (36.8 °C)    Resp 18    Ht 6' (1.829 m)    Wt 69.5 kg (153 lb 3.2 oz)    SpO2 99%    BMI 20.78 kg/m2       Date 02/15/17 0700 - 02/16/17 0659 02/16/17 0700 - 02/17/17 0659   Shift [id number] 1900-0659 24 Hour Total 5038-3762 7566-4865 24 Hour Total   I  N  T  A  K  E   P.O.  150 150         P. O.  150 150       I.V.  (mL/kg/hr) 100  (0.1) 1663.8  (2) 1763.8  (1.1)         Volume (0.9% sodium chloride infusion)  1613.8 1613.8         Volume (ceFAZolin (ANCEF) 2g IVPB in 50 mL D5W) 100 50 150       Blood 545  545         Volume (TRANSFUSE PACKED RBC'S) 545  545       Other  0 0         Irrigation Volume Input (mL) (Urinary Catheter 02/14/17)  0 0       Shift Total  (mL/kg) 645  (10) 1813.8  (26.1) 2458.8  (35.4)      O  U  T  P  U  T   Urine  (mL/kg/hr) 350  (0.5) 1700  (2) 2050  (1.2)         Urine Voided  500 500         Urine Output (mL) (Urinary Catheter 02/14/17) 350 1200 1550       Shift Total  (mL/kg) 350  (5.4) 1700  (24.5) 2050  (29.5)       113.8 408.8      Weight (kg) 64.2 69.5 69.5 69.5 69.5 69.5     Wound - clean and intact    Abd: soft, ND, NT    Pulm: CTA    Recent Results (from the past 12 hour(s))   HGB & HCT    Collection Time: 02/16/17  4:50 AM   Result Value Ref Range    HGB 12.0 (L) 13.0 - 16.0 g/dL    HCT 36.1 36.0 - 48.0 %     Imp: POD 4 SB resect    Looks good overall    Full liquids, remove phillips    Advance diet as subha    Ready for discharge soon

## 2017-02-16 NOTE — PROGRESS NOTES
Problem: Dysphagia (Adult)  Goal: *Acute Goals and Plan of Care (Insert Text)  Dysphagia Present: moderate/severe   Aspiration: at risk     Recommendations:  Diet: NPO pending MBS  Meds: via IV  Aspiration Precautions  Other: MBS    Goals: Patient will:  1. Tolerate PO trials with 0 s/s overt distress in 4/5 trials  2. Utilize compensatory swallow strategies/maneuvers (decrease bite/sip, size/rate, alt. liq/sol) with min cues in 4/5 trials  3. Perform oral-motor/laryngeal exercises to increase oropharyngeal swallow function with min cues  4. Complete an objective swallow study (i.e., MBSS) to assess swallow integrity, r/o aspiration, and determine of safest LRD, min A  Outcome: Progressing Towards Goal  SPEECH LANGUAGE PATHOLOGY BEDSIDE SWALLOW EVALUATION     Patient: Fernando Frances (54 y.o. male)  Date: 2/16/2017  Primary Diagnosis: SBO  Small Bowel Obstruction  Small bowel obstruction (HCC)  Procedure(s) (LRB):  EXPLORATORY LAPAROTOMY POSSIBLE SMALL BOWEL RESECTION (N/A) 3 Days Post-Op   Precautions: aspiration risk  Fall      ASSESSMENT :  Based on the objective data described below, the patient presents with mod-severe oropharyngeal dysphagia. Note s/sx of penetration/aspiration across thin, nectar, and honey thick liquids. S/sx including throat clear, multiple swallows, and weak cough increasing in frequency as trials progressed, concerning for pharyngeal residual. Recommend MBS later this date to further assess pharyngeal function. Education provided to pt and daughter re: aspiration risk, dysphagia, and MBS plans; understanding voiced. Results/recommendations to follow pending MBS. Patient will benefit from skilled intervention to address the above impairments.   Patients rehabilitation potential is considered to be Good  Factors which may influence rehabilitation potential include:   [ ]            None noted  [ ]            Mental ability/status  [X]            Medical condition  [ ] Home/family situation and support systems  [ ]            Safety awareness  [ ]            Pain tolerance/management  [ ]            Other:        PLAN : NPO pending MBS  Recommendations and Planned Interventions:  MBS with results/recommendations and POC to follow  Frequency/Duration: Patient will be followed by speech-language pathology 1-2 times per week to address goals. Discharge Recommendations: To Be Determined pending MBS       SUBJECTIVE:   Patient stated Thank you. OBJECTIVE:       Past Medical History   Diagnosis Date    Hypertension      TIA (transient ischemic attack)     History reviewed. No pertinent past surgical history. Prior Level of Function/Home Situation: resides at home with wife, next to door daughter  Home Situation  Home Environment: Private residence  # Steps to Enter: 6  Rails to Enter: Yes  Hand Rails : Bilateral  Wheelchair Ramp: Yes (with bilateral rails)  One/Two Story Residence: Two story, live on 1st floor  Living Alone: No  Support Systems: Child(carolina), Spouse/Significant Other/Partner  Patient Expects to be Discharged to[de-identified] Skilled nursing facility  Current DME Used/Available at Home: Grab bars, Cane, straight, Shower chair, Walker  Tub or Shower Type: Tub/Shower combination (has grab bars and shower seat)  Diet prior to admission: regular  Current Diet:  NPO pending MBS   Cognitive and Communication Status:  Neurologic State: Alert  Orientation Level: Oriented X4  Cognition: Follows commands  Perception: Appears intact  Perseveration: No perseveration noted  Safety/Judgement: Fall prevention  Oral Assessment:  Oral Assessment  Labial: No impairment  Dentition: Full  Oral Hygiene: good  Lingual: No impairment  Velum: No impairment  Mandible: No impairment  P.O. Trials:  Patient Position: HOB45  Vocal quality prior to P.O.: No impairment  Consistency Presented: Honey thick liquid; Nectar thick liquid; Thin liquid  How Presented: SLP-fed/presented;Self-fed/presented;Straw;Cup/sip     Bolus Acceptance: No impairment  Bolus Formation/Control: No impairment     Propulsion: No impairment  Oral Residue: None  Initiation of Swallow: No impairment  Laryngeal Elevation: Functional  Aspiration Signs/Symptoms: Delayed cough/throat clear;Weak cough  Pharyngeal Phase Characteristics: Multiple swallows  Effective Modifications: Small sips and bites  Cues for Modifications: Minimal           Pharyngeal Phase Severity : Moderate-severe     GCODESwallowing:  Swallow Current Status CL= 60-79%   Swallow Goal Status CI= 1-19%     The severity rating is based on the following outcomes:  CARR[place] Noms Swallow Level 3              Clinical Judgement     PAIN:  Start of Eval: 0  End of Eval: 0      After treatment:   [ ]            Patient left in no apparent distress sitting up in chair  [X]            Patient left in no apparent distress in bed  [X]            Call bell left within reach  [ ]            Nursing notified  [X]            Family present  [ ]            Caregiver present  [ ]            Bed alarm activated      COMMUNICATION/EDUCATION:   [X]            Aspiration precautions; swallow safety; compensatory techniques. [X]            Patient/family have participated as able in goal setting and plan of care. [X]            Patient/family agree to work toward stated goals and plan of care. [ ]            Patient understands intent and goals of therapy; neutral about participation. [ ]            Patient unable to participate in goal setting/plan of care; educ ongoing with interdisciplinary staff  [ ]             Posted safety precautions in patient's room.      Thank you for this referral.  Felipe Gutierrez  Time Calculation: 23 mins

## 2017-02-16 NOTE — PROGRESS NOTES
2000 Assumed care of the pt, assessments completed and charted. Resting quietly in the bed, VSS, complains of some minor back pain that resolves with repositioning. No other distress noted or reported. 2200 Pt complains of an extremely sore throat, Dr Clarice Carlson notified, orders received and written. 0400 Pt bathed completely and states that he feels much better. Pt reports passing flatus and he coughs when drinking thin liquids. Will consult for speech therapy to do a complete swallow evaluation. 0700 Pt states he feels better today. No further issues to report. Bedside and Verbal shift change report given to Caro Hinton (oncoming nurse) by Hussain Purvis RN (offgoing nurse). Report included the following information SBAR, Kardex, Intake/Output, MAR, Recent Results and Cardiac Rhythm A-Fib c BBB.

## 2017-02-16 NOTE — PROGRESS NOTES
Problem: Dysphagia (Adult)  Goal: *Acute Goals and Plan of Care (Insert Text)  Dysphagia Present: severe  Aspiration: at risk     Recommendations:  Diet: NPO with alternative nutrition/hydration source  Meds: via IV or TF  Aspiration Precautions  Other: small 1/2 tsp sips water after oral care for comfort    Goals: Patient will:  1. Tolerate PO trials with 0 s/s overt distress in 4/5 trials  2. Utilize compensatory swallow strategies/maneuvers (decrease bite/sip, size/rate, alt. liq/sol) with min cues in 4/5 trials  3. Perform oral-motor/laryngeal exercises to increase oropharyngeal swallow function with min cues  4. Complete an objective swallow study (i.e., MBSS) to assess swallow integrity, r/o aspiration, and determine of safest LRD, min A - met 2/16/17     Outcome: Progressing Towards Goal  SPEECH PATHOLOGY MODIFIED BARIUM SWALLOW STUDY     Patient: Paola Sosa (89 y.o. male)  Date: 2/16/2017  Primary Diagnosis: SBO  Small Bowel Obstruction  Small bowel obstruction (HCC)  Procedure(s) (LRB):  EXPLORATORY LAPAROTOMY POSSIBLE SMALL BOWEL RESECTION (N/A) 3 Days Post-Op   Precautions: aspiration  Fall      ASSESSMENT :  MBS completed with overt repeated silent aspiration with thin and nectar-thick consistencies. Evaluation terminated 2/2 severity/quantiy of aspiration. Premature spillage across consistencies to valleculae; reduced hyolaryngeal excursion and essentially absent epiglottic inversion. Severe pharyngeal residual reduced but did not eliminate with subsequent spontaneous swallows and resulted in repeated aspiration events. Pt presents with severe oropharyngeal dysphagia, as evidenced above, which places pt at risk for aspiration. At this time, recommend NPO with temporary alternative nutrition/hydration source. SLP utilized video of study for visual feedback, education, and recommendations for pt/caregiver; verbalized comprehension.  Discussed consideration of comfort feeding however daughter reporting desire to pursue current medical management and establish alternative nutrition source. Further educated re: aspiration risk and prevention strategies. ST to continue to follow. Patient will benefit from skilled intervention to address the above impairments. Patients rehabilitation potential is considered to be Good  Factors which may influence rehabilitation potential include:   [ ]              None noted  [X]              Mental ability/status  [X]              Medical condition  [X]              Home/family situation and support systems  [ ]              Safety awareness  [ ]              Pain tolerance/management  [ ]              Other:        PLAN : NPO, aspiration precautions, consider temporary alternative nutrition/hydration source  Recommendations and Planned Interventions:  ST to f/u for education, laryngeal/pharygneal ex  Frequency/Duration: Patient will be followed by speech-language pathology 1-2 times per day/4-7 days per week to address goals. Discharge Recommendations: Home Health, Outpatient and [address]:   Patient stated I was coughing a few days ago but now I don't cough (re: silent aspiration). OBJECTIVE:       Past Medical History   Diagnosis Date    Hypertension      TIA (transient ischemic attack)     History reviewed. No pertinent past surgical history.   Prior Level of Function/Home Situation: per chart/pt  Home Situation  Home Environment: Private residence  # Steps to Enter: 6  Rails to Enter: Yes  Hand Rails : Bilateral  Wheelchair Ramp: Yes (with bilateral rails)  One/Two Story Residence: Two story, live on 1st floor  Living Alone: No  Support Systems: Child(carolina), Spouse/Significant Other/Partner  Patient Expects to be Discharged to[de-identified] Skilled nursing facility  Current DME Used/Available at Home: Grab bars, Cane, straight, Shower chair, Walker  Tub or Shower Type: Tub/Shower combination (has grab bars and shower seat)  Diet prior to admission: regular/thin  Current Diet:  NPO   Radiologist: HRRA  Film Views: Lateral  Patient Position: 90 in fluroro chair      Trial 1: Trial 2:   Consistency Presented: Thin liquid; Nectar thick liquid     How Presented: Self-fed/presented;SLP-fed/presented;Cup/sip;Spoon;Straw     Consistency Amount:  (x2 each)     Bolus Acceptance: No impairment     Bolus Formation/Control: Impaired: Premature spillage  :     Propulsion: No impairment     Oral Residue: None     Initiation of Swallow: Triggered at vallecula     Timing: Vallecular     Penetration: During swallow; To cords     Aspiration/Timing: Silent ;Repeated; After;From initial swallow;From residual     Pharyngeal Clearance: Vallecular residue;Pyriform residue ;Greater than 50%     Attempted Modifications: Cup/sip; Chin tuck; Spoon;Small sips and bites     Effective Modifications: None     Cues for Modifications: Minimal               Trial 3: Trial 4:                            :    :                                                                          Decreased Tongue Base Retraction?: Yes  Laryngeal Elevation: Inadequate epiglottic inversion; Reduced excursion with laryngeal vestibule gap; No closure  Aspiration/Penetration Score: 8 (Aspiration-Contrast passes cords/glottis with no effort to eject, ie/silent aspiration)     Pharyngeal-Esophageal Segment: Decreased relaxation of upper esophageal segment  Pharyngeal Dysfunction: Crico-pharyngeal dysfunction;Decreased tongue base retraction;Decreased strength;Decreased elevation/closure     Oral Phase Severity: Mild  Pharyngeal Phase Severity: Severe     GCODESwallowing:   Swallow Current Status CM= 80-99%   Swallow Goal Status CK= 40-59%     The severity rating is based on the following outcomes:  [place] Noms Swallow Level 2              8-point Penetration-Aspiration Scale: Score 8  Clinical Judgement     PAIN:  Start of Study: 0  End of Study: 0       COMMUNICATION/EDUCATION:   [X]  Aspiration risks/precautions; compensatory swallow techniques  [X]  Patient/family have participated as able in goal setting and plan of care. [X]  Patient/family agree to work toward stated goals and plan of care. [ ]  Patient understands intent and goals of therapy, but is neutral about his/her participation. [ ]  Patient is unable to participate in goal setting and plan of care.      Thank you for this referral.  Vero Dutton, SLP  Time Calculation: 25 mins

## 2017-02-16 NOTE — PROGRESS NOTES
completed follow up visit with patient and a Spiritual assessment of patient ws done. and offered Pastoral care support. Spoke with fmily member as patient was resting.    Chaplains will continue to follow and will provide pastoral care on an as needed/requested basis    Chaplain Earle Qureshi   Board Certified [facility]   [phone]

## 2017-02-16 NOTE — PROGRESS NOTES
8027 Bedside and Verbal shift change report given to Thais Barakat RN   (oncoming nurse) by Sandy Owens RN (offgoing nurse). Report included the following information SBAR, Procedure Summary, Intake/Output, MAR and Recent Results. Pt resting in bed, NAD, no c/o pain at this time. Bed locked and lowered, siderails up x3. Call bell at bedside, will continue to monitor. 1103 Pt sleeping in bed, scheduled meds provided, Pt need met at this time, call bell at bedside will continue to monitor. 1225 IDRs completed. [phone] off floor for xray. 1400 Pt returned to floor. Pt alert and stable. Call bell at bedside. 1745 Pt sleeping in bed, scheduled meds held per orders d/t SLP notes. Pt needs met at this time, will continue to monitor. 1950 Bedside and Verbal shift change report given to Rach Lugo RN (oncoming nurse) by Thais Barakat RN (offgoing nurse). Report included the following information SBAR, Procedure Summary, Intake/Output, MAR and Recent Results.

## 2017-02-16 NOTE — PROGRESS NOTES
Daily Progress Note: 2/16/2017 10:10 AM   Admit Date: 2/12/2017    Patient seen in follow up for multiple medical problems as listed below:  Patient Active Problem List   Diagnosis Code    Small bowel obstruction ([facility] K56.69    Atrial fibrillation with rapid ventricular response ([facility] [address] I48.91    Postoperative anemia D64.9       Assesment   80 y.o. male with a PMHx of HTN, TIA, SIDNEY, SBO and abdominal hernia who presented to the ED with the acute onset of left upper quadrant abdominal pain. CT scan of the abd/pelvis in the ED that was positive for a SBO.  s/p Exlap and small bowel resection (191cm) 2/13 am Dr Dru Logan. Course complicated by Afib+RVR 2/32 am requiring rate control and cardiology involvement. Patient only responded temporarily to diltiazem boluses and diltiazem drip to 5mg. The evening of 2/14 the patients rate remained 130-140 and he was becoming increasingly hypotensive, cardizem gtt was stopped and patient was given 250mcg of IV Digoxin with HR resolution into 60's. Eventually coverted to lopresser with HTN medication adjustment. Tolerating diet advancement. Afib+RVR: Hx of pAfib follows with Dr Alla Muñoz not on rate control. Failed cardizem IV, now on Dig. TTE-EF45%  SBO s/p Exlap and small bowel resection 2/13 am Dr Dru Logan  HTN - has been low normal BP. Stop norvasc. TIA -restart eliquis 2/16  Post-op on Iron Deficiency Anemia -restart iron when toelrating PO.  Hg stable @ 8. transfuse <7  CKD Stage IIIb -@ baseline  Hx of L Inguinal Hernia  Delerium - transient, expected      DVT Protocol Active: yes  Code Status:  Full Code     Disposition: SNF tomorrow    Subjective:     CC: Abdominal Pain (left lower abdomen) and Leg Pain (left leg)    Interval History: Digoxin to BB, Surgery advancing diet having flatus no BM, some dysphagia this am with grits needs ST eval.      Objective:     Visit Vitals    /78 (BP 1 Location: Left arm, BP Patient Position: Supine)    Pulse 74    Temp 98.1 °F (36.7 °C)    Resp 18    Ht 6' (1.829 m)    Wt 69.5 kg (153 lb 3.2 oz)    SpO2 90%    BMI 20.78 kg/m2       Temp (24hrs), Av.7 °F (36.5 °C), Min:97 °F (36.1 °C), Max:98.2 °F (36.8 °C)        Intake/Output Summary (Last 24 hours) at 17 1116  Last data filed at 17 1109   Gross per 24 hour   Intake          2948.75 ml   Output             2050 ml   Net           898.75 ml       Gen: Alert some confusion, NAD  HEENT:  PATRICIA, EOMI. Neck: No Bruits/JVD   Lungs:   CTAB. Good respiratory effort  Heart:   RR s1 s2 distant sounds  Abdomen: ND,tender, bandage midline, BSX normo/hyper,   Extremities:   No LE edema. No cyanosis. Skin:  no jaundice/lesions      Data Review:     Meds/Labs/Tests reviewed    Current Shift:   07 - 1900  In: 340 [P.O.:340]  Out: -   Last three shifts:   1901 -  0700  In: 3482.5 [P.O.:150;  I.V.:2787.5]  Out: 2370 [Urine:2370]  Recent Labs      17   0450  17   1508  17   0350   WBC   --    --   12.7   RBC   --    --   2.88*   HGB  12.0*  7.3*  8.0*   HCT  36.1  22.5*  24.3*   PLT   --    --   236       Recent Labs      17   1508  17   0350   BUN  49*  53*   CREA  2.00*  2.29*   CA  7.2*  7.3*   K  3.6  4.0   NA  141  139   CL  110*  105   CO2  23  24   PHOS   --   3.3   GLU  109*  131*        Lab Results   Component Value Date/Time    Glucose 109 2017 03:08 PM    Glucose 131 2017 03:50 AM    Glucose 340 2017 04:20 AM    Glucose 143 2017 06:05 AM    Glucose 157 2016 12:40 PM          Care coordination with Nursing/Consultants/staff: 5  Prior history, labs, and charting reviewed: 15    Procedures/Imaging:  s/p Exlap and small bowel resection  am Dr Alyx Vidal  TTE-EF45%    Total time spent with chart review, patient examination/education, discussion with staff on case,documentation and medication management / adjustment  :  27 Minutes      Dr [facility]  Hospitalist Division  Pager: [phone]

## 2017-02-16 NOTE — PROGRESS NOTES
1300:  Pt currently off the floor. Will follow up for PT.    1400:  Pt back from [facility] and is exhausted per pt and spouse. Will follow up as schedule permits.

## 2017-02-17 PROBLEM — R63.30 FEEDING DIFFICULTIES: Status: ACTIVE | Noted: 2017-01-01

## 2017-02-17 PROBLEM — R53.81 DEBILITY: Status: ACTIVE | Noted: 2017-01-01

## 2017-02-17 NOTE — PROGRESS NOTES
OT attempted 2x, pt off the floor for x-ray and now, pt's daughter politely refusing stating pt is exhausted. Will cont to follow.     LEONEL Hall/KWAME

## 2017-02-17 NOTE — PROGRESS NOTES
Daily Progress Note: 2/17/2017 10:10 AM   Admit Date: 2/12/2017    Patient seen in follow up for multiple medical problems as listed below:  Patient Active Problem List   Diagnosis Code    Small bowel obstruction ([facility] [address] K56.69    Atrial fibrillation with rapid ventricular response ([facility] [address] I48.91    Postoperative anemia D64.9       Assesment   80 y.o. male with a PMHx of HTN, TIA, SIDNEY, SBO and abdominal hernia who presented to the ED with the acute onset of left upper quadrant abdominal pain. CT scan of the abd/pelvis in the ED that was positive for a SBO.  s/p Exlap and small bowel resection (191cm) 2/13 am Dr Jose Abreu. Course complicated by Afib+RVR 7/85 am requiring rate control and cardiology involvement. Patient only responded temporarily to diltiazem boluses and diltiazem drip to 5mg. The evening of 2/14 the patients rate remained 130-140 and he was becoming increasingly hypotensive, cardizem gtt was stopped and patient was given 250mcg of IV Digoxin with HR resolution into 60's. Eventually coverted to lopresser with HTN medication adjustment. Tolerating diet advancement. Afib+RVR: Hx of pAfib follows with Dr Velvet Noel not on rate control. Failed cardizem IV, now on Dig. TTE-EF45%  SBO s/p Exlap and small bowel resection 2/13 am Dr Jose Abreu  HTN - has been low normal BP. Stop norvasc. TIA -restart eliquis 2/16  Post-op on Iron Deficiency Anemia -restart iron when toelrating PO. Hg stable @ 8. transfuse <7  CKD Stage IIIb -@ baseline  Hx of L Inguinal Hernia  Delerium - transient, expected      DVT Protocol Active: yes  Code Status:  Full Code     Disposition: SNF Monday if ok to eat    Subjective:     CC: Abdominal Pain (left lower abdomen) and Leg Pain (left leg)    Interval History: failed MBS currently NPO but needs nutrition. Will not entertain PEG with this patient.   Discussed with Dr Nava Amin and we feel he had significant trauma from multiple NGT placements prior to his surgery causing OP swelling and dysphagia. Benefit to risk of dobhoff tube vs TPN difficult here. Will attempt XR guided dobhoff and if fails will continue TPN over weekned. Planned repeat swallow tests Monday. Palliative care consulted. IV lopresser for now, then restart po eliquis with tube    Objective:     Visit Vitals    /78    Pulse 60    Temp 98.4 °F (36.9 °C)    Resp 20    Ht 6' (1.829 m)    Wt 69.8 kg (153 lb 14.4 oz)    SpO2 100%    BMI 20.87 kg/m2       Temp (24hrs), Av °F (36.7 °C), Min:97.5 °F (36.4 °C), Max:98.4 °F (36.9 °C)        Intake/Output Summary (Last 24 hours) at 17 1258  Last data filed at 17 0429   Gross per 24 hour   Intake          1018.75 ml   Output             1800 ml   Net          -781.25 ml       Gen: AOx3 NAD  HEENT:  PATRICIA, EOMI. Neck: No Bruits/JVD   Lungs:   CTAB. Good respiratory effort  Heart:   RR s1 s2 distant sounds  Abdomen: ND,tender, bandage midline, BSX normo/hyper,   Extremities:   No LE edema. No cyanosis. Skin:  no jaundice/lesions      Data Review:     Meds/Labs/Tests reviewed    Current Shift:     Last three shifts:  02/15 1901 -  0700  In: 2421.3 [P.O.:610;  I.V.:1811.3]  Out: 5700 [Urine:5700]  Recent Labs      17   0450  17   1508   HGB  12.0*  7.3*   HCT  36.1  22.5*       Recent Labs      17   1508   BUN  49*   CREA  2.00*   CA  7.2*   K  3.6   NA  141   CL  110*   CO2  23   GLU  109*        Lab Results   Component Value Date/Time    Glucose 109 2017 03:08 PM    Glucose 131 2017 03:50 AM    Glucose 340 2017 04:20 AM    Glucose 143 2017 06:05 AM    Glucose 157 2016 12:40 PM          Care coordination with Nursing/Consultants/staff: 5  Prior history, labs, and charting reviewed: 15    Procedures/Imaging:  s/p Exlap and small bowel resection  am Dr John Lao  TTE-EF45%    Total time spent with chart review, patient examination/education, discussion with staff on case,documentation and medication management / adjustment  :  30 Minutes      Dr Jasson Sosa  Pager: [phone]

## 2017-02-17 NOTE — PROGRESS NOTES
[facility]: [phone] ([phone])  [facility]: [phone]   [facility]: [phone]    Patient Name: Edna Sinclair  YOB: 1927    Date of Initial Consult: 2/17/2017   Reason for Consult: care decisions  Requesting Provider: Dr. Matt  Primary Care Physician: Malathi Chavez MD      SUMMARY:   Edna Sinclair is a 80y.o. year old with a past history of hypertension, TIA, SBO, abdominal hernia , who was admitted on 2/12/2017 from home with a diagnosis of abdominal pain . He was found to have a small bowel obstruction. Surgery was consulted and he was taken for an exploratory laparotomy where a closed loop obstruction from internal hernia with necrotic bowel was found. He underwent a central small bowel resection (191 cm). His hospital course has been complicated by atrial fib with RVR and hypotension not tolerant of cardizem drip. Heart rate has improved with the addition of digoxin and lopressor. Mr. Sasha Sevilla was noted to have coughing and choking with liquids. Speech therapy was consulted, MBS was performed, unfortunately with aspiration of all textures. Palliative medicine is consulted for care decisions. PALLIATIVE DIAGNOSES:   1. Feeding difficulties   2. SBO  3. Debility        PLAN:   1. Patient seen at bedside along with Ms. Quirino Krabbe, his daughter Connie Quiros was also present. He is alert and oriented x 3. Shared stories of his past employment. Social;  Lives with his wife, daughter lives few doors down. He is a retired . Daughter shares his wife is frail and elderly. 2. Goals of care currently full code. Did not discuss at this visit. Daughter is very knowledgeable concerning her father's medical condition. She is aware of the MBS results. She shares with us patient very high functioning at home, loves to eat, ambulatory and engaging with others.  She wishes to give him the opportunity to continue to improve. Feeding options discussed (she had already discussed with attending) and was aware of options. Plan is for short period of NGT feeds, she is aware he may need to be restrained, but is looking into the option of hiring an aide to be with her father, to prevent the need for restraining. We will re visit on Monday with continued discussions. 3. SBO s/p resection of 191 cm of bowel, denies pain, except when he moves his legs up, no BM yet but passing flatus. 4. Debility; at home ambulatory at times uses cane or walker for long distance. Independent in ADL's, needs some assistance of IADL's. Will likely need SNF for rehab upon d/c.   5. Initial consult note routed to primary continuity provider  6.  Communicated plan of care with: Palliative IDT, daughter, attending       GOALS OF CARE:     [====Goals of Care====]  GOALS OF CARE:  Patient / health care proxy stated goals: full treatment       TREATMENT PREFERENCES:   Code Status: Full Code    Advance Care Planning:  Advance Care Planning 2/12/2017   Patient's Healthcare Decision Maker is: Legal Next of clinovo Madan   Primary Decision Maker Name Luis Morris   Primary Decision Maker Phone Number    Primary Decision Maker Relationship to Patient Other relative   Secondary Decision Maker Name Davey Carreon Phone Number    Secondary Decision Maker Relationship to Patient Adult child   Confirm Advance Directive None   Patient Would Like to Complete Advance Directive No   Does the patient have other document types Other (comment)       Other:    The palliative care team has discussed with patient / health care proxy about goals of care / treatment preferences for patient.  [====Goals of Care====]    Advance Care Planning 2/12/2017   Patient's Healthcare Decision Maker is: Legal Next of clinovo 69   Primary Decision Maker Name Luis Morris   Primary Decision Maker Phone Number    Primary Decision Maker Relationship to Patient Other relative   Secondary Decision Maker Name Davey 425 Phone Number    Secondary Decision Maker Relationship to Patient Adult child   Confirm Advance Directive None   Patient Would Like to Complete Advance Directive No   Does the patient have other document types Other (comment)           HISTORY:     History obtained from: chart, patient, daughter     CHIEF COMPLAINT: feeding difficulties     HPI/SUBJECTIVE:    The patient is:   [x] Verbal and participatory  [] Non-participatory due to:   Please see summary    Clinical Pain Assessment (nonverbal scale for nonverbal patients): Pain: 1  Denies pain        FUNCTIONAL ASSESSMENT:      Palliative Performance Scale (PPS): 50%          PSYCHOSOCIAL/SPIRITUAL SCREENING:     Advance Care Planning:  Advance Care Planning 2/12/2017   Patient's Healthcare Decision Maker is: Legal Next of Adryan 69   Primary Decision Maker Name Antonia Kenney   Primary Decision Maker Phone Number    Primary Decision Maker Relationship to Patient Other relative   Secondary Decision Maker Name aDvey 425 Phone Number    [address] [facility] Relationship to Patient Adult child   Confirm Advance Directive None   Patient Would Like to Complete Advance Directive No   Does the patient have other document types Other (comment)        Any spiritual / Buddhism concerns:  [] Yes /  [x] No    Caregiver Burnout:  [] Yes /  [x] No /  [] No Caregiver Present      Anticipatory grief assessment:   [x] Normal  / [] Maladaptive       ESAS Anxiety: Anxiety: 0    ESAS Depression: Depression: 0        REVIEW OF SYSTEMS:     Positive and pertinent negative findings in ROS are noted above in HPI. The following systems were [x] reviewed / [] unable to be reviewed as noted in HPI  Other findings are noted below.   Systems: constitutional, ears/nose/mouth/throat, respiratory, gastrointestinal, genitourinary, musculoskeletal, integumentary, neurologic, psychiatric, endocrine. Positive findings noted below. Modified ESAS Completed by: provider   Fatigue: 6 Drowsiness: 1   Depression: 0 Pain: 1   Anxiety: 0 Nausea: 0     Dyspnea: 0                    PHYSICAL EXAM:     Wt Readings from Last 3 Encounters:   02/17/17 69.8 kg (153 lb 14.4 oz)   01/04/16 65.8 kg (145 lb)   11/08/15 63.5 kg (140 lb)     Blood pressure 138/74, pulse 62, temperature 98.2 °F (36.8 °C), resp. rate 20, height 6' (1.829 m), weight 69.8 kg (153 lb 14.4 oz), SpO2 97 %. Pain:  Pain Scale 1: Numeric (0 - 10)  Pain Intensity 1: 0  Pain Onset 1: intermittent  Pain Location 1: Foot  Pain Orientation 1: Left, Right  Pain Description 1: Aching  Pain Intervention(s) 1: Medication (see MAR)      Constitutional: elderly gentleman sitting up in bed in NAD  Respiratory: breathing not labored,  Skin: warm, dry  Neurologic: following commands,alert and oriented x 3        HISTORY:     Principal Problem:    Small bowel obstruction (HCC) (2/13/2017)    Active Problems:    Atrial fibrillation with rapid ventricular response (HCC) (2/14/2017)      Postoperative anemia (2/14/2017)      Past Medical History   Diagnosis Date    Hypertension     TIA (transient ischemic attack)       History reviewed. No pertinent past surgical history. History reviewed. No pertinent family history. History reviewed, no pertinent family history.   Social History   Substance Use Topics    Smoking status: Former Smoker    Smokeless tobacco: Not on file    Alcohol use No     No Known Allergies   Current Facility-Administered Medications   Medication Dose Route Frequency    metoprolol (LOPRESSOR) injection 2.5 mg  2.5 mg IntraVENous Q6H    tamsulosin (FLOMAX) capsule 0.4 mg  0.4 mg Oral DAILY    0.9% sodium chloride infusion 250 mL  250 mL IntraVENous PRN    apixaban (ELIQUIS) tablet 2.5 mg  2.5 mg Oral BID    phenol throat spray (CHLORASEPTIC) 1 Spray  1 Spray Oral PRN    sodium chloride (NS) flush 5-10 mL  5-10 mL IntraVENous Q8H    sodium chloride (NS) flush 5-10 mL  5-10 mL IntraVENous PRN    0.9% sodium chloride infusion 250 mL  250 mL IntraVENous PRN    sodium chloride (NS) flush 5-10 mL  5-10 mL IntraVENous Q8H    sodium chloride (NS) flush 5-10 mL  5-10 mL IntraVENous PRN    0.9% sodium chloride infusion  75 mL/hr IntraVENous CONTINUOUS    HYDROmorphone (PF) (DILAUDID) injection 1 mg  1 mg IntraVENous Q3H PRN    ondansetron (ZOFRAN) injection 4 mg  4 mg IntraVENous Q4H PRN    0.9% sodium chloride infusion 250 mL  250 mL IntraVENous PRN        LAB AND IMAGING FINDINGS:     Lab Results   Component Value Date/Time    WBC 12.7 02/14/2017 03:50 AM    HGB 12.0 02/16/2017 04:50 AM    PLATELET 506 16/29/3792 03:50 AM     Lab Results   Component Value Date/Time    Sodium 141 02/14/2017 03:08 PM    Potassium 3.6 02/14/2017 03:08 PM    Chloride 110 02/14/2017 03:08 PM    CO2 23 02/14/2017 03:08 PM    BUN 49 02/14/2017 03:08 PM    Creatinine 2.00 02/14/2017 03:08 PM    Calcium 7.2 02/14/2017 03:08 PM    Magnesium 1.8 02/14/2017 03:08 PM    Phosphorus 3.3 02/14/2017 03:50 AM      Lab Results   Component Value Date/Time    AST (SGOT) 14 02/12/2017 06:05 AM    Alk.  phosphatase 70 02/12/2017 06:05 AM    Protein, total 7.0 02/12/2017 06:05 AM    Albumin 3.5 02/12/2017 06:05 AM    Globulin 3.5 02/12/2017 06:05 AM     Lab Results   Component Value Date/Time    INR 1.2 02/13/2017 05:30 AM    Prothrombin time 15.1 02/13/2017 05:30 AM    aPTT 29.9 01/04/2016 12:40 PM      No results found for: IRON, FE, TIBC, IBCT, PSAT, FERR   No results found for: PH, PCO2, PO2  No components found for: [place]   Lab Results   Component Value Date/Time    CK 68 09/16/2010 01:01 PM    CK - MB 0.9 09/16/2010 01:01 PM              Total time:  70 minutes   Counseling / coordination time: 50 minutes   > 50% counseling / coordination?: yes with patient and daughter     Prolonged service was provided for  []30 min   []75 min in face to face time in the presence of the patient. Time Start:   Time End:   Note: this can only be billed with 61775 (initial) or 44731 (follow up). If multiple start / stop times, list each separately.

## 2017-02-17 NOTE — PROGRESS NOTES
Nutrition follow up/  Plan of care      RECOMMENDATIONS:     1. NPO. If TF decided on recommend Osmolite 1 daroi initiated at 25 ml/hr increased as tolerated to goal of 70 ml/hr to provide 1780 Kcal,74 g protein and 1415 ml free water. 2. Monitor weight, labs and enteral nutrition when indicated  3. RD to follow     GOALS:     1. Ongoing: Enteral nutrition meets >75% of protein/calorie needs by 2/20  2. Met/Ongoing: Weight Maintenance (+/- 1-2 lb) by 2/20    ASSESSMENT:     Per BMI of 20.9, weight is in the normal classification. However, patient is at nutrition risk due to BMI below 23 with patient above 72years of age. PO intake is poor vs NPO order. Labs noted. Enteral nutrition recommendations listed. RD to follow. Nutrition Risk:  [x] High  [] Moderate []  Low    SUBJECTIVE/OBJECTIVE:     Pt admitted for SBO. He has hx of SBO and HTN. He had exlap and small bowel resection (about 191 cm) on 2/13. No nausea/vomiting. Pt lives with his daughter and she takes care of his meals. No known food allergies. Pt has UBW around 140 lb. S/P MBS on 2/16. Per SLP, patient with severe oropharyngeal dysphagia with recommendations for NPO. Will monitor. Information Obtained from:    [x] Chart Review   [x] Patient   [] Family/Caregiver   [x] Nurse/Physician   [] Interdisciplinary Meeting/Rounds    Diet: NPO  Medications: [x] Reviewed     Allergies: [x] Reviewed   Encounter Diagnoses     ICD-10-CM ICD-9-CM   1. SBO (small bowel obstruction) (HCC) K56.69 560.9   2. Abdominal pain, LUQ (left upper quadrant) R10.12 789.02   3. Chronic pain of left knee M25.562 719.46    G89.29 338.29   4. Chronic renal failure, unspecified stage N18.9 585.9   5.  Anemia, unspecified type D64.9 285.9     Past Medical History   Diagnosis Date    Hypertension     TIA (transient ischemic attack)       Labs:    Lab Results   Component Value Date/Time    Sodium 141 02/14/2017 03:08 PM    Potassium 3.6 02/14/2017 03:08 PM    Chloride 110 02/14/2017 03:08 PM    CO2 23 02/14/2017 03:08 PM    Anion gap 8 02/14/2017 03:08 PM    Glucose 109 02/14/2017 03:08 PM    BUN 49 02/14/2017 03:08 PM    Creatinine 2.00 02/14/2017 03:08 PM    Calcium 7.2 02/14/2017 03:08 PM    Magnesium 1.8 02/14/2017 03:08 PM    Phosphorus 3.3 02/14/2017 03:50 AM    Albumin 3.5 02/12/2017 06:05 AM     Anthropometrics: BMI (calculated): 20.9  Last 3 Recorded Weights in this Encounter    02/15/17 0600 02/16/17 0530 02/17/17 0231   Weight: 64.2 kg (141 lb 9.6 oz) 69.5 kg (153 lb 3.2 oz) 69.8 kg (153 lb 14.4 oz)      Ht Readings from Last 1 Encounters:   02/13/17 6' (1.829 m)     [] Weight Loss [x] Weight Gain [] Weight Stable    Nutrition Needs:   Calories: 8289-7023 Kcal  Protein:   70-80 g       [x] No Cultural, Druze or ethnic dietary need identified.     [] Cultural, Druze and ethnic food preferences identified and addressed     Wt Status:  [x] Normal (18.6 - 24.9) [] Underweight (<18.5) [] Overweight (25 - 29.9) [] Mild Obesity (30 - 34.9)  [] Moderate Obesity (35 - 39.9) [] Morbid Obesity (40+)     Nutrition Problems Identified:   [x] Suboptimal PO intake   [] Food Allergies  [x] Difficulty chewing/swallowing/poor dentition  [] Constipation/Diarrhea   [] Nausea   [] None  [] Other:     Plan:   [] Therapeutic Diet  []  Obtained/adjusted food preferences/tolerances and/or snacks options   []  Supplements added   [x] Occupational therapy following for feeding techniques  []  HS snack added   []  Modify diet texture   []  Modify diet for food allergies   []  Educate patient   []  Assist with menu selection   []  Monitor PO intake on meal rounds   [x]  Continue inpatient monitoring and intervention   []  Participated in discharge planning/Interdisciplinary rounds/Team meetings   [x]  Other: Enteral nutrition recommendations listed    Education Needs:   [x] Not appropriate for teaching at this time due to: NPO   [] Identified and addressed    Nutrition Monitoring and Evaluation:  [x] Continue ongoing monitoring and intervention  [] Other    Calvin Gant

## 2017-02-17 NOTE — PROGRESS NOTES
1425:  Pt off the floor for procedure, daughter stated he has been exhausted and weak. States he is better in the morning. Will follow up tomorrow.

## 2017-02-17 NOTE — PALLIATIVE CARE
David Gimenez, NP and I met with pt who was AOX4 and his daughter. Pt lives with his wife who is frail and elderly and the daughter lives next door. Daughter oversees their care and prepares all their meals. She is their only child. Prior to this incident, pt used a cane or a walker and was not too steady. He was semi-independent in ADLs. He ate \"like a lumberjack\" says the daughter. Daughter is very concerned about pt's MBS results and she is willing for pt to have NG tube for a few days for nutrition. We discussed restraints and daughter is going to arrange private duty aides to stay with pt 24/7 to avoid restraints. If she can't arrange this immediately she will explain the restraints to pt and that it is temporary. Pt reiterated several times that he enjoys all the people coming and going in his room. He enjoys having someone to interact with. To that end, put in  consult and music therapy consult. We will re-visit pt's swallowing issue on Mon 2/20/17. D/W Dr Matt.

## 2017-02-17 NOTE — PROGRESS NOTES
Speech Therapy Note:    Attempted to see pt this am; however, after d/w interdisciplinary team and review of results from [address] from prior day, pt not appropriate. RN, Faustino Block, reported worsening lung sounds. Pt may not be appropriate for alternate nutritional source secondary to recent surgery. Pt may benefit from Palliative consult at this time. Discussed with IDR team and will hold today. Rhonda Morales [address]., [address] [facility]  Office: [phone]  Pager: [phone]

## 2017-02-17 NOTE — PROGRESS NOTES
Unable to place Dobhoff under IR guidance. Will plan for TPN over weekend. Will try and run D10 through largest PIV until PICC placed with understanding the PIV vein may degrade and be lost.  PICC and TPN setup likely tomorrow morning.

## 2017-02-17 NOTE — PROGRESS NOTES
Patient has been accepted to [facility] on discharge. Patient has failed his swallow evaluation. Palliative care consulted.  Yumiko Herring RN

## 2017-02-17 NOTE — PROGRESS NOTES
Inpatient Daily Progress Note    Subjective:   Pt was stating that he has a sore throat due to procedures and wants water to drink. Pain is well controlled. He has had nausea, has not vomitted, has passed flatus, and has not had a bowel movement. Objective:     Physical Exam:  Visit Vitals    /71 (BP 1 Location: Left arm, BP Patient Position: At rest)    Pulse 66    Temp 97.8 °F (36.6 °C)    Resp 18    Ht 6' (1.829 m)    Wt 69.8 kg (153 lb 14.4 oz)    SpO2 100%    BMI 20.87 kg/m2       Gen: Well developed, well nourished 80 y.o. male in no acute distress  Resp: clear to auscultation bilaterally, no wheezes   Cardio: Regular rate and rhythm, no murmurs, clicks, gallops or rubs, no edema  Abdomen: soft, non-tender, nondistended, hyperactive bowel sounds, no hernias  Psych: alert and oriented to person, place and time      No results found for this or any previous visit (from the past 12 hour(s)).     Assessment:     Mary Hill is a 80 y.o. male s/p small bowel resection POD 4      Plan:     -Continue current medications  -Ice chips only due to aspiration risk  -consider discharging to skilled nursing facility

## 2017-02-17 NOTE — PROCEDURES
Vascular & Interventional Radiology Brief Procedure Note    Attempted fluoroscopic guided Dobhoff feeding tube placement. Under fluoroscopy, the Dobhoff catheter preferably went into the airway every time, like secondary to moderate sized anterior cervical osteophyte deflecting catheter anteriorly into supraglottic airway. Able to pass glide wire through side of distal tube and into esophagus. However, the Dobhoff would not follow. Patient complained of severe pain and appropriately told us to stop. After greater than 10 minutes of fluoroscopy time, procedure was terminated.     Wing Erazo MD  [facility]  Vascular & Interventional Radiology  2/17/2017

## 2017-02-17 NOTE — PROGRESS NOTES
0750 Bedside and Verbal shift change report given to Denyse Collet, RN (oncoming nurse) by Mich Chiu RN (offgoing nurse). Report included the following information SBAR, Procedure Summary, Intake/Output, MAR and Recent Results. Pt resting in bed, PA surgeon at bedside. No acute distress, no complaints of pain at this time. Bed locked and lowered, siderails up x3. Call bell at bedside, will continue to monitor. 0830 Assessment completed. Meds held at this time d/t SLP eval results. Dr Meli Potter notified in regards to med and nutrition concerns provided the patient is NPO due to silent aspiration. Per Dr Meli Potter will convert meds to appropriate route for pt condition. 1145 IDRs completed. [phone] Pt off floor to radiology. Dtr at bedside. 56 Rad called for size to use for NGT, Per Dr Meli Potter orders \"8 Fr feeding tube. \" Rad notified. 1500 Pt returned to floor, NGT placment unsuccessful. Dr Meli Potter paged. [phone] Dr Meli Potter notified of events during RAD procedure, orders received for STAT PICC insertion. PICC team paged. [phone] [facility] PICC nurse paged, no answer. Coco Eugenia in touch with picc nurse, PICC team out for the day, will complete procedure in the AM. Dr Meli Potter notified. 1830 Pt provided with scheduled meds, eliquis held. Orders for D10 completed. Pt needs met at this time. 1940 Bedside and Verbal shift change report given to Mariza Torres RN (oncoming nurse) by Denyse Collet, RN (offgoing nurse). Report included the following information SBAR, Procedure Summary, Intake/Output, MAR and Recent Results. Dtr updated on plan for pt's nutritional needs, concerns addressed, verbalized understanding and comfortablity with upcoming interventions.

## 2017-02-17 NOTE — PROGRESS NOTES
1920 Pt received after report given by off going RN  0230 Pt resting in bed with eyes closed  Bedside and Verbal shift change report given to [address] (oncoming nurse) by Chyna Westbrook RN (offgoing nurse). Report included the following information SBAR, Kardex, Intake/Output and MAR.

## 2017-02-17 NOTE — PROGRESS NOTES
0750 Bedside and Verbal shift change report given to Patricia Mcmillan RN (oncoming nurse) by Rhys Caldera RN (offgoing nurse). Report included the following information SBAR, Procedure Summary, Intake/Output, MAR and Recent Results. Pt resting in bed, PA surgeon at bedside. No acute distress, no complaints of pain at this time. Bed locked and lowered, siderails up x3. Call bell at bedside, will continue to monitor. 0830 Assessment completed. Meds held at this time d/t SLP eval results. Dr Matt notified in regards to med and nutrition concerns provided the patient is NPO due to silent aspiration. Per Dr Matt will convert meds to appropriate route for pt condition. 1145 IDRs completed. [phone] Pt off floor to radiology. Dtr at bedside.

## 2017-02-18 NOTE — DIABETES MGMT
NUTRITIONAL RE-ASSESSMENT AND GLYCEMIC CONTROL PLAN OF CARE     Doreen Lancaster           80 y.o.           2/12/2017                 1. SBO (small bowel obstruction) (HCC)    2. Abdominal pain, LUQ (left upper quadrant)    3. Chronic pain of left knee    4. Chronic renal failure, unspecified stage    5. Anemia, unspecified type    6. Debility    7. Feeding difficulties        ASSESSMENT:    Based on most recent weight  of 153 lbs. , pt is underweight  related to inadequate caloric intake as evidenced by 86% ideal weight and BMI= 20.9kg/m2. Luiza Krishna Altered GI function and nutrient deficit as evidenced by npo status related to SBO. INTERVENTIONS/PLAN:   Contacted for TPN recommendations because feeding tube placement was not successful. When pt has PICC placed, recommend TPN of 85 g amino acids/d, 300 g dextrose/d with 300 ml 20 % lipids/d at 85 ml/hr. This will provide 85 g protein, 1620 non-protein calories/day for initial order. SUBJECTIVE/OBJECTIVE:     Diet: npo  Patient Vitals for the past 100 hrs:   % Diet Eaten   02/16/17 1544 40 %   02/16/17 1109 25 %     Medications: [x]                Reviewed   D10 at 110ml/hr until TPN is available. Lab Results   Component Value Date/Time    Sodium 141 02/14/2017 03:08 PM    Potassium 3.6 02/14/2017 03:08 PM    Chloride 110 02/14/2017 03:08 PM    CO2 23 02/14/2017 03:08 PM    Anion gap 8 02/14/2017 03:08 PM    Glucose 109 02/14/2017 03:08 PM    BUN 49 02/14/2017 03:08 PM    Creatinine 2.00 02/14/2017 03:08 PM    Calcium 7.2 02/14/2017 03:08 PM    Magnesium 1.8 02/14/2017 03:08 PM    Phosphorus 3.3 02/14/2017 03:50 AM    Albumin 3.5 02/12/2017 06:05 AM       Anthropometrics:  ,  , BMI (calculated): 20.9 [place] wt 178lbs.    Wt Readings from Last 1 Encounters:   02/17/17 69.8 kg (153 lb 14.4 oz)    Ht Readings from Last 1 Encounters:   02/17/17 6' (1.829 m)     Wt Readings from Last 3 Encounters:   02/17/17 69.8 kg (153 lb 14.4 oz)   01/04/16 65.8 kg (145 lb) 11/08/15 63.5 kg (140 lb)       Estimated Nutrition Needs: 1900 Kcals/day, Protein reqrts  84g/d   Fluid Requirements 2.1 liters/d  Based on:   [x]          Actual BW    []          IBW   []            Adjusted BW      Nutrition Diagnoses:   As stated above. Nutrition Interventions: TPN recommendations to start 2/18  Goal:     Intake will meet >75% of energy and protein requirements by 2/22. Wt maintenance or gain 1 lb per week by 2/27.        Nutrition Monitoring and Evaluation      []     Monitor po intake on meal rounds  [x]     Continue inpatient monitoring and intervention  [x]     Other:TPN    Nutrition Risk:  [x]   High     []  Moderate    []  Minimal/Uncompromised    Rosmery Fleming RD

## 2017-02-18 NOTE — PROGRESS NOTES
Problem: Mobility Impaired (Adult and Pediatric)  Goal: *Acute Goals and Plan of Care (Insert Text)  Physical Therapy Goals  Initiated 2/15/2017 and to be accomplished within 7 day(s)  1. Patient will move from supine to sit and sit to supine , scoot up and down and roll side to side in bed with modified independence. 2. Patient will transfer from bed to chair and chair to bed with supervision/set-up using the least restrictive device. 3. Patient will perform sit to stand with supervision/set-up. 4. Patient will ambulate with minimal assistance/contact guard assist for 75 feet with the least restrictive device. Outcome: Progressing Towards Goal  PHYSICAL THERAPY TREATMENT     Patient: Madison Oneil (31 y.o. male)  Date: 2/18/2017  Diagnosis: SBO  Small Bowel Obstruction  Small bowel obstruction (HCC) Small bowel obstruction (HCC)  Procedure(s) (LRB):  EXPLORATORY LAPAROTOMY POSSIBLE SMALL BOWEL RESECTION (N/A) 5 Days Post-Op  Precautions: Fall   Chart, physical therapy assessment, plan of care and goals were reviewed. ASSESSMENT:  Pt c/o back pain. Worked on bed mobility and changing position to prevent skin breakdown. ModA for rolling, pt left in L side lying and supported with pillows for comfort. Pt skin on back with a lot of impressions from the wrinkled bed linens, rubbed pt back, reports relief. Education: position change every 2 hours, call for assistance  Progression toward goals:  [ ]      Improving appropriately and progressing toward goals  [X]      Improving slowly and progressing toward goals  [ ]      Not making progress toward goals and plan of care will be adjusted       PLAN:  Patient continues to benefit from skilled intervention to address the above impairments. Continue treatment per established plan of care.   Discharge Recommendations:  Fracisco Lemus  Further Equipment Recommendations for Discharge:  rolling walker, TBD when more mobile       SUBJECTIVE: Patient stated My back is killing me.       OBJECTIVE DATA SUMMARY:   Critical Behavior:  Neurologic State: Alert  Orientation Level: Oriented X4  Cognition: Appropriate safety awareness, Follows commands  Safety/Judgement: Awareness of environment, Good awareness of safety precautions, Insight into deficits  Functional Mobility Training:  Bed Mobility:  Rolling: Moderate assistance  Scooting: Maximum assistance  Pain:  Pre 5  Post 5  Pain Scale 1: Numeric (0 - 10)  Activity Tolerance:   Fair(-)  Please refer to the flowsheet for vital signs taken during this treatment.   After treatment:   [ ] Patient left in no apparent distress sitting up in chair  [X] Patient left in no apparent distress in bed  [X] Call bell left within reach  [X] Nursing notified  [ ] Caregiver present  [ ] Bed alarm activated      Humza Jc PTA   Time Calculation: 23 mins

## 2017-02-18 NOTE — ROUTINE PROCESS
Bedside, Verbal and Written shift change report given to Mirta Tinsley RN (oncoming nurse) by Zackary Franco RN (offgoing nurse). Report included the following information SBAR, Kardex, Intake/Output, MAR, Recent Results, Med Rec Status and Cardiac Rhythm A-Fib.    0740 - Shift assessment completed. Pt alert and oriented x4. No respiratory distress noted. No c/o pain reported. Call bell within reach, bed in low position. 1100 - Dr Vashti Owens on unit. Notified pt's Potassium 2.8 with AM labs, only able to administer 10 mEq KCl IV. Pt NPO and unable to administer PO KCl and Pyxis will not allow to pull 0600 IV KCl. Dr Vashti Owens stated he would place orders for Potassium, depending on Potassium dosage in TPN orders, will need to verify with Pharmacy. Also, notified pt had 3.62 sec pause w/conversion to NSR then A-Flutter to A-Fib on cardiac monitor. No new orders at this time. 1130 - Pt had Double Lumen PICC Line placed, verified via CXR, able to use. Awaiting TPN from pharmacy. Will continue IVF until TPN arrives per Dr Vashti Owens. 1240 - Shift re-assessment completed, no change in pt condition. 1640 - Shift re-assessment completed, no change in pt condition. 1817 - TPN arrived from pharmacy, pt started on TPN through PICC Line at 100 ml/hr, signed off with Braxton Seals RN and Yaneli Fairbanks RN. Bedside, Verbal and Written shift change report given to Debbie Posey RN (oncoming nurse) by Mirta Tinsley RN (offgoing nurse). Report included the following information SBAR, Kardex, Intake/Output, MAR, Recent Results, Med Rec Status and Cardiac Rhythm A-Fib. Verified TPN.

## 2017-02-18 NOTE — PROGRESS NOTES
Patient seen and examined. Failed SLP and ND tube placement  Abdomen is soft and non-tender.   Plan:    TPN  Consider PEG tube is continue to fail SLP  Placement

## 2017-02-18 NOTE — ROUTINE PROCESS
1950: Assumed care. Assessment done. Abdominal surgical incision no redness. No drainage. Wound edges well approximated. Staples intact. (+) bowel sounds noted. Passing gas. No BM yet. Denies any pain or discomfort at this time. Call light within reach. 2000: Encouraged patient to use incentive spirometer 5-10 x  every hour while awake or watching TV. Can reached [phone].  2158: No change from previous assessment. Due meds given. 2330: Quietly sleeping.  0103: No change from previous assessment. Due med given. 0330: Patient c/o sore heels. Bilateral heel border & prevalon boots applied on. Expressed relief.  0405: Incontinence care provided. 1520: Due med given. 0545: Slept good thru night. Needs attended. Turned & repositioned at intervals. 2007: Bedside and Verbal shift change report given to Master Mcmanus RN (oncoming nurse) by Alen Bautista RN (offgoing nurse). Report included the following information SBAR, Kardex, Intake/Output, MAR and Recent Results.

## 2017-02-18 NOTE — ROUTINE PROCESS
Nurse entered the chart to help primary nurse with charting and acting on a critical value for Potassium.

## 2017-02-19 NOTE — ROUTINE PROCESS
Bedside, Verbal and Written shift change report given to Venessa Nunn RN (oncoming nurse) by Jose De Jesus Chávez RN (offgoing nurse). Report included the following information SBAR, Kardex, Intake/Output, MAR, Recent Results, Med Rec Status and Cardiac Rhythm A-Fib. Verified TPN     0725 - Shift assessment completed. Pt alert and oriented x4. No respiratory distress noted. No c/o pain reported. Call bell within reach, bed in low position.      0825 - Paged Dr Delia Harada, awaiting return call back. 8111 - Dr Delia Harada returned call, notified pt's Potassium critical at 2.9, ordered 4 runs of IV KCl 10 mEq.    1225 - Shift re-assessment completed, no change in pt condition.     1625 - Shift re-assessment completed, no change in pt condition.      1927 - Pt started on new TPN bag, verified w/Lisa Marques RN. Bedside, Verbal and Written shift change report given to Jose De Jesus Chávez RN (oncoming nurse) by Venessa Nunn RN (offgoing nurse). Report included the following information SBAR, Kardex, Intake/Output, MAR, Recent Results, Med Rec Status and Cardiac Rhythm A-Fib. Verified TPN.

## 2017-02-19 NOTE — PROGRESS NOTES
Daily Progress Note: 2/19/2017 10:10 AM   Admit Date: 2/12/2017    Patient seen in follow up for multiple medical problems as listed below:  Patient Active Problem List   Diagnosis Code    Small bowel obstruction ([facility] [address] K56.69    Atrial fibrillation with rapid ventricular response ([facility] [address] I48.91    Postoperative anemia D64.9    Feeding difficulties R63.3    Debility R53.81       Assesment   80 y.o. male with a PMHx of HTN, TIA, SIDNEY, SBO and abdominal hernia who presented to the ED with the acute onset of left upper quadrant abdominal pain. CT scan of the abd/pelvis in the ED that was positive for a SBO.  s/p Exlap and small bowel resection (191cm) 2/13 am Dr Estrellita Camarillo. Course complicated by Afib+RVR 6/33 am requiring rate control and cardiology involvement. Patient only responded temporarily to diltiazem boluses and diltiazem drip to 5mg. The evening of 2/14 the patients rate remained 130-140 and he was becoming increasingly hypotensive, cardizem gtt was stopped and patient was given 250mcg of IV Digoxin with HR resolution into 60's. Eventually coverted to lopresser with HTN medication adjustment. Failed MBS 2/16 and placed NPO, no hx of dysphagia this was thought due to significant trauma from NGT placement for surgery. Was to have Dobhoff placed 2/17 with IR, they could not get a tube down. He received D10 overnight is his PIV, then PICC and TPN 2/18 am.  Plan for repeat ST eval/MBS Monday. If fails could consider PEG. Severe Dysphagia - currently NPO pending repeat MBS 2/20 on TPN through PICC  Afib+RVR: Hx of pAfib follows with Dr Chris Braga not on rate control. Failed cardizem IV, now on Dig. TTE-EF45%  SBO s/p Exlap and small bowel resection 2/13 am Dr Estrellita Camarillo. Had BM 2/18 but NPO for dysphagia  HTN - has been low normal BP. Stop norvasc. TIA -restart eliquis 2/16  Post-op on Iron Deficiency Anemia -restart iron when toelrating PO.  Hg stable @ 8. transfuse <7  CKD Stage IIIb -@ baseline  Hx of L Inguinal Hernia  Delerium - transient, expected      DVT Protocol Active: yes  Code Status:  Full Code     Disposition: TCC when ready    Subjective:     CC: Abdominal Pain (left lower abdomen) and Leg Pain (left leg)    Interval History:    PICC and TPN continues. Very hypokalemic despite addition of K in TPN and IV Kcl yesterday. Planned MBS on Monday, if fails will need to consider PEG vs palliative options which i dont think the family is currently refusing  To TCC when feeding issue resolved. [employer], he is not sleeping at night. Objective:     Visit Vitals    /83 (BP 1 Location: Left arm, BP Patient Position: Supine)    Pulse 81    Temp 98.2 °F (36.8 °C)    Resp 18    Ht 6' (1.829 m)    Wt 64.9 kg (143 lb)    SpO2 92%    BMI 19.39 kg/m2       Temp (24hrs), Av.1 °F (36.7 °C), Min:97 °F (36.1 °C), Max:98.5 °F (36.9 °C)        Intake/Output Summary (Last 24 hours) at 17 1220  Last data filed at 17 3054   Gross per 24 hour   Intake               70 ml   Output             1000 ml   Net             -930 ml       Gen: Alert and quiet, NAD  HEENT:  PATRICIA, EOMI. Neck: No Bruits/JVD   Lungs:   CTAB. Good respiratory effort  Heart:   RR s1 s2 distant sounds  Abdomen: ND,tender, bandage midline, BS normo/hyper,   Extremities:   No LE edema. No cyanosis.   Skin:  no jaundice/lesions      Data Review:     Meds/Labs/Tests reviewed    Current Shift:     Last three shifts:   190 -  07  In: 1498.2 [I.V.:1498.2]  Out: 190 [Urine:1900]  Recent Labs      17   0502  17   0410   HGB  10.4*  10.7*   HCT  31.4*  33.1*       Recent Labs      17   0502  17   0410   BUN  49*  50*   CREA  1.88*  1.85*   CA  7.5*  7.5*   K  2.9*  2.8*   NA  137  139   CL  102  104   CO2  25  22   PHOS   --   2.6   GLU  236*  230*        Lab Results   Component Value Date/Time    Glucose 236 2017 05:02 AM    Glucose 230 2017 04:10 AM    Glucose 109 2017 03:08 PM    Glucose 131 02/14/2017 03:50 AM    Glucose 340 02/13/2017 04:20 AM          Care coordination with Nursing/Consultants/staff: 10  Prior history, labs, and charting reviewed: 15    Procedures/Imaging:  s/p Exlap and small bowel resection 2/13 am Dr House Expose  TTE-EF45%  Failed XR guided dobhoff 2/17  PICC 2/18 with TPN    Total time spent with chart review, patient examination/education, discussion with staff on case,documentation and medication management / adjustment  :  36 Minutes      Dr Riya Nunez  Pager: [phone]

## 2017-02-19 NOTE — PROGRESS NOTES
Problem: Mobility Impaired (Adult and Pediatric)  Goal: *Acute Goals and Plan of Care (Insert Text)  Physical Therapy Goals  Initiated 2/15/2017 and to be accomplished within 7 day(s)  1. Patient will move from supine to sit and sit to supine , scoot up and down and roll side to side in bed with modified independence. 2. Patient will transfer from bed to chair and chair to bed with supervision/set-up using the least restrictive device. 3. Patient will perform sit to stand with supervision/set-up. 4. Patient will ambulate with minimal assistance/contact guard assist for 75 feet with the least restrictive device. Outcome: Progressing Towards Goal  PHYSICAL THERAPY TREATMENT     Patient: Maria R Leo (13 y.o. male)  Date: 2/19/2017  Diagnosis: SBO  Small Bowel Obstruction  Small bowel obstruction (HCC) Small bowel obstruction (HCC)  Procedure(s) (LRB):  EXPLORATORY LAPAROTOMY POSSIBLE SMALL BOWEL RESECTION (N/A) 6 Days Post-Op  Precautions: Fall  Chart, physical therapy assessment, plan of care and goals were reviewed. ASSESSMENT:  Pt progressed today. Pt able to sit on EOB without assistance; note leans to left, improved with TCs to use L UE support. Pt able to perform about 3 reps for LE TE and requires rest break. Pt able to complete 10 total reps. Pt declined standing at this time. EDUCATION:Pt ed on importance + benefits to perform bed mobility and exercises every 1-2 hours to promote functional mobility, pt verbalizes understanding. Progression toward goals:        Improving appropriately and progressing toward goals        Improving slowly and progressing toward goals(X)        Not making progress toward goals and plan of care will be adjusted       PLAN:  Patient continues to benefit from skilled intervention to address the above impairments. Continue treatment per established plan of care.   Discharge Recommendations:  Rehab  Further Equipment Recommendations for Discharge:  TBD SUBJECTIVE:   Patient stated I do not want to stand right now, it will hurt my back.       OBJECTIVE DATA SUMMARY:   Critical Behavior:  Neurologic State: Alert  Orientation Level: Oriented X4  Cognition: Appropriate decision making, Appropriate for age attention/concentration, Appropriate safety awareness, Follows commands  Safety/Judgement: Awareness of environment, Good awareness of safety precautions, Insight into deficits  Functional Mobility Training:  Bed Mobility:  Rolling: Moderate assistance  Supine to Sit: Moderate assistance  Sit to Supine: Moderate assistance  Scooting: Maximum assistance  Balance:  Sitting: Intact; With support  Sitting - Static: Poor (constant support)  Sitting - Dynamic: Poor (constant support)  Therapeutic Exercises:   SLR, HS, seated LAQs, and marches   Vital Signs  Temp: 97.5 °F (36.4 °C)     Pulse (Heart Rate): 71     BP: (!) 163/96 (R.N.  Iban is aware)     Resp Rate: 18     O2 Sat (%): 98 %  Pain:0  Pre treatment pain level:0  Post treatment pain level:0  Activity Tolerance:   fair, fatigues after 8 mins of sitting on EOB, and after LE reps     After treatment:   Patient left in no apparent distress sitting up in chair  Patient left in no apparent distress in bed(X)  Call bell left within reach(X)  Nursing notified  Caregiver present  Bed alarm activated      Asuncion Queen PTA   Time Calculation: 24 mins

## 2017-02-19 NOTE — PROGRESS NOTES
met with patient completed the initial Spiritual Assessment of the patient, and offered Pastoral Care, see flow sheets for interventions.  extended the assurance of prayer and spiritual care materials. Patient does not have any Christianity/cultural needs that will affect patients preferences in health care. The patient was informed that chaplains will continue to follow and will provide pastoral care on an as needed/requested basis.       Jonathan Hayes, MPH, [address] [phone]

## 2017-02-19 NOTE — PROGRESS NOTES
Bedside and Verbal shift change report received from [id number] [place] [address] (offgoing nurse). Report included the following information SBAR, Kardex, Intake/Output, MAR and Recent Results. 1945-Shift assessment completed, pt alert, TPN infusing, no complaints of pain, no distress noted, will continue to monitor. 2345-Reassessment completed, no changes from previous, will continue to monitor. 0355-Reassessment completed, will continue to monitor. Bedside and Verbal shift change report given to [address] [place] [address] (oncoming nurse) by Rick Aviles RN (offgoing nurse). Report included the following information SBAR, Kardex, Intake/Output, MAR and Recent Results.

## 2017-02-19 NOTE — PROGRESS NOTES
Patient seen and examined. Doing ok. No abdominal pain. No fever. No tachycardia. Abdomen is soft and non-tender. Wound is healing well. Plan:  Awaiting passing the SLP.    PICC and TPN  Pulmonary toileting

## 2017-02-20 NOTE — PROGRESS NOTES
0830 Received care of patient. In bed sleeping. Snoring loudly, mouth breathing. 02 sats 90% 02 2L via NC applied and 02 increased to 94% Patient received ativan at 2am and lethargic but easy to arouse. Respirations 20.    1030 Patients temp 101.5 PRN tylenol suppository ordered. MD at bedside to assess patients AMS. Dr. Lisandra Delarosa discontinued ativan. Vitals stable. Bed in low position    1037 PRN tylenol suppository given. Small BM noted. 200ml of urine voided into urinal. Bed in low position. 200 Daughter at bedside. Answered questions related to patient plan of care    1200 Reassessment done and no changes noted      1600 Daughter at bedside. PICC flushed and patent.       1900 Bedside shift report given to Stephanie DING oncoming nurse by Vinicius Morrow RN offgoing nurse

## 2017-02-20 NOTE — PROGRESS NOTES
Problem: Self Care Deficits Care Plan (Adult)  Goal: *Acute Goals and Plan of Care (Insert Text)  Occupational Therapy Goals  Initiated 2/15/2017 within 7 day(s). 1. Patient will perform grooming tasks while standing at sink with supervision/set-up. 2. Patient will perform lower body dressing with supervision/set-up. 3. Patient will perform bathing with supervision/set-up. 4. Patient will perform toilet transfers with modified independence. 5. Patient will perform all aspects of toileting with modified independence. 6. Patient will participate in upper extremity therapeutic exercise/activities with supervision/set-up for 8 minutes to increase/maintain strength/edurance of BUEs for ADLs. 7. Patient will utilize energy conservation techniques during functional activities with verbal cues. Outcome: Not Progressing Towards Goal  OCCUPATIONAL THERAPY TREATMENT     Patient: Dennise Shah (24 y.o. male)  Date: 2/20/2017  Diagnosis: SBO  Small Bowel Obstruction  Small bowel obstruction (HCC) Small bowel obstruction (HCC)  Procedure(s) (LRB):  EXPLORATORY LAPAROTOMY POSSIBLE SMALL BOWEL RESECTION (N/A) 7 Days Post-Op  Precautions: Fall  Chart, occupational therapy assessment, plan of care, and goals were reviewed. ASSESSMENT:  Pt w/no command following this am and grimacing w/ROM TherEx and simple ADL grooming tasks. Pt requires hand over hand and Max Assist performing hand/face hygiene. Pt resists oral care w/toothette. Romelia CLAROS, reports pt had Ativan at 2am, possible results of medication. EDUCATION Pt not appropriate for educational opportunity at this time. No family present at this time.   Progression toward goals:  [ ]          Improving appropriately and progressing toward goals  [ ]          Improving slowly and progressing toward goals  [X]          Not making progress toward goals and plan of care will be adjusted       PLAN:  Patient continues to benefit from skilled intervention to address the above impairments. Continue treatment per established plan of care. Discharge Recommendations:  Fracisco Lemus vs return home w/24 hr care  Further Equipment Recommendations for Discharge:   TBD as pt progresses       SUBJECTIVE:   Patient nonverbal.      OBJECTIVE DATA SUMMARY:         Cognitive/Behavioral Status:  Neurologic State: Confused  Orientation Level: Oriented to person  Cognition: Decreased attention/concentration, Decreased command following  Safety/Judgement: Fall prevention  Functional Mobility and Transfers for ADLs:  ADL Intervention:  Grooming  Washing Face: Maximum assistance (w/hand over hand assist)  Washing Hands: Maximum assistance (w/hand over hand assist)     Cognitive Retraining  Safety/Judgement: Fall prevention     Therapeutic Exercises:   PROM> AAROM BUE shoulder/elbow flexion/extension     Pain:  Pre Treatment:0  Post Treatment:0  Pt grimacing w/BUE ROM TherEx and w/hand over hand assist performing simple ADL grooming tasks     Activity Tolerance:    Poor     Please refer to the flowsheet for vital signs taken during this treatment.   After treatment:   [ ]  Patient left in no apparent distress sitting up in chair  [X]  Patient left in no apparent distress in bed  [ ]  Call bell left within reach  [X]  Nursing notified  [ ]  Caregiver present  [ ]  Bed alarm activated     LEONEL Pruett  Time Calculation: 23 mins

## 2017-02-20 NOTE — PROGRESS NOTES
Problem: Dysphagia (Adult)  Goal: *Acute Goals and Plan of Care (Insert Text)  Dysphagia Present: severe  Aspiration: at risk     Recommendations:  Diet: NPO with alternative nutrition/hydration source  Meds: via IV or TF  Aspiration Precautions  Other: small 1/2 tsp sips water after oral care for comfort    Goals: Patient will:  1. Tolerate PO trials with 0 s/s overt distress in 4/5 trials  2. Utilize compensatory swallow strategies/maneuvers (decrease bite/sip, size/rate, alt. liq/sol) with min cues in 4/5 trials  3. Perform oral-motor/laryngeal exercises to increase oropharyngeal swallow function with min cues  4. Complete an objective swallow study (i.e., MBSS) to assess swallow integrity, r/o aspiration, and determine of safest LRD, min A - met 2/16/17     Outcome: Not Progressing Towards Goal  SPEECH LANGUAGE PATHOLOGY DYSPHAGIA TREATMENT     Patient: Manish Farmer (8 y.o. male)  Date: 2/20/2017  Diagnosis: SBO  Small Bowel Obstruction  Small bowel obstruction (HCC) Small bowel obstruction (HCC)  Procedure(s) (LRB):  EXPLORATORY LAPAROTOMY POSSIBLE SMALL BOWEL RESECTION (N/A) 7 Days Post-Op  Precautions: aspiration Fall      ASSESSMENT:  Pt presents with severe oropharyngeal dysphagia in the setting of AMS. Attempted to see pt this am. Pt drowsy but opens eyes to tactile stim. Unable to verbalize and unable to follow directions. Attempted ice chips x2; pt with minimal to absent response to thermal tactile stim to oral cavity. D/w pallative NP Milind Sultana and will hold MBS today and attempt next day.  RN and MD alerted and aware of increased AMS this am.     Progression toward goals:  [ ]         Improving appropriately and progressing toward goals  [ ]         Improving slowly and progressing toward goals  [X]         Not making progress toward goals and plan of care will be adjusted       PLAN:  Recommendations and Planned Interventions:   Patient continues to benefit from skilled intervention to address the above impairments. Continue treatment per established plan of care. Discharge Recommendations:  Home Health and [address]:   Patient unable to verbalize. OBJECTIVE:   Cognitive and Communication Status:  Neurologic State: Eyes open to stimulus, Drowsy  Orientation Level: Unable to verbalize  Cognition: Follows commands  Perception: Appears intact  Perseveration: No perseveration noted  Safety/Judgement: Fall prevention  Dysphagia Treatment:  Oral Assessment:  Oral Assessment  Labial: No impairment  Dentition: Full  Oral Hygiene: good  Lingual: No impairment  Velum: No impairment  Mandible: No impairment  P.O.  Trials:              Patient Position: [facility]              Vocal quality prior to P.O.: Phonation breaks, Hoarse, Breathy              Consistency Presented: Ice chips              How Presented: Self-fed/presented                             Bolus Acceptance: Impaired              Bolus Formation/Control: Impaired              Type of Impairment: Lip closure, Mastication, Incomplete              Propulsion: Absent              Oral Residue: None              Initiation of Swallow: Absent              Laryngeal Elevation: Functional              Aspiration Signs/Symptoms: Delayed cough/throat clear, Weak cough              Pharyngeal Phase Characteristics: Multiple swallows              Effective Modifications: Small sips and bites              Cues for Modifications: Minimal                                Oral Phase Severity: Severe              Pharyngeal Phase Severity : Severe     PAIN:  Start of Tx: 0  End of Tx: 0      After treatment:   [ ]              Patient left in no apparent distress sitting up in chair  [X]              Patient left in no apparent distress in bed  [X]              Call bell left within reach  [X]              Nursing notified  [ ]              Family present  [ ]              Caregiver present  [ ]              Bed alarm activated COMMUNICATION/EDUCATION:   [X]        Aspiration precautions; swallow safety; compensatory techniques  [X]        Patient unable to participate in education; education ongoing with staff  [ ]         Posted safety precautions in patient's room.   [ ]         Oral-motor/laryngeal strengthening exercises        Lily Singh   SLP Intern     Time Calculation: 10 mins

## 2017-02-20 NOTE — DIABETES MGMT
NUTRITIONAL RE-ASSESSMENT AND GLYCEMIC CONTROL PLAN OF CARE     Jodene Siemens           80 y.o.           2/12/2017                 1. SBO (small bowel obstruction) (HCC)    2. Abdominal pain, LUQ (left upper quadrant)    3. Chronic pain of left knee    4. Chronic renal failure, unspecified stage    5. Anemia, unspecified type    6. Debility    7. Feeding difficulties        ASSESSMENT:    Based on most recent weight  of 153 lbs. , pt is underweight  related to inadequate caloric intake as evidenced by 86% ideal weight and BMI= 20.9kg/m2. Natalee Corbin Altered GI function and nutrient deficit as evidenced by npo status related to SBO. INTERVENTIONS/PLAN:     Recommend TPN of 85 g amino acids/d, 300 g dextrose/d with 350 ml 20 % lipids/d at 100 ml/hr. This will provide 85 g protein, 1720 non-protein calories/day for initial order. SUBJECTIVE/OBJECTIVE:     Diet: npo  Patient Vitals for the past 100 hrs:   % Diet Eaten   02/19/17 1821 0 %   02/19/17 1250 0 %   02/16/17 1544 40 %   02/16/17 1109 25 %     Medications: [x]                Reviewed Rec increase Levemir to 8 units q 12 hrs while on TPN. Lab Results   Component Value Date/Time    Sodium 139 02/20/2017 05:10 AM    Potassium 3.8 02/20/2017 05:10 AM    Chloride 111 02/20/2017 05:10 AM    CO2 18 02/20/2017 05:10 AM    Anion gap 10 02/20/2017 05:10 AM    Glucose 189 02/20/2017 05:10 AM    BUN 56 02/20/2017 05:10 AM    Creatinine 1.93 02/20/2017 05:10 AM    Calcium 8.1 02/20/2017 05:10 AM    Magnesium 2.7 02/20/2017 05:10 AM    Phosphorus 2.0 02/20/2017 05:10 AM    Albumin 3.5 02/12/2017 06:05 AM       Anthropometrics:  ,  , BMI (calculated): 20.9 [place] wt 178lbs.    Wt Readings from Last 1 Encounters:   02/20/17 64.4 kg (142 lb)      Ht Readings from Last 1 Encounters:   02/17/17 6' (1.829 m)     Wt Readings from Last 3 Encounters:   02/20/17 64.4 kg (142 lb)   01/04/16 65.8 kg (145 lb)   11/08/15 63.5 kg (140 lb)       Estimated Nutrition Needs: 1900 Kcals/day, Protein reqrts  84g/d   Fluid Requirements 2.1 liters/d  Based on:   [x]          Actual BW    []          IBW   []            Adjusted BW      Nutrition Diagnoses:   As stated above. Nutrition Interventions: TPN recommendations Goal:     Intake will meet >75% of energy and protein requirements by 2/25. Wt maintenance or gain 1 lb per week by 3/2. Glucose will be within target range by 2/23. .     Nutrition Monitoring and Evaluation      []     Monitor po intake on meal rounds  [x]     Continue inpatient monitoring and intervention  [x]     Other:TPN    Nutrition Risk:  [x]   High     []  Moderate    []  Minimal/Uncompromised    Ean Mclaughlin RD

## 2017-02-20 NOTE — PROGRESS NOTES
Speech Therapy Note:    SLP acknowledging MBS; however, pt not appropriate for test this am secondary to level of alertness. Will attempt later this day as time permits. D/w Palliative NP Ashok Gil. Rhonda MORALES.  [facility] [id number] [facility]  Office: [phone]  Pager: [phone]

## 2017-02-20 NOTE — PROGRESS NOTES
[facility]: [phone] ([phone])  [facility]: [phone]   [facility]: [phone]    Patient Name: Anastasia Everett  YOB: 1927    Date of Initial Consult: 2/17/2017   Reason for Consult: care decisions  Requesting Provider: Dr. Matt  Primary Care Physician: Marten Hammans, MD      SUMMARY:   Anastasia Everett is a 80y.o. year old with a past history of hypertension, TIA, SBO, abdominal hernia , who was admitted on 2/12/2017 from home with a diagnosis of abdominal pain . He was found to have a small bowel obstruction. Surgery was consulted and he was taken for an exploratory laparotomy where a closed loop obstruction from internal hernia with necrotic bowel was found. He underwent a central small bowel resection (191 cm). His hospital course has been complicated by atrial fib with RVR and hypotension not tolerant of cardizem drip. Heart rate has improved with the addition of digoxin and lopressor. Mr. Dominic Mejia was noted to have coughing and choking with liquids. Speech therapy was consulted, MBS was performed, unfortunately with aspiration of all textures. Palliative medicine is consulted for care decisions. PALLIATIVE DIAGNOSES:   1. Feeding difficulties   2. SBO  3. Debility        PLAN:   1. Patient seen at bedside, several times this am, not as alert as I saw him Friday, has complained about insomnia and received Ativan (1 mg)  around 2 am this morning. Attending aware and monitoring mental status. 2. Goals of care currently full code. Did not discuss at this visit. Daughter is very knowledgeable concerning her father's medical condition. She is aware of the MBS results. She shares with us patient very high functioning at home, loves to eat, ambulatory and engaging with others. She wishes to give him the opportunity to continue to improve. Was not able to place nasal  feeding tube.  Now on TPN, confused not as alert this am ? Due to Ativan which has  Now been d/c'd . Not able to perform MBS today. Hopefully he will be more alert and less confused tomorrow after effects of Ativan have worn off.  3. SBO s/p resection of 191 cm of bowel. Does not seem to be in pain on my assessment  BM x 3 yesterday. 4. Debility; at home ambulatory at times uses cane or walker for long distance. Independent in ADL's, needs some assistance of IADL's. Will likely need SNF for rehab upon d/c and has been accepted by [facility]    5. Initial consult note routed to primary continuity provider  6.  Communicated plan of care with: Palliative IDT,  Attending, speech therapy, RN        GOALS OF CARE:     [====Goals of Care====]  GOALS OF CARE:  Patient / health care proxy stated goals: full treatment       TREATMENT PREFERENCES:   Code Status: Full Code    Advance Care Planning:  Advance Care Planning 2/12/2017   Patient's Healthcare Decision Maker is: Legal Next of OneBreath Madan   Primary Decision Maker Name Karthik Thorpe   Primary Decision Maker Phone Number    Primary Decision Maker Relationship to Patient Other relative   Secondary Decision Maker Name Davey 425 Phone Number    Secondary Decision Maker Relationship to Patient Adult child   Confirm Advance Directive None   Patient Would Like to Complete Advance Directive No   Does the patient have other document types Other (comment)       Other:    The palliative care team has discussed with patient / health care proxy about goals of care / treatment preferences for patient.  [====Goals of Care====]    Advance Care Planning 2/12/2017   Patient's Healthcare Decision Maker is: Legal Next of Spacebikiniraghav Hager   Primary Decision Maker Name Karthik Thorpe   Primary Decision Maker Phone Number    Primary Decision Maker Relationship to Patient Other relative   Secondary Decision Maker Name Davey 425 Phone Number  Secondary Decision Maker Relationship to Patient Adult child   Confirm Advance Directive None   Patient Would Like to Complete Advance Directive No   Does the patient have other document types Other (comment)           HISTORY:     History obtained from: chart, patient, daughter     CHIEF COMPLAINT: feeding difficulties     HPI/SUBJECTIVE:    The patient is:   [] Verbal and participatory  [x] Non-participatory due to: decreased alertness and confusion   Please see summary  02/20/2017  99.1 68 20 110/69 94% on 2 liters of nasal cannula   TPN started on Saturday, IR not able to pass nasal feeding tube. 2/18/2017 cxr Right upper extremity PICC at the caval atrial junction   2. Subdiaphragmatic air is likely iatrogenic given the patient's recent procedure. Attention to this finding on follow-up examinations recommended.     Clinical Pain Assessment (nonverbal scale for nonverbal patients): Pain: 0  Denies pain        FUNCTIONAL ASSESSMENT:      Palliative Performance Scale (PPS): 30%          PSYCHOSOCIAL/SPIRITUAL SCREENING:     Advance Care Planning:  Advance Care Planning 2/12/2017   Patient's Healthcare Decision Maker is: Legal Next of [address]   Primary Decision Maker Name Juventino Ramos   Primary Decision Maker Phone Number    Primary Decision Maker Relationship to Patient Other relative   Secondary Decision Maker Name Davey Carreon Phone Number    Secondary Decision Maker Relationship to Patient Adult child   Confirm Advance Directive None   Patient Would Like to Complete Advance Directive No   Does the patient have other document types Other (comment)        Any spiritual / Episcopal concerns:  [] Yes /  [x] No    Caregiver Burnout:  [] Yes /  [x] No /  [] No Caregiver Present      Anticipatory grief assessment:   [x] Normal  / [] Maladaptive       ESAS Anxiety: Anxiety: 0    ESAS Depression: Depression: 0        REVIEW OF SYSTEMS:     Positive and pertinent negative findings in ROS are noted above in HPI. The following systems were [] reviewed / [x] unable to be reviewed as noted in HPI  Other findings are noted below. Systems: constitutional, ears/nose/mouth/throat, respiratory, gastrointestinal, genitourinary, musculoskeletal, integumentary, neurologic, psychiatric, endocrine. Positive findings noted below. Modified ESAS Completed by: provider   Fatigue: 6 Drowsiness: 1   Depression: 0 Pain: 0   Anxiety: 0 Nausea: 0     Dyspnea: 1     Constipation: Yes     Stool Occurrence(s): 1        PHYSICAL EXAM:     Wt Readings from Last 3 Encounters:   02/20/17 64.4 kg (142 lb)   01/04/16 65.8 kg (145 lb)   11/08/15 63.5 kg (140 lb)     Blood pressure 110/69, pulse 68, temperature 99.1 °F (37.3 °C), resp. rate 20, height 6' (1.829 m), weight 64.4 kg (142 lb), SpO2 94 %. Pain:  Pain Scale 1: Visual  Pain Intensity 1: 0  Pain Onset 1: intermittent  Pain Location 1: Foot  Pain Orientation 1: Left, Right  Pain Description 1: Aching  Pain Intervention(s) 1: Medication (see MAR)      Constitutional: elderly confused, but alerts to his name, talking, can not understand him well. Respiratory: breathing not labored   Skin: warm, dry  Neurologic: opens eyes to his name, trying to talk but not able to understand well. HISTORY:     Principal Problem:    Small bowel obstruction ([address] (2/13/2017)    Active Problems:    Atrial fibrillation with rapid ventricular response ([address] (2/14/2017)      Postoperative anemia (2/14/2017)      Feeding difficulties (2/17/2017)      Debility (2/17/2017)      Past Medical History   Diagnosis Date    Hypertension     TIA (transient ischemic attack)       History reviewed. No pertinent past surgical history. History reviewed. No pertinent family history. History reviewed, no pertinent family history.   Social History   Substance Use Topics    Smoking status: Former Smoker    Smokeless tobacco: Not on file    Alcohol use No     No Known Allergies   Current Facility-Administered Medications   Medication Dose Route Frequency    dilTIAZem (CARDIZEM) 100 mg in 0.9% sodium chloride (MBP/ADV) 100 mL infusion  10 mg/hr IntraVENous CONTINUOUS    acetaminophen (TYLENOL) suppository 650 mg  650 mg Rectal Q4H PRN    TPN ADULT - CENTRAL   IntraVENous CONTINUOUS    insulin detemir (LEVEMIR) injection 8 Units  8 Units SubCUTAneous Q12H    TPN ADULT - CENTRAL   IntraVENous CONTINUOUS    zolpidem (AMBIEN) tablet 5 mg  5 mg Oral QHS PRN    insulin lispro (HUMALOG) injection   SubCUTAneous Q6H    glucose chewable tablet 16 g  4 Tab Oral PRN    glucagon (GLUCAGEN) injection 1 mg  1 mg IntraMUSCular PRN    dextrose (D50W) injection syrg 12.5-25 g  25-50 mL IntraVENous PRN    sodium chloride (NS) flush 10-30 mL  10-30 mL InterCATHeter PRN    sodium chloride (NS) flush 10 mL  10 mL InterCATHeter Q24H    sodium chloride (NS) flush 10 mL  10 mL InterCATHeter PRN    sodium chloride (NS) flush 10-40 mL  10-40 mL InterCATHeter Q8H    sodium chloride (NS) flush 20 mL  20 mL InterCATHeter Q24H    bacitracin 500 unit/gram packet 1 Packet  1 Packet Topical PRN    metoprolol (LOPRESSOR) injection 1.25 mg  1.25 mg IntraVENous Q6H    heparin (porcine) injection 5,000 Units  5,000 Units SubCUTAneous Q8H    tamsulosin (FLOMAX) capsule 0.4 mg  0.4 mg Oral DAILY    0.9% sodium chloride infusion 250 mL  250 mL IntraVENous PRN    apixaban (ELIQUIS) tablet 2.5 mg  2.5 mg Oral BID    phenol throat spray (CHLORASEPTIC) 1 Spray  1 Spray Oral PRN    sodium chloride (NS) flush 5-10 mL  5-10 mL IntraVENous Q8H    sodium chloride (NS) flush 5-10 mL  5-10 mL IntraVENous PRN    0.9% sodium chloride infusion 250 mL  250 mL IntraVENous PRN    sodium chloride (NS) flush 5-10 mL  5-10 mL IntraVENous Q8H    sodium chloride (NS) flush 5-10 mL  5-10 mL IntraVENous PRN    HYDROmorphone (PF) (DILAUDID) injection 1 mg  1 mg IntraVENous Q3H PRN    ondansetron (ZOFRAN) injection 4 mg  4 mg IntraVENous Q4H PRN    0.9% sodium chloride infusion 250 mL  250 mL IntraVENous PRN        LAB AND IMAGING FINDINGS:     Lab Results   Component Value Date/Time    WBC 12.7 02/14/2017 03:50 AM    HGB 11.1 02/20/2017 05:10 AM    PLATELET 709 21/58/9951 03:50 AM     Lab Results   Component Value Date/Time    Sodium 139 02/20/2017 05:10 AM    Potassium 3.8 02/20/2017 05:10 AM    Chloride 111 02/20/2017 05:10 AM    CO2 18 02/20/2017 05:10 AM    BUN 56 02/20/2017 05:10 AM    Creatinine 1.93 02/20/2017 05:10 AM    Calcium 8.1 02/20/2017 05:10 AM    Magnesium 2.7 02/20/2017 05:10 AM    Phosphorus 2.0 02/20/2017 05:10 AM      Lab Results   Component Value Date/Time    AST (SGOT) 14 02/12/2017 06:05 AM    Alk. phosphatase 70 02/12/2017 06:05 AM    Protein, total 7.0 02/12/2017 06:05 AM    Albumin 3.5 02/12/2017 06:05 AM    Globulin 3.5 02/12/2017 06:05 AM     Lab Results   Component Value Date/Time    INR 1.2 02/13/2017 05:30 AM    Prothrombin time 15.1 02/13/2017 05:30 AM    aPTT 29.9 01/04/2016 12:40 PM      No results found for: IRON, FE, TIBC, IBCT, PSAT, FERR   No results found for: PH, PCO2, PO2  No components found for: [place]   Lab Results   Component Value Date/Time    CK 68 09/16/2010 01:01 PM    CK - MB 0.9 09/16/2010 01:01 PM              Total time: 35 minutes   Counseling / coordination time: 25 minutes   > 50% counseling / coordination?: yes     Prolonged service was provided for  []30 min   []75 min in face to face time in the presence of the patient. Time Start:   Time End:   Note: this can only be billed with 49118 (initial) or 71336 (follow up). If multiple start / stop times, list each separately.

## 2017-02-20 NOTE — PROGRESS NOTES
Inpatient Daily Progress Note    Subjective:   Pt attempting to communicate, but not making sense. Pt was able to communicate that pain is well controlled. Objective:     Physical Exam:  Visit Vitals    /69    Pulse 68    Temp 99.1 °F (37.3 °C)    Resp 20    Ht 6' (1.829 m)    Wt 64.4 kg (142 lb)    SpO2 94%    BMI 19.26 kg/m2       Gen: Well developed, well nourished 80 y.o. male not appearing to be of normal mental status  Abdomen: soft, nontender, nondistended  Wound: clean, dry and intact  Psych: not alert and oriented to person, place and time      Recent Results (from the past 12 hour(s))   HGB & HCT    Collection Time: 02/20/17  5:10 AM   Result Value Ref Range    HGB 11.1 (L) 13.0 - 16.0 g/dL    HCT 33.0 (L) 36.0 - 48.0 %   PHOSPHORUS    Collection Time: 02/20/17  5:10 AM   Result Value Ref Range    Phosphorus 2.0 (L) 2.5 - 4.9 MG/DL   MAGNESIUM    Collection Time: 02/20/17  5:10 AM   Result Value Ref Range    Magnesium 2.7 (H) 1.8 - 2.4 mg/dL   METABOLIC PANEL, BASIC    Collection Time: 02/20/17  5:10 AM   Result Value Ref Range    Sodium 139 136 - 145 mmol/L    Potassium 3.8 3.5 - 5.5 mmol/L    Chloride 111 (H) 100 - 108 mmol/L    CO2 18 (L) 21 - 32 mmol/L    Anion gap 10 3.0 - 18 mmol/L    Glucose 189 (H) 74 - 99 mg/dL    BUN 56 (H) 7.0 - 18 MG/DL    Creatinine 1.93 (H) 0.6 - 1.3 MG/DL    BUN/Creatinine ratio 29 (H) 12 - 20      GFR est AA 40 (L) >60 ml/min/1.73m2    GFR est non-AA 33 (L) >60 ml/min/1.73m2    Calcium 8.1 (L) 8.5 - 10.1 MG/DL   GLUCOSE, POC    Collection Time: 02/20/17  5:49 AM   Result Value Ref Range    Glucose (POC) 208 (H) 70 - 110 mg/dL   GLUCOSE, POC    Collection Time: 02/20/17 11:50 AM   Result Value Ref Range    Glucose (POC) 265 (H) 70 - 110 mg/dL       Date 02/19/17 0700 - 02/20/17 0659 02/20/17 0700 - 02/21/17 0659   Shift 3837-8141 8930-3340 24 Hour Total 1159-3187 1973-8192 24 Hour Total   I  N  T  A  K  E   P. O. 0  0         P. O. 0  0 I.V.  (mL/kg/hr)  1153.3  (1.5) 1153.3  (0.7)         Volume (TPN ADULT - CENTRAL)  1153.3 1153.3       Shift Total  (mL/kg) 0  (0) 1153.3  (17.8) 1153.3  (17.8)      O  U  T  P  U  T   Urine  (mL/kg/hr) 250  (0.3) 350  (0.4) 600  (0.4) 200  200      Urine Voided 250 350 600 200  200      Urine Occurrence(s) 4 x  4 x       Stool            Stool Occurrence(s) 3 x  3 x       Shift Total  (mL/kg) 250  (3.9) 350  (5.4) 600  (9.3) 200  (3.1)  200  (3.1)   NET -250 803.3 553.3 -200  -200   Weight (kg) 64.9 64.9 64.9 64.4 64.4 64.4         Assessment:     Karthik Del Toro is a 80 y.o. male s/p small bowel resection on 2/12/17. Pt does not appear to be as alert as usual and is not making sense.   Pt was given Ativan last night and it is thought that his mental status is a result of the medication      Plan:     -Continue current medications  - continue to monitor metal status and hold Ativan

## 2017-02-20 NOTE — PROGRESS NOTES
Bedside and Verbal [id number] Report included the following information SBAR, Kardex, Intake/Output, MAR and Recent Results. 2015-Shift assessment completed, pt alert, no distress noted, no complains of pain, bed locked in low position, will continue to monitor. 0015-Reassessment completed, will continue to monitor. 0210-Pt agitated, unable to sleep. Ambien prn in chart but pt cannot swallow, Dr Nieves Villagomez, order for ativan received. 0312-Pt heart rate in 140s-150s, Dr Nieves Villagomez notified, orders for metoprolol received. 0405-Pt HR still in 140s-150s, Dr notified, orders for cardizem drip received. 0410-Cardizem drip started, pt tolerating well. 0456-Pt hr sustaining in 140s, Dr. Mark May, orders received to increase Cardizem drip, will continue to monitor. 0540-Pt heart rate still 140/150s, no complaints of pain, will continue to monitor. 0615-Heart rate in 100-110s, will continue to monitor. Bedside and Verbal shift change report given to Obdulia Deluna RN (oncoming nurse) by Ansley Orozco RN (offgoing nurse). Report included the following information SBAR, Kardex, Intake/Output, MAR and Recent Results.

## 2017-02-20 NOTE — MANAGEMENT PLAN
Reviewed chart.  Pt has been accepted to [facility] for SNF rehab post discharge      Ita Barriga RN BSN  Outcomes Manager  Pager # [phone]

## 2017-02-20 NOTE — PROGRESS NOTES
Internal Medicine Progress Note    Patient's Name: Tobin Ren Date: 2/12/2017  Length of Stay: 7      Assessment/Plan     #SBO s/p Exlap and Small Bowel Resection 02/13  #Severe Dysphagia on TPN  #Atrial Fibrillation with RVR  #Acute Metabolic Encephalopathy Likely 2/2 Benzodiazepam  #L Inguinal Hernia  #HTN  #TIA on Long-term AC  #Iron Deficiency Anemia  #CKD Stage IIIb  #DVT PPx     - Surgery following, no further intervention. Pt w/ BM. Pain control PRN  - For speech eval zhou 2/2 lethargy today. Cont TPN for now. PEG if fail  - Rate controlled on dig. Restart eliquis pending speech eval zhou  - Monitor clinically for now. No further benzos or ambien  - Eliquis on hold for now while NPO  - Restart ferrous sulfate once diet advanced  - Cr appears at baseline. Cont to monitor BMP.  - SCDs    I have personally reviewed all pertinent labs and films that have officially resulted over the last 24 hours. I have personally checked for all pending labs that are awaiting final results. I discussed the patient's status and updates with the daughter.     Subjective     Pt s/e @ bedside  Pt OK over weekend, received ativan around 2 AM for insomnia and slept but has been drowsy since  Unable to obtain ROS 2/2 mentation    Objective     Visit Vitals    /69    Pulse 68    Temp 99.1 °F (37.3 °C)    Resp 20    Ht 6' (1.829 m)    Wt 64.4 kg (142 lb)    SpO2 94%    BMI 19.26 kg/m2       Physical Exam:  General Appearance: NAD, lethargic  HENT: normocephalic/atraumatic, moist mucus membranes  Lungs: CTA with normal respiratory effort  CV: IRIR, no m/r/g  Abdomen: soft, bandage in place, normal bowel sounds  Extremities: no cyanosis, no peripheral edema  Neuro: moves all extremities, no focal deficits  Psych: Lethargic, no psychosis    Intake and Output:  Current Shift:  02/20 0701 - 02/20 1900  In: -   Out: 200 [Urine:200]  Last three shifts:  02/18 1901 - 02/20 0700  In: 1153.3 [I.V.:1153.3]  Out: 1300 [WRAPX:1220]    Lab/Data Reviewed:  BMP:   Lab Results   Component Value Date/Time     02/20/2017 05:10 AM    K 3.8 02/20/2017 05:10 AM     (H) 02/20/2017 05:10 AM    CO2 18 (L) 02/20/2017 05:10 AM    AGAP 10 02/20/2017 05:10 AM     (H) 02/20/2017 05:10 AM    BUN 56 (H) 02/20/2017 05:10 AM    CREA 1.93 (H) 02/20/2017 05:10 AM    GFRAA 40 (L) 02/20/2017 05:10 AM    GFRNA 33 (L) 02/20/2017 05:10 AM     CBC:   Lab Results   Component Value Date/Time    HGB 11.1 (L) 02/20/2017 05:10 AM    HCT 33.0 (L) 02/20/2017 05:10 AM       Imaging Reviewed:  No results found.     Medications Reviewed:  Current Facility-Administered Medications   Medication Dose Route Frequency    dilTIAZem (CARDIZEM) 100 mg in 0.9% sodium chloride (MBP/ADV) 100 mL infusion  10 mg/hr IntraVENous CONTINUOUS    acetaminophen (TYLENOL) suppository 650 mg  650 mg Rectal Q4H PRN    TPN ADULT - CENTRAL   IntraVENous CONTINUOUS    insulin detemir (LEVEMIR) injection 8 Units  8 Units SubCUTAneous Q12H    TPN ADULT - CENTRAL   IntraVENous CONTINUOUS    zolpidem (AMBIEN) tablet 5 mg  5 mg Oral QHS PRN    insulin lispro (HUMALOG) injection   SubCUTAneous Q6H    glucose chewable tablet 16 g  4 Tab Oral PRN    glucagon (GLUCAGEN) injection 1 mg  1 mg IntraMUSCular PRN    dextrose (D50W) injection syrg 12.5-25 g  25-50 mL IntraVENous PRN    sodium chloride (NS) flush 10-30 mL  10-30 mL InterCATHeter PRN    sodium chloride (NS) flush 10 mL  10 mL InterCATHeter Q24H    sodium chloride (NS) flush 10 mL  10 mL InterCATHeter PRN    sodium chloride (NS) flush 10-40 mL  10-40 mL InterCATHeter Q8H    sodium chloride (NS) flush 20 mL  20 mL InterCATHeter Q24H    bacitracin 500 unit/gram packet 1 Packet  1 Packet Topical PRN    metoprolol (LOPRESSOR) injection 1.25 mg  1.25 mg IntraVENous Q6H    heparin (porcine) injection 5,000 Units  5,000 Units SubCUTAneous Q8H    tamsulosin (FLOMAX) capsule 0.4 mg  0.4 mg Oral DAILY    0.9% sodium chloride infusion 250 mL  250 mL IntraVENous PRN    apixaban (ELIQUIS) tablet 2.5 mg  2.5 mg Oral BID    phenol throat spray (CHLORASEPTIC) 1 Spray  1 Spray Oral PRN    sodium chloride (NS) flush 5-10 mL  5-10 mL IntraVENous Q8H    sodium chloride (NS) flush 5-10 mL  5-10 mL IntraVENous PRN    0.9% sodium chloride infusion 250 mL  250 mL IntraVENous PRN    sodium chloride (NS) flush 5-10 mL  5-10 mL IntraVENous Q8H    sodium chloride (NS) flush 5-10 mL  5-10 mL IntraVENous PRN    HYDROmorphone (PF) (DILAUDID) injection 1 mg  1 mg IntraVENous Q3H PRN    ondansetron (ZOFRAN) injection 4 mg  4 mg IntraVENous Q4H PRN    0.9% sodium chloride infusion 250 mL  250 mL IntraVENous PRN           Fatuma Watkins DO  Internal Medicine, Hospitalist  Pager: [phone]  [address] [place] [address]

## 2017-02-20 NOTE — PROGRESS NOTES
Speech Therapy Note:    Pt not appropriate for MBS today; will follow-up next day to determine appropriateness.      Christy Poole, SLP Intern  Ph: [phone]

## 2017-02-20 NOTE — PALLIATIVE CARE
Bedside visit with pt who was alert and awake but confused. He tried very hard to communicate but could only speak one or two words. He seems to be worried about who's paying the hospital bill, as far as we could understand. Also appreciated a little water via the toothette sponge. Daughter not present.

## 2017-02-21 NOTE — MANAGEMENT PLAN
Had long conversation with daughter about discharging planning. Explained SNF rehab process and he will need to be able to stand and take a couple steps to be admitted to [facility]. Gave list of other facilities and daughter will make backup plan. Daughter asked about personal care after going home and she is aware they will need to pay for it. She states there are resources for payment. Daughter lives next door and also cares for her mother. Daughter had lots of questions about home health and hospice. Answered all questions about home health and that a care manager at rehab would help complete home health physician order at that time. Offered Palliative care to meet with family about hospice. Daughter states father and family are not ready for that and want everything done.  She states father is anxious to be able to get stronger and ambulate      Jose M Rankin RN BSN  Outcomes Manager  Pager # [phone]

## 2017-02-21 NOTE — PROGRESS NOTES
Speech Therapy Note:    Follow-up d/w Dr. Bahman Carlisle today; MBS to be completed next am as indicated. Rhonda [address]., [address] [facility]  Office: [phone]  Pager: [phone]

## 2017-02-21 NOTE — PROGRESS NOTES
completed a follow up visit with patient in room 3018 and found him  To be very weak and soft spoke. Patient complained about not being able to get any help when he needed to get to the bathroom as so he said he had an accident in the bed. I alerted staff to his situation and they came to help him.    Chaplains will continue to follow and will provide pastoral care on an as needed/requested basis    Chaplain Earle Qureshi   Board Certified [address] [place] [address]   [phone]

## 2017-02-21 NOTE — PROGRESS NOTES
Inpatient Daily Progress Note    Subjective:   Pt was complaining that all we are doing is looking at him and not doing anything to help him. He wants to leave. Pain is well controlled. He has not had nausea, has not vomitted, has passed flatus, and has had a bowel movement. Objective:     Physical Exam:  Visit Vitals    /71 (BP 1 Location: Left arm, BP Patient Position: Supine)    Pulse 81    Temp 98.6 °F (37 °C)    Resp 20    Ht 6' (1.829 m)    Wt 69.9 kg (154 lb 1.6 oz)    SpO2 94%    BMI 20.9 kg/m2       Date 02/20/17 0700 - 02/21/17 0659 02/21/17 0700 - 02/22/17 0659   Shift 7901-9880 3476-1442 24 Hour Total 2697-1916 9175-9057 24 Hour Total   I  N  T  A  K  E   Shift Total  (mL/kg)         O  U  T  P  U  T   Urine  (mL/kg/hr) 200  (0.3) 200  (0.2) 400  (0.2)         Urine Voided 200  200         Urine Output (mL) (Condom Catheter 02/20/17)  200 200       Stool            Stool Occurrence(s)  1 x 1 x       Shift Total  (mL/kg) 200  (3.1) 200  (2.9) 400  (5.7)      NET -200 -200 -400      Weight (kg) 64.4 69.9 69.9 69.9 69.9 69.9       Gen: Well developed, well nourished 80 y.o. male in no acute distress, appeared to be having a nightmare when I walked in the room. I asked him if he was sleeping and he opened his eyes and continued with what he was saying. He was complaining that we are not doing anything and that he was afraid he was going to be put in a cab to go home and that he is too sick for that. He was saying that he wants to go home though. He appears frustrated.   Resp: clear to auscultation bilaterally, no wheezes   Cardio: Regular rate and rhythm, no murmurs, clicks, gallops or rubs, no edema  Abdomen: soft, non-tender, nondistended, with active bowel sounds  Wound: clean, dry and intact  Psych: alert and oriented to person, place and time      Recent Results (from the past 12 hour(s))   GLUCOSE, POC    Collection Time: 02/21/17 12:56 AM   Result Value Ref Range    Glucose (POC) 199 (H) 70 - 233 mg/dL   METABOLIC PANEL, BASIC    Collection Time: 02/21/17  5:40 AM   Result Value Ref Range    Sodium 139 136 - 145 mmol/L    Potassium 3.9 3.5 - 5.5 mmol/L    Chloride 106 100 - 108 mmol/L    CO2 22 21 - 32 mmol/L    Anion gap 11 3.0 - 18 mmol/L    Glucose 155 (H) 74 - 99 mg/dL    BUN 68 (H) 7.0 - 18 MG/DL    Creatinine 1.98 (H) 0.6 - 1.3 MG/DL    BUN/Creatinine ratio 34 (H) 12 - 20      GFR est AA 39 (L) >60 ml/min/1.73m2    GFR est non-AA 32 (L) >60 ml/min/1.73m2    Calcium 7.5 (L) 8.5 - 10.1 MG/DL   CBC WITH AUTOMATED DIFF    Collection Time: 02/21/17  5:40 AM   Result Value Ref Range    WBC 11.3 4.6 - 13.2 K/uL    RBC 3.55 (L) 4.70 - 5.50 M/uL    HGB 10.0 (L) 13.0 - 16.0 g/dL    HCT 30.5 (L) 36.0 - 48.0 %    MCV 85.9 74.0 - 97.0 FL    MCH 28.2 24.0 - 34.0 PG    MCHC 32.8 31.0 - 37.0 g/dL    RDW 15.3 (H) 11.6 - 14.5 %    PLATELET 249 231 - 711 K/uL    MPV 10.9 9.2 - 11.8 FL    NEUTROPHILS 91 (H) 40 - 73 %    LYMPHOCYTES 6 (L) 21 - 52 %    MONOCYTES 3 3 - 10 %    EOSINOPHILS 0 0 - 5 %    BASOPHILS 0 0 - 2 %    ABS. NEUTROPHILS 10.3 (H) 1.8 - 8.0 K/UL    ABS. LYMPHOCYTES 0.6 (L) 0.9 - 3.6 K/UL    ABS. MONOCYTES 0.4 0.05 - 1.2 K/UL    ABS. EOSINOPHILS 0.0 0.0 - 0.4 K/UL    ABS.  BASOPHILS 0.0 0.0 - 0.06 K/UL    DF AUTOMATED     GLUCOSE, POC    Collection Time: 02/21/17  7:07 AM   Result Value Ref Range    Glucose (POC) 208 (H) 70 - 110 mg/dL       Assessment:     Doreen Lancaster is a 80 y.o. male s/p small bowel resection on 2/12/17      Plan:     -Continue current medications  -consider transfer to skilled facility

## 2017-02-21 NOTE — PROGRESS NOTES
Problem: Dysphagia (Adult)  Goal: *Acute Goals and Plan of Care (Insert Text)  Dysphagia Present: severe  Aspiration: at risk     Recommendations:  Diet: NPO with alternative nutrition/hydration source  Meds: via IV or TF  Aspiration Precautions  Other: small 1/2 tsp sips water after oral care for comfort    Goals: Patient will:  1. Tolerate PO trials with 0 s/s overt distress in 4/5 trials  2. Utilize compensatory swallow strategies/maneuvers (decrease bite/sip, size/rate, alt. liq/sol) with min cues in 4/5 trials  3. Perform oral-motor/laryngeal exercises to increase oropharyngeal swallow function with min cues  4. Complete an objective swallow study (i.e., MBSS) to assess swallow integrity, r/o aspiration, and determine of safest LRD, min A - met 2/16/17     Outcome: Not Progressing Towards Goal  SPEECH LANGUAGE PATHOLOGY DYSPHAGIA TREATMENT     Patient: Christian Harrison (62 y.o. male)  Date: 2/21/2017  Diagnosis: SBO  Small Bowel Obstruction  Small bowel obstruction (HCC) Small bowel obstruction (HCC)  Procedure(s) (LRB):  EXPLORATORY LAPAROTOMY POSSIBLE SMALL BOWEL RESECTION (N/A) 8 Days Post-Op  Precautions: aspiration Fall      ASSESSMENT:  Patient seen for swallowing therapy this day. Oral cavity is dry with odor, oral care given. Patient pursing lips through oral care. Patient presented with ice chips with continued poor bolus control, repelling ice chips anteriorly. No swallow elicited to palpation. ST will complete MBS next day. Progression toward goals:  [ ]         Improving appropriately and progressing toward goals  [ ]         Improving slowly and progressing toward goals  [X]         Not making progress toward goals and plan of care will be adjusted       PLAN:  Recommendations and Planned Interventions:  NPO with alt means of nutrition. Patient continues to benefit from skilled intervention to address the above impairments. Continue treatment per established plan of care.   Discharge Recommendations:  Home health       SUBJECTIVE:   Patient stated Im done. OBJECTIVE:   Cognitive and Communication Status:  Neurologic State: Alert, Eyes open spontaneously  Orientation Level: Oriented to person, Oriented to place, Oriented to time  Cognition: Decreased attention/concentration  Perception: Appears intact  Perseveration: No perseveration noted  Safety/Judgement: Fall prevention  Dysphagia Treatment:  Oral Assessment:  Oral Assessment  Labial: No impairment  Dentition: Full  Oral Hygiene: good  Lingual: No impairment  Velum: No impairment  Mandible: No impairment  P.O.  Trials:              Patient Position: [facility]              Vocal quality prior to P.O.: Phonation breaks, Hoarse, Breathy              Consistency Presented: Ice chips              How Presented: Self-fed/presented                             Bolus Acceptance: Impaired              Bolus Formation/Control: Impaired              Type of Impairment: Lip closure, Mastication, Incomplete              Propulsion: Absent              Oral Residue: None              Initiation of Swallow: Absent              Laryngeal Elevation: Functional              Aspiration Signs/Symptoms: Delayed cough/throat clear, Weak cough              Pharyngeal Phase Characteristics: Multiple swallows              Effective Modifications: Small sips and bites              Cues for Modifications: Minimal                                Oral Phase Severity: Severe              Pharyngeal Phase Severity : Severe     PAIN:  Start of Tx: 0  End of Tx: 0      After treatment:   [ ]              Patient left in no apparent distress sitting up in chair  [X]              Patient left in no apparent distress in bed  [X]              Call bell left within reach  [ ]              Nursing notified  [ ]              Family present  [ ]              Caregiver present  [ ]              Bed alarm activated         COMMUNICATION/EDUCATION:   [ ]         Aspiration precautions; swallow safety; compensatory techniques  [X]        Patient unable to participate in education; education ongoing with staff  [ ]         Posted safety precautions in patient's room.   [ ]         Oral-motor/laryngeal strengthening exercises        Gurpreet Arias MS [facility] SLP  Time Calculation: 25 mins

## 2017-02-21 NOTE — PROGRESS NOTES
Problem: Mobility Impaired (Adult and Pediatric)  Goal: *Acute Goals and Plan of Care (Insert Text)  Physical Therapy Goals  Initiated 2/15/2017 and to be accomplished within 7 day(s)  1. Patient will move from supine to sit and sit to supine , scoot up and down and roll side to side in bed with modified independence. 2. Patient will transfer from bed to chair and chair to bed with supervision/set-up using the least restrictive device. 3. Patient will perform sit to stand with supervision/set-up. 4. Patient will ambulate with minimal assistance/contact guard assist for 75 feet with the least restrictive device. Outcome: Not Progressing Towards Goal  PHYSICAL THERAPY TREATMENT     Patient: Silas Moncada (3 y.o. male)  Date: 2/21/2017  Diagnosis: SBO  Small Bowel Obstruction  Small bowel obstruction (HCC) Small bowel obstruction (HCC)  Procedure(s) (LRB):  EXPLORATORY LAPAROTOMY POSSIBLE SMALL BOWEL RESECTION (N/A) 8 Days Post-Op  Precautions: Fall  Chart, physical therapy assessment, plan of care and goals were reviewed. ASSESSMENT:  Pt was seen for mobility training and PROM of all joints 2/2 inability to perform transfers due to increased pain. Pt required maxA to reposition on R side and he immediately required maxA to roll back on his back 2/2 reports of LB pain according to his daughter that is present throughout the entire session. PROM of all joints with grimacing with initial reps, however pt is able to relax after 3-5 reps able to tolerate 10 reps each, 1 set. Pts daughter reports that her dad is more lethargic and remains affected by the seditive that he received a few days ago. She is requesting that PT continue and progress accordingly as she feels that once the medication is out of his system, he will get stronger, be able to participate etc.  Continue to address pts multiple deficits at this time.   Progression toward goals:  [ ]      Improving appropriately and progressing toward goals  [ ]      Improving slowly and progressing toward goals  [X]      Not making progress toward goals and plan of care will be adjusted       PLAN:  Patient continues to benefit from skilled intervention to address the above impairments. Continue treatment per established plan of care. Discharge Recommendations: To Be Determined  Further Equipment Recommendations for Discharge:  N/A       G-CODES:      Mobility  Current  CM= 80-99%. The severity rating is based on the Level of Assistance required for Functional Mobility and ADLs. SUBJECTIVE:   Patient stated Grumbles and unable to determine what is said.       OBJECTIVE DATA SUMMARY:   Critical Behavior:  Neurologic State: Alert, Eyes open spontaneously  Orientation Level: Oriented to person, Oriented to place, Oriented to time  Cognition: Decreased attention/concentration  Safety/Judgement: Fall prevention  Functional Mobility Training:  Bed Mobility:  Rolling: Maximum assistance (and with moderate pain)  Pain:  Pt reports /10 pain or discomfort prior to treatment. (Pt unable to articulate. However he is grimaces with all initial movements of body parts. Pt reports /10 pain or discomfort post treatment. Activity Tolerance:   Pt with poor tolerance to skilled PT session today. His daughter reports that he has been \"out of it\" for 2 days 2/2 being medicated with a sedative that he was given and he had an adverse reaction to. He grimaces with all movements during bed mobility and with ROM. Please refer to the flowsheet for vital signs taken during this treatment.   After treatment:   [ ] Patient left in no apparent distress sitting up in chair  [X] Patient left in no apparent distress in bed  [X] Call bell left within reach  [X] Nursing notified  [X] Caregiver present  [ ] Bed alarm activated      Browne Begin   Time Calculation: 10 mins

## 2017-02-21 NOTE — PROGRESS NOTES
Internal Medicine Progress Note    Patient's Name: Naila Sharp Date: 2/12/2017  Length of Stay: 8      Assessment/Plan     #SBO s/p Exlap and Small Bowel Resection 02/13  #Severe Dysphagia on TPN  #Atrial Fibrillation with RVR  #Acute Metabolic Encephalopathy Likely 2/2 Benzodiazepam  #L Inguinal Hernia  #HTN  #TIA on Long-term AC  #Iron Deficiency Anemia  #CKD Stage IIIb  #DVT PPx     - Surgery following, no further intervention. Pt cont to have BMs. Pain control PRN  - For speech eval zhou 2/2 dilt gtt, which was stopped. Cont TPN for now. PEG if fail  - Rate controlled on lopressor. Dilt gtt d/c'd. Restart eliquis pending speech eval zhou  - Monitor clinically for now. No further benzos or ambien  - Eliquis on hold for now while NPO  - Restart ferrous sulfate once diet advanced  - Cr slightly up this AM, but around baseline. Cont to monitor BMP.  - SCDs, SQH    I have personally reviewed all pertinent labs and films that have officially resulted over the last 24 hours. I have personally checked for all pending labs that are awaiting final results. I discussed the patient's status and updates with the daughter.     Subjective     Pt s/e @ bedside  Speech eval planned for this AM but delayed due to dilt gtt  Pt mentation much better but has difficulty speaking from dry mouth  Denies CP or SOB  Denies abd pain    Objective     Visit Vitals    /66    Pulse 75    Temp 97.9 °F (36.6 °C)    Resp 18    Ht 6' (1.829 m)    Wt 69.9 kg (154 lb 1.6 oz)    SpO2 90%    BMI 20.9 kg/m2       Physical Exam:  General Appearance: NAD, conversant but difficulty with speech  HENT: normocephalic/atraumatic, dry mucus membranes  Neck: No JVD, supple  Lungs: CTA with normal respiratory effort  CV: IRIR, no m/r/g  Abdomen: soft, bandage in place, normal bowel sounds  Neuro: moves all extremities, no focal deficits  Extremities: SCDs, no edema  Psych: Normal mood and affect    Intake and Output:  Current Shift: Last three shifts:  02/19 1901 - 02/21 0700  In: 1153.3 [I.V.:1153.3]  Out: 750 [Urine:750]    Lab/Data Reviewed:  BMP:   Lab Results   Component Value Date/Time     02/21/2017 05:40 AM    K 3.9 02/21/2017 05:40 AM     02/21/2017 05:40 AM    CO2 22 02/21/2017 05:40 AM    AGAP 11 02/21/2017 05:40 AM     (H) 02/21/2017 05:40 AM    BUN 68 (H) 02/21/2017 05:40 AM    CREA 1.98 (H) 02/21/2017 05:40 AM    GFRAA 39 (L) 02/21/2017 05:40 AM    GFRNA 32 (L) 02/21/2017 05:40 AM     CBC:   Lab Results   Component Value Date/Time    WBC 11.3 02/21/2017 05:40 AM    HGB 10.0 (L) 02/21/2017 05:40 AM    HCT 30.5 (L) 02/21/2017 05:40 AM     02/21/2017 05:40 AM       Imaging Reviewed:  No results found.     Medications Reviewed:  Current Facility-Administered Medications   Medication Dose Route Frequency    insulin lispro (HUMALOG) injection 3 Units  3 Units SubCUTAneous Q6H    insulin detemir (LEVEMIR) injection 10 Units  10 Units SubCUTAneous Q12H    barium sulfate (E-Z-DISK) contrast tablet 700 mg  700 mg Oral RAD ONCE    barium sulfate (VARIBAR NECTAR) 40 % (w/v) contrast suspension 15 mL  15 mL Oral RAD ONCE    barium sulfate (VARIBAR PUDDING) 40 % (w/v), 30% (w/w) contrast oral paste 15 mL  15 mL Oral RAD ONCE    barium sulfate (VARIBAR THIN HONEY) 40 % (w/v) 29% (w/w) contrast suspension 15 mL  15 mL Oral RAD ONCE    barium sulfate (VARIBAR THIN) 40 % (w/v) oral powder 30 mL  30 mL Oral RAD ONCE    TPN ADULT - CENTRAL   IntraVENous CONTINUOUS    acetaminophen (TYLENOL) suppository 650 mg  650 mg Rectal Q4H PRN    TPN ADULT - CENTRAL   IntraVENous CONTINUOUS    zolpidem (AMBIEN) tablet 5 mg  5 mg Oral QHS PRN    insulin lispro (HUMALOG) injection   SubCUTAneous Q6H    glucose chewable tablet 16 g  4 Tab Oral PRN    glucagon (GLUCAGEN) injection 1 mg  1 mg IntraMUSCular PRN    dextrose (D50W) injection syrg 12.5-25 g  25-50 mL IntraVENous PRN    sodium chloride (NS) flush 10-30 mL 10-30 mL InterCATHeter PRN    sodium chloride (NS) flush 10 mL  10 mL InterCATHeter Q24H    sodium chloride (NS) flush 10 mL  10 mL InterCATHeter PRN    sodium chloride (NS) flush 10-40 mL  10-40 mL InterCATHeter Q8H    sodium chloride (NS) flush 20 mL  20 mL InterCATHeter Q24H    bacitracin 500 unit/gram packet 1 Packet  1 Packet Topical PRN    metoprolol (LOPRESSOR) injection 1.25 mg  1.25 mg IntraVENous Q6H    heparin (porcine) injection 5,000 Units  5,000 Units SubCUTAneous Q8H    tamsulosin (FLOMAX) capsule 0.4 mg  0.4 mg Oral DAILY    0.9% sodium chloride infusion 250 mL  250 mL IntraVENous PRN    apixaban (ELIQUIS) tablet 2.5 mg  2.5 mg Oral BID    phenol throat spray (CHLORASEPTIC) 1 Spray  1 Spray Oral PRN    sodium chloride (NS) flush 5-10 mL  5-10 mL IntraVENous Q8H    sodium chloride (NS) flush 5-10 mL  5-10 mL IntraVENous PRN    0.9% sodium chloride infusion 250 mL  250 mL IntraVENous PRN    sodium chloride (NS) flush 5-10 mL  5-10 mL IntraVENous Q8H    sodium chloride (NS) flush 5-10 mL  5-10 mL IntraVENous PRN    HYDROmorphone (PF) (DILAUDID) injection 1 mg  1 mg IntraVENous Q3H PRN    ondansetron (ZOFRAN) injection 4 mg  4 mg IntraVENous Q4H PRN    0.9% sodium chloride infusion 250 mL  250 mL IntraVENous PRN           Georgia Givens DO  Internal Medicine, Hospitalist  Pager: [phone]  [facility]

## 2017-02-21 NOTE — PROGRESS NOTES
0730 received care of patient. Patient in bed resting. Somewhat agitated. Yelling \"Where is my daughter? \" Patient verbally reassured that his daughter was coming today. Remains on TPN and cardizem drip. 1130 IDR rounds complete. Cardizem drip discntinued. Daughter at bedside. Patient resting. 1200 Reassessment done and no changes noted    1230 Blood drawn from PICC line for magnesium labs. Both ports patent and flushes easily    1305 Patient had large bowel movement.  Pericare done with CNA

## 2017-02-21 NOTE — PROGRESS NOTES
Problem: Self Care Deficits Care Plan (Adult)  Goal: *Acute Goals and Plan of Care (Insert Text)  Occupational Therapy Goals  Initiated 2/15/2017 within 7 day(s). 1. Patient will perform grooming tasks while standing at sink with supervision/set-up. 2. Patient will perform lower body dressing with supervision/set-up. 3. Patient will perform bathing with supervision/set-up. 4. Patient will perform toilet transfers with modified independence. 5. Patient will perform all aspects of toileting with modified independence. 6. Patient will participate in upper extremity therapeutic exercise/activities with supervision/set-up for 8 minutes to increase/maintain strength/edurance of BUEs for ADLs. 7. Patient will utilize energy conservation techniques during functional activities with verbal cues. Outcome: Progressing Towards Goal  OCCUPATIONAL THERAPY TREATMENT     Patient: Crystal Jovel (39 y.o. male)  Date: 2/21/2017  Diagnosis: SBO  Small Bowel Obstruction  Small bowel obstruction (HCC) Small bowel obstruction (HCC)  Procedure(s) (LRB):  EXPLORATORY LAPAROTOMY POSSIBLE SMALL BOWEL RESECTION (N/A) 8 Days Post-Op  Precautions: Fall  Chart, occupational therapy assessment, plan of care, and goals were reviewed. ASSESSMENT:  Pt is more alert today, oriented x 3, motivated to participate w/therapy. Pt seen for UE TherEx and ADL grooming tasks at bed level. Pt requires assist to achive full ELIDA w/BUE, L > R and pt c/o pain w/LUE ROM. L MCP 2nd digit, red and warm, elevated at end of session. Pt severely deconditioned requiring hand over hand and Max Assist w/hand grooming tasks. Pt producing large amounts of red tinged mucous s/p oral care. Alerted Romelia CLAROS. EDUCATION Pt educated on importance of UE TherEx and encouraged to perform throughout the day.   Progression toward goals:  [ ]          Improving appropriately and progressing toward goals  [X]          Improving slowly and progressing toward goals  [ ]          Not making progress toward goals and plan of care will be adjusted       PLAN:  Patient continues to benefit from skilled intervention to address the above impairments. Continue treatment per established plan of care. Discharge Recommendations:  Fracisco Lemus  Further Equipment Recommendations for Discharge:   TBD by SNF staff       SUBJECTIVE:   Patient stated That would be great.  when asked if he wanted to brush his teeth      OBJECTIVE DATA SUMMARY:         Cognitive/Behavioral Status:  Neurologic State: Alert  Orientation Level: Oriented to person, Oriented to place, Oriented to situation  Cognition: Appropriate for age attention/concentration, Follows commands  Safety/Judgement: Fall prevention  Functional Mobility and Transfers for ADLs:              Bed Mobility:  Scooting: Total assistance;Assist x2  ADL Intervention:  Grooming  Washing Face: Maximum assistance (w/hand over hand assist)  Brushing Teeth: Maximum assistance (w/hand over hand assist), pt poor bilateral coordination and decrease FM strength require total assist w/toothpast mgt. And hand over hand assist to bring toothbrush to oral cavity     Therapeutic Exercises:   AAROM > AROM RUE all planes  PROM > AROM LUE shoulder/elbow flexion/extension     Pain  Pre Treatment:0  Post Treatment:0  Pt c/o pain w/LUE elbow/shoulder flexion, alleviated w/rest and elevation     Activity Tolerance:    Fair at bed level     Please refer to the flowsheet for vital signs taken during this treatment.   After treatment:   [ ]  Patient left in no apparent distress sitting up in chair  [X]  Patient left in no apparent distress in bed in chair position  [X]  Call bell left within reach  [X]  Nursing notified  [ ]  Caregiver present  [ ]  Bed alarm activated     LEONEL Pruett  Time Calculation: 45 mins

## 2017-02-21 NOTE — PROGRESS NOTES
Speech Therapy Note:    SLP d/w Dr. Cynthia Cunningham re: MBS. It is appropriate for pt to have study done today. Will follow up accordingly. Rhonda Zapata., [id number] [facility]  Office: [phone]  Pager: [phone]

## 2017-02-22 NOTE — PROGRESS NOTES
1st attempt at OT re-evaluation: 1324. Patient sleeping, awoken with loud verbal/tactile stimuli. Patient responded to a few questions but extremely drowsy; frequently fell back to sleep during conversation and not able to participate in re-evaluation at this time. Will follow up as schedule permits. Thank you.     Miguel Gonzalez MS OTR/L  Office Ext: [phone]  Pager: [phone]

## 2017-02-22 NOTE — PROGRESS NOTES
Shift Summary:    Assumed care of pt laying in bed, Alert with TV on. TPN infusing, double lumen PICC line in RIght Arm. Several incont stool episodes. Pt has a condom catheter on. Pt turned and repositioned with pillows throughout shift. Pt complains of pain for each incont care and turn. At midnight pt request a fruit cup explained not able to eat yet. Pt complains about being thirsty. Provided mouth swaps and brushed teeth. Pt still upset and requested to call his daughter. Nurse assisted in calling daughter. Daughter helped to explain NPO and knew about barium swallow test today. Pt complained about left shoulder pain and does not like the throat spray bc it burns his mouth. Daughter requested to speak to nurse. Daughter advised his left shoulder does hurt and she massages his shoulder and it helps. Daughter requested I or CNA to massage for pt. Also stated not to use spray. Daughter stated will be in around 11am or so as she had an appointment this AM. Nurse completed massage. Notified by monitor tech HR afib up to 130s. Pt has scheduled metoprolol, given. Helped for a small amount of time. Pt still complains of pain and becoming restless. CNA offered massage and reposition. Charge Nurse Micheal Mariscal also in room to try to help comfort pt. This nurse made a warm compress for pt, which he enjoyed at first but pain still there. Ultimately provided pain medication at 0310. Definitely helped pt, pt does arouse to voice but drifts back to sleep quickly. HR still Afib in the 110s.

## 2017-02-22 NOTE — PROGRESS NOTES
Problem: Dysphagia (Adult)  Goal: *Acute Goals and Plan of Care (Insert Text)  Dysphagia Present: severe  Aspiration: at risk     Recommendations:  Diet: NPO with alternative nutrition/hydration source  Meds: via IV or TF  Aspiration Precautions  Other: small 1/2 tsp sips water after oral care for comfort    Goals: Patient will:  1. Tolerate PO trials with 0 s/s overt distress in 4/5 trials  2. Utilize compensatory swallow strategies/maneuvers (decrease bite/sip, size/rate, alt. liq/sol) with min cues in 4/5 trials  3. Perform oral-motor/laryngeal exercises to increase oropharyngeal swallow function with min cues  4. Complete an objective swallow study (i.e., MBSS) to assess swallow integrity, r/o aspiration, and determine of safest LRD, min A - met 2/16/17     Outcome: Not Progressing Towards Goal  [address] STUDY     Patient: Crystal Jovel (32 y.o. male)  Date: 2/22/2017  Primary Diagnosis: SBO  Small Bowel Obstruction  Small bowel obstruction (HCC)  Failed swallowing evaluations x2  Procedure(s) (LRB):  EXPLORATORY LAPAROTOMY POSSIBLE SMALL BOWEL RESECTION (N/A) 9 Days Post-Op   Precautions: Aspiration  Fall      ASSESSMENT :  MBS completed with repeated aspiration (with weak delayed cough) across all study trials, to include: thin, honey, and pudding. Pt unable to create suction from straw; SLP presented all trials via tsp presentations. Pt with decreased lingual control as indicated by consistent premature spillage into  Valleculae/pyriforms with consistent swallow delay; swallow triggered at level of pyriforms. Pt with ineffective hyolaryngeal excursion resulting in minimal laryngeal movement and NO epiglottic function. As a result, pt with aspiration prior to, during, and after swallow. Pt unable to effectively clear laryngeal residue despite max verbal stim. Attempted controlled sips/breath hold/swallow to improve airway protection; unsuccessful.  Pt demo inadequate sensory/motor awareness/strength for safe and adequate nutritional intake; considered high aspiration risk. Pt is at very high risk for aspiration despite diet modification to puree with honey-thick liquids. Rec QOL/comfort diet vs PEG; however, given advanced age and multiple comorbidities, preferred recommendation is initiation of QOL/comfort diet (puree/honey-thick). 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 Pt is at very high risk for aspiration despite diet modification to puree with honey-thick liquids. Rec QOL/comfort diet vs PEG; however, given advanced age and multiple comorbidities, preferred recommendation is initiation of QOL/comfort diet (puree/honey-thick). Results discussed at length with MD, RN, and interdisciplinary rounds team.      Patient will benefit from skilled intervention to address the above impairments. Patients rehabilitation potential is considered to be Guarded  Factors which may influence rehabilitation potential include:   [ ]              None noted  [X]              Mental ability/status  [X]              Medical condition  [ ]              Home/family situation and support systems  [X]              Safety awareness  [ ]              Pain tolerance/management  [ ]              Other:        PLAN :  Recommendations and Planned Interventions:  NPO vs QOL diet  Frequency/Duration: Patient will be followed by speech-language pathology 1-2 visits to address goals. Discharge Recommendations: [address]:   Patient stated yes. OBJECTIVE:       Past Medical History:   Diagnosis Date    Hypertension      TIA (transient ischemic attack)     History reviewed. No pertinent surgical history.   Prior Level of Function/Home Situation: mod I; lives with spouse  Home Situation  Home Environment: Private residence  # Steps to Enter: 6  Rails to [employer]: Yes  [employer] : Bilateral  Wheelchair Ramp: Yes (with bilateral rails)  One/Two Story Residence: Two story, live on 1st floor  Living Alone: No  Support Systems: Child(carolina), Spouse/Significant Other/Partner  Patient Expects to be Discharged to[de-identified] Skilled nursing facility  Current DME Used/Available at Home: Grab bars, Cane, straight, Shower chair, Walker  Tub or Shower Type: Tub/Shower combination (has grab bars and shower seat)  Diet prior to admission: regular  Current Diet:  NPO   Radiologist: [employer] Views: Lateral  Patient Position: 90      Trial 1: Trial 2:   Consistency Presented: Thin liquid;Pudding; Honey thick liquid     How Presented: SLP-fed/presented;Spoon           Bolus Acceptance: Impaired     Bolus Formation/Control: Impaired: Premature spillage  :     Propulsion: Delayed (# of seconds); Discoordination     Oral Residue: Lingual;Palatal     Initiation of Swallow: Triggered at pyriform sinus(es)     Timing: Pooling 1-5 sec     Penetration: During swallow; After swallow; Before swallow; To cords     Aspiration/Timing: Repeated;During; After;Before;From initial swallow;From residual     Pharyngeal Clearance: Vallecular residue;Pyriform residue ; Less than 10%     Attempted Modifications: Other (comment) (controlled swallows)     Effective Modifications: None     Cues for Modifications: Moderate               Trial 3: Trial 4:                            :    :                                                                          Decreased Tongue Base Retraction?: Yes  Laryngeal Elevation: Minimal movement of larynx/epiglottis; Inadequate epiglottic inversion; Incomplete laryngeal closure  Aspiration/Penetration Score: 8 (Aspiration-Contrast passes cords/glottis with no effort to eject, ie/silent aspiration)  Pharyngeal Symmetry: Not assessed  Pharyngeal-Esophageal Segment: Decreased relaxation of upper esophageal segment  Pharyngeal Dysfunction: Crico-pharyngeal dysfunction;Decreased tongue base retraction;Decreased strength;Decreased elevation/closure;Decreased pharyngeal wall constriction     Oral Phase Severity: Moderate  Pharyngeal Phase Severity: Severe     PAIN:  Start of Study: 0  End of Study: 0       COMMUNICATION/EDUCATION:   [X]  Aspiration risks/precautions; compensatory swallow techniques  [X]  Patient/family have participated as able in goal setting and plan of care. [ ]  Patient/family agree to work toward stated goals and plan of care. [ ]  Patient understands intent and goals of therapy, but is neutral about his/her participation. [X]  Patient is unable to participate in goal setting and plan of care.      Thank you for this referral.  Nadira Otero, SLP  Time Calculation: 30 mins

## 2017-02-22 NOTE — PROGRESS NOTES
0348 called sean from ICU to make aware of MEWS of 5, he will continue to watch pt. RN student Liza Remy in room now with pt. Pt arousable but in and out, had dilaudid at 0310, previously reacted same way to ativan on 2/19.     1006 received call from xray about pt's ordered swallow test. Pt able to communicate some but not consistent. Sebastián Rangel from SLP paged, they will proceed with testing. ivf bolus of 500 given. 1015 pt to barium swallow. 1315 pt failed barium swallow, will have PEG placed tomorrow. 1910 Bedside and Verbal shift change report given to Haley Pagan (oncoming nurse) by Olivia Duane, RN   (offgoing nurse). Report included the following information Kardex, MAR and Recent Results.

## 2017-02-22 NOTE — PROGRESS NOTES
Internal Medicine Progress Note    Patient's Name: Naila Sharp Date: 2/12/2017  Length of Stay: 9      Assessment/Plan     #SBO s/p Exlap and Small Bowel Resection 02/13  #Severe Dysphagia on TPN  #Atrial Fibrillation with RVR  #Acute Metabolic Encephalopathy Likely 2/2 Benzodiazepam  #L Inguinal Hernia  #HTN  #TIA on Long-term AC  #Iron Deficiency Anemia  #CKD Stage IIIb  #DVT PPx     - Surgery following, no further intervention. Pt cont to have BMs. Pain control PRN  - Barium swallow this AM. Cont TPN for now. GI consult and PEG if fail  - Rate controlled on lopressor, will increase. Restart eliquis once PO able  - Monitor clinically for now. No further benzos or ambien  - Eliquis on hold for now while NPO  - Restart ferrous sulfate once diet advanced  - Cr up above 2.4 w/ increased heart rate. Will bolus fluids and start standing order. Trend BMP.  - SCDs, SQH    I have personally reviewed all pertinent labs and films that have officially resulted over the last 24 hours. I have personally checked for all pending labs that are awaiting final results. I discussed the patient's status and updates with the daughter.     Subjective     Pt s/e @ bedside  For barium swallow today  Received dilaudid early AM  Confused but talking better  Denies CP or SOB  Denies abd pain    Objective     Visit Vitals    /61    Pulse (!) 116    Temp 97.6 °F (36.4 °C)    Resp 26    Ht 6' (1.829 m)    Wt 68.9 kg (152 lb)    SpO2 97%    BMI 20.61 kg/m2       Physical Exam:  General Appearance: NAD, conversant but difficulty with speech  HENT: normocephalic/atraumatic, dry mucus membranes  Neck: Supple, no JVD  Lungs: CTA with normal respiratory effort  CV: IRIR w/ rate in 110s, no m/r/g  Abdomen: soft, bandage in place, normal bowel sounds  Extremities: SCDs, no edema  Neuro: moves all extremities, no focal deficits  Psych: Normal mood and affect    Intake and Output:  Current Shift:     Last three shifts: 02/20 1901 - 02/22 0700  In: 738.3 [I.V.:738.3]  Out: 1350 [Urine:1350]    Lab/Data Reviewed:  BMP:   Lab Results   Component Value Date/Time     02/22/2017 04:25 AM    K 5.0 02/22/2017 04:25 AM     02/22/2017 04:25 AM    CO2 25 02/22/2017 04:25 AM    AGAP 10 02/22/2017 04:25 AM     (H) 02/22/2017 04:25 AM    BUN 91 (H) 02/22/2017 04:25 AM    CREA 2.45 (H) 02/22/2017 04:25 AM    GFRAA 30 (L) 02/22/2017 04:25 AM    GFRNA 25 (L) 02/22/2017 04:25 AM     CBC:   Lab Results   Component Value Date/Time    WBC 14.6 (H) 02/22/2017 04:25 AM    HGB 10.1 (L) 02/22/2017 04:25 AM    HCT 30.7 (L) 02/22/2017 04:25 AM     02/22/2017 04:25 AM       Imaging Reviewed:  No results found.     Medications Reviewed:  Current Facility-Administered Medications   Medication Dose Route Frequency    0.9% sodium chloride infusion  100 mL/hr IntraVENous CONTINUOUS    [START ON 2/23/2017] ceFAZolin (ANCEF) 1 g in 0.9% sodium chloride (MBP/ADV) 50 mL MBP  1 g IntraVENous ONCE    heparin (porcine) injection 5,000 Units  5,000 Units SubCUTAneous Q8H    insulin lispro (HUMALOG) injection 3 Units  3 Units SubCUTAneous Q6H    insulin detemir (LEVEMIR) injection 10 Units  10 Units SubCUTAneous Q12H    TPN ADULT - CENTRAL   IntraVENous CONTINUOUS    acetaminophen (TYLENOL) suppository 650 mg  650 mg Rectal Q4H PRN    zolpidem (AMBIEN) tablet 5 mg  5 mg Oral QHS PRN    insulin lispro (HUMALOG) injection   SubCUTAneous Q6H    glucose chewable tablet 16 g  4 Tab Oral PRN    glucagon (GLUCAGEN) injection 1 mg  1 mg IntraMUSCular PRN    dextrose (D50W) injection syrg 12.5-25 g  25-50 mL IntraVENous PRN    sodium chloride (NS) flush 10-30 mL  10-30 mL InterCATHeter PRN    sodium chloride (NS) flush 10 mL  10 mL InterCATHeter Q24H    sodium chloride (NS) flush 10 mL  10 mL InterCATHeter PRN    sodium chloride (NS) flush 10-40 mL  10-40 mL InterCATHeter Q8H    sodium chloride (NS) flush 20 mL  20 mL InterCATHeter Q24H  bacitracin 500 unit/gram packet 1 Packet  1 Packet Topical PRN    metoprolol (LOPRESSOR) injection 1.25 mg  1.25 mg IntraVENous Q6H    tamsulosin (FLOMAX) capsule 0.4 mg  0.4 mg Oral DAILY    0.9% sodium chloride infusion 250 mL  250 mL IntraVENous PRN    apixaban (ELIQUIS) tablet 2.5 mg  2.5 mg Oral BID    phenol throat spray (CHLORASEPTIC) 1 Spray  1 Spray Oral PRN    sodium chloride (NS) flush 5-10 mL  5-10 mL IntraVENous Q8H    sodium chloride (NS) flush 5-10 mL  5-10 mL IntraVENous PRN    0.9% sodium chloride infusion 250 mL  250 mL IntraVENous PRN    sodium chloride (NS) flush 5-10 mL  5-10 mL IntraVENous Q8H    sodium chloride (NS) flush 5-10 mL  5-10 mL IntraVENous PRN    HYDROmorphone (PF) (DILAUDID) injection 1 mg  1 mg IntraVENous Q3H PRN    ondansetron (ZOFRAN) injection 4 mg  4 mg IntraVENous Q4H PRN    0.9% sodium chloride infusion 250 mL  250 mL IntraVENous PRN           Elisa Lundberg DO  Internal Medicine, Hospitalist  Pager: [phone]  [address] [place] [address] Drive [facility]

## 2017-02-22 NOTE — CONSULTS
Gastroenterology Consult    Patient: Silas Moncada MRN: [id number]  SSN: [id number]    YOB: 1927  Age: 80 y.o. Sex: male      Subjective:      Silas Moncada is a 80y.o. year old  male who is being seen for Peg tube insertion. 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 He failed his swallowing evaluation. He has no chronic GI problems. Hospital Problems  Never Reviewed          Codes Class Noted POA    Feeding difficulties ICD-10-CM: R63.3  ICD-9-CM: 783.3  2/17/2017 Unknown        Debility ICD-10-CM: R53.81  ICD-9-CM: 799.3  2/17/2017 Unknown        Atrial fibrillation with rapid ventricular response ([facility] ICD-10-CM: I48.91  ICD-9-CM: 427.31  2/14/2017 Unknown        Postoperative anemia ICD-10-CM: D64.9  ICD-9-CM: 285.9  2/14/2017 Unknown        * (Principal)Small bowel obstruction ([facility] ICD-10-CM: K56.69  ICD-9-CM: 560.9  2/13/2017 Unknown            Past Medical History:   Diagnosis Date    Hypertension     TIA (transient ischemic attack)      History reviewed. No pertinent surgical history. History reviewed. No pertinent family history.   Social History   Substance Use Topics    Smoking status: Former Smoker    Smokeless tobacco: Not on file    Alcohol use No      Current Facility-Administered Medications   Medication Dose Route Frequency Provider Last Rate Last Dose    0.9% sodium chloride infusion  100 mL/hr IntraVENous CONTINUOUS Nadja Staple, DO        [START ON 2/23/2017] ceFAZolin (ANCEF) 1 g in 0.9% sodium chloride (MBP/ADV) 50 mL MBP  1 g IntraVENous ONCE Sandor Mo MD        insulin lispro (HUMALOG) injection 3 Units  3 Units SubCUTAneous Q6H Nadja Staple, DO   3 Units at 02/22/17 0600    insulin detemir (LEVEMIR) injection 10 Units  10 Units SubCUTAneous Q12H Nadja Staple, DO   10 Units at 02/22/17 1870    TPN ADULT - CENTRAL   IntraVENous CONTINUOUS Nadja Staple,  mL/hr at 02/21/17 2037      acetaminophen (TYLENOL) suppository 650 mg  650 mg Rectal Q4H PRN Richad Mao, DO   650 mg at 02/20/17 1037    zolpidem (AMBIEN) tablet 5 mg  5 mg Oral QHS PRN Kitty Sol, DO        insulin lispro (HUMALOG) injection   SubCUTAneous Q6H Richad Mao, DO   3 Units at 02/22/17 0600    glucose chewable tablet 16 g  4 Tab Oral PRN Kitty Sol, DO        glucagon (GLUCAGEN) injection 1 mg  1 mg IntraMUSCular PRN Kitty Sol, DO        dextrose (D50W) injection syrg 12.5-25 g  25-50 mL IntraVENous PRN Kitty Sol, DO        sodium chloride (NS) flush 10-30 mL  10-30 mL InterCATHeter PRN Kitty Sol, DO        sodium chloride (NS) flush 10 mL  10 mL InterCATHeter Q24H Kitty Sol, DO   10 mL at 02/21/17 1233    sodium chloride (NS) flush 10 mL  10 mL InterCATHeter PRN Kitty Sol, DO   10 mL at 02/20/17 0006    sodium chloride (NS) flush 10-40 mL  10-40 mL InterCATHeter Q8H Kitty Sol, DO   10 mL at 02/22/17 0600    sodium chloride (NS) flush 20 mL  20 mL InterCATHeter Q24H Kitty Sol, DO   20 mL at 02/21/17 1233    bacitracin 500 unit/gram packet 1 Packet  1 Packet Topical PRN Kitty Sol, DO        metoprolol (LOPRESSOR) injection 1.25 mg  1.25 mg IntraVENous Q6H Kitty Sol, DO   1.25 mg at 02/22/17 4870    heparin (porcine) injection 5,000 Units  5,000 Units SubCUTAneous Q8H Kitty Sol, DO   5,000 Units at 02/22/17 0310    tamsulosin (FLOMAX) capsule 0.4 mg  0.4 mg Oral DAILY Kitty Sol, DO   Stopped at 02/21/17 0900    0.9% sodium chloride infusion 250 mL  250 mL IntraVENous PRN Kitty Sol, DO        apixaban Lutricia Maillard) tablet 2.5 mg  2.5 mg Oral BID Kitty Sol, DO   Stopped at 02/20/17 1816    phenol throat spray (CHLORASEPTIC) 1 Spray  1 [place] Oral PRN Vero Carrillo MD   1 Spray at 02/20/17 1158    sodium chloride (NS) flush 5-10 mL  5-10 mL IntraVENous Q8H Tesha Schumacher MD   10 mL at 02/22/17 0606    sodium chloride (NS) flush 5-10 mL  5-10 mL IntraVENous PRN Tesha Schumacher MD        0.9% sodium chloride infusion 250 mL  250 mL IntraVENous PRN Thalia Reyes, QUYEN        sodium chloride (NS) flush 5-10 mL  5-10 mL IntraVENous Q8H Tesha Schumacher MD   10 mL at 02/22/17 0606    sodium chloride (NS) flush 5-10 mL  5-10 mL IntraVENous PRN Tesha Schumacher MD        HYDROmorphone (PF) (DILAUDID) injection 1 mg  1 mg IntraVENous Q3H PRN Tesha Schumacher MD   1 mg at 02/22/17 0310    [facility]dansetron [facility]) injection 4 mg  4 mg IntraVENous Q4H PRN Tesha Schumacher MD   4 mg at 02/15/17 0225    0.9% sodium chloride infusion 250 mL  250 mL IntraVENous PRN Tesha Schumacher MD            No Known Allergies    Review of Systems:  Constitutional: positive for malaise, anorexia and weight loss  Ears, Nose, Mouth, Throat, and Face: simon-pharyngeal dysphagia  Respiratory: negative  Cardiovascular: atrial fibrillation  Neurological: Hx of TIA  Behavioral/Psychiatric: negative    Objective:     Blood pressure 105/61, pulse (!) 116, temperature 97.6 °F (36.4 °C), resp. rate 26, height 6' (1.829 m), weight 68.9 kg (152 lb), SpO2 97 %. Physical Exam:  GENERAL: cooperative, no distress, appears stated age  THROAT & NECK: normal  LUNG: clear to auscultation bilaterally  HEART: regular rate and rhythm  ABDOMEN: soft, non-tender.  Bowel sounds normal. No masses,  no organomegaly  EXTREMITIES:  no edema  SKIN: Normal.  RECTAL: deferred    Recent Results (from the past 24 hour(s))   GLUCOSE, POC    Collection Time: 02/21/17 12:18 PM   Result Value Ref Range    Glucose (POC) 185 (H) 70 - 110 mg/dL   MAGNESIUM    Collection Time: 02/21/17 12:55 PM   Result Value Ref Range    Magnesium 2.4 1.8 - 2.4 mg/dL   GLUCOSE, POC    Collection Time: 02/21/17  5:29 PM   Result Value Ref Range    Glucose (POC) 127 (H) 70 - 110 mg/dL GLUCOSE, POC    Collection Time: 02/21/17 11:40 PM   Result Value Ref Range    Glucose (POC) 173 (H) 70 - 110 mg/dL   PHOSPHORUS    Collection Time: 02/22/17  4:25 AM   Result Value Ref Range    Phosphorus 3.5 2.5 - 4.9 MG/DL   CBC WITH AUTOMATED DIFF    Collection Time: 02/22/17  4:25 AM   Result Value Ref Range    WBC 14.6 (H) 4.6 - 13.2 K/uL    RBC 3.55 (L) 4.70 - 5.50 M/uL    HGB 10.1 (L) 13.0 - 16.0 g/dL    HCT 30.7 (L) 36.0 - 48.0 %    MCV 86.5 74.0 - 97.0 FL    MCH 28.5 24.0 - 34.0 PG    MCHC 32.9 31.0 - 37.0 g/dL    RDW 15.4 (H) 11.6 - 14.5 %    PLATELET 996 423 - 727 K/uL    MPV 11.0 9.2 - 11.8 FL    NEUTROPHILS 93 (H) 40 - 73 %    LYMPHOCYTES 4 (L) 21 - 52 %    MONOCYTES 3 3 - 10 %    EOSINOPHILS 0 0 - 5 %    BASOPHILS 0 0 - 2 %    ABS. NEUTROPHILS 13.6 (H) 1.8 - 8.0 K/UL    ABS. LYMPHOCYTES 0.5 (L) 0.9 - 3.6 K/UL    ABS. MONOCYTES 0.5 0.05 - 1.2 K/UL    ABS. EOSINOPHILS 0.0 0.0 - 0.4 K/UL    ABS. BASOPHILS 0.0 0.0 - 0.06 K/UL    DF AUTOMATED     METABOLIC PANEL, BASIC    Collection Time: 02/22/17  4:25 AM   Result Value Ref Range    Sodium 137 136 - 145 mmol/L    Potassium 5.0 3.5 - 5.5 mmol/L    Chloride 102 100 - 108 mmol/L    CO2 25 21 - 32 mmol/L    Anion gap 10 3.0 - 18 mmol/L    Glucose 130 (H) 74 - 99 mg/dL    BUN 91 (H) 7.0 - 18 MG/DL    Creatinine 2.45 (H) 0.6 - 1.3 MG/DL    BUN/Creatinine ratio 37 (H) 12 - 20      GFR est AA 30 (L) >60 ml/min/1.73m2    GFR est non-AA 25 (L) >60 ml/min/1.73m2    Calcium 8.2 (L) 8.5 - 10.1 MG/DL   GLUCOSE, POC    Collection Time: 02/22/17  5:53 AM   Result Value Ref Range    Glucose (POC) 178 (H) 70 - 110 mg/dL       No results found. Assessment:   1. S/P small bowel resection 2/13/2017  2. Multiple medical problems: A fib, CRF, Anemia,gallstones, TIA, Hx hypertension  3. Nestor -Pharyngeal dysphagia      Plan:   1.  Peg tube in am      Signed By: Gin Hawk MD, Samantha Wray, [facility]    February 22, 2017

## 2017-02-22 NOTE — PROGRESS NOTES
2nd attempt for OT re-evaluation at 1435. Patient sleeping upon entry, awoken to voice and asked, Stella Reveal do we buy the appliances from? \" When asked where he was, patient stated, \"I'm in the hospital\" and then fell back to sleep. Christopher Mediate made aware and verbalized understanding. Will follow up for re-evaluation tomorrow. Thank you.     Berna Carlos, MS OTR/L  Office Ext: [phone]  Pager: [phone]

## 2017-02-22 NOTE — PROGRESS NOTES
Surgery    Noted failed SLP eval x 2. Needs PEG placed and start TFs asap. No surgical contraindication to PEG.  Leave staples until 14 days post-op

## 2017-02-22 NOTE — PROGRESS NOTES
Problem: Mobility Impaired (Adult and Pediatric)  Goal: *Acute Goals and Plan of Care (Insert Text)  Physical Therapy Goals  Re-evaluated 2/22/2017 with no goals met. Goals modified for change in status and to be accomplished in 7 days:  1. Patient will move from supine to sit and sit to supine , scoot up and down and roll side to side in bed with maximal assist.   2. Patient will transfer from bed to chair and chair to bed with maximal assist using the least restrictive device. 3. Patient will perform sit to stand with maximal assist.  4. Patient will participate in BLE HEP with minimal assist in order to promote functional strength for activities. 5. Patient will maintain sitting EOB x 5 minutes with minimal assist in order to promote improved trunk control and activity tolerance. Initiated 2/15/2017 and to be accomplished within 7 day(s)  1. Patient will move from supine to sit and sit to supine , scoot up and down and roll side to side in bed with modified independence. 2. Patient will transfer from bed to chair and chair to bed with supervision/set-up using the least restrictive device. 3. Patient will perform sit to stand with supervision/set-up. 4. Patient will ambulate with minimal assistance/contact guard assist for 75 feet with the least restrictive device. Outcome: Not Progressing Towards Goal  PHYSICAL THERAPY RE-EVALUATION AND TREATMENT     Patient: Hussein Vanegas (2 y.o. male)  Date: 2/22/2017  Diagnosis: SBO  Small Bowel Obstruction  Small bowel obstruction (HCC)  Failed swallowing evaluations x2 Small bowel obstruction (HCC)  Procedure(s) (LRB):  EXPLORATORY LAPAROTOMY POSSIBLE SMALL BOWEL RESECTION (N/A) 9 Days Post-Op  Precautions: Fall      ASSESSMENT:  Pt demonstrates acute regression in functional activity since initial evaluation. Currently, pt requires total assist for bilateral rolling in bed. Pt does not follow cues to perform AROM and is unreliable with AAROM.  Pt exercise tolerance is limited by wincing and crying out in pain with attempted P/AAROM. All exercises performed were within pain free ROM as much as possible (i.e. Movement stopped when pt indicated pain response). End feels are empty; no crepitus, minimal soft tissue tension appreciated with stretches; all motions limited by pain c/o. Pt c/o having BM and rolled bilaterally for hygiene with total assist; pt cries out with what appears to be generalized pain. After completing treatment, pt's dtr present and requests education for ROM HEP. Pt's dtr educated as per below. Goals have been assessed and modified as appropriate. EDUCATION: Dtr educated on [place] as per below  Patient's progression toward goals since last assessment: no goals met. New goals set above. PLAN:  Goals have been updated based on progression since last assessment. Patient continues to benefit from skilled intervention to address the above impairments. Continue to follow the patient 1-2 times per day/3-5 days per week to address goals. Planned Interventions:  [X]     Bed Mobility Training          [X]     Neuromuscular Re-Education  [X]     Transfer Training                [ ]    Orthotic/Prosthetic Training  [X]     Gait Training                       [ ]     Modalities  [X]     Therapeutic Exercises       [ ]     Edema Management/Control  [X]     Therapeutic Activities         [X]     Patient and Family Training/Education  [ ]     Other (comment):  Discharge Recommendations: Fracisco Lemus  Further Equipment Recommendations for Discharge: to be determined and N/A       SUBJECTIVE:   Patient indicates agreement to participate with LE ROM. Pt moans/cries out with rolling and ROM. OBJECTIVE DATA SUMMARY:      G CODES:  Mobility [id number] Current  CN= 100%  [id number] Goal  CM= 80-99%.   The severity rating is based on the Other :  [employer] Sitting Balance Scale  0: Pt performs 25% or less of sitting activity (Max assist) CN, 100% impaired. 1: Pt supports self with upper extremities but requires therapist assistance. Pt performs 25-50% of effort (Mod assist) CM, 80% to <100% impaired. 1+: Pt supports self with upper extremities but requires therapist assistance. Pt performs >50% effort. (Min assist). CL, 60% to <80% impaired. 2: Pt supports self independently with both upper extremities. CL, 60% to <80% impaired. 2+: Pt support self independently with 1 upper extremity. CK, 40% to <60% impaired. 3: Pt sits without upper extremity support for up to 30 seconds. CK, 40% to <60% impaired. 3+: Pt sits without upper extremity support for 30 seconds or greater. CJ, 20% to <40% impaired. 4: Pt moves and returns trunkal midpoint 1-2 inches in one plane. CJ, 20% to <40% impaired. 4+: Pt moves and returns trunkal midpoint 1-2 inches in multiple planes. CI, 1% to <20% impaired. 5: Pt moves and returns trunkal midpoint in all planes greater than 2 inches. CH, 0% impaired. Critical Behavior:  Neurologic State: Drowsy  Orientation Level: Oriented to person, Oriented to place  Cognition: Decreased attention/concentration, Decreased command following, Recognition of people/places  Safety/Judgement: Decreased awareness of environment  Functional Mobility Training:  Bed Mobility:  Rolling: Total assistance  Supine to Sit:  (n/a)  Transfers:  Sit to Stand:  (n/a)  Balance:  Sitting:  (n/a)  Standing:  (n/a)  Ambulation/Gait Training:  Gait Description (WDL):  (n/a)  Neuro Re-Education:  n/a  Therapeutic Exercises:   Performed 5-10 reps BLE AA/PROM including hip flex/ext, hip abd/add, knee flex/ext, ankle DF/PF. Gentle bilateral hamstring stretch x 30 seconds each.   Provided additional caregiver education with PT providing 3-5 reps of heel slides, hip abd/add, and DF/PF and daughter performing return demonstration with good technique and cues given for improved body mechanics and for attention to pt's pain response with dtr educated to only perform within pain free ROM for 5-10 reps 1-2 times per day to tolerance. Pain: unrated  Pre treatment pain:  At rest appears 0/5  Post treatment pain:  At rest appears 0/5; during bed mobility and ROM, pt cries out in pain though he is unable to rate or localize; if questioned he says \"yes\" to pain at back and legs. Pain Scale 1: Visual  Pain Intensity 1: 0  Activity Tolerance:   poor  Please refer to the flowsheet for vital signs taken during this treatment. After treatment:   [ ]  Patient left in no apparent distress sitting up in chair  [X]  Patient left in no apparent distress in bed  [X]  Call bell left within reach  [ ]  Nursing notified  [X]  Caregiver present  [ ]  Bed alarm activated     Recommendations for nursing: turn and position assist of 2  Written on communication board: no  Verbally communicated to: Betsey Nails, PT   Time Calculation: 29 mins

## 2017-02-23 NOTE — PROGRESS NOTES
responded to a code blue in room 3018 tonight. This started as a Rapid Response which quickly turned into a code blue. Patient taken to [id number] and may need to be intubated. Family was present in room as things started to develop. Prayer and support offered to family member. Constance Comment   Board Certified [address] [place] [address]   [phone]

## 2017-02-23 NOTE — PROGRESS NOTES
1235: 2nd attempt for OT evaluation. Bhargav Hare RN and patient's daughter present. Patient drowsy; per patient's daughter, patient Lorne Susana say a few things and then falls back to sleep. \" Will continue to follow for OT re-evaluation. Thank you.     Preet Peña MS OTR/L  Office Ext: [phone]  Pager: [phone]

## 2017-02-23 NOTE — PROGRESS NOTES
Surgery    PEG planned for this morning. No acute surgical issues.  OK to feed via PEG when OK with GI.

## 2017-02-23 NOTE — ROUTINE PROCESS
TRANSFER - OUT REPORT:    Verbal report given to Bhargav Hare (name) on Ada Apgar  being transferred to [facility] for routine post - op       Report consisted of patients Situation, Background, Assessment and   Recommendations(SBAR). Information from the following report(s) Kardex and MAR was reviewed with the receiving nurse. Lines:   PICC Double Lumen 17 Right;Brachial (Active)   Central Line Being Utilized Yes 2017  9:00 AM   Criteria for Appropriate Use Total parenteral nutrition 2017  9:00 AM   Site Assessment Clean, dry, & intact 2017  9:00 AM   Phlebitis Assessment 0 2017  9:00 AM   Infiltration Assessment 0 2017  9:00 AM   Date of Last Dressing Change 17  9:00 AM   Dressing Status Clean, dry, & intact 2017  9:00 AM   Action Taken Blood drawn 2017  9:00 AM   External Catheter Length (cm) 0 centimeters 2017 11:27 AM   Dressing Type Disk with Chlorhexadine Gluconate (CHG); Transparent 2017  2:24 PM   Hub Color/Line Status Purple; Infusing 2017  9:00 AM   Positive Blood Return (Site #1) Yes 2017  9:00 AM   Hub Color/Line Status Red; Infusing 2017  9:00 AM   Positive Blood Return (Site #2) Yes 2017  9:00 AM   Alcohol Cap Used Yes 2017  7:48 PM        Opportunity for questions and clarification was provided.       Patient transported with:   Registered Nurse

## 2017-02-23 NOTE — PROGRESS NOTES
Internal Medicine Progress Note    Patient's Name: Bakari Solano Date: 2/12/2017  Length of Stay: 10      Assessment/Plan     #SBO s/p Exlap and Small Bowel Resection 02/13  #Severe Dysphagia on TPN  #Atrial Fibrillation with RVR  #Acute Metabolic Encephalopathy Likely 2/2 Benzodiazepam, improving  #SUSAN on CKD Stage IIIb  #Hyperkalemia Possibly 2/2 Renal Failure  #L Inguinal Hernia  #HTN  #TIA on Long-term AC  #Iron Deficiency Anemia  #DVT PPx     - Surgery following, no further intervention. Pt cont to have BMs. Pain control PRN  - PEG tube today via GI. Cont TPN for now. TFs when able  - Restart dilt gtt. Cont lopressor. Restart eliquis once PO able  - Appears close to baseline this AM. No further benzos or ambien  - Cr up again this AM. Cont IVFs. Check PVR and insert phillips if needed. Cont IVFs. Trend BMP.  - Check EKG r/o changes  - Eliquis on hold for now while NPO  - Restart ferrous sulfate once diet advanced  - SCDs, SQH    I have personally reviewed all pertinent labs and films that have officially resulted over the last 24 hours. I have personally checked for all pending labs that are awaiting final results. I discussed the patient's status and updates with the daughter.     Subjective     Pt s/e @ bedside  For PEG today  Much more alert this AM  Denies CP or SOB  Denies abd pain    Objective     Visit Vitals    /77 (BP 1 Location: Left arm, BP Patient Position: At rest)    Pulse (!) 125    Temp 98.5 °F (36.9 °C)    Resp 22    Ht 6' (1.829 m)    Wt 68.3 kg (150 lb 8 oz)    SpO2 94%    BMI 20.41 kg/m2       Physical Exam:  General Appearance: NAD, conversant but difficulty with speech  HENT: normocephalic/atraumatic, dry mucus membranes  Neck: Supple, no JVD  Lungs: CTA with normal respiratory effort  CV: IRIR w/ rate in 110s, no m/r/g  Abdomen: soft, bandage in place, normal bowel sounds  Extremities: No edema, SCDs in place  Neuro: moves all extremities, no focal deficits  Psych: Normal mood and affect    Intake and Output:  Current Shift:     Last three shifts:  02/21 1901 - 02/23 0700  In: 4108.3 [I.V.:4108.3]  Out: 450 [Urine:450]    Lab/Data Reviewed:  BMP:   Lab Results   Component Value Date/Time     02/23/2017 09:00 AM    K 5.9 (H) 02/23/2017 09:00 AM     02/23/2017 09:00 AM    CO2 24 02/23/2017 09:00 AM    AGAP 11 02/23/2017 09:00 AM     (H) 02/23/2017 09:00 AM     (H) 02/23/2017 09:00 AM    CREA 2.47 (H) 02/23/2017 09:00 AM    GFRAA 30 (L) 02/23/2017 09:00 AM    GFRNA 25 (L) 02/23/2017 09:00 AM     CBC:   Lab Results   Component Value Date/Time    WBC 13.7 (H) 02/23/2017 09:00 AM    HGB 9.6 (L) 02/23/2017 09:00 AM    HCT 29.7 (L) 02/23/2017 09:00 AM     02/23/2017 09:00 AM       Imaging Reviewed:  Rachid York Video    Result Date: 2/22/2017  Modified Barium Swallow History: Dysphagia, cough Technique: The procedure was performed with the speech pathologist. Different consistencies of barium tinged products were given to swallow during real time fluoroscopic observation. Findings: With thin consistency via straw, and honey consistency via spoon , pt demonstrated premature spillage and aspiration. Silent aspiration with honey noted. Coughing aspiration with thin consistency. There was significant residua in the vallecula and pyriform sinus. With putting consistency , pt demonstrated swallowing delay with premature spillage and silent aspiration. There was significant residua in the vallecula and pyriform sinus. Fluoroscopic time: 1.5 minutes Fluoroscopic dose (reference air kerma): 5.18 mGy     Impression: Abnormal modified swallow study as described. Please see full speech pathologist report to follow for discussion of findings and detailed recommendations. Videotape is available for review.       Medications Reviewed:  Current Facility-Administered Medications   Medication Dose Route Frequency    ketorolac (TORADOL) 30 mg/mL (1 mL) injection        midazolam (VERSED) 1 mg/mL injection        fentaNYL citrate (PF) 50 mcg/mL injection        midazolam (VERSED) injection    PRN    pantoprazole (PROTONIX) 40 mg in 0.9% sodium chloride (MBP/ADV) 50 mL MBP  8 mg/hr IntraVENous CONTINUOUS    0.9% sodium chloride infusion  100 mL/hr IntraVENous CONTINUOUS    ceFAZolin (ANCEF) 1 g in 0.9% sodium chloride (MBP/ADV) 50 mL MBP  1 g IntraVENous ONCE    heparin (porcine) injection 5,000 Units  5,000 Units SubCUTAneous Q8H    metoprolol (LOPRESSOR) injection 5 mg  5 mg IntraVENous Q6H    TPN ADULT - CENTRAL   IntraVENous CONTINUOUS    insulin lispro (HUMALOG) injection 3 Units  3 Units SubCUTAneous Q6H    insulin detemir (LEVEMIR) injection 10 Units  10 Units SubCUTAneous Q12H    acetaminophen (TYLENOL) suppository 650 mg  650 mg Rectal Q4H PRN    zolpidem (AMBIEN) tablet 5 mg  5 mg Oral QHS PRN    insulin lispro (HUMALOG) injection   SubCUTAneous Q6H    glucose chewable tablet 16 g  4 Tab Oral PRN    glucagon (GLUCAGEN) injection 1 mg  1 mg IntraMUSCular PRN    dextrose (D50W) injection syrg 12.5-25 g  25-50 mL IntraVENous PRN    sodium chloride (NS) flush 10-30 mL  10-30 mL InterCATHeter PRN    sodium chloride (NS) flush 10 mL  10 mL InterCATHeter Q24H    sodium chloride (NS) flush 10 mL  10 mL InterCATHeter PRN    sodium chloride (NS) flush 10-40 mL  10-40 mL InterCATHeter Q8H    sodium chloride (NS) flush 20 mL  20 mL InterCATHeter Q24H    bacitracin 500 unit/gram packet 1 Packet  1 Packet Topical PRN    tamsulosin (FLOMAX) capsule 0.4 mg  0.4 mg Oral DAILY    0.9% sodium chloride infusion 250 mL  250 mL IntraVENous PRN    apixaban (ELIQUIS) tablet 2.5 mg  2.5 mg Oral BID    phenol throat spray (CHLORASEPTIC) 1 Spray  1 Spray Oral PRN    sodium chloride (NS) flush 5-10 mL  5-10 mL IntraVENous Q8H    sodium chloride (NS) flush 5-10 mL  5-10 mL IntraVENous PRN    0.9% sodium chloride infusion 250 mL  250 mL IntraVENous PRN    sodium chloride (NS) flush 5-10 mL  5-10 mL IntraVENous Q8H    sodium chloride (NS) flush 5-10 mL  5-10 mL IntraVENous PRN    HYDROmorphone (PF) (DILAUDID) injection 1 mg  1 mg IntraVENous Q3H PRN    ondansetron (ZOFRAN) injection 4 mg  4 mg IntraVENous Q4H PRN    0.9% sodium chloride infusion 250 mL  250 mL IntraVENous PRN           Alex Johnston DO  Internal Medicine, Hospitalist  Pager: [phone]  [address] [place] [address]

## 2017-02-23 NOTE — PROGRESS NOTES
1759 - RRT called for unresponsiveness and labored breathing. Pt's eyes open but he is not responding to stimuli. Pt's daughter (at bedside) also mentioned pt was \"shaking\" and had recently felt clammy. Blood sugar not readable on Accucheck machine. D50 ordered by Dr. Ernesto Bond; given. Pt more alert thereafter but still confused with labored breathing. Staff present during RRT: Dr. Ernesto Bond; Trever Irvin, RT; Milton Lawrence RN; Narednra Curry, TIMUR; TIMUR Chong; Carol Askew, RN; as well as nx supervisor. 1808 - [facility] [employer] called. ~ [phone] - Pt transferred to ICU.     ~ 1820 - Bedside report given to [facility], RN.

## 2017-02-23 NOTE — PROGRESS NOTES
Responded to RRT with Dr Lorenzo Carrera  Patient had acute respiratory distress leading to brief respiratory arrest on the floor. He was treated with NRB oxygen. He also had mild hypoglycemia  A fib with RVR on Cardizem but with stable BP. Have transferred to ICU. Engaging ICU team.  He is becoming more responsive in ICU.   Checking ABG

## 2017-02-23 NOTE — DIABETES MGMT
NUTRITIONAL RE-ASSESSMENT AND GLYCEMIC CONTROL PLAN OF CARE     Kaelyn Gaoan           80 y.o.           2/12/2017                 1. SBO (small bowel obstruction) (HCC)    2. Abdominal pain, LUQ (left upper quadrant)    3. Chronic pain of left knee    4. Chronic renal failure, unspecified stage    5. Anemia, unspecified type    6. Debility    7. Feeding difficulties        ASSESSMENT:    Based on  weight  of 153 lbs. , pt is underweight  related to inadequate caloric intake as evidenced by 86% ideal weight and BMI= 20.9kg/m2. .   Nutrient deficit as evidenced by npo status related to dysphagia. Pt received a PEG today. INTERVENTIONS/PLAN:      TPN to be discontinued after current order infuses. Jevity Tube feeding has been ordered as an intermittent feeding 240 ml to be delivered every 6 hours over 45 minutes per feeding. This will provide 43 g protein, 1018 calories/day with 0.8 liters free water /d from TF plus 400 ml total from 50 ml water flush before and after each feeding. Pt's nutrient requirements can be met by  350 ml Jevity x 5 feedings (7a-11a-3p -7p- 10p ). This will provide 1855 calories, 78 g protein and 1.5 liters free water/d from TF. Continue with water flushes 50 ml before and after each feeding. SUBJECTIVE/OBJECTIVE:     Diet: npo  Patient Vitals for the past 100 hrs:   % Diet Eaten   02/19/17 1821 0 %   02/19/17 1250 0 %     Medications: [x]       Reviewed - Pt was receiving  Levemir  10 units q 12 hrs while on TPN, would reduce to VIR corrective lispro and dc meal time lispro when transitioned to intermittent TF.     Lab Results   Component Value Date/Time    Sodium 137 02/23/2017 09:00 AM    Potassium 5.9 02/23/2017 09:00 AM    Chloride 102 02/23/2017 09:00 AM    CO2 24 02/23/2017 09:00 AM    Anion gap 11 02/23/2017 09:00 AM    Glucose 115 02/23/2017 09:00 AM     02/23/2017 09:00 AM    Creatinine 2.47 02/23/2017 09:00 AM    Calcium 8.2 02/23/2017 09:00 AM    Magnesium 2.4 02/21/2017 12:55 PM    Phosphorus 3.5 02/22/2017 04:25 AM    Albumin 3.5 02/12/2017 06:05 AM       Anthropometrics:  ,  , BMI (calculated): 20.4  Nyssa wt 178lbs. Wt Readings from Last 1 Encounters:   02/23/17 68.3 kg (150 lb 8 oz)      Ht Readings from Last 1 Encounters:   02/17/17 6' (1.829 m)     Wt Readings from Last 3 Encounters:   02/23/17 68.3 kg (150 lb 8 oz)   01/04/16 65.8 kg (145 lb)   11/08/15 63.5 kg (140 lb)       Estimated Nutrition Needs: 1900 Kcals/day, Protein reqrts  84g/d   Fluid Requirements 2.1 liters/d  Based on:   [x]          Actual BW    []          IBW   []            Adjusted BW      Nutrition Diagnoses:   As stated above. Nutrition Interventions: TPN/TF  recommendations   Goals:     Intake will meet >75% of energy and protein requirements by 2/28. Wt maintenance or gain 1 lb per week by 3/5. Glucose will be within target range by 2/26.      Nutrition Monitoring and Evaluation      []     Monitor po intake on meal rounds  [x]     Continue inpatient monitoring and intervention  [x]     Other:TPN transition to TF when PEG available for use    Nutrition Risk:  [x]   High     []  Moderate    []  Minimal/Uncompromised    Mazin Duvall RD

## 2017-02-23 NOTE — PERIOP NOTES
TRANSFER - IN REPORT:    Verbal report received from Robyn Stevenson on [facility]  being received from 3000 or routine progression of care      Report consisted of patients Situation, Background, Assessment and   Recommendations(SBAR). Information from the following report(s) Kardex and MAR was reviewed with the receiving nurse. Opportunity for questions and clarification was provided. Assessment completed upon patients arrival to unit and care assumed.

## 2017-02-23 NOTE — PROGRESS NOTES
46 - Called daughter. Pt in bed yelling out to call Yamilka. Pt asking daughter to come get him.  Daughter advised she has been at bedside all day and she needs to get her sleep and will be back tomorrow before he leaves to get the PEG

## 2017-02-23 NOTE — CONSULTS
Consult Note  Consult requested by: Dr. Tresia Councilman is a 80 y.o. male [facility] who is being seen on consult for elevated Bun/Cr. Chief Complaint   Patient presents with    Abdominal Pain     left lower abdomen    Leg Pain     left leg     Admission diagnosis: Feeding difficulties     HPI:  80 yr  male with hx of HTN, was admitted on 2/12 with SBO has undergone exp lap and small bowel resection and been on TPN. He earlier today has undergone PEG tube placement. His serum Cr has increased to 2.4 from 1.4 on admission. His serum potassium uis 5.9 and was treated earlier with insulin and D50. Pt currently is lethargic is moaning to tactile stimuli. His daughter present at bedside reports that it his mentation decreases like that each time he receives anesthesia. His b.p is 852 systolic now and is receiving N/S 100 cc/hr. He also is on Eliquis and Heprain s/q. Past Medical History:   Diagnosis Date    Hypertension     TIA (transient ischemic attack)       History reviewed. No pertinent surgical history. Social History     Social History    Marital status:      Spouse name: N/A    Number of children: N/A    Years of education: N/A     Occupational History    Not on file. Social History Main Topics    Smoking status: Former Smoker    Smokeless tobacco: Not on file    Alcohol use No    Drug use: No    Sexual activity: Not on file     Other Topics Concern    Not on file     Social History Narrative       History reviewed. No pertinent family history.   No Known Allergies     Home Medications:     Current Facility-Administered Medications   Medication Dose Route Frequency    ketorolac (TORADOL) 30 mg/mL (1 mL) injection        midazolam (VERSED) 1 mg/mL injection        fentaNYL citrate (PF) 50 mcg/mL injection        midazolam (VERSED) injection    PRN    dilTIAZem (CARDIZEM) 100 mg in 0.9% sodium chloride (MBP/ADV) 100 mL infusion  10 mg/hr IntraVENous TITRATE    pantoprazole (PROTONIX) 40 mg in sodium chloride 0.9 % 10 mL injection  40 mg IntraVENous BID    insulin detemir (LEVEMIR) injection 5 Units  5 Units SubCUTAneous Q12H    furosemide (LASIX) injection 40 mg  40 mg IntraVENous ONCE    0.9% sodium chloride infusion  100 mL/hr IntraVENous CONTINUOUS    heparin (porcine) injection 5,000 Units  5,000 Units SubCUTAneous Q8H    metoprolol (LOPRESSOR) injection 5 mg  5 mg IntraVENous Q6H    acetaminophen (TYLENOL) suppository 650 mg  650 mg Rectal Q4H PRN    zolpidem (AMBIEN) tablet 5 mg  5 mg Oral QHS PRN    insulin lispro (HUMALOG) injection   SubCUTAneous Q6H    glucose chewable tablet 16 g  4 Tab Oral PRN    glucagon (GLUCAGEN) injection 1 mg  1 mg IntraMUSCular PRN    dextrose (D50W) injection syrg 12.5-25 g  25-50 mL IntraVENous PRN    sodium chloride (NS) flush 10-30 mL  10-30 mL InterCATHeter PRN    sodium chloride (NS) flush 10 mL  10 mL InterCATHeter Q24H    sodium chloride (NS) flush 10 mL  10 mL InterCATHeter PRN    sodium chloride (NS) flush 10-40 mL  10-40 mL InterCATHeter Q8H    sodium chloride (NS) flush 20 mL  20 mL InterCATHeter Q24H    bacitracin 500 unit/gram packet 1 Packet  1 Packet Topical PRN    tamsulosin (FLOMAX) capsule 0.4 mg  0.4 mg Oral DAILY    0.9% sodium chloride infusion 250 mL  250 mL IntraVENous PRN    apixaban (ELIQUIS) tablet 2.5 mg  2.5 mg Oral BID    phenol throat spray (CHLORASEPTIC) 1 Spray  1 Spray Oral PRN    sodium chloride (NS) flush 5-10 mL  5-10 mL IntraVENous Q8H    sodium chloride (NS) flush 5-10 mL  5-10 mL IntraVENous PRN    0.9% sodium chloride infusion 250 mL  250 mL IntraVENous PRN    sodium chloride (NS) flush 5-10 mL  5-10 mL IntraVENous Q8H    sodium chloride (NS) flush 5-10 mL  5-10 mL IntraVENous PRN    HYDROmorphone (PF) (DILAUDID) injection 1 mg  1 mg IntraVENous Q3H PRN    ondansetron (ZOFRAN) injection 4 mg  4 mg IntraVENous Q4H PRN    0.9% sodium chloride infusion 250 mL 250 mL IntraVENous PRN       Review of Systems:   Review of systems not obtained due to patient factors. due to pt's decreased mental status. Data Review:    Labs: Results:       Chemistry Recent Labs      02/23/17   0900 02/22/17   0425  02/21/17   0540   GLU  115*  130*  155*   NA  137  137  139   K  5.9*  5.0  3.9   CL  102  102  106   CO2  24  25  22   BUN  103*  91*  68*   CREA  2.47*  2.45*  1.98*   CA  8.2*  8.2*  7.5*   AGAP  11  10  11   BUCR  42*  37*  34*      CBC w/Diff Recent Labs      02/23/17   0900 02/22/17   0425  02/21/17   0540   WBC  13.7*  14.6*  11.3   RBC  3.40*  3.55*  3.55*   HGB  9.6*  10.1*  10.0*   HCT  29.7*  30.7*  30.5*   PLT  262  267  281   GRANS  91*  93*  91*   LYMPH  9*  4*  6*   EOS  0  0  0      Coagulation Recent Labs      02/23/17   0407   APTT  36.9*       Iron/Ferritin No results for input(s): IRON in the last 72 hours. No lab exists for component: TIBCCALC   BNP No results for input(s): BNPP in the last 72 hours. Cardiac Enzymes No results for input(s): CPK, CKND1, KEVIN in the last 72 hours. No lab exists for component: CKRMB, TROIP   Liver Enzymes No results for input(s): TP, ALB, TBIL, AP, SGOT, GPT in the last 72 hours.     No lab exists for component: DBIL   Thyroid Studies No results found for: T4, T3U, TSH, TSHEXT          Physical Assessment:     Visit Vitals    /66 (BP 1 Location: Left arm, BP Patient Position: At rest)    Pulse 98    Temp 99.4 °F (37.4 °C)    Resp 22    Ht 6' (1.829 m)    Wt 68.3 kg (150 lb 8 oz)    SpO2 90%    BMI 20.41 kg/m2       Intake/Output Summary (Last 24 hours) at 02/23/17 1736  Last data filed at 02/23/17 0410   Gross per 24 hour   Intake          3370.01 ml   Output                0 ml   Net          3370.01 ml     Physial Exam:  General appearance: chronically ill appearing, malnourished  Neurologic:with eyes open, not responding to verbal stimuli, moaning to tactile stmuli  Neck: supple, symmetrical, trachea midline, no adenopathy, thyroid: not enlarged, symmetric, no tenderness/mass/nodules, no carotid bruit and no JVD  Lungs: clear to auscultation bilaterally  Heart: regular rate and rhythm, S1, S2 normal, no murmur, click, rub or gallop  Abdomen: soft with PEG tube in place- abd covered in abd wall cast   Extremities: no edema  Skin turgor decreased  Impression:   1. SUSAN most likely due to ATN- clinically appears dry- cont gentle iv hydrationj, support b.p, avois nephrotoxic meds- will check urine indices, u/s on kidneys. 2. Hyperkalemia due to Heparin in the context of SUSAN- consider stopping Heparin since he is already on Eliquis and is being started on Jevity via PEG consider switching to Nepro tube feeding. Cont iv saline and one dose of lasix 40mg iv now. 3. SBO s/p small bowel resection. 4. Delirium per med team  Plan:   As above.   We liek to thank Dr. Michele Rogers for the opportunity to assist in his care  Rishabh Munoz MD  February 23, 2017

## 2017-02-23 NOTE — PROGRESS NOTES
~ 1140 - Pt back from Endo (PEG tube placed). Pt drowsy but able to talk; alert x 1. Bladder scan done per Dr. Porsche Ayala, showing 90cc. ~ 1430 - Per Dr. Porsche Ayala, ok to D/C TPN and start TF. Switch Protonix gtt to IV BID. Continue IVF. Start Cardizem gtt @ 10 d/t elevated HR.     1500 - Cardizem gtt started @ 10/hr. A fib on tele w/ -142. Pt resting in bed, denies pain but does appear uncomfortable. Moans; appears lethargic but does interact and mumble incomprehensible words. Daughter at bedside. 1713 -  up to 150s per tele; still in a fib. Dr. Porsche Ayala paged. ~ 1740 - Per Dr. Porsche Ayala, increase Cardizem gtt to 15. Ok to switch TF to Nepro d/t hyperkalemia. CONTINUE TO HOLD ELIQUIS UNTIL PT IS TAKING ORAL FOOD/LIQUIDS; for now, he will get DVT ppx w/ sub-q heparin.

## 2017-02-23 NOTE — ANESTHESIA PREPROCEDURE EVALUATION
Anesthetic History   No history of anesthetic complications            Review of Systems / Medical History  Patient summary reviewed and pertinent labs reviewed    Pulmonary  Within defined limits                 Neuro/Psych         TIA     Cardiovascular  Within defined limits  Hypertension        Dysrhythmias : atrial fibrillation      Exercise tolerance: <4 METS     GI/Hepatic/Renal               Comments: Feeding difficulties Endo/Other        Anemia     Other Findings   Comments: Current Smoker? NO       Elective Surgery? Yes       Abstained from smoking 24 hours prior to anesthesia? N/A    Risk Factors for Postoperative nausea/vomiting:       History of postoperative nausea/vomiting? NO       Female? NO       Motion sickness? NO       Intended opioid administration for postoperative analgesia?   NO           Physical Exam    Airway  Mallampati: III  TM Distance: 4 - 6 cm  Neck ROM: normal range of motion   Mouth opening: Normal     Cardiovascular    Rhythm: regular  Rate: normal         Dental    Dentition: Poor dentition     Pulmonary  Breath sounds clear to auscultation               Abdominal  GI exam deferred       Other Findings            Anesthetic Plan    ASA: 3  Anesthesia type: MAC          Induction: Intravenous  Anesthetic plan and risks discussed with: Patient

## 2017-02-23 NOTE — PROGRESS NOTES
Problem: Mobility Impaired (Adult and Pediatric)  Goal: *Acute Goals and Plan of Care (Insert Text)  Physical Therapy Goals  Re-evaluated 2/22/2017 with no goals met. Goals modified for change in status and to be accomplished in 7 days:  1. Patient will move from supine to sit and sit to supine , scoot up and down and roll side to side in bed with maximal assist.   2. Patient will transfer from bed to chair and chair to bed with maximal assist using the least restrictive device. 3. Patient will perform sit to stand with maximal assist.  4. Patient will participate in BLE HEP with minimal assist in order to promote functional strength for activities. 5. Patient will maintain sitting EOB x 5 minutes with minimal assist in order to promote improved trunk control and activity tolerance. Initiated 2/15/2017 and to be accomplished within 7 day(s)  1. Patient will move from supine to sit and sit to supine , scoot up and down and roll side to side in bed with modified independence. 2. Patient will transfer from bed to chair and chair to bed with supervision/set-up using the least restrictive device. 3. Patient will perform sit to stand with supervision/set-up. 4. Patient will ambulate with minimal assistance/contact guard assist for 75 feet with the least restrictive device. Outcome: Progressing Towards Goal  PHYSICAL THERAPY TREATMENT     Patient: Manish Farmer (70 y.o. male)  Date: 2/23/2017  Diagnosis: SBO  Small Bowel Obstruction  Small bowel obstruction (HCC)  Failed swallowing evaluations x2 Feeding difficulties  Procedure(s) (LRB):  PERCUTANEOUS ENDOSCOPIC GASTROSTOMY TUBE INSERTION (N/A) Day of Surgery  Precautions: Fall  Chart, physical therapy assessment, plan of care and goals were reviewed. ASSESSMENT:  Pt is seen for PROM and he continues to grimace with most movements. He has not progressed with functional gains and his family member has been educated x 2 with PROM.  May need to modify goals or DC 2/2 no functional gains in adequate amount of time for progression to be seen. Progression toward goals:  [ ]      Improving appropriately and progressing toward goals  [ ]      Improving slowly and progressing toward goals  [X]      Not making progress toward goals and plan of care will be adjusted       PLAN:  Patient continues to benefit from skilled intervention to address the above impairments. Continue treatment per established plan of care. Discharge Recommendations:  Fracisco Lemus  Further Equipment Recommendations for Discharge:  N/A       G-CODES:      Mobility  Current  CN= 100%. The severity rating is based on the Level of Assistance required for Functional Mobility and ADLs. SUBJECTIVE:   Patient stated I can't do this. I have tried but I can't do this. Marlin Peralse CG is not present during session and pt falls asleep during most of the PROM. OBJECTIVE DATA SUMMARY:   Critical Behavior:  Neurologic State: Drowsy  Orientation Level: Oriented to person, Oriented to place  Cognition: Decreased attention/concentration, Decreased command following  Safety/Judgement: Decreased awareness of environment  Functional Mobility Training:  Bed Mobility:  Rolling: Total assistance      Therapeutic Exercises:   PROM of BLEs x 10 each joint with concentration on heel cords 2/2 foot drop. Pain:  Pt reports /10 pain or discomfort prior to treatment. (Pt grimaces with most movements and is unable to quantify pain scale)   Pt reports /10 pain or discomfort post treatment. Activity Tolerance:   Poor tolerance to session. Please refer to the flowsheet for vital signs taken during this treatment.   After treatment:   [ ] Patient left in no apparent distress sitting up in chair  [X] Patient left in no apparent distress in bed  [X] Call bell left within reach  [X] Nursing notified  [ ] Caregiver present  [ ] Bed alarm activated      Janise Duverney   Time Calculation: 9 mins

## 2017-02-23 NOTE — PROCEDURES
Endoscopy Procedure Note    Patient: [facility] MRN: [id number]  SSN: [id number]    YOB: 1927  Age: 80 y.o. Sex: male      Date/Time:  2/23/2017 10:35 AM    Esophagogastroduodenoscopy (EGD) Procedure Report    Procedure: Esophagogastroduodenoscopy with placement of a percutaneous endoscopic gastrostomy tube    IMPRESSION:   1. Normal mucosa of the esophagus, stomach. Scattered, shallow white based ulcers in the duodenal bulb and second portion and a moderately deep, oval ulcer in the duodenal apex with black eschar in the base. 2.  Successful placement of 20 gauge traction removal PEG using the standard Ponsky Pull technique  3. No bleeding during procedure despite the fact that eliquis could not be held pre-procedure b/o recent ischemic small bowel and  Risk of additional thromboembolic events should eliquis be held    RECOMMENDATIONS:  1. PPI to heal ulcers  2. Avoid NSAIAs  3. Begin TF and advance as tolerated  4. Needs external bumper of PEG loosened in 1 week--discussed with daughter on the phone  5. Abdominal wall binder to prevent patient from pulling out PEG    Indication: failed swallow evaluation, feeding difficulty requiring PEG placement  :  Clint Fu MD  Referring Provider:   [address] [place] [address] MD  Assistants: Endoscopy Technician-1: Roselyn Butler  Endoscopy Technician-2: Mary Thomas  Endoscopy RN-1: Nelly Pederson RN  History: The history and physical exam were reviewed and updated. Endoscope: [id number]  ASA: ASA 3 - Patient with moderate systemic disease with functional limitations  Mallampati: II (soft palate, uvula, fauces visible)  Sedation:  Versed 1.5 mg IV n divided doses    Description of the procedure: The procedure was discussed with the patient including risks, benefits, alternatives including risks of iv sedation, bleeding, perforation and aspiration. A safety timeout was performed.  The patient was placed in the left lateral decubitus position. A bite block was placed. The patient was given incremental doses of intravenous medication until moderate sedation was achieved. The patients vital signs were monitored at all times including heart rate/rhythm, blood pressure and oxygen saturation. The endoscope was then passed under direct visualization into the esophagus, across the GE junction into the stomach and through the pylorus into the duodenal bulb and second portion. The endoscope was then slowly withdrawn while visualizing the mucosa. In the stomach a retroflexion was performed and gastric fundus and cardia visualized. 20 gauge traction removal PEG placed using the standard Ponsky Pull technique in the anterior wall of the gastric body . The endoscope was then slowly withdrawn. The patient was then transferred to recovery in stable condition. Findings: see impression above    Complications:   none    EBL:  minimal    Discharge disposition:  Back to nursing floor    Tray Ahn MD, Seth Mcfadden, [address] [employer] [facility]  February 23, 2017  10:35 AM

## 2017-02-23 NOTE — PROGRESS NOTES
1035: patient off floor for placement of Peg tube. Will follow up as schedule permits for OT re-evaluation. Thank you.     Berna Carlos MS OTR/L  Office Ext: [phone]  Pager: [phone]

## 2017-02-24 NOTE — PROGRESS NOTES
Surgery    Events of last night noted. I have reviewed the CT myself with radiology    Visit Vitals    /68    Pulse (!) 101    Temp 97.9 °F (36.6 °C)    Resp 12    Ht 6' (1.829 m)    Wt 70.1 kg (154 lb 8.7 oz)    SpO2 100%    BMI 20.96 kg/m2         Date 02/23/17 0700 - 02/24/17 0659 02/24/17 0700 - 02/25/17 0659   Shift [id number] 1900-0659 24 Hour Total 1251-2737 6335-5172 24 Hour Total   I  N  T  A  K  E   I.V.  (mL/kg/hr) 1195  (1.5) 2083.1  (2.5) 3278.1  (1.9) 145  145      I.V.  650 650 60  60      PitRESSIN Volume  0 0         Phenylephrine Volume  265.1 265.1 35  35      Amiodarone Volume  211.4 211.4         Levophed Volume  0 0         Volume (0.9% sodium chloride infusion) 1195  1195         Volume (dextrose 5% and 0.9% NaCl infusion)  756.7 756.7         Volume (sodium chloride 0.45 % in dextrose 10% 1,019.6 mL infusion)  200 200 50  50    NG/GT  540 540         Medication Volume (PEG/Gastrostomy Tube 02/23/17)  480 480         Intake (ml) (PEG/Gastrostomy Tube 02/23/17)  60 60       Shift Total  (mL/kg) 1195  (17.5) 2623.1  (37.4) 3818.1  (54.5) 145  (2.1)  145  (2.1)   O  U  T  P  U  T   Urine  (mL/kg/hr)  1175  (1.4) 1175  (0.7) 30  30      Urine Output (mL) (Condom Catheter 02/20/17)  800 800         Urine Output (mL) (Urinary Catheter 02/24/17 Conner)  375 375 30  30    Emesis/NG output  0 0 0  0      Output (ml) (PEG/Gastrostomy Tube 02/23/17)  0 0 0  0    Stool            Stool Occurrence(s)  1 x 1 x       Shift Total  (mL/kg)  1175  (16.8) 1175  (16.8) 30  (0.4)  30  (0.4)   NET 1195 1448. 1 2643.1 115  115   Weight (kg) 68.3 70.1 70.1 70.1 70.1 70.1     Pulm: Vent- rhonchi    Abd: soft, scaphoid, PEG site clean.  Staple line clean and intact    CT - no leak, foci of air consistent with fresh PEG placement    Imp: Aspiration versus hypoglycemic event last night     No acute new surgical issues    Will leave staples til Monday    OK to feed

## 2017-02-24 NOTE — INTERDISCIPLINARY ROUNDS
SBT trials done today but not ready to extubate - Remains on pressor -BS low - on D10W -  Cleared with surgery to start TF today via new PEG-  Levimir DCED- Amiodarone stopped due to bradycardia -Palliative talked with Daughter She is not ready for DNR

## 2017-02-24 NOTE — PROGRESS NOTES
Internal Medicine Progress Note    Patient's Name: Anastasia Everett  Admit Date: 2/12/2017  Length of Stay: 11      Assessment/Plan     #Acute Hypoxic Respiratory Failure   #Possible Aspiration Pneumonia, Possible Pneumonitis  #B/L Pleural Effusions  #VDRF  #SBO s/p Exlap and Small Bowel Resection 02/13  #Severe Dysphagia on TPN  #Atrial Fibrillation with RVR  #Acute Metabolic Encephalopathy Likely 2/2 Benzodiazepam, improving  #SUSAN on CKD Stage IIIb  #Hyperkalemia Possibly 2/2 Renal Failure, Possible Heparin-induced w/ EKG Changes  #L Inguinal Hernia  #HTN  #TIA on Long-term AC  #Iron Deficiency Anemia  #DVT PPx     - Cont vent mgmt. Vent bundle. F/u pulm  - Pt w/ leukocytosis now, but afebrile. Cont broad spec antibiotics. F/u cultures. Trend CXR  - Surgery following, no further intervention. Staples out Monday  - PEG tube placed yesterday. Plan to possibly start TFs today. F/u GI  - Currently off dilt gtt 2/2 hypotension. Temp on amio, but d/c'd 2/2 bradycardia. Restart dilt gtt. Cont lopressor. Restart eliquis once PO able  - Cr stable. Cont IVFs and phillips. F/u nephro. Trend BMP.  - K improved w/ lasix and insulin/d50. Trend BMP  - Eliquis on hold, bleed risk outweighs thromboembolism protection at this point 2/2 recent PEG and critical nature  - Restart ferrous sulfate once diet advanced  - SCDs, SQH    I have personally reviewed all pertinent labs and films that have officially resulted over the last 24 hours. I have personally checked for all pending labs that are awaiting final results. I discussed the patient's status and updates with the daughter. Subjective     Pt s/e @ bedside  Events from last night noted, pt intubated  Possible aspiration?  Hypogylcemic  Unable to obtain review of systems    Objective     Visit Vitals    /68    Pulse 96    Temp 97.9 °F (36.6 °C)    Resp 23    Ht 6' (1.829 m)    Wt 70.1 kg (154 lb 8.7 oz)    SpO2 100%    BMI 20.96 kg/m2       Physical Exam:  General Appearance: Intubated and sedated  HENT: normocephalic/atraumatic, + ETT, + thrush  Neck: Supple, no JVD  Lungs: Vent-assisted BS, no w/r/r  CV: IRIR w/ rate in 110s, no m/r/g  Abdomen: soft, bandage in place, normal bowel sounds  Extremities: No edema, SCDs in place  Neuro: sedated, but no obv focal deficits  Skin: Intact, no cyanosis    Intake and Output:  Current Shift:  02/24 0701 - 02/24 1900  In: -   Out: 90 [Urine:90]  Last three shifts:  02/22 1901 - 02/24 0700  In: 5774.8 [I.V.:5234.8]  Out: 1205 [Urine:1205]    Lab/Data Reviewed:  BMP:   Lab Results   Component Value Date/Time     02/24/2017 02:40 AM    K 5.3 02/24/2017 02:40 AM     02/24/2017 02:40 AM    CO2 23 02/24/2017 02:40 AM    AGAP 10 02/24/2017 02:40 AM     (H) 02/24/2017 02:40 AM     (H) 02/24/2017 02:40 AM    CREA 2.68 (H) 02/24/2017 02:40 AM    GFRAA 27 (L) 02/24/2017 02:40 AM    GFRNA 23 (L) 02/24/2017 02:40 AM     CBC:   Lab Results   Component Value Date/Time    WBC 17.0 (H) 02/24/2017 10:15 AM    HGB 8.8 (L) 02/24/2017 10:15 AM    HCT 27.4 (L) 02/24/2017 10:15 AM     02/24/2017 10:15 AM       Imaging Reviewed:  Ct Abd Pelv Wo Cont    Result Date: 2/24/2017  EXAM: CT of the abdomen and pelvis INDICATION: Abdominal pain COMPARISON: Several prior exams, most recently 2/12/2017 TECHNIQUE: Axial CT imaging of the abdomen and pelvis was performed with enteric contrast administered through the recently placed percutaneous gastrostomy tube and without intravenous contrast. Multiplanar reformats were generated. One or more dose reduction techniques were used on this CT: automated exposure control, adjustment of the mAs and/or kVp according to patient's size, and iterative reconstruction techniques.  The specific techniques utilized on this CT exam have been documented in the patient's electronic medical record. _______________ FINDINGS: LOWER CHEST: There are small moderate bilateral pleural effusions with associated compressive atelectasis of the lower lobes. No pneumothorax. Cardiac size is mildly enlarged. Coronary arterial and aortic valvular atherosclerotic calcifications are present without evidence of aneurysmal dilatation. LIVER, BILIARY: The unenhanced appearance of the liver demonstrates no focal abnormality. No biliary dilation. Gallbladder contains several cholesterol containing gallstones. PANCREAS: Somewhat limited in visualization; however, grossly normal in unenhanced appearance. SPLEEN: Normal. ADRENALS: Normal. KIDNEYS/URETERS/BLADDER: No hydronephrosis involving either kidney. Unchanged posterior interpolar hemorrhagic right renal cyst. 1.7 cm calcification noted in the inferior left renal pelvis, unchanged. Small amount of intravesicular air noted. PELVIC ORGANS: Small amount of fluid present in the pelvis, similar in appearance to prior examination. VASCULATURE: Extensive aortobiiliac atherosclerotic calcification is present without evidence of aneurysmal dilatation. LYMPH NODES: Scattered subcentimeter mesenteric and retroperitoneal lymph nodes are present without evidence of abdominal or pelvic adenopathy. GASTROINTESTINAL TRACT: There is a percutaneous gastrostomy catheter, which is present in expected position. The gastrostomy balloon is present entirely within the lumen of the stomach. There is no extraluminal accumulation of oral contrast identified. There are a few small foci of extraperitoneal gas noted anterior to the hepatic margin (series 3 images 42-44), not unexpected given recent placement. Oral contrast is noted within the proximal portion of the small intestine, as well as in the large intestine. Postsurgical changes of laparotomy are present. Suture material is noted in the right lower quadrant, with mild mesenteric swirling noted in this location.  Small bowel loops proximal to this segment of mesentery maintain a normal caliber, without evidence of recurrent bowel obstruction. Mild gaseous distention of the colon noted, possibly related to mild ileus. BONES: No acute or aggressive osseous abnormalities identified. OTHER: The left inguinal hernia previously demonstrated. Reduced on today's examination, only containing a small amount of fluid, without bowel loops as noted previously. _______________     IMPRESSION: 1. Postprocedural changes from same day percutaneous gastrostomy catheter placement, with gastrostomy catheter in expected position. No evidence of extravasated contrast. There is small amount of free intraperitoneal gas which is not unexpected given recent placement. 2. Postsurgical changes of laparotomy and small bowel resection for management of prior small bowel obstruction. Nonspecific mesenteric swelling noted region of suture material present in the right lower quadrant; however, no evidence of recurrent bowel obstruction present. Mild gaseous distention of the colon is present, possibly related to mild postop ileus. 3. Small-moderate bilateral pleural effusions, new from prior CT examination, with increased body wall edema, small amount of pelvic fluid. 4. Small amount of intravesicular air , presumably from recent prior Conner catheter placement. Note: Preliminary report sent to the patient care division by the radiology resident at the time of the study. Xr Chest Port    Result Date: 2/24/2017  Chest, single view Indication: Intubated patient, follow-up examination Comparison: Several prior exams, most recently 2/23/2017 Findings:  Portable upright AP view of the chest was obtained. There is an endotracheal tube present 5.6 cm above the jimi. A right peripherally inserted central venous catheter projects over the superior cavoatrial junction. Degree of pulmonary inflation is similar to prior. Posterior layering pleural effusions are noted, with hazy bilateral lower lung opacities, right greater than left. No pneumothorax.  Cardiac size and mediastinal contours are stable. No acute osseous abnormality present. IMPRESSION: 1. Endotracheal tube and right peripherally inserted central venous catheter in position as above. 2. Mild pulmonary hypoinflation, with unchanged appearance to posteriorly layering pleural effusions and bilateral lower lung atelectasis/airspace disease. Xr Chest Port    Result Date: 2/24/2017  Chest, single view Indication: Respiratory arrest, possible aspiration Comparison: Several prior exams, most recently 2/18/2017 Findings:  Portable upright AP view of the chest was obtained. There is a right peripherally inserted central venous catheter with tip projecting over the superior cavoatrial junction. Degree of pulmonary inflation is similar to prior. Hazy bilateral lower lung opacities are present, with dense left retrocardiac opacity noted, increased in prior examination. No pneumothorax. Cardiac size and mediastinal contours are stable. No acute osseous abnormality. Interval resolution of previously demonstrated right-sided subdiaphragmatic air. Impression: 1. Right peripherally inserted central venous catheter with tip projecting over the superior cavoatrial junction. 2. Increased hazy opacities at bilateral lung bases, most in keeping with a combination of posteriorly layering pleural effusions and associated atelectasis or infiltrate. Note: Preliminary report sent to the Emergency Department by the radiology resident at the time of the study. Xr Chest Port    Result Date: 2/24/2017  EXAM: One-view chest CLINICAL HISTORY: ett plcmt; CENTRAL LINE PLACEMENT , COMPARISON: 2/23/2017 FINDINGS: Frontal view of the chest demonstrate no focal airspace consolidation. Stable opacification of the lung bases, right greater than left compatible with pleural effusions. No pneumothorax. Right approach PICC line tip in the mid SVC. ET tube in place distal tip approximately 5 cm from the jimi. Cardiac silhouette enlarged, stable.  No acute osseous finding. IMPRESSION: ET tube and PICC line in place as described. Stable bibasilar opacities compatible with pleural effusions, right greater than left.       Medications Reviewed:  Current Facility-Administered Medications   Medication Dose Route Frequency    magnesium sulfate 1 g/100 ml IVPB (premix or compounded)  1 g IntraVENous ONCE    [START ON 2/25/2017] vancomycin (VANCOCIN) 500 mg in 0.9% sodium chloride (MBP/ADV) 100 mL ADV  500 mg IntraVENous Q24H    [START ON 2/27/2017] VANCOMYCIN INFORMATION NOTE   Other Rx Dosing/Monitoring    sodium chloride (NS) flush 5-10 mL  5-10 mL IntraVENous Q8H    sodium chloride (NS) flush 5-10 mL  5-10 mL IntraVENous PRN    simethicone (MYLICON) 32HY/7.0LS oral drops 80 mg  1.2 mL Oral Multiple    fentaNYL citrate (PF) injection  mcg   mcg IntraVENous Multiple    pantoprazole (PROTONIX) 40 mg in sodium chloride 0.9 % 10 mL injection  40 mg IntraVENous BID    PHENYLephrine 30 mg in 0.9% sodium chloride 250 mL (TARIK-SYNEPHRINE) infusion   mcg/min IntraVENous TITRATE    NOREPINephrine (LEVOPHED) 8,000 mcg in dextrose 5% 250 mL infusion  2-30 mcg/min IntraVENous TITRATE    vasopressin (VASOSTRICT) 100 Units in 0.9% sodium chloride 100 mL infusion  0.01 Units/min IntraVENous CONTINUOUS    piperacillin-tazobactam (ZOSYN) 3.375 g in 0.9% sodium chloride (MBP/ADV) 100 mL MBP EXTENDED 4 HOUR INFUSION###  3.375 g IntraVENous Q12H    sodium chloride 0.45 % in dextrose 10% 1,019.6 mL infusion   IntraVENous CONTINUOUS    midazolam (VERSED) injection 1-4 mg  1-4 mg IntraVENous Q2H PRN    heparin (porcine) injection 5,000 Units  5,000 Units SubCUTAneous Q8H    acetaminophen (TYLENOL) suppository 650 mg  650 mg Rectal Q4H PRN    zolpidem (AMBIEN) tablet 5 mg  5 mg Oral QHS PRN    insulin lispro (HUMALOG) injection   SubCUTAneous Q6H    glucose chewable tablet 16 g  4 Tab Oral PRN    glucagon (GLUCAGEN) injection 1 mg  1 mg IntraMUSCular PRN    dextrose (D50W) injection syrg 12.5-25 g  25-50 mL IntraVENous PRN    sodium chloride (NS) flush 10-30 mL  10-30 mL InterCATHeter PRN    sodium chloride (NS) flush 10 mL  10 mL InterCATHeter Q24H    sodium chloride (NS) flush 10 mL  10 mL InterCATHeter PRN    sodium chloride (NS) flush 10-40 mL  10-40 mL InterCATHeter Q8H    sodium chloride (NS) flush 20 mL  20 mL InterCATHeter Q24H    bacitracin 500 unit/gram packet 1 Packet  1 Packet Topical PRN    tamsulosin (FLOMAX) capsule 0.4 mg  0.4 mg Oral DAILY    0.9% sodium chloride infusion 250 mL  250 mL IntraVENous PRN    phenol throat spray (CHLORASEPTIC) 1 Spray  1 Spray Oral PRN    sodium chloride (NS) flush 5-10 mL  5-10 mL IntraVENous Q8H    sodium chloride (NS) flush 5-10 mL  5-10 mL IntraVENous PRN    0.9% sodium chloride infusion 250 mL  250 mL IntraVENous PRN    sodium chloride (NS) flush 5-10 mL  5-10 mL IntraVENous Q8H    sodium chloride (NS) flush 5-10 mL  5-10 mL IntraVENous PRN    HYDROmorphone (PF) (DILAUDID) injection 1 mg  1 mg IntraVENous Q3H PRN    ondansetron (ZOFRAN) injection 4 mg  4 mg IntraVENous Q4H PRN    0.9% sodium chloride infusion 250 mL  250 mL IntraVENous PRN           Karol Spain DO  Internal Medicine, Hospitalist  Pager: [phone]  [facility]

## 2017-02-24 NOTE — PROGRESS NOTES
Patient's chart reviewed. Pt with rapid response and code blue on 2/23/17. Pt is curently intubated and on ventilator in ICU. Will need new orders to evaluate for PT given significant change in status. Will d/c previous PT orders; please re-order when patient is appropriate.     Thank you,  Caryle Humphrey, MSPT

## 2017-02-24 NOTE — PROGRESS NOTES
Patient had rapid response and code blue yesterday; currently intubated and on vent in ICU. 8625: attempted to re-evaluate patient. Per [place], not appropriate at this time. Will discontinue OT orders; please reorder when patient is appropriate. Thank you.     Erikc Baca MS OTR/L  Office Ext: [phone]  Pager: [phone]

## 2017-02-24 NOTE — PROGRESS NOTES
Reason for Renal Dosing:  Per Renal Dosing Policy    Patient clinical status and labs ordered/reviewed. Pt Weight Weight: 68.3 kg (150 lb 8 oz)   Serum Creatinine Lab Results   Component Value Date/Time    Creatinine 2.58 02/23/2017 06:41 PM       Creatinine Clearance Estimated Creatinine Clearance: 18.8 mL/min (based on Cr of 2.58). BUN Lab Results   Component Value Date/Time     02/23/2017 06:41 PM       WBC Lab Results   Component Value Date/Time    WBC 20.9 02/23/2017 06:41 PM      Temperature Temp: 96.4 °F (35.8 °C)   HR Pulse (Heart Rate): 89     BP BP: (!) 79/64             Drug type: Antibiotic indicated for Intra-Abdominal Infection. Drug/dose: Piperacillin-Tazobactam 3.375 grams every 6 hours was adjusted to Piperacillin-Tazobactam 3.375 grams every 12 hours ISHA     Continue to monitor.     Signed Jayla Galloway PHARMJULIO  Date 2/23/2017  Time 9:07 PM

## 2017-02-24 NOTE — PROGRESS NOTES
Problem: Falls - Risk of  Goal: *Knowledge of fall prevention  Outcome: Not Progressing Towards Goal  Pt does not communicate with nurses will explain all care in hope he understands. Problem: Small Bowel Obstruction - Non Surgical: Discharge Outcomes  Goal: *Tolerating diet  Outcome: Not Progressing Towards Goal  Currently nponew peg tube placed.

## 2017-02-24 NOTE — ADVANCED PRACTICE NURSE
Intubation Procedure Note    Performed By:  Isaiah Layne CRNA     Assistant:  Respiratory at bedside    Medications given were: 10mg Amidate and 100mg succinylcholine. Direct Laryngoscopy with Glidescope 4 . Grade 1 view. A number: 7.5 [place] OETT cuffed   ETT was placed to: 23 cm at the teeth. Placement was evaluated by noting: good end-tidal CO2 detector color change . Attempts required: 1. Complications: none. The procedure was tolerated well. Disposition: ICU - intubated and hemodynamically stable.             Signed By: Isaiah Layne CRNA

## 2017-02-24 NOTE — PROGRESS NOTES
[facility]  Pulmonary, Critical Care, and Sleep Medicine      Name: Macho Robles MRN: [id number]   : 1927 Hospital: [facility]   Date: 2017          Critical Care Progress Notes    2017  Tolerated vent overnight, SBT fairly well, then with increased WOB  Remains on 2 pressors, on amio  Glucose stable on D10  Cx NGTD  WBC down, LA normal    IMPRESSION:   · Acute hypoxemic, hypercapnic respiratory failure likely aspiration with previous brief respiratory arrest, now requiring MV   · AMS, multifactorial with hypoglycemia and sedation given for PEG   · Possible sepsis with leukocytosis, hypotension, source likely aspiration, +possible UTI    · Hypotension requiring pressor support, afib/volume depletion/sepsis  · A-fib with RVR, s/p cardizem, now on amio  · SUSAN   · Anemia  · Hyperkalemia, now resolved  · Dysphagia s/p PEG   · Hx recent Small Bowel Obstruction s/p ex-lap with resection. · Hypoalbuminemia  · Hypocalcemia, replaced      RECOMMENDATIONS:   · Resp -  Vent bundle/protocol. Cont Zosyn/Vanco.  Sputum cx. Follow ABG, FiO2 increased. Follow CXR, ABG. Aspiration precautions. SBT in am if stable and pressors weaned. · ID - Severe sepsis bundle. Cont zosyn/vanc. Await sputum, blood and urine cx. LA normal. Trend WBC. · CVS - Monitor HD. Wean pressors for MAP>65. PICC in place. Currently off xarelto. Continue amio for now. · Heme/Onc- Trend hgb, transfuse prn. Recheck coags in am  · Renal - Renal following. Continue IVF 1/2 NsD10  Placed on electrolyte replacement protocol. Monitor K+ closely  · Endocrine - Check TSH. Monitor glucose closely, avoid hypoglycemia, d/c levemir. · Neuro/ Pain/ Sedation - prn versed or fentanyl for sedation.  Minimize sedating medication swhen possible  · GI - Start TF today   · Prophylaxis - Heparin, Protonix  · Palliative care following  · FULL CODE  · Pt daughter updated at bedside          Past Medical History: Diagnosis Date    Hypertension     TIA (transient ischemic attack)       History reviewed. No pertinent surgical history. Prior to Admission medications    Medication Sig Start Date End Date Taking? Authorizing Provider   apixaban (ELIQUIS) 2.5 mg tablet Take 2.5 mg by mouth every twelve (12) hours. Yes Sadie Dent, MD   amLODIPine (NORVASC) 10 mg tablet Take 10 mg by mouth daily. Yes Sadie Dent MD   lisinopril (PRINIVIL, ZESTRIL) 10 mg tablet Take 10 mg by mouth daily. Yes Sadie Dent MD   Hydrochlorothiazide (HYDRODIURIL) 12.5 mg tablet Take 12.5 mg by mouth daily. Yes Sadie Other, MD   ferrous sulfate 325 mg (65 mg iron) tablet Take 325 mg by mouth Daily (before breakfast).    Yes Sadie Dent MD     Current Facility-Administered Medications   Medication Dose Route Frequency    [START ON 2/25/2017] vancomycin (VANCOCIN) 500 mg in 0.9% sodium chloride (MBP/ADV) 100 mL ADV  500 mg IntraVENous Q24H    [START ON 2/27/2017] VANCOMYCIN INFORMATION NOTE   Other Rx Dosing/Monitoring    amiodarone (NEXTERONE) 360 mg in dextrose 200 mL (1.8 mg/mL) infusion  0.5-1 mg/min IntraVENous TITRATE    nystatin (MYCOSTATIN) 100,000 unit/mL oral suspension 500,000 Units  500,000 Units Oral QID    sodium chloride (NS) flush 5-10 mL  5-10 mL IntraVENous Q8H    pantoprazole (PROTONIX) 40 mg in sodium chloride 0.9 % 10 mL injection  40 mg IntraVENous BID    PHENYLephrine 30 mg in 0.9% sodium chloride 250 mL (TARIK-SYNEPHRINE) infusion   mcg/min IntraVENous TITRATE    NOREPINephrine (LEVOPHED) 8,000 mcg in dextrose 5% 250 mL infusion  2-30 mcg/min IntraVENous TITRATE    vasopressin (VASOSTRICT) 100 Units in 0.9% sodium chloride 100 mL infusion  0.01 Units/min IntraVENous CONTINUOUS    piperacillin-tazobactam (ZOSYN) 3.375 g in 0.9% sodium chloride (MBP/ADV) 100 mL MBP EXTENDED 4 HOUR INFUSION###  3.375 g IntraVENous Q12H    sodium chloride 0.45 % in dextrose 10% 1,019.6 mL infusion   IntraVENous CONTINUOUS    heparin (porcine) injection 5,000 Units  5,000 Units SubCUTAneous Q8H    insulin lispro (HUMALOG) injection   SubCUTAneous Q6H    sodium chloride (NS) flush 10 mL  10 mL InterCATHeter Q24H    sodium chloride (NS) flush 10-40 mL  10-40 mL InterCATHeter Q8H    sodium chloride (NS) flush 20 mL  20 mL InterCATHeter Q24H    tamsulosin (FLOMAX) capsule 0.4 mg  0.4 mg Oral DAILY    sodium chloride (NS) flush 5-10 mL  5-10 mL IntraVENous Q8H    sodium chloride (NS) flush 5-10 mL  5-10 mL IntraVENous Q8H     No Known Allergies   Social History   Substance Use Topics    Smoking status: Former Smoker    Smokeless tobacco: Not on file    Alcohol use No      History reviewed. No pertinent family history. Review of Systems:  Review of systems not obtained due to patient factors.     Objective:   Vital Signs:    Visit Vitals    /68    Pulse 96    Temp 98.4 °F (36.9 °C)    Resp 23    Ht 6' (1.829 m)    Wt 70.1 kg (154 lb 8.7 oz)    SpO2 100%    BMI 20.96 kg/m2       O2 Device: Endotracheal tube   O2 Flow Rate (L/min): 15 l/min   Temp (24hrs), Av.8 °F (36.6 °C), Min:96.4 °F (35.8 °C), Max:99.4 °F (37.4 °C)       Intake/Output:   Last shift:       07 - 1900  In: -   Out: 200 [Urine:200]  Last 3 shifts: 1901 -  0700  In: 5774.8 [I.V.:5234.8]  Out: 1205 [Urine:1205]    Intake/Output Summary (Last 24 hours) at 17 1330  Last data filed at 17 1200   Gross per 24 hour   Intake          3963.09 ml   Output             1405 ml   Net          2558.09 ml       Ventilator Settings:  Mode Rate Tidal Volume Pressure FiO2 PEEP   Assist control, VC+   500 ml  7 cm H2O 30 % 5 cm H20     Peak airway pressure: 13 cm H2O    Minute ventilation: 7 l/min      ARDS network Guidelines: Lung protective strategy and Pl pressure goals____________      Physical Exam:    General/Neuro:  Opens eyes to voice, on vent, follows simple commands all 4 extremities, breathing over vent, appears stated age   Head:  Normocephalic, without obvious abnormality, atraumatic. Eyes:  Conjunctivae/corneas clear. PERRL   Nose: Nares normal. Septum midline. Mucosa normal. No drainage. Throat: ETT in place   Neck: Supple, symmetrical       Lungs:   Good AE B, diminished bibasilar, no wheeze       Heart:  Irregular , no m   Abdomen:   Soft, NT Bowel sounds normal.  PEG site w/o bleeding. Extremities: Extremities normal, atraumatic, no cyanosis or edema. Pulses: 2+ and symmetric all extremities. Skin: Skin color, texture, turgor normal. No rashes or lesions               Data:     Recent Results (from the past 24 hour(s))   GLUCOSE, POC    Collection Time: 02/23/17  6:02 PM   Result Value Ref Range    Glucose (POC) <30.0 (LL) 70 - 110 mg/dL   GLUCOSE, POC    Collection Time: 02/23/17  6:14 PM   Result Value Ref Range    Glucose (POC) 119 (H) 70 - 110 mg/dL   POC G3    Collection Time: 02/23/17  6:20 PM   Result Value Ref Range    Device: Non rebreather      Flow rate (POC) 10 L/M    FIO2 (POC) 100 %    pH (POC) 7.359 7.35 - 7.45      pCO2 (POC) 38.5 35.0 - 45.0 MMHG    pO2 (POC) 82 80 - 100 MMHG    HCO3 (POC) 21.8 (L) 22 - 26 MMOL/L    sO2 (POC) 96 92 - 97 %    Base deficit (POC) 4 mmol/L    Allens test (POC) YES      Total resp. rate 28      Site LEFT RADIAL      Patient temp. 97.4      Specimen type (POC) ARTERIAL      Performed by Josie Clifford    CBC WITH AUTOMATED DIFF    Collection Time: 02/23/17  6:41 PM   Result Value Ref Range    WBC 20.9 (H) 4.6 - 13.2 K/uL    RBC 3.54 (L) 4.70 - 5.50 M/uL    HGB 10.0 (L) 13.0 - 16.0 g/dL    HCT 31.2 (L) 36.0 - 48.0 %    MCV 88.1 74.0 - 97.0 FL    MCH 28.2 24.0 - 34.0 PG    MCHC 32.1 31.0 - 37.0 g/dL    RDW 15.8 (H) 11.6 - 14.5 %    PLATELET 828 247 - 627 K/uL    MPV 11.0 9.2 - 11.8 FL    NEUTROPHILS 92 (H) 40 - 73 %    LYMPHOCYTES 5 (L) 21 - 52 %    MONOCYTES 3 3 - 10 %    EOSINOPHILS 0 0 - 5 %    BASOPHILS 0 0 - 2 %    ABS. NEUTROPHILS 19.2 (H) 1.8 - 8.0 K/UL    ABS. LYMPHOCYTES 1.1 0.9 - 3.6 K/UL    ABS. MONOCYTES 0.6 0.05 - 1.2 K/UL    ABS. EOSINOPHILS 0.0 0.0 - 0.4 K/UL    ABS. BASOPHILS 0.0 0.0 - 0.06 K/UL    DF AUTOMATED     METABOLIC PANEL, COMPREHENSIVE    Collection Time: 02/23/17  6:41 PM   Result Value Ref Range    Sodium 140 136 - 145 mmol/L    Potassium 5.3 3.5 - 5.5 mmol/L    Chloride 106 100 - 108 mmol/L    CO2 26 21 - 32 mmol/L    Anion gap 8 3.0 - 18 mmol/L    Glucose 31 (LL) 74 - 99 mg/dL     (H) 7.0 - 18 MG/DL    Creatinine 2.58 (H) 0.6 - 1.3 MG/DL    BUN/Creatinine ratio 41 (H) 12 - 20      GFR est AA 29 (L) >60 ml/min/1.73m2    GFR est non-AA 24 (L) >60 ml/min/1.73m2    Calcium 7.7 (L) 8.5 - 10.1 MG/DL    Bilirubin, total 0.3 0.2 - 1.0 MG/DL    ALT (SGPT) 23 16 - 61 U/L    AST (SGOT) 38 (H) 15 - 37 U/L    Alk.  phosphatase 97 45 - 117 U/L    Protein, total 5.0 (L) 6.4 - 8.2 g/dL    Albumin 1.9 (L) 3.4 - 5.0 g/dL    Globulin 3.1 2.0 - 4.0 g/dL    A-G Ratio 0.6 (L) 0.8 - 1.7     CARDIAC PANEL,(CK, CKMB & TROPONIN)    Collection Time: 02/23/17  6:41 PM   Result Value Ref Range    CK 41 39 - 308 U/L    CK - MB 3.2 <3.6 ng/ml    CK-MB Index 7.8 (H) 0.0 - 4.0 %    Troponin-I, Qt. 0.14 (H) 0.0 - 0.045 NG/ML   CALCIUM, IONIZED    Collection Time: 02/23/17  6:41 PM   Result Value Ref Range    Ionized Calcium 1.13 1.12 - 1.32 MMOL/L   PROTHROMBIN TIME + INR    Collection Time: 02/23/17  6:41 PM   Result Value Ref Range    Prothrombin time 16.7 (H) 11.5 - 15.2 sec    INR 1.4 (H) 0.8 - 1.2     LACTIC ACID, PLASMA    Collection Time: 02/23/17  6:45 PM   Result Value Ref Range    Lactic acid 1.3 0.4 - 2.0 MMOL/L   SODIUM, UR, RANDOM    Collection Time: 02/23/17  6:47 PM   Result Value Ref Range    Sodium urine, random 15 (L) 20 - 110 MMOL/L   CHLORIDE, UR, RANDOM    Collection Time: 02/23/17  6:47 PM   Result Value Ref Range    Chloride,urine random 21 (L) 55 - 125 MMOL/L   POTASSIUM, UR, RANDOM    Collection Time: 02/23/17  6:47 PM   Result Value Ref Range Potassium urine, random 61 12.0 - 62 MMOL/L   CREATININE, UR, RANDOM    Collection Time: 02/23/17  6:47 PM   Result Value Ref Range    Creatinine, urine 45.60 30 - 125 mg/dL   EKG, 12 LEAD, SUBSEQUENT    Collection Time: 02/23/17  6:52 PM   Result Value Ref Range    Ventricular Rate 111 BPM    Atrial Rate 394 BPM    QRS Duration 110 ms    Q-T Interval 344 ms    QTC Calculation (Bezet) 467 ms    Calculated R Axis -67 degrees    Calculated T Axis 85 degrees    Diagnosis       Atrial fibrillation with rapid ventricular response  Left axis deviation  Incomplete right bundle branch block  Inferior infarct , age undetermined  Abnormal ECG  When compared with ECG of 23-FEB-2017 12:31,  Incomplete right bundle branch block has replaced Right bundle branch block     GLUCOSE, POC    Collection Time: 02/23/17  7:31 PM   Result Value Ref Range    Glucose (POC) <30.0 (LL) 70 - 110 mg/dL   GLUCOSE, POC    Collection Time: 02/23/17  7:32 PM   Result Value Ref Range    Glucose (POC) <30.0 (LL) 70 - 110 mg/dL   GLUCOSE, POC    Collection Time: 02/23/17  7:47 PM   Result Value Ref Range    Glucose (POC) 127 (H) 70 - 110 mg/dL   GLUCOSE, POC    Collection Time: 02/23/17  8:47 PM   Result Value Ref Range    Glucose (POC) 78 70 - 110 mg/dL   POC G3    Collection Time: 02/23/17  9:23 PM   Result Value Ref Range    Device: Non rebreather      Flow rate (POC) 15 L/M    FIO2 (POC) 100 %    pH (POC) 7.288 (L) 7.35 - 7.45      pCO2 (POC) 47.8 (H) 35.0 - 45.0 MMHG    pO2 (POC) 147 (H) 80 - 100 MMHG    HCO3 (POC) 23.2 22 - 26 MMOL/L    sO2 (POC) 99 (H) 92 - 97 %    Base deficit (POC) 4 mmol/L    Allens test (POC) YES      Total resp.  rate 29      Site RIGHT RADIAL      Patient temp. 96.4      Specimen type (POC) ARTERIAL      Performed by Lisa Phipps, POC    Collection Time: 02/23/17 10:01 PM   Result Value Ref Range    Glucose (POC) 55 (L) 70 - 246 mg/dL   METABOLIC PANEL, BASIC    Collection Time: 02/23/17 10:15 PM   Result Value Ref Range    Sodium 140 136 - 145 mmol/L    Potassium 6.0 (H) 3.5 - 5.5 mmol/L    Chloride 106 100 - 108 mmol/L    CO2 24 21 - 32 mmol/L    Anion gap 10 3.0 - 18 mmol/L    Glucose 50 (L) 74 - 99 mg/dL     (H) 7.0 - 18 MG/DL    Creatinine 2.62 (H) 0.6 - 1.3 MG/DL    BUN/Creatinine ratio 41 (H) 12 - 20      GFR est AA 28 (L) >60 ml/min/1.73m2    GFR est non-AA 23 (L) >60 ml/min/1.73m2    Calcium 7.5 (L) 8.5 - 10.1 MG/DL   LACTIC ACID, PLASMA    Collection Time: 02/23/17 10:15 PM   Result Value Ref Range    Lactic acid 0.8 0.4 - 2.0 MMOL/L   CULTURE, BLOOD    Collection Time: 02/23/17 10:15 PM   Result Value Ref Range    Special Requests: NO SPECIAL REQUESTS      Culture result: NO GROWTH AFTER 6 HOURS     CBC WITH AUTOMATED DIFF    Collection Time: 02/23/17 10:15 PM   Result Value Ref Range    WBC 18.6 (H) 4.6 - 13.2 K/uL    RBC 3.44 (L) 4.70 - 5.50 M/uL    HGB 9.7 (L) 13.0 - 16.0 g/dL    HCT 30.6 (L) 36.0 - 48.0 %    MCV 89.0 74.0 - 97.0 FL    MCH 28.2 24.0 - 34.0 PG    MCHC 31.7 31.0 - 37.0 g/dL    RDW 16.0 (H) 11.6 - 14.5 %    PLATELET 379 546 - 374 K/uL    MPV 11.3 9.2 - 11.8 FL    NEUTROPHILS 93 (H) 40 - 73 %    LYMPHOCYTES 5 (L) 21 - 52 %    MONOCYTES 2 (L) 3 - 10 %    EOSINOPHILS 0 0 - 5 %    BASOPHILS 0 0 - 2 %    ABS. NEUTROPHILS 17.4 (H) 1.8 - 8.0 K/UL    ABS. LYMPHOCYTES 0.9 0.9 - 3.6 K/UL    ABS. MONOCYTES 0.3 0.05 - 1.2 K/UL    ABS. EOSINOPHILS 0.0 0.0 - 0.4 K/UL    ABS.  BASOPHILS 0.0 0.0 - 0.06 K/UL    DF AUTOMATED     PROTHROMBIN TIME + INR    Collection Time: 02/23/17 10:15 PM   Result Value Ref Range    Prothrombin time 16.8 (H) 11.5 - 15.2 sec    INR 1.4 (H) 0.8 - 1.2     PTT    Collection Time: 02/23/17 10:15 PM   Result Value Ref Range    aPTT 41.1 (H) 23.0 - 36.4 SEC   GLUCOSE, POC    Collection Time: 02/23/17 10:24 PM   Result Value Ref Range    Glucose (POC) 70 70 - 110 mg/dL   CULTURE, BLOOD    Collection Time: 02/23/17 10:30 PM   Result Value Ref Range    Special Requests: NO SPECIAL REQUESTS      Culture result: NO GROWTH AFTER 6 HOURS     POC G3    Collection Time: 02/23/17 10:42 PM   Result Value Ref Range    Device: VENT      FIO2 (POC) 60 %    pH (POC) 7.368 7.35 - 7.45      pCO2 (POC) 34.0 (L) 35.0 - 45.0 MMHG    pO2 (POC) 82 80 - 100 MMHG    HCO3 (POC) 19.9 (L) 22 - 26 MMOL/L    sO2 (POC) 97 92 - 97 %    Base deficit (POC) 6 mmol/L    Mode ASSIST CONTROL      Tidal volume 500 ml    Set Rate 1 bpm    PEEP/CPAP (POC) 5.0 cmH2O    Allens test (POC) YES      Inspiratory Time 1.71 sec    Total resp. rate 19      Site RIGHT RADIAL      Patient temp.  96.0      Specimen type (POC) ARTERIAL      Performed by Ivette Dietrich     Volume control plus YES     GLUCOSE, POC    Collection Time: 02/23/17 11:06 PM   Result Value Ref Range    Glucose (POC) 137 (H) 70 - 110 mg/dL   GLUCOSE, POC    Collection Time: 02/24/17  1:20 AM   Result Value Ref Range    Glucose (POC) 123 (H) 70 - 110 mg/dL   URINALYSIS W/MICROSCOPIC    Collection Time: 02/24/17  2:30 AM   Result Value Ref Range    Color DARK YELLOW      Appearance TURBID      Specific gravity 1.022 1.005 - 1.030      pH (UA) 5.0 5.0 - 8.0      Protein 30 (A) NEG mg/dL    Glucose NEGATIVE  NEG mg/dL    Ketone NEGATIVE  NEG mg/dL    Bilirubin NEGATIVE  NEG      Blood LARGE (A) NEG      Urobilinogen 0.2 0.2 - 1.0 EU/dL    Nitrites NEGATIVE  NEG      Leukocyte Esterase MODERATE (A) NEG      WBC 4 to 10 0 - 4 /hpf    RBC TOO NUMEROUS TO COUNT 0 - 5 /hpf    Epithelial cells 1+ 0 - 5 /lpf    Bacteria NEGATIVE  NEG /hpf    Granular cast TOO NUMEROUS TO COUNT (A) NEG /lpf   METABOLIC PANEL, BASIC    Collection Time: 02/24/17  2:40 AM   Result Value Ref Range    Sodium 138 136 - 145 mmol/L    Potassium 5.3 3.5 - 5.5 mmol/L    Chloride 105 100 - 108 mmol/L    CO2 23 21 - 32 mmol/L    Anion gap 10 3.0 - 18 mmol/L    Glucose 111 (H) 74 - 99 mg/dL     (H) 7.0 - 18 MG/DL    Creatinine 2.68 (H) 0.6 - 1.3 MG/DL    BUN/Creatinine ratio 40 (H) 12 - 20 GFR est AA 27 (L) >60 ml/min/1.73m2    GFR est non-AA 23 (L) >60 ml/min/1.73m2    Calcium 7.3 (L) 8.5 - 10.1 MG/DL   MAGNESIUM    Collection Time: 02/24/17  2:40 AM   Result Value Ref Range    Magnesium 1.8 1.8 - 2.4 mg/dL   PHOSPHORUS    Collection Time: 02/24/17  2:40 AM   Result Value Ref Range    Phosphorus 5.1 (H) 2.5 - 4.9 MG/DL   CALCIUM, IONIZED    Collection Time: 02/24/17  2:40 AM   Result Value Ref Range    Ionized Calcium 1.02 (L) 1.12 - 1.32 MMOL/L   CARDIAC PANEL,(CK, CKMB & TROPONIN)    Collection Time: 02/24/17  2:40 AM   Result Value Ref Range    CK 53 39 - 308 U/L    CK - MB 4.9 (H) <3.6 ng/ml    CK-MB Index 9.2 (H) 0.0 - 4.0 %    Troponin-I, Qt. 0.14 (H) 0.0 - 0.045 NG/ML   GLUCOSE, POC    Collection Time: 02/24/17  2:45 AM   Result Value Ref Range    Glucose (POC) 103 70 - 110 mg/dL   GLUCOSE, POC    Collection Time: 02/24/17  5:11 AM   Result Value Ref Range    Glucose (POC) 112 (H) 70 - 110 mg/dL   GLUCOSE, POC    Collection Time: 02/24/17  8:38 AM   Result Value Ref Range    Glucose (POC) 94 70 - 110 mg/dL   CBC WITH AUTOMATED DIFF    Collection Time: 02/24/17 10:15 AM   Result Value Ref Range    WBC 17.0 (H) 4.6 - 13.2 K/uL    RBC 3.12 (L) 4.70 - 5.50 M/uL    HGB 8.8 (L) 13.0 - 16.0 g/dL    HCT 27.4 (L) 36.0 - 48.0 %    MCV 87.8 74.0 - 97.0 FL    MCH 28.2 24.0 - 34.0 PG    MCHC 32.1 31.0 - 37.0 g/dL    RDW 16.0 (H) 11.6 - 14.5 %    PLATELET 243 322 - 810 K/uL    MPV 10.6 9.2 - 11.8 FL    NEUTROPHILS 93 (H) 42 - 75 %    LYMPHOCYTES 3 (L) 20 - 51 %    MONOCYTES 4 2 - 9 %    EOSINOPHILS 0 0 - 5 %    BASOPHILS 0 0 - 3 %    ABS. NEUTROPHILS 15.8 (H) 1.8 - 8.0 K/UL    ABS. LYMPHOCYTES 0.5 (L) 0.8 - 3.5 K/UL    ABS. MONOCYTES 0.7 0 - 1.0 K/UL    ABS. EOSINOPHILS 0.0 0.0 - 0.4 K/UL    ABS.  BASOPHILS 0.0 0.0 - 0.1 K/UL    RBC COMMENTS ANISOCYTOSIS  1+        RBC COMMENTS POLYCHROMASIA  1+        RBC COMMENTS OVALOCYTES  1+        RBC COMMENTS SPHEROCYTES  1+        DF MANUAL     POC G3    Collection Time: 02/24/17 10:39 AM   Result Value Ref Range    Device: VENT      FIO2 (POC) 0.30 %    pH (POC) 7.406 7.35 - 7.45      pCO2 (POC) 30.4 (L) 35.0 - 45.0 MMHG    pO2 (POC) 76 (L) 80 - 100 MMHG    HCO3 (POC) 19.2 (L) 22 - 26 MMOL/L    sO2 (POC) 96 92 - 97 %    Base deficit (POC) 6 mmol/L    Mode CPAP/SPON      PEEP/CPAP (POC) 5 cmH2O    Pressure support 7 cmH2O    Allens test (POC) NO      Total resp. rate 23      Site RIGHT RADIAL      Patient temp. 98.0      Specimen type (POC) ARTERIAL      Performed by Kehinde Kay    EKG, 12 LEAD, SUBSEQUENT    Collection Time: 02/24/17 11:10 AM   Result Value Ref Range    Ventricular Rate 112 BPM    Atrial Rate 122 BPM    QRS Duration 138 ms    Q-T Interval 352 ms    QTC Calculation (Bezet) 480 ms    Calculated R Axis -76 degrees    Calculated T Axis 58 degrees    Diagnosis       Atrial fibrillation with rapid ventricular response  Left axis deviation  Right bundle branch block  Inferior infarct (cited on or before 23-FEB-2017)  Abnormal ECG  When compared with ECG of 23-FEB-2017 18:52,  Right bundle branch block has replaced Incomplete right bundle branch block     GLUCOSE, POC    Collection Time: 02/24/17 12:30 PM   Result Value Ref Range    Glucose (POC) 98 70 - 110 mg/dL             Telemetry:AFIB    Imaging:  I have personally reviewed the patients radiographs and have reviewed the reports:  CXR 2/24/17    1. Endotracheal tube and right peripherally inserted central venous catheter in  position as above.     2. Mild pulmonary hypoinflation, with unchanged appearance to posteriorly  layering pleural effusions and bilateral lower lung atelectasis/airspace  disease. CT c/a/p 2/23/17  1. Postprocedural changes from same day percutaneous gastrostomy catheter  placement, with gastrostomy catheter in expected position. No evidence of  extravasated contrast. There is small amount of free intraperitoneal gas which  is not unexpected given recent placement.   2. Postsurgical changes of laparotomy and small bowel resection for management  of prior small bowel obstruction. Nonspecific mesenteric swelling noted region  of suture material present in the right lower quadrant; however, no evidence of  recurrent bowel obstruction present. Mild gaseous distention of the colon is  present, possibly related to mild postop ileus. 3. Small-moderate bilateral pleural effusions, new from prior CT examination,  with increased body wall edema, small amount of pelvic fluid. 4. Small amount of intravesicular air , presumably from recent prior [place]  catheter placement.          BRITTANY Cooper

## 2017-02-24 NOTE — PROGRESS NOTES
1800 Responded to RRT  Pt with agonal breathing , unresponsive ,Glucose reading low & 1/2 amp D50 given , Progressed to brief respitory arrest - Code blue called  Bagged briefly & now breathing - 100% nonrebreather applied- Transferred to ICU- Remained in Afib Cardizem at 15mg/hr

## 2017-02-24 NOTE — PROGRESS NOTES
Problem: Ventilator Management  Goal: *Adequate oxygenation and ventilation  Outcome: Progressing Towards Goal  Started wean this am patient doing well placed bite block per RN needed.  Titrated fio2 to30% from 60% patient stable and bbs are clear hr 102 spo2 100%    Patient off wean did not do well see md notes

## 2017-02-24 NOTE — ROUTINE PROCESS
0700: Bedside shift change report given to Gabino Cooper RN (oncoming nurse) by Mikie Dykes RN (offgoing nurse). Report included the following information SBAR, Kardex, ED Summary, OR Summary, Procedure Summary, Intake/Output, MAR, Accordion, Recent Results and Med Rec Status. 0840: Dr. Jorge Taylor at bedside, stated okauy to start TF.    1000: Daughter Efrain Elizondo at bedside, spoke with Dr. Junior Marinelli and Dr. Gail Farias to discuss POC and get updates. 1730: Efrain Elizondo called for update. Will call again to check in before bed. Stated she was thankful for our care. 1800: Pt has been more alert, agitated at times. Spoke with BRITTANY Moore, who stated it would be best to hold off on PRN sedation/pain medications as pt is very sensitive to them and we hope to extibate in the morning. Will pass on to [facility] [facility] shift. 1900: Bedside shift change report given to Nat Sanders RN (oncoming nurse) by Gabino Cooper RN (offgoing nurse). Report included the following information SBAR, Kardex, ED Summary, OR Summary, Procedure Summary, Intake/Output, MAR, Accordion and Recent Results.

## 2017-02-24 NOTE — CONSULTS
[facility]  Pulmonary, Critical Care, and Sleep Medicine      Name: Macho Robles MRN: [id number]   : 1927 Hospital: [address] [place] [address]   Date: 2017          Critical Care Initial Patient Consult    Requesting MD:   Dr Ivette Santizo                                               Reason for CC Consult: Respritory distress, altered distress    IMPRESSION:   · Respiratory distress with brief respiratory arrest on the floor with possible aspiration. · Acute onset altered mental status. No appreciable improvement with D50. · Recurrent hypoglycemia x2  · Septic Shock  · A-fib with RVR. Takes Cardizem, now on Amio gtt. · SUSAN most likely due to ATN. Appears to be volume depleted. Renal following. · Hyperkalemia  · Small Bowel Obstruction s/p ex-lap with resection. · PEG placement today due to feeding difficulty/failed swallow eval.  · Hypoalbuminemia  · Hypocalcemia      RECOMMENDATIONS:   · Resp -  Will cover aspiration with Zosyn/Vanco.  Repeat ABG revealed resp acidosis. Patient intubated and placed on vent. · ID - Severe sepsis bundle. Blood cultures/Lactate pending. · CVS - Jhon started for hypotsension after fluid bolus. Levo/Vaso on call. Decreased Amio to 0.5   · Heme/Onc- No transfusion requirements at this time  · Renal - SUSAN most likely due to ATN. Fluid bolus given. Placed on electrolyte replacement protocol. · Endocrine - Increase D5NS changed to D10 at 50/hr  · Neuro/ Pain/ Sedation - Versed for sedation  · GI - Per GI, OK to start TFs when stable to do so. · Prophylaxis - Heparin, Protonix     Subjective/History: This patient seen today with PMHx of HTN, TIA, SIDNEY, SBO has been seen and evaluated at the request of Dr. Carlos El for respiratory distress with a brief moment of respiratory arrest while admitted to the floor s/p ex-lap for SBO. Patient is a 80 y.o. male admitted on 17 for acute onset of left upper quadrant abdominal pain.   CT was positive for SBO.s/p SB resection. Patient's history was complicated by a-fib and developed RVR while admitted which was controlled with Digoxin and eventually switched to Dig. The patient underwent EGD placement of PEG earlier today for persistent dysphagia. The patient continued to have an increased WOB throughout the evening with subsequent resp acidosis and intubation. WBCs are elevated, he is hypothermic, hypotensive and recurrently hypoglycemic. He was started on pressors after IVF administration. Started on D10 and IV ABX. The patient is critically ill and can not provide additional history due to altered mental status. Past Medical History:   Diagnosis Date    Hypertension     TIA (transient ischemic attack)       History reviewed. No pertinent surgical history. Prior to Admission medications    Medication Sig Start Date End Date Taking? Authorizing Provider   apixaban (ELIQUIS) 2.5 mg tablet Take 2.5 mg by mouth every twelve (12) hours. Yes Phys Other, MD   amLODIPine (NORVASC) 10 mg tablet Take 10 mg by mouth daily. Yes Phys Other, MD   lisinopril (PRINIVIL, ZESTRIL) 10 mg tablet Take 10 mg by mouth daily. Yes Phys Other, MD   Hydrochlorothiazide (HYDRODIURIL) 12.5 mg tablet Take 12.5 mg by mouth daily. Yes Phys Other, MD   ferrous sulfate 325 mg (65 mg iron) tablet Take 325 mg by mouth Daily (before breakfast).    Yes Sadie Other, MD     Current Facility-Administered Medications   Medication Dose Route Frequency    sodium chloride (NS) flush 5-10 mL  5-10 mL IntraVENous Q8H    famotidine (PF) (PEPCID) injection 20 mg  20 mg IntraVENous ONCE    insulin lispro (HUMALOG) injection   SubCUTAneous ONCE    ketorolac (TORADOL) 30 mg/mL (1 mL) injection        midazolam (VERSED) 1 mg/mL injection        fentaNYL citrate (PF) 50 mcg/mL injection        0.9% sodium chloride infusion  25 mL/hr IntraVENous CONTINUOUS    sodium chloride (NS) flush 5-10 mL  5-10 mL IntraVENous Q8H    0.9% sodium chloride infusion  100 mL/hr IntraVENous CONTINUOUS    pantoprazole (PROTONIX) 40 mg in sodium chloride 0.9 % 10 mL injection  40 mg IntraVENous BID    insulin detemir (LEVEMIR) injection 5 Units  5 Units SubCUTAneous Q12H    amiodarone (NEXTERONE) 360 mg in dextrose 200 mL (1.8 mg/mL) infusion  0.5-1 mg/min IntraVENous TITRATE    dextrose 5% and 0.9% NaCl infusion  100 mL/hr IntraVENous CONTINUOUS    dextrose 5% (MBP/ADV) 5 % infusion        dextrose 5% and 0.9% NaCl infusion  500 mL/hr IntraVENous CONTINUOUS    PHENYLephrine 30 mg in 0.9% sodium chloride 250 mL (TARIK-SYNEPHRINE) infusion   mcg/min IntraVENous TITRATE    heparin (porcine) injection 5,000 Units  5,000 Units SubCUTAneous Q8H    insulin lispro (HUMALOG) injection   SubCUTAneous Q6H    sodium chloride (NS) flush 10 mL  10 mL InterCATHeter Q24H    sodium chloride (NS) flush 10-40 mL  10-40 mL InterCATHeter Q8H    sodium chloride (NS) flush 20 mL  20 mL InterCATHeter Q24H    tamsulosin (FLOMAX) capsule 0.4 mg  0.4 mg Oral DAILY    sodium chloride (NS) flush 5-10 mL  5-10 mL IntraVENous Q8H    sodium chloride (NS) flush 5-10 mL  5-10 mL IntraVENous Q8H     No Known Allergies   Social History   Substance Use Topics    Smoking status: Former Smoker    Smokeless tobacco: Not on file    Alcohol use No      History reviewed. No pertinent family history. Review of Systems:  Review of systems not obtained due to patient factors.     Objective:   Vital Signs:    Visit Vitals    BP (!) 83/50 (BP 1 Location: Left arm, BP Patient Position: At rest)    Pulse 93    Temp 96.4 °F (35.8 °C)    Resp 28    Ht 6' (1.829 m)    Wt 68.3 kg (150 lb 8 oz)    SpO2 100%    BMI 20.41 kg/m2       O2 Device: Non-rebreather mask   O2 Flow Rate (L/min): 15 l/min   Temp (24hrs), Av.1 °F (36.7 °C), Min:96.4 °F (35.8 °C), Max:99.4 °F (37.4 °C)       Intake/Output:   Last shift:      1901 -  07  In: -   Out: 800 [Urine:800]  Last 3 shifts: 02/22 0701 - 02/23 1900  In: 4846.7 [I.V.:4846.7]  Out: 100 [Urine:100]    Intake/Output Summary (Last 24 hours) at 02/23/17 2004  Last data filed at 02/23/17 1909   Gross per 24 hour   Intake          3006.68 ml   Output              800 ml   Net          2206.68 ml       Ventilator Settings:  Mode Rate Tidal Volume Pressure FiO2 PEEP                    Peak airway pressure:      Minute ventilation:        ARDS network Guidelines: Lung protective strategy and Pl pressure goals____________      Physical Exam:    General:  Responsive to painful stimuli. Head:  Normocephalic, without obvious abnormality, atraumatic. Eyes:  Conjunctivae/corneas clear. PERRL   Nose: Nares normal. Septum midline. Mucosa normal. No drainage. Throat: Lips, mucosa, and tongue normal. Teeth and gums normal.   Neck: Supple, symmetrical, trachea midline, no adenopathy, thyroid: no enlargment/tenderness/nodules,  no JVD. Lungs:   Coarse and diminished to auscultation bilaterally. Increased WOB. Chest wall:  No tenderness or deformity. Heart:  Irregular rate and rhythm, a-fib on the monitor. Abdomen:   Soft, no Bowel sounds normal.  PEG site w/o bleeding. Extremities: Extremities normal, atraumatic, no cyanosis or edema. Pulses: 2+ and symmetric all extremities. Skin: Skin color, texture, turgor normal. No rashes or lesions       Neurologic: Responsive to pain. Does not follow commands.        Data:     Recent Results (from the past 24 hour(s))   GLUCOSE, POC    Collection Time: 02/23/17 12:08 AM   Result Value Ref Range    Glucose (POC) 129 (H) 70 - 110 mg/dL   PTT    Collection Time: 02/23/17  4:07 AM   Result Value Ref Range    aPTT 36.9 (H) 23.0 - 36.4 SEC   GLUCOSE, POC    Collection Time: 02/23/17  6:33 AM   Result Value Ref Range    Glucose (POC) 121 (H) 70 - 110 mg/dL   CBC WITH AUTOMATED DIFF    Collection Time: 02/23/17  9:00 AM   Result Value Ref Range    WBC 13.7 (H) 4.6 - 13.2 K/uL    RBC 3.40 (L) 4.70 - 5.50 M/uL    HGB 9.6 (L) 13.0 - 16.0 g/dL    HCT 29.7 (L) 36.0 - 48.0 %    MCV 87.4 74.0 - 97.0 FL    MCH 28.2 24.0 - 34.0 PG    MCHC 32.3 31.0 - 37.0 g/dL    RDW 15.8 (H) 11.6 - 14.5 %    PLATELET 465 269 - 817 K/uL    MPV 11.1 9.2 - 11.8 FL    NEUTROPHILS 91 (H) 40 - 73 %    LYMPHOCYTES 9 (L) 21 - 52 %    MONOCYTES 0 (L) 3 - 10 %    EOSINOPHILS 0 0 - 5 %    BASOPHILS 0 0 - 2 %    ABS. NEUTROPHILS 12.4 (H) 1.8 - 8.0 K/UL    ABS. LYMPHOCYTES 1.3 0.9 - 3.6 K/UL    ABS. MONOCYTES 0.0 (L) 0.05 - 1.2 K/UL    ABS. EOSINOPHILS 0.0 0.0 - 0.4 K/UL    ABS.  BASOPHILS 0.0 0.0 - 0.06 K/UL    DF AUTOMATED     METABOLIC PANEL, BASIC    Collection Time: 02/23/17  9:00 AM   Result Value Ref Range    Sodium 137 136 - 145 mmol/L    Potassium 5.9 (H) 3.5 - 5.5 mmol/L    Chloride 102 100 - 108 mmol/L    CO2 24 21 - 32 mmol/L    Anion gap 11 3.0 - 18 mmol/L    Glucose 115 (H) 74 - 99 mg/dL     (H) 7.0 - 18 MG/DL    Creatinine 2.47 (H) 0.6 - 1.3 MG/DL    BUN/Creatinine ratio 42 (H) 12 - 20      GFR est AA 30 (L) >60 ml/min/1.73m2    GFR est non-AA 25 (L) >60 ml/min/1.73m2    Calcium 8.2 (L) 8.5 - 10.1 MG/DL   GLUCOSE, POC    Collection Time: 02/23/17 12:12 PM   Result Value Ref Range    Glucose (POC) 131 (H) 70 - 110 mg/dL   EKG, 12 LEAD, SUBSEQUENT    Collection Time: 02/23/17 12:31 PM   Result Value Ref Range    Ventricular Rate 117 BPM    Atrial Rate 104 BPM    QRS Duration 140 ms    Q-T Interval 340 ms    QTC Calculation (Bezet) 474 ms    Calculated R Axis -66 degrees    Calculated T Axis 65 degrees    Diagnosis       Atrial fibrillation with rapid ventricular response  Right bundle branch block  Left anterior fascicular block  Bifascicular block  Abnormal ECG  When compared with ECG of 14-FEB-2017 11:22,  No significant change was found     GLUCOSE, POC    Collection Time: 02/23/17  6:02 PM   Result Value Ref Range    Glucose (POC) <30.0 (LL) 70 - 110 mg/dL   GLUCOSE, POC    Collection Time: 02/23/17  6:14 PM   Result Value Ref Range    Glucose (POC) 119 (H) 70 - 110 mg/dL   POC G3    Collection Time: 02/23/17  6:20 PM   Result Value Ref Range    Device: Non rebreather      Flow rate (POC) 10 L/M    FIO2 (POC) 100 %    pH (POC) 7.359 7.35 - 7.45      pCO2 (POC) 38.5 35.0 - 45.0 MMHG    pO2 (POC) 82 80 - 100 MMHG    HCO3 (POC) 21.8 (L) 22 - 26 MMOL/L    sO2 (POC) 96 92 - 97 %    Base deficit (POC) 4 mmol/L    Allens test (POC) YES      Total resp. rate 28      Site LEFT RADIAL      Patient temp. 97.4      Specimen type (POC) ARTERIAL      Performed by Radha Unger    CBC WITH AUTOMATED DIFF    Collection Time: 02/23/17  6:41 PM   Result Value Ref Range    WBC 20.9 (H) 4.6 - 13.2 K/uL    RBC 3.54 (L) 4.70 - 5.50 M/uL    HGB 10.0 (L) 13.0 - 16.0 g/dL    HCT 31.2 (L) 36.0 - 48.0 %    MCV 88.1 74.0 - 97.0 FL    MCH 28.2 24.0 - 34.0 PG    MCHC 32.1 31.0 - 37.0 g/dL    RDW 15.8 (H) 11.6 - 14.5 %    PLATELET 731 582 - 724 K/uL    MPV 11.0 9.2 - 11.8 FL    NEUTROPHILS 92 (H) 40 - 73 %    LYMPHOCYTES 5 (L) 21 - 52 %    MONOCYTES 3 3 - 10 %    EOSINOPHILS 0 0 - 5 %    BASOPHILS 0 0 - 2 %    ABS. NEUTROPHILS 19.2 (H) 1.8 - 8.0 K/UL    ABS. LYMPHOCYTES 1.1 0.9 - 3.6 K/UL    ABS. MONOCYTES 0.6 0.05 - 1.2 K/UL    ABS. EOSINOPHILS 0.0 0.0 - 0.4 K/UL    ABS. BASOPHILS 0.0 0.0 - 0.06 K/UL    DF AUTOMATED     METABOLIC PANEL, COMPREHENSIVE    Collection Time: 02/23/17  6:41 PM   Result Value Ref Range    Sodium 140 136 - 145 mmol/L    Potassium 5.3 3.5 - 5.5 mmol/L    Chloride 106 100 - 108 mmol/L    CO2 26 21 - 32 mmol/L    Anion gap 8 3.0 - 18 mmol/L    Glucose 31 (LL) 74 - 99 mg/dL     (H) 7.0 - 18 MG/DL    Creatinine 2.58 (H) 0.6 - 1.3 MG/DL    BUN/Creatinine ratio 41 (H) 12 - 20      GFR est AA 29 (L) >60 ml/min/1.73m2    GFR est non-AA 24 (L) >60 ml/min/1.73m2    Calcium 7.7 (L) 8.5 - 10.1 MG/DL    Bilirubin, total 0.3 0.2 - 1.0 MG/DL    ALT (SGPT) 23 16 - 61 U/L    AST (SGOT) 38 (H) 15 - 37 U/L    Alk. phosphatase 97 45 - 117 U/L    Protein, total 5.0 (L) 6.4 - 8.2 g/dL    Albumin 1.9 (L) 3.4 - 5.0 g/dL    Globulin 3.1 2.0 - 4.0 g/dL    A-G Ratio 0.6 (L) 0.8 - 1.7     CARDIAC PANEL,(CK, CKMB & TROPONIN)    Collection Time: 02/23/17  6:41 PM   Result Value Ref Range    CK 41 39 - 308 U/L    CK - MB 3.2 <3.6 ng/ml    CK-MB Index 7.8 (H) 0.0 - 4.0 %    Troponin-I, Qt. 0.14 (H) 0.0 - 0.045 NG/ML   CALCIUM, IONIZED    Collection Time: 02/23/17  6:41 PM   Result Value Ref Range    Ionized Calcium 1.13 1.12 - 1.32 MMOL/L   PROTHROMBIN TIME + INR    Collection Time: 02/23/17  6:41 PM   Result Value Ref Range    Prothrombin time 16.7 (H) 11.5 - 15.2 sec    INR 1.4 (H) 0.8 - 1.2     LACTIC ACID, PLASMA    Collection Time: 02/23/17  6:45 PM   Result Value Ref Range    Lactic acid 1.3 0.4 - 2.0 MMOL/L   EKG, 12 LEAD, SUBSEQUENT    Collection Time: 02/23/17  6:52 PM   Result Value Ref Range    Ventricular Rate 111 BPM    Atrial Rate 394 BPM    QRS Duration 110 ms    Q-T Interval 344 ms    QTC Calculation (Bezet) 467 ms    Calculated R Axis -67 degrees    Calculated T Axis 85 degrees    Diagnosis       Atrial fibrillation with rapid ventricular response  Left axis deviation  Incomplete right bundle branch block  Inferior infarct , age undetermined  Abnormal ECG  When compared with ECG of 23-FEB-2017 12:31,  Incomplete right bundle branch block has replaced Right bundle branch block     GLUCOSE, POC    Collection Time: 02/23/17  7:31 PM   Result Value Ref Range    Glucose (POC) <30.0 (LL) 70 - 110 mg/dL   GLUCOSE, POC    Collection Time: 02/23/17  7:32 PM   Result Value Ref Range    Glucose (POC) <30.0 (LL) 70 - 110 mg/dL   GLUCOSE, POC    Collection Time: 02/23/17  7:47 PM   Result Value Ref Range    Glucose (POC) 127 (H) 70 - 110 mg/dL             Telemetry:AFIB    Imaging:  I have personally reviewed the patients radiographs and have reviewed the reports:           Total critical care time exclusive of procedures: 90 minutes  BRITTANY Martinez    This patient was seen and plan was formulated on 02/23/17       Patient seen and examined independently. Event, labs, medications, XR chest reviewed with staff,Rona Fowler, NP, patient  Audrey Miles . Please see detail note from her. Agree with assessment and plan of care. In addition,    Problem list:  Altered mental status due to metabolic encephalopathy   Mixed acute Hypoxemic and hypercapnic respiratory failure requiring elective intubation and ventilator support   Profound hypoglycemia likely secondary to AM Insulin patient received for Hyperkalemia treatment + poor PO intake   Hypotension multifactorial A-fib with RVR + diuresis +/- Sepsis   Aspiration pneumonitis due to AMS? Chronic A-fib now RVR  Moderate Protein caloric malnutrition   Recent abdominal surgery ,Today, S/P PEG tube placement     Plan:  Agree with empiric broad spectrum antibiotic coverage, Follow cultures   Consider CT abdomen without oral contrast only   Consider gentle IV hydration +Glucose , Discontinue Cardizem. If necessary consider Amiodarone   Consider pressor with MAP target >65 mm hg   Hold anticoagulation   Repeat BMP and CBC   DVT and PUD prophylaxis per Primary team    Family has been updated about patient's condition     Further recommendations will be based on the patient's response to recommended treatment and results of the investigation ordered. []See my orders for details    My assessment, plan of care, findings, medications, side effects etc were discussed with:  [x]nursing []PT/OT    [x]respiratory therapy []   [x]family []     [x]Total care time exclusive of procedures 50 minutes with complex decision making performed and > 50% time spent in face to face evaluation.     Elma Kc MD, [place]

## 2017-02-24 NOTE — PROGRESS NOTES
Speech Therapy Note:  SLP attempted follow up with patient, however patient coded and was intubated. Patient not appropriate for therapy. Discussed with RN, Lieutenant Ferraro. Will discontinue orders accordingly. Please re-order when patient is appropriate.   Thank you,  Leopold Lager M.S. [facility] SLP  Ph: [phone]  Pager: [phone]

## 2017-02-24 NOTE — DIABETES MGMT
NUTRITIONAL RE-ASSESSMENT AND GLYCEMIC CONTROL PLAN OF CARE     [facility]           80 y.o.           2/12/2017                 1. SBO (small bowel obstruction) (HCC)    2. Abdominal pain, LUQ (left upper quadrant)    3. Chronic pain of left knee    4. Chronic renal failure, unspecified stage    5. Anemia, unspecified type    6. Debility    7. Feeding difficulties      ASSESSMENT:    Based on  weight  of 153 lbs. , pt is underweight  related to inadequate caloric intake as evidenced by 86% ideal weight and BMI= 20.9kg/m2. .   Nutrient deficit as evidenced by npo status related to dysphagia. Diagnosis-Intake: Inadequate protein-energy intake related to respiratory distress AEB inability to take in oral nourishment and need for enteral feeding. Pt w/hypoalbuminemia as evidenced by albumin=1.9mg/dl. INTERVENTIONS/PLAN:   Low K TF formula requested. Recommend Nepro TF at 40ml/hr. This will provide 78 g protein, 1728 calories/day with 0.7liters free water /d from TF plus 200 ml total from 50 ml water flush q 6hrs. SUBJECTIVE/OBJECTIVE:     Diet:npo plus Nepro TF   No data found. Medications: [x]       Reviewed - Pt was receiving  Levemir  10 units q 12 hrs while on TPN, would reduce to  corrective lispro. Lab Results   Component Value Date/Time    Sodium 138 02/24/2017 02:40 AM    Potassium 5.3 02/24/2017 02:40 AM    Chloride 105 02/24/2017 02:40 AM    CO2 23 02/24/2017 02:40 AM    Anion gap 10 02/24/2017 02:40 AM    Glucose 111 02/24/2017 02:40 AM     02/24/2017 02:40 AM    Creatinine 2.68 02/24/2017 02:40 AM    Calcium 7.3 02/24/2017 02:40 AM    Magnesium 1.8 02/24/2017 02:40 AM    Phosphorus 5.1 02/24/2017 02:40 AM    Albumin 1.9 02/23/2017 06:41 PM       Anthropometrics:  ,  , BMI (calculated): 21  [place] wt 178lbs.    Wt Readings from Last 1 Encounters:   02/24/17 70.1 kg (154 lb 8.7 oz)      Ht Readings from Last 1 Encounters:   02/17/17 6' (1.829 m)     Wt Readings from Last 3 Encounters:   02/24/17 70.1 kg (154 lb 8.7 oz)   01/04/16 65.8 kg (145 lb)   11/08/15 63.5 kg (140 lb)       Estimated Nutrition Needs: 1900 Kcals/day, Protein reqrts  84g/d   Fluid Requirements 2.1 liters/d  Based on:   [x]          Actual BW    []          IBW   []            Adjusted BW      Nutrition Diagnoses:   As stated above. Nutrition Interventions:TF  recommendations   Goals:     Intake will meet >75% of energy and protein requirements by 3/1. Wt maintenance or gain 1 lb per week by 3/7. Glucose will be within target range by 2/27.      Nutrition Monitoring and Evaluation      []     Monitor po intake on meal rounds  [x]     Continue inpatient monitoring and intervention  [x]     Other: TF tolerance    Nutrition Risk:  [x]   High     []  Moderate    []  Minimal/Uncompromised    Ean Mclaughlin RD

## 2017-02-24 NOTE — PROGRESS NOTES
Chart reviewed. Pt sedated on vent in ICU after RRT last evening. Daughter has been provided SNF list. Care-management following and will re-address discharge plan when appropriate. Dorothea Peabody RN  Sputum sample obtained RN to send to lab patient  Stable at this time .  Hr 104 spo2 100%

## 2017-02-24 NOTE — PROGRESS NOTES
2056-Called to the bedside to intubate. Patient was intubated and placed on the ventilator. 7.5 ETT, [email or web address] [id number] Transported patient from ICU to CT, and CT back to ICU. Transport went well. Patient is resting comfortable. Airway is patent and stable.

## 2017-02-24 NOTE — PROGRESS NOTES
RENAL PROGRESS NOTE        Karthik Del Toro         Assessment/Plan:     · SUSAN (ischemic atn in a setting of sbo). Non oliguric, continue volume resuscitation. · Hyperkalemia in context of susan. Improving. · A. Fib. AC is on hold. · Aspiration pneumonia. On abx. · Resp failure. · S/p small bowel resection for sbo on 2/13. Subjective:  Patient complaints off: Transferred to icu due to resp failure with brief resp arrest lat night, intubated. On low dose neosynephrin.      Patient Active Problem List   Diagnosis Code    Small bowel obstruction ([facility] [address] K56.69    Atrial fibrillation with rapid ventricular response (HCC) I48.91    Postoperative anemia D64.9    Feeding difficulties R63.3    Debility R53.81       Current Facility-Administered Medications   Medication Dose Route Frequency Provider Last Rate Last Dose    magnesium sulfate 1 g/100 ml IVPB (premix or compounded)  1 g IntraVENous ONCE Johnetta Dakin,  mL/hr at 02/24/17 1015 1 g at 02/24/17 1015    [START ON 2/25/2017] vancomycin (VANCOCIN) 500 mg in 0.9% sodium chloride (MBP/ADV) 100 mL ADV  500 mg IntraVENous Q24H Johnetta Dakin, DO        [START ON 2/27/2017] VANCOMYCIN INFORMATION NOTE   Other Rx Dosing/Monitoring Johnetta Dakin, DO        sodium chloride (NS) flush 5-10 mL  5-10 mL IntraVENous Q8H Gregorio Nguyen MD   10 mL at 02/24/17 8003    sodium chloride (NS) flush 5-10 mL  5-10 mL IntraVENous PRN Gregorio Nguyen MD        simethicone (MYLICON) 93MN/3.3PL oral drops 80 mg  1.2 mL Oral Tara Haney MD        fentaNYL citrate (PF) injection  mcg   mcg IntraVENous Tara Haney MD        pantoprazole (PROTONIX) 40 mg in sodium chloride 0.9 % 10 mL injection  40 mg IntraVENous BID Johnetta Dakin, DO   40 mg at 02/24/17 1282    PHENYLephrine 30 mg in 0.9% sodium chloride 250 mL (TARIK-SYNEPHRINE) infusion   mcg/min IntraVENous TITRATE Rona A Day, PA 35 mL/hr at 02/24/17 0749 70 mcg/min at 02/24/17 0749    NOREPINephrine (LEVOPHED) 8,000 mcg in dextrose 5% 250 mL infusion  2-30 mcg/min IntraVENous TITRATE Rona A Day, PA   Stopped at 02/23/17 2100    vasopressin (VASOSTRICT) 100 Units in 0.9% sodium chloride 100 mL infusion  0.01 Units/min IntraVENous CONTINUOUS Rona A Day, PA   Stopped at 02/23/17 2200    piperacillin-tazobactam (ZOSYN) 3.375 g in 0.9% sodium chloride (MBP/ADV) 100 mL MBP EXTENDED 4 HOUR INFUSION###  3.375 g IntraVENous Q12H Olive Joshua, DO        sodium chloride 0.45 % in dextrose 10% 1,019.6 mL infusion   IntraVENous CONTINUOUS Rona A Day, PA 50 mL/hr at 02/24/17 0143      midazolam (VERSED) injection 1-4 mg  1-4 mg IntraVENous Q2H PRN Rona A Day, PA   2 mg at 02/24/17 0035    heparin (porcine) injection 5,000 Units  5,000 Units SubCUTAneous Q8H [place] Joshua, DO   5,000 Units at 02/24/17 0419    acetaminophen (TYLENOL) suppository 650 mg  650 mg Rectal Q4H PRN Olive Joshua, DO   650 mg at 02/20/17 1037    zolpidem (AMBIEN) tablet 5 mg  5 mg Oral QHS PRN Gavi Dewey, DO        insulin lispro (HUMALOG) injection   SubCUTAneous Q6H Olive Joshua, DO   Stopped at 02/23/17 0000    glucose chewable tablet 16 g  4 Tab Oral PRN Gavi Dewey, DO        glucagon (GLUCAGEN) injection 1 mg  1 mg IntraMUSCular PRN Gavi Dewey, DO        dextrose (D50W) injection syrg 12.5-25 g  25-50 mL IntraVENous PRN Gavi Dewey, DO   25 g at 02/23/17 2205    sodium chloride (NS) flush 10-30 mL  10-30 mL InterCATHeter PRN Gavi Dewey, DO        sodium chloride (NS) flush 10 mL  10 mL InterCATHeter Q24H Gavi Dewey, DO   10 mL at 02/24/17 1022    sodium chloride (NS) flush 10 mL  10 mL InterCATHeter PRN Gavi Dewey, DO   10 mL at 02/20/17 0006    sodium chloride (NS) flush 10-40 mL  10-40 mL InterCATHeter Q8H Marolyn Marking, DO   10 mL at 02/24/17 0604    sodium chloride (NS) flush 20 mL  20 mL InterCATHeter Q24H Marolyn Marking, DO   20 mL at 02/24/17 1022    bacitracin 500 unit/gram packet 1 Packet  1 Packet Topical PRN Marolyn Marking, DO        tamsulosin [facility]) capsule 0.4 mg  0.4 mg Oral DAILY Marolyn Marking, DO   Stopped at 02/21/17 0900    0.9% sodium chloride infusion 250 mL  250 mL IntraVENous PRN Marolyn Marking, DO        phenol throat spray (CHLORASEPTIC) 1 Spray  1 [place] Oral PRN Pratima Anguiano MD   1 [place] at 02/20/17 1158    sodium chloride (NS) flush 5-10 mL  5-10 mL IntraVENous Q8H Matt Mcnamara MD   10 mL at 02/24/17 3694    sodium chloride (NS) flush 5-10 mL  5-10 mL IntraVENous PRN Matt Mcnamara MD        0.9% sodium chloride infusion 250 mL  250 mL IntraVENous PRN Thalia Pal CRNA        sodium chloride (NS) flush 5-10 mL  5-10 mL IntraVENous Q8H Matt Mcnamara MD   10 mL at 02/24/17 4230    sodium chloride (NS) flush 5-10 mL  5-10 mL IntraVENous PRN Matt Mcnamara MD        HYDROmorphone (PF) (DILAUDID) injection 1 mg  1 mg IntraVENous Q3H PRN Matt Mcnamara MD   1 mg at 02/22/17 0310    ondansetron [facility]) injection 4 mg  4 mg IntraVENous Q4H PRN Matt Mcnamara MD   4 mg at 02/15/17 0225    0.9% sodium chloride infusion 250 mL  250 mL IntraVENous PRN Matt Mcnamara MD           Objective  Vitals:    02/24/17 0700 02/24/17 0715 02/24/17 0800 02/24/17 0858   BP: 93/64 92/65 110/68    Pulse: (!) 101 (!) 102 (!) 101 96   Resp: 17 25 12 23   Temp:   97.9 °F (36.6 °C)    SpO2: 100% 100% 100% 100%   Weight:       Height:             Intake/Output Summary (Last 24 hours) at 02/24/17 1041  Last data filed at 02/24/17 0900   Gross per 24 hour   Intake          3963.09 ml   Output             1295 ml   Net          2668.09 ml           Admission weight: Weight: 63.5 kg (140 lb) (02/12/17 0603)  Last Weight Metrics:  Weight Loss Metrics 2/24/2017 1/4/2016 11/8/2015   Today's Wt 154 lb 8.7 oz 145 lb 140 lb   BMI 20.96 kg/m2 19.66 kg/m2 18.98 kg/m2             Physical Assessment:     General: intubated, sedated. ET in place. Neck: No jvd. LUNGS: diminished air entry at the bases, bl exp rhonchi. CVS EXM: S1, S2  RRR. Abdomen: non distended. PEG in place. Surgical dressings intact. Lower Extremities:  Trace  edema. Lab    CBC w/Diff Recent Labs      02/23/17   2215  02/23/17   1841  02/23/17   0900   WBC  18.6*  20.9*  13.7*   RBC  3.44*  3.54*  3.40*   HGB  9.7*  10.0*  9.6*   HCT  30.6*  31.2*  29.7*   PLT  332  371  262   GRANS  93*  92*  91*   LYMPH  5*  5*  9*   EOS  0  0  0        Chemistry Recent Labs      02/24/17   0240  02/23/17 2215  02/23/17   1841   GLU  111*  50*  31*   NA  138  140  140   K  5.3  6.0*  5.3   CL  105  106  106   CO2  23  24  26   BUN  107*  107*  107*   CREA  2.68*  2.62*  2.58*   CA  7.3*  7.5*  7.7*   AGAP  10  10  8   BUCR  40*  41*  41*   AP   --    --   97   TP   --    --   5.0*   ALB   --    --   1.9*   GLOB   --    --   3.1   AGRAT   --    --   0.6*   PHOS  5.1*   --    --          No results found for: IRON, FE, TIBC, IBCT, PSAT, FERR Lab Results   Component Value Date/Time    Calcium 7.3 02/24/2017 02:40 AM    Phosphorus 5.1 02/24/2017 02:40 AM        Matilda Moore M.D.   [facility]  Phone

## 2017-02-24 NOTE — PROGRESS NOTES
Gastrointestinal Progress Note    Patient Name: Nat Pal    RKNJH'N Date: 2/24/2017    Admit Date: 2/12/2017    Subjective:     Nat Pal is a 80 y.o. Male who we are seeing for PEG placement. The patient is intubated, sedated on mechanical ventilation and cannot provide history. Chart review indicates a brief respiratory arrest 10 PM or so yesterday after some period of progressive increased work of breathing.   CT Abdomen failed to show any acute abdominal pathology, and apparently the patient did not start on TF after PEG placement yesterday AM.  There is no report of GI bleeding        Current Facility-Administered Medications   Medication Dose Route Frequency    [START ON 2/25/2017] vancomycin (VANCOCIN) 500 mg in 0.9% sodium chloride (MBP/ADV) 100 mL ADV  500 mg IntraVENous Q24H    [START ON 2/27/2017] VANCOMYCIN INFORMATION NOTE   Other Rx Dosing/Monitoring    sodium chloride (NS) flush 5-10 mL  5-10 mL IntraVENous Q8H    sodium chloride (NS) flush 5-10 mL  5-10 mL IntraVENous PRN    simethicone (MYLICON) 49KL/3.1RY oral drops 80 mg  1.2 mL Oral Multiple    fentaNYL citrate (PF) injection  mcg   mcg IntraVENous Multiple    pantoprazole (PROTONIX) 40 mg in sodium chloride 0.9 % 10 mL injection  40 mg IntraVENous BID    PHENYLephrine 30 mg in 0.9% sodium chloride 250 mL (TARIK-SYNEPHRINE) infusion   mcg/min IntraVENous TITRATE    NOREPINephrine (LEVOPHED) 8,000 mcg in dextrose 5% 250 mL infusion  2-30 mcg/min IntraVENous TITRATE    vasopressin (VASOSTRICT) 100 Units in 0.9% sodium chloride 100 mL infusion  0.01 Units/min IntraVENous CONTINUOUS    piperacillin-tazobactam (ZOSYN) 3.375 g in 0.9% sodium chloride (MBP/ADV) 100 mL MBP EXTENDED 4 HOUR INFUSION###  3.375 g IntraVENous Q12H    sodium chloride 0.45 % in dextrose 10% 1,019.6 mL infusion   IntraVENous CONTINUOUS    midazolam (VERSED) injection 1-4 mg  1-4 mg IntraVENous Q2H PRN    heparin (porcine) injection 5,000 Units  5,000 Units SubCUTAneous Q8H    acetaminophen (TYLENOL) suppository 650 mg  650 mg Rectal Q4H PRN    zolpidem (AMBIEN) tablet 5 mg  5 mg Oral QHS PRN    insulin lispro (HUMALOG) injection   SubCUTAneous Q6H    glucose chewable tablet 16 g  4 Tab Oral PRN    glucagon (GLUCAGEN) injection 1 mg  1 mg IntraMUSCular PRN    dextrose (D50W) injection syrg 12.5-25 g  25-50 mL IntraVENous PRN    sodium chloride (NS) flush 10-30 mL  10-30 mL InterCATHeter PRN    sodium chloride (NS) flush 10 mL  10 mL InterCATHeter Q24H    sodium chloride (NS) flush 10 mL  10 mL InterCATHeter PRN    sodium chloride (NS) flush 10-40 mL  10-40 mL InterCATHeter Q8H    sodium chloride (NS) flush 20 mL  20 mL InterCATHeter Q24H    bacitracin 500 unit/gram packet 1 Packet  1 Packet Topical PRN    tamsulosin (FLOMAX) capsule 0.4 mg  0.4 mg Oral DAILY    0.9% sodium chloride infusion 250 mL  250 mL IntraVENous PRN    phenol throat spray (CHLORASEPTIC) 1 Spray  1 Spray Oral PRN    sodium chloride (NS) flush 5-10 mL  5-10 mL IntraVENous Q8H    sodium chloride (NS) flush 5-10 mL  5-10 mL IntraVENous PRN    0.9% sodium chloride infusion 250 mL  250 mL IntraVENous PRN    sodium chloride (NS) flush 5-10 mL  5-10 mL IntraVENous Q8H    sodium chloride (NS) flush 5-10 mL  5-10 mL IntraVENous PRN    HYDROmorphone (PF) (DILAUDID) injection 1 mg  1 mg IntraVENous Q3H PRN    ondansetron (ZOFRAN) injection 4 mg  4 mg IntraVENous Q4H PRN    0.9% sodium chloride infusion 250 mL  250 mL IntraVENous PRN          Objective:     Visit Vitals    /68    Pulse 96    Temp 97.9 °F (36.6 °C)    Resp 23    Ht 6' (1.829 m)    Wt 70.1 kg (154 lb 8.7 oz)    SpO2 100%    BMI 20.96 kg/m2       Abdomen: soft, non-tender. Bowel sounds normal. No masses,  no organomegaly. PEG site is normal, Abdominal wall binder is in place    Data Review:  CT Abdomen:  No acute intra-abdominal process.   PEG tube location is normal.    Labs: Results:       Chemistry Recent Labs      02/24/17   0240  02/23/17 2215 02/23/17   1841   GLU  111*  50*  31*   NA  138  140  140   K  5.3  6.0*  5.3   CL  105  106  106   CO2  23 24 26   BUN  107*  107*  107*   CREA  2.68*  2.62*  2.58*   CA  7.3*  7.5*  7.7*   AGAP  10  10  8   BUCR  40*  41*  41*      CBC w/Diff Recent Labs      02/24/17   1015  02/23/17 2215 02/23/17   1841   WBC  17.0*  18.6*  20.9*   RBC  3.12*  3.44*  3.54*   HGB  8.8*  9.7*  10.0*   HCT  27.4*  30.6*  31.2*   PLT  304  332  371   GRANS  PENDING  93*  92*   LYMPH  PENDING  5*  5*   EOS  PENDING  0  0      Coagulation Recent Labs      02/23/17 2215 02/23/17   1841 02/23/17   0407   PTP  16.8*  16.7*   --    INR  1.4*  1.4*   --    APTT  41.1*   --   36.9*       Liver Enzymes Recent Labs      02/23/17 1841   TP  5.0*   ALB  1.9*   TBILI  0.3   AP  97   SGOT  38*   ALT  23      Lipase No results for input(s): LPSE in the last 72 hours. Assessment:   1. Feeding difficulty for which PEG placed yesterday in uneventful fashion  2. Respiratory failure---?? aspiration    Recommendation:   1. OK to use PEG for meds, feeding, fluid anytime  2. External bumper of PEG should be loosened in hospital or in our office on Thurs March 2 or Friday March 3  3. I will sign off but remain available        Rhonda Zafar.  Narendra Gray MD, [employer], [address]  February 24, 2017  Jj Parker

## 2017-02-24 NOTE — PROGRESS NOTES
`3219 Bedside shift change report given to marky marte (oncoming nurse) by Karthik Peters (offgoing nurse). Report included the following information SBAR, Kardex and Recent Results. Side rails up x 3, call light within reach. Hob elevated 30 degrees. .  2000 assessment completed. oral care provided. roddy day pa in aware current status. 2029 amio decreased to0.5 mcg/min per orderswindle day  pa  2130 dr Triston Ro notified of peg tube placement today and if okay to use for contrast , yes  But do not start tube feed until  Pt reeval per gi md in am.  2149  Ada chinyere cna intubated with 7.5 ett  23 teeth . 2200 roddy day pa ordered to hold levimer for tonight and to reassess in am.  2200 daughter in brief provided per nurse and pa roddy day. 2339 to ct scan with 2 nurses and resp therapy. 0015 waiting for contrast to spread to colon. Assessment completed. 0040 back from ct scan oral care provided. 0200 dr Esperanza Joya notified of k 6, and am lab sent stated to call if k greater than 5.5 .  0230 phillips inserted without difficulty urine hazy. 0400 assessment completed. Oral and phillips care provided. 9763 dr Constance Milian notified of ct result last k level 5.3, uop  Averaging 30 cc/hr. And episode od bradycardia at 0418  Last 2 minutes and pt back after nurse tap shoulder. Orders received.

## 2017-02-25 NOTE — PROGRESS NOTES
Internal Medicine Progress Note    Patient's Name: Vidya Members  Admit Date: 2/12/2017  Length of Stay: 12      Assessment/Plan     #Acute Hypoxic Respiratory Failure   #Possible Aspiration Pneumonia, Possible Pneumonitis  #B/L Pleural Effusions  #VDRF  #SBO s/p Exlap and Small Bowel Resection 02/13  #Severe Dysphagia on TPN  #Atrial Fibrillation with RVR  #Acute Metabolic Encephalopathy Likely 2/2 Benzodiazepam, improving  #SUSAN on CKD Stage IIIb  #Hyperkalemia Possibly 2/2 Renal Failure, Possible Heparin-induced w/ EKG Changes  #L Inguinal Hernia  #HTN  #TIA on Long-term AC  #Iron Deficiency Anemia  #DVT PPx     - Cont vent mgmt. Vent bundle. F/u pulm  - WBCs back WNL and afebrile. Cont broad spec antibiotics per pulm. Sputum cx prelim read is negative. Trend CXR  - Surgery following, no further intervention. Staples out Monday  - PEG tube placed yesterday. Cont TFs. F/u GI  - Amio PRN. Cont lopressor. Readdress eliquis once stable  - Cr trending up. Cont IVFs and phillips. F/u nephro. Trend BMP.  - K back WNL. Trend BMP  - Eliquis on hold, bleed risk outweighs thromboembolism protection at this point 2/2 recent PEG and critical nature  - Restart ferrous sulfate once diet advanced  - SCDs, SQH    I have personally reviewed all pertinent labs and films that have officially resulted over the last 24 hours. I have personally checked for all pending labs that are awaiting final results. I discussed the patient's status and updates with the daughter.     Subjective     Pt s/e @ bedside  Failed spon breath 2/2 tachypnea  Otherwise, no major events  Unable to obtain review of systems    Objective     Visit Vitals    BP 99/73    Pulse (!) 102    Temp 98.4 °F (36.9 °C)    Resp 18    Ht 6' (1.829 m)    Wt 75.5 kg (166 lb 7.2 oz)    SpO2 100%    BMI 22.57 kg/m2       Physical Exam:  General Appearance: Intubated and sedated  HENT: normocephalic/atraumatic, + ETT, + thrush  Neck: Supple, no JVD  Lungs: Vent-assisted BS, no w/r/r  CV: IRIR w/ rate in 100s, no m/r/g  Abdomen: soft, bandage in place, normal bowel sounds  Extremities: No edema, SCDs in place  Neuro: sedated, but no obv focal deficits  Skin: Intact, no cyanosis    Intake and Output:  Current Shift:  02/25 0701 - 02/25 1900  In: 714.3 [I.V.:514.3]  Out: 175 [Urine:175]  Last three shifts:  02/23 1901 - 02/25 0700  In: 5836.7 [I.V.:4356.7]  Out: 2142 [Urine:2142]    Lab/Data Reviewed:  BMP:   Lab Results   Component Value Date/Time     02/25/2017 03:30 AM    K 4.8 02/25/2017 03:30 AM     02/25/2017 03:30 AM    CO2 20 (L) 02/25/2017 03:30 AM    AGAP 13 02/25/2017 03:30 AM     (H) 02/25/2017 03:30 AM     (H) 02/25/2017 03:30 AM    CREA 2.84 (H) 02/25/2017 03:30 AM    GFRAA 26 (L) 02/25/2017 03:30 AM    GFRNA 21 (L) 02/25/2017 03:30 AM     CBC:   Lab Results   Component Value Date/Time    WBC 13.0 02/25/2017 03:30 AM    HGB 8.2 (L) 02/25/2017 03:30 AM    HCT 25.6 (L) 02/25/2017 03:30 AM     02/25/2017 03:30 AM       Imaging Reviewed:  [facility] Chest Port    Result Date: 2/25/2017  Chest, single view Indication: Endotracheal tube placement. Comparison: several prior exams, most recently 2/24/2017. Findings:  Portable upright AP view of the chest was obtained. There is an endotracheal tube present 4.6 cm above the jimi. A right peripherally inserted central venous catheter projects over the superior cavoatrial junction. The degree of pulmonary inflation is similar to prior. Bilateral posterior layering pleural effusions are present with hazy lower lung opacities noted. Improved aeration of the right lung base. No pneumothorax. Cardiac size and mediastinal contours are stable. No acute osseous abnormality present. Impression: 1. Endotracheal tube and right peripherally inserted central venous catheter in position as above.  2. Similar appearance to bilateral posteriorly layering pleural effusions, with improved aeration of the right lung base.       Medications Reviewed:  Current Facility-Administered Medications   Medication Dose Route Frequency    dextrose 5 % - 0.45% NaCl infusion  100 mL/hr IntraVENous CONTINUOUS    vancomycin (VANCOCIN) 500 mg in 0.9% sodium chloride (MBP/ADV) 100 mL ADV  500 mg IntraVENous Q24H    [START ON 2/27/2017] VANCOMYCIN INFORMATION NOTE   Other Rx Dosing/Monitoring    amiodarone (NEXTERONE) 360 mg in dextrose 200 mL (1.8 mg/mL) infusion  0.5-1 mg/min IntraVENous TITRATE    nystatin (MYCOSTATIN) 100,000 unit/mL oral suspension 500,000 Units  500,000 Units Oral QID    sodium chloride (NS) flush 5-10 mL  5-10 mL IntraVENous Q8H    simethicone (MYLICON) 09AM/7.7JX oral drops 80 mg  1.2 mL Oral Multiple    fentaNYL citrate (PF) injection  mcg   mcg IntraVENous Multiple    pantoprazole (PROTONIX) 40 mg in sodium chloride 0.9 % 10 mL injection  40 mg IntraVENous BID    PHENYLephrine 30 mg in 0.9% sodium chloride 250 mL (TARIK-SYNEPHRINE) infusion   mcg/min IntraVENous TITRATE    NOREPINephrine (LEVOPHED) 8,000 mcg in dextrose 5% 250 mL infusion  2-30 mcg/min IntraVENous TITRATE    vasopressin (VASOSTRICT) 100 Units in 0.9% sodium chloride 100 mL infusion  0.01 Units/min IntraVENous CONTINUOUS    piperacillin-tazobactam (ZOSYN) 3.375 g in 0.9% sodium chloride (MBP/ADV) 100 mL MBP EXTENDED 4 HOUR INFUSION###  3.375 g IntraVENous Q12H    sodium chloride 0.45 % in dextrose 10% 1,019.6 mL infusion   IntraVENous CONTINUOUS    midazolam (VERSED) injection 1-4 mg  1-4 mg IntraVENous Q2H PRN    heparin (porcine) injection 5,000 Units  5,000 Units SubCUTAneous Q8H    acetaminophen (TYLENOL) suppository 650 mg  650 mg Rectal Q4H PRN    zolpidem (AMBIEN) tablet 5 mg  5 mg Oral QHS PRN    insulin lispro (HUMALOG) injection   SubCUTAneous Q6H    glucose chewable tablet 16 g  4 Tab Oral PRN    glucagon (GLUCAGEN) injection 1 mg  1 mg IntraMUSCular PRN    dextrose (D50W) injection syrg 12.5-25 g  25-50 mL IntraVENous PRN    sodium chloride (NS) flush 10 mL  10 mL InterCATHeter PRN    bacitracin 500 unit/gram packet 1 Packet  1 Packet Topical PRN    tamsulosin (FLOMAX) capsule 0.4 mg  0.4 mg Oral DAILY    0.9% sodium chloride infusion 250 mL  250 mL IntraVENous PRN    phenol throat spray (CHLORASEPTIC) 1 Spray  1 Spray Oral PRN    sodium chloride (NS) flush 5-10 mL  5-10 mL IntraVENous Q8H    0.9% sodium chloride infusion 250 mL  250 mL IntraVENous PRN    sodium chloride (NS) flush 5-10 mL  5-10 mL IntraVENous Q8H    HYDROmorphone (PF) (DILAUDID) injection 1 mg  1 mg IntraVENous Q3H PRN    ondansetron (ZOFRAN) injection 4 mg  4 mg IntraVENous Q4H PRN    0.9% sodium chloride infusion 250 mL  250 mL IntraVENous PRN           Leonardo You DO  Internal Medicine, Hospitalist  Pager: [phone]  [address] [place] [address]

## 2017-02-25 NOTE — PROGRESS NOTES
Rec'd pt/report from [facility], [address] [facility] M[id number]. Pt agitated, restless grabbing at ETT. Currently wearing soft wrist restraints bilaterally. Appears to be uncomfortable in pain/discomfort from ETT. 20:30  Pt rec'd  Fentanyl 50 mics for pain discomfort and agitation. Will continue to monitor VS/ pt status. Mouth care / suctioning completed. Pt mouth dry-rec'd nystatin. 21:30  Appears more comfortable, less agitated currently, though remains easily awakened. 23:00  Dressing change to picc line right upper arm completed. Pt incont of large soft brown stool. Completed bath including replaced sacrum mepilex. Skin remains pink, blanchable under mepilex. 04:00  AM labs completed from picc line without difficulty. 06:10  Pt awake, increasing restlessness, agitation. Attempting to lift arms toward ETT, waving when walking by room. Rec'd 50 mics fentanyl IV for restlessness/ discomfort. Explained to pt there is possibility of removing ETT and to try and relax, not bite tube. Unable to confirm pt understanding at this time. Will continue to monitor.

## 2017-02-25 NOTE — PROGRESS NOTES
Problem: Ventilator Management  Goal: *Adequate oxygenation and ventilation  Outcome: Progressing Towards Goal  PSV this am of 7 patient doing well et tube secure.  bbs are clear hr 104 rr24 abg ordered    Due to increased rr of high 40's returned to full support rr 22 and hr 119 BRITTANY Rea abg  7.42, 25.3,135,-8,16.7,17, 99  Titrated fio2 to 35% per ABG

## 2017-02-25 NOTE — PROGRESS NOTES
RENAL PROGRESS NOTE        Crystal Jovel         Assessment/Plan:     · SUSAN (ischemic atn in a setting of sbo). Non oliguric, bun/cr are up. Continue ivf. · Hyperkalemia in context of susan. Resolving. · A. Fib. AC is on hold. · Aspiration pneumonia/sepsis. On abx. · Resp failure. · S/p small bowel resection for sbo on 2/13. Subjective:  Patient complaints off: Intubated, sedated. On neosynephrin. Tolerates tf.      Patient Active Problem List   Diagnosis Code    Small bowel obstruction ([facility] [address] K56.69    Atrial fibrillation with rapid ventricular response (HCC) I48.91    Postoperative anemia D64.9    Feeding difficulties R63.3    Debility R53.81       Current Facility-Administered Medications   Medication Dose Route Frequency Provider Last Rate Last Dose    calcium gluconate 2 g in 0.9% sodium chloride 50 mL IVPB  2 g IntraVENous ONCE Gracie Moh, DO        vancomycin (VANCOCIN) 500 mg in 0.9% sodium chloride (MBP/ADV) 100 mL ADV  500 mg IntraVENous Q24H Gracie Moh, DO   500 mg at 02/25/17 0459    [START ON 2/27/2017] VANCOMYCIN INFORMATION NOTE   Other Rx Dosing/Monitoring Gracie Moh, DO        amiodarone (NEXTERONE) 360 mg in dextrose 200 mL (1.8 mg/mL) infusion  0.5-1 mg/min IntraVENous TITRATE Bogdan Gil MD 16.7 mL/hr at 02/25/17 0004 0.5 mg/min at 02/25/17 0004    nystatin (MYCOSTATIN) 100,000 unit/mL oral suspension 500,000 Units  500,000 Units Oral QID Gracie Moh, DO   500,000 Units at 02/25/17 0006    sodium chloride (NS) flush 5-10 mL  5-10 mL IntraVENous Q8H Mare Valenzuela MD   10 mL at 02/25/17 0500    simethicone (MYLICON) 62YW/6.8XS oral drops 80 mg  1.2 mL Oral Tara Duval MD        fentaNYL citrate (PF) injection  mcg   mcg IntraVENous Multiple Vianney Duval MD   50 mcg at 02/25/17 0622    pantoprazole (PROTONIX) 40 mg in sodium chloride 0.9 % 10 mL injection  40 mg IntraVENous BID Lizzy Kudo, DO   40 mg at 02/24/17 1705    PHENYLephrine 30 mg in 0.9% sodium chloride 250 mL (TARIK-SYNEPHRINE) infusion   mcg/min IntraVENous TITRATE Rona A Day, PA 20 mL/hr at 02/25/17 0727 40 mcg/min at 02/25/17 0727    NOREPINephrine (LEVOPHED) 8,000 mcg in dextrose 5% 250 mL infusion  2-30 mcg/min IntraVENous TITRATE Rona A Day, PA   Stopped at 02/23/17 2100    vasopressin (VASOSTRICT) 100 Units in 0.9% sodium chloride 100 mL infusion  0.01 Units/min IntraVENous CONTINUOUS Rona A Day, PA   Stopped at 02/23/17 2200    piperacillin-tazobactam (ZOSYN) 3.375 g in 0.9% sodium chloride (MBP/ADV) 100 mL MBP EXTENDED 4 HOUR INFUSION###  3.375 g IntraVENous Q12H Lizzy Kudo, DO 25 mL/hr at 02/25/17 0005 3.375 g at 02/25/17 0005    sodium chloride 0.45 % in dextrose 10% 1,019.6 mL infusion   IntraVENous CONTINUOUS Rona A Day, PA 50 mL/hr at 02/25/17 0004      midazolam (VERSED) injection 1-4 mg  1-4 mg IntraVENous Q2H PRN Rona A Day, PA   2 mg at 02/25/17 0222    heparin (porcine) injection 5,000 Units  5,000 Units SubCUTAneous Q8H Lizzy Kudo, DO   5,000 Units at 02/25/17 0359    acetaminophen (TYLENOL) suppository 650 mg  650 mg Rectal Q4H PRN Lizzy Kudo, DO   650 mg at 02/20/17 1037    zolpidem (AMBIEN) tablet 5 mg  5 mg Oral QHS PRN Randalyn Anis, DO        insulin lispro (HUMALOG) injection   SubCUTAneous Q6H Lizzy Kudo, DO   Stopped at 02/25/17 0600    glucose chewable tablet 16 g  4 Tab Oral PRN Randalyn Anis, DO        glucagon (GLUCAGEN) injection 1 mg  1 mg IntraMUSCular PRN Randalyn Anis, DO        dextrose (D50W) injection syrg 12.5-25 g  25-50 mL IntraVENous PRN Randalyn Anis, DO   25 g at 02/23/17 0698    sodium chloride (NS) flush 10 mL  10 mL InterCATHeter PRN Randalyn Anis, DO   10 mL at 02/20/17 0006    bacitracin 500 unit/gram packet 1 Packet  1 Packet Topical PRN Sharona Cowing, DO        tamsulosin [facility]) capsule 0.4 mg  0.4 mg Oral DAILY [place] Cowing, DO   Stopped at 02/21/17 0900    0.9% sodium chloride infusion 250 mL  250 mL IntraVENous PRN Sharona Cowing, DO        phenol throat spray (CHLORASEPTIC) 1 Spray  1 Spray Oral PRN Bob Mcclain MD   1 Spray at 02/20/17 1158    sodium chloride (NS) flush 5-10 mL  5-10 mL IntraVENous Q8H Mc Gordon MD   10 mL at 02/25/17 0500    0.9% sodium chloride infusion 250 mL  250 mL IntraVENous PRN Thalia Minaya CRNA        sodium chloride (NS) flush 5-10 mL  5-10 mL IntraVENous Q8H Mc Gordon MD   10 mL at 02/25/17 0500    HYDROmorphone (PF) (DILAUDID) injection 1 mg  1 mg IntraVENous Q3H PRN Mc Gordon MD   1 mg at 02/22/17 0310    ondansetron (ZOFRAN) injection 4 mg  4 mg IntraVENous Q4H PRN Mc Gordon MD   4 mg at 02/15/17 0225    0.9% sodium chloride infusion 250 mL  250 mL IntraVENous PRN Mc Gordon MD           Objective  Vitals:    02/25/17 0600 02/25/17 0630 02/25/17 0700 02/25/17 0730   BP: 94/67 (!) 84/57 (!) 72/53    Pulse: (!) 121 (!) 119 (!) 109 (!) 113   Resp: 21 11 13 14   Temp:   98.4 °F (36.9 °C)    SpO2: 100% 100% 100% 100%   Weight:       Height:             Intake/Output Summary (Last 24 hours) at 02/25/17 0811  Last data filed at 02/25/17 0700   Gross per 24 hour   Intake           2983.6 ml   Output              887 ml   Net           2096.6 ml           Admission weight: Weight: 63.5 kg (140 lb) (02/12/17 0603)  Last Weight Metrics:  Weight Loss Metrics 2/25/2017 1/4/2016 11/8/2015   Today's Wt 166 lb 7.2 oz 145 lb 140 lb   BMI 22.57 kg/m2 19.66 kg/m2 18.98 kg/m2             Physical Assessment:     General: intubated, sedated. ET in place. Neck: No jvd. LUNGS: diminished air entry at the bases, bl exp rhonchi. CVS EXM: S1, S2  RRR.   Abdomen: non distended. PEG in place. Surgical dressings intact. Lower Extremities:  1+  edema. Lab    CBC w/Diff Recent Labs      02/25/17   0330  02/24/17   1015  02/23/17   2215   WBC  13.0  17.0*  18.6*   RBC  2.92*  3.12*  3.44*   HGB  8.2*  8.8*  9.7*   HCT  25.6*  27.4*  30.6*   PLT  292  304  332   GRANS  94*  93*  93*   LYMPH  5*  3*  5*   EOS  0  0  0        Chemistry Recent Labs      02/25/17   0330  02/24/17   1445  02/24/17   0240   02/23/17   2215  02/23/17   1841   GLU  134*   --   111*   --   50*  31*   NA  138   --   138   --   140  140   K  4.8  5.2  5.3   --   6.0*  5.3   CL  105   --   105   --   106  106   CO2  20*   --   23   --   24  26   BUN  108*   --   107*   --   107*  107*   CREA  2.84*   --   2.68*   --   2.62*  2.58*   CA  7.2*   --   7.3*   --   7.5*  7.7*   AGAP  13   --   10   --   10  8   BUCR  38*   --   40*   --   41*  41*   AP   --    --    --    --    --   97   TP   --    --    --    --    --   5.0*   ALB   --    --    --    --    --   1.9*   GLOB   --    --    --    --    --   3.1   AGRAT   --    --    --    --    --   0.6*   PHOS  4.8   --   5.1*   < >   --    --     < > = values in this interval not displayed. No results found for: IRON, FE, TIBC, IBCT, PSAT, FERR   Lab Results   Component Value Date/Time    Calcium 7.2 02/25/2017 03:30 AM    Phosphorus 4.8 02/25/2017 03:30 AM        Graydon Gitelman, M.D.   [facility]  Phone

## 2017-02-25 NOTE — PROGRESS NOTES
[facility]  ICU Progress Note      Name: Alissa Carlton   : 1927   MRN: [id number]   Date: 2017       Patient placed on SBT this morning for approx one hour. RR up into the 40's. SBT terminated. Will keep sedation as light as possible and repeat SBT tomorrow.       Ilsa Fowler PA-C  Pulmonary and Critical Care Services

## 2017-02-25 NOTE — PROGRESS NOTES
Surgery    Noted ongoing pulm support. On TF without issue    Will remove staples Monday.  Please call if needed before then

## 2017-02-25 NOTE — PROGRESS NOTES
[facility]  ICU Progress Note      Name: Macho Robles   : 1927   MRN: [id number]   Date: 2017 7:51 AM     [x]I have reviewed the flowsheet and previous days notes. Events overnight reviewed and discussed with nursing staff. Vital signs and records reviewed. Subjective:    No new events overnight. Ventilating easily on minimal settings. Wakes easily. Did fairly well yesterday with SBT but was placed back on vent due to increased WOB. [x]The patient is unable to give any meaningful history or review of systems because the patient is:  [x]Intubated []Sedated   []Unresponsive      [x]The patient is critically ill on      [x]Mechanical ventilation [x]Pressors   []BiPAP []                 ROS: Patient is intubated and sedated. Hemodynamically stable on 40 mcg/min Jhon. Glucose improved ~200. PEG feeding in progress at 50 mL/hr with 5 mL residuals. Renal indicies continue to creep up despite an increase in UOP to 50 mL/hr. Resp culture preliminary result has grown gram pos cocci in pairs. BCX's x2 neg. UCX pending.     Medication Review:  · Pressors - Jhon 40 mcg/hr  · Sedation - Versed 1-4 mg q 2hr prn (last dose at midnight 2mg)  · Antibiotics - Vanco/Zosyn  · Pain - Fentanyl  mcg prn, Dilaudid 1 mg q 3hr prn (Not given last 2 days)  · GI/ DVT - heparin q 8 hours, Protonix  · Others Amiodarone at 0.5 mg/min    Safety Bundles: VAP Bundle/ CAUTI/ Severe Sepsis Protocol/ Electrolyte Replacement Protocol    Vital Signs:    Visit Vitals    BP (!) 72/53    Pulse (!) 113    Temp 98.4 °F (36.9 °C)    Resp 14    Ht 6' (1.829 m)    Wt 75.5 kg (166 lb 7.2 oz)    SpO2 100%    BMI 22.57 kg/m2       O2 Device: Endotracheal tube   O2 Flow Rate (L/min): 15 l/min   Temp (24hrs), Av.5 °F (36.9 °C), Min:97.8 °F (36.6 °C), Max:99.7 °F (37.6 °C)       Intake/Output:   Last shift:         Last 3 shifts: 1901 -  0700  In: 5836.7 [I.V.:4356.7]  Out: 8437 [Urine:2142]    Intake/Output Summary (Last 24 hours) at 02/25/17 0751  Last data filed at 02/25/17 0700   Gross per 24 hour   Intake           3068.6 ml   Output              937 ml   Net           2131.6 ml       Ventilator Settings:  Ventilator  Mode: Assist control, VC+  Respiratory Rate  Back-Up Rate: 10  Insp Time (sec): 1.71 sec  I:E Ratio: .  Ventilator Volumes  Vt Set (ml): 500 ml  Vt Exhaled (Machine Breath) (ml): 559 ml  Vt Spont (ml): 368 ml  Ve Observed (l/min): 10.7 l/min  Ventilator Pressures  Pressure Support (cm H2O): 7 cm H2O  PIP Observed (cm H2O): 22 cm H2O  Plateau Pressure (cm H2O): 18 cm H2O  MAP (cm H2O): 13  PEEP/VENT (cm H2O): 5 cm H20    Physical Exam:    General: Intubated/ intermittently sedated; HEENT:  Anicteric sclerae; pink palpebral conjunctivae; mucosa moist  Resp:  Symmetrical chest expansion, no accessory muscle use; good airway entry; no rales/ wheezing/ rhonchi noted  CV:  irregular rate and rhythm, 2+ pulses at the wrists  GI:  Abdomen soft, non-tender; Incision non-infected appearing. PEG appears to be in good position. TFs w/o difficulty. 5 mL residuals.     Extremities:  +2 pulses on all extremities; no edema/ cyanosis/ clubbing noted  Skin:  Warm; no rashes/ lesions noted  Neurologic:  Non-focal  Devices:  PICC 2/18/17, ETT 2/23/17, Conner 2/24/17, PEG 2/23/17      DATA:     Current Facility-Administered Medications   Medication Dose Route Frequency    calcium gluconate 2 g in 0.9% sodium chloride 50 mL IVPB  2 g IntraVENous ONCE    vancomycin (VANCOCIN) 500 mg in 0.9% sodium chloride (MBP/ADV) 100 mL ADV  500 mg IntraVENous Q24H    [START ON 2/27/2017] VANCOMYCIN INFORMATION NOTE   Other Rx Dosing/Monitoring    amiodarone (NEXTERONE) 360 mg in dextrose 200 mL (1.8 mg/mL) infusion  0.5-1 mg/min IntraVENous TITRATE    nystatin (MYCOSTATIN) 100,000 unit/mL oral suspension 500,000 Units  500,000 Units Oral QID    sodium chloride (NS) flush 5-10 mL  5-10 mL IntraVENous Q8H    simethicone (MYLICON) 54RI/5.1FZ oral drops 80 mg  1.2 mL Oral Multiple    fentaNYL citrate (PF) injection  mcg   mcg IntraVENous Multiple    pantoprazole (PROTONIX) 40 mg in sodium chloride 0.9 % 10 mL injection  40 mg IntraVENous BID    PHENYLephrine 30 mg in 0.9% sodium chloride 250 mL (TARIK-SYNEPHRINE) infusion   mcg/min IntraVENous TITRATE    NOREPINephrine (LEVOPHED) 8,000 mcg in dextrose 5% 250 mL infusion  2-30 mcg/min IntraVENous TITRATE    vasopressin (VASOSTRICT) 100 Units in 0.9% sodium chloride 100 mL infusion  0.01 Units/min IntraVENous CONTINUOUS    piperacillin-tazobactam (ZOSYN) 3.375 g in 0.9% sodium chloride (MBP/ADV) 100 mL MBP EXTENDED 4 HOUR INFUSION###  3.375 g IntraVENous Q12H    sodium chloride 0.45 % in dextrose 10% 1,019.6 mL infusion   IntraVENous CONTINUOUS    midazolam (VERSED) injection 1-4 mg  1-4 mg IntraVENous Q2H PRN    heparin (porcine) injection 5,000 Units  5,000 Units SubCUTAneous Q8H    acetaminophen (TYLENOL) suppository 650 mg  650 mg Rectal Q4H PRN    zolpidem (AMBIEN) tablet 5 mg  5 mg Oral QHS PRN    insulin lispro (HUMALOG) injection   SubCUTAneous Q6H    glucose chewable tablet 16 g  4 Tab Oral PRN    glucagon (GLUCAGEN) injection 1 mg  1 mg IntraMUSCular PRN    dextrose (D50W) injection syrg 12.5-25 g  25-50 mL IntraVENous PRN    sodium chloride (NS) flush 10 mL  10 mL InterCATHeter PRN    bacitracin 500 unit/gram packet 1 Packet  1 Packet Topical PRN    tamsulosin (FLOMAX) capsule 0.4 mg  0.4 mg Oral DAILY    0.9% sodium chloride infusion 250 mL  250 mL IntraVENous PRN    phenol throat spray (CHLORASEPTIC) 1 Spray  1 Spray Oral PRN    sodium chloride (NS) flush 5-10 mL  5-10 mL IntraVENous Q8H    0.9% sodium chloride infusion 250 mL  250 mL IntraVENous PRN    sodium chloride (NS) flush 5-10 mL  5-10 mL IntraVENous Q8H    HYDROmorphone (PF) (DILAUDID) injection 1 mg  1 mg IntraVENous Q3H PRN    ondansetron (ZOFRAN) injection 4 mg  4 mg IntraVENous Q4H PRN    0.9% sodium chloride infusion 250 mL  250 mL IntraVENous PRN         Labs: Results:       Chemistry Recent Labs      02/25/17   0330  02/24/17   1445  02/24/17   0240  02/23/17 2215 02/23/17 1841   GLU  134*   --   111*  50*  31*   NA  138   --   138  140  140   K  4.8  5.2  5.3  6.0*  5.3   CL  105   --   105  106  106   CO2  20*   --   23 24 26   BUN  108*   --   107*  107*  107*   CREA  2.84*   --   2.68*  2.62*  2.58*   CA  7.2*   --   7.3*  7.5*  7.7*   AGAP  13   --   10  10  8   BUCR  38*   --   40*  41*  41*   AP   --    --    --    --   97   TP   --    --    --    --   5.0*   ALB   --    --    --    --   1.9*   GLOB   --    --    --    --   3.1   AGRAT   --    --    --    --   0.6*      CBC w/Diff Recent Labs      02/25/17   0330 02/24/17   1015  02/23/17 2215   WBC  13.0  17.0*  18.6*   RBC  2.92*  3.12*  3.44*   HGB  8.2*  8.8*  9.7*   HCT  25.6*  27.4*  30.6*   PLT  292  304  332   GRANS  94*  93*  93*   LYMPH  5*  3*  5*   EOS  0  0  0      Coagulation Recent Labs      02/25/17 0330 02/23/17 2215 02/23/17   0407   PTP  18.3*  16.8*   < >   --    INR  1.6*  1.4*   < >   --    APTT   --   41.1*   --   36.9*    < > = values in this interval not displayed.        Liver Enzymes Recent Labs      02/23/17 1841   TP  5.0*   ALB  1.9*   AP  97   SGOT  38*      ABG Lab Results   Component Value Date/Time    PHI 7.406 02/24/2017 10:39 AM    PCO2I 30.4 (L) 02/24/2017 10:39 AM    PO2I 76 (L) 02/24/2017 10:39 AM    HCO3I 19.2 (L) 02/24/2017 10:39 AM    FIO2I 0.30 02/24/2017 10:39 AM      Microbiology Recent Labs      02/24/17   1515  02/23/17   2230  02/23/17   2215   CULT  PENDING  NO GROWTH AFTER 6 HOURS  NO GROWTH AFTER 6 HOURS          Telemetry: []Sinus []A-flutter []Paced    [x]A-fib []Multiple PVCs                    Imaging:  [x]I have personally reviewed the patients radiographs  []Radiographs reviewed with radiologist   []No change from prior, tubes and lines in adequate position  [x]Improved   []Worsening        IMPRESSION:   · Acute hypoxemic, hypercapnic respiratory failure likely aspiration with previous brief respiratory arrest.  Improved CXR. Minimal vent settings. Pplat 13. Failed SBT yesterday due to fatigue. · AMS. Improved. Follows commands. · Possible sepsis with leukocytosis, hypotension, source likely aspiration, Gram positive cocci in pairs on preliminary respiratory culture. BCX's neg. UCX pending. · Hypotension requiring pressor support, afib/volume depletion/sepsis. Weaning Jhon. Currently at 36, down from 79 yesterday. · A-fib with RVR. Currently on amio at 0.5mg/hr  · SUSAN. Renal indicies up. UOP improved at 50 mL/hr. Nephrology following. · Anemia stable. · Hyperkalemia, now resolved  · Dysphagia s/p PEG 2/23. TFs at 50/hr with 5 mL residuals  · Hx recent Small Bowel Obstruction s/p ex-lap with resection. · Hypoalbuminemia  · Hypocalcemia, replaced        PLAN:   · Resp -  SBT this morning. Continue lung protective strategies. Pplat-13. Normal ABG. CXR with bibasilar effusions, though appears better overall. · ID - Remains normothermic. WBCs WNLs. Resp Culture positive for GM Pos cocci in pairs. Continue Vanco/Zosyn  · CVS - Remains in a-fib. Hemodynamically stable on 40 mcg/hr Jhon, which is down from 79 yesterday. Controlled on 0.5 mg/hr Amiodarone. · Heme/onc -   No transfusion requirements today. · Renal - Indicies creeping up though UOP improving. Since Glucose has improved, will switch to D5 1/2 NS at 100 mL/hr. · Endocrine - Glucose per ICU protocol  · Neuro/ Pain/ Sedation - Minimize sedation and narcotic medications for vent liberation. · GI - Continue TFs and PPI for duodenal ulcers and SUP.    · Prophylaxis - Protonix / Heparin            The patient is: [] acutely ill Risk of deterioration: [x] moderate    [x] critically ill  [] high     []See my orders for details    My assessment/plan was discussed with:  [x]nursing []PT/OT    [x]respiratory therapy [x]Dr. Audrey Willams []     []Total critical care time exclusive of procedures       minutes    BRITTANY Martinez    .

## 2017-02-26 NOTE — PROGRESS NOTES
Pt HR (converted)? To a rate of 50's/ sinus bradycardia. Amio placed on hold. Notified Dr. Rhett Pope and updated, - new orders rec'd. B /P high 19'H systolically. Pt resting quietly, no distress or / changes noted. 23:00  Remains in SB/SR. Jhon remains off. VSS, continuing to montor. 03:00  Pt incont of small loose stool. Pt cleaned and turned , linen replaced. VSS. Levo not required as of this time. 06:30  Restarted amiodarone for HR now back in 120 range. ? A-fib/ STachycardia.

## 2017-02-26 NOTE — PROGRESS NOTES
Problem: Ventilator Management  Goal: *Adequate oxygenation and ventilation  Outcome: Not Progressing Towards Goal  Tried patient on PSV of 7  Then 15 due to HR increased to 130  and RR increased to 50 placed back on ACVC+ due to failed wean per protocol

## 2017-02-26 NOTE — PROGRESS NOTES
[facility]  -  Progress Note      Patient: Manish Farmer MRN: [id number]  SSN: [id number]    YOB: 1927  Age: 80 y.o. Sex: male      Admit Date: 2/12/2017    Assessment:     -- pAF. Prior h/o this. Multiple recurrent episodes this admission, rates up to 140 at times. Unable to aggressively rate control due to low BP. Received IV digoxin 0.25 mg IV this am  -- Acute hypoxic respiratory failure. Intubated several days ago for presumed aspiration  -- Mild cardiomyopathy. EF 45% on admission in setting of sepsis, no evidence of ADHF  -- Admitted with SBO s/p ex lap  -- ARF. Oliguric, no improvement in UO past 24 hrs  -- Post-anemia. Hgb with gradual trend down past few days    Plan:     -- agree with IV digoxin, will given 2 additional doses over next 48 hrs at 0.125 mg IV, would accept afib rates between 100-120 given acute illness  -- check digoxin level in am  -- can also use IV metoprolol 2.5 mg prn if BP will tolerate  -- would not restart therapeutic anti-coagulation until medically stable    Subjective:      Intubated    Objective:      Patient Vitals for the past 8 hrs:   Temp Pulse Resp BP SpO2   02/26/17 0756 - - 18 - -   02/26/17 0748 - (!) 120 30 - 100 %   02/26/17 0700 - (!) 128 23 114/68 100 %   02/26/17 0630 - (!) 123 22 - 99 %   02/26/17 0600 - (!) 110 21 107/64 100 %   02/26/17 0530 - (!) 102 19 103/69 100 %   02/26/17 0500 - 66 18 100/69 100 %   02/26/17 0451 - 64 22 - 100 %   02/26/17 0430 - 64 17 111/66 100 %   02/26/17 0400 97.6 °F (36.4 °C) 60 17 99/57 100 %         Patient Vitals for the past 96 hrs:   Weight   02/26/17 0430 75.2 kg (165 lb 12.6 oz)   02/25/17 0300 75.5 kg (166 lb 7.2 oz)   02/24/17 0554 70.1 kg (154 lb 8.7 oz)   02/23/17 0524 68.3 kg (150 lb 8 oz)         Intake/Output Summary (Last 24 hours) at 02/26/17 1139  Last data filed at 02/26/17 0500   Gross per 24 hour   Intake           2413.7 ml   Output              531 ml   Net 1882.7 ml       Physical Exam:  General:  intubated  Neck:  no JVD  Lungs:  diminished breath sounds R base, L base  Heart:  irregularly irregular rhythm, tachy  Abdomen:  abdomen is distended, decrease bs  Extremities:  extremities normal, atraumatic, no cyanosis or edema    Data Review:     Labs: Results:       Chemistry Recent Labs      02/26/17 0400 02/25/17   0330 02/24/17   1740  02/24/17   1445  02/24/17   0240   02/23/17   1841   GLU  150*  134*   --    --   111*   < >  31*   NA  137  138   --    --   138   < >  140   K  4.2  4.8   --   5.2  5.3   < >  5.3   CL  106  105   --    --   105   < >  106   CO2  19*  20*   --    --   23   < >  26   BUN  112*  108*   --    --   107*   < >  107*   CREA  3.13*  2.84*   --    --   2.68*   < >  2.58*   CA  6.9*  7.2*   --    --   7.3*   < >  7.7*   MG  1.9  2.0  2.0   --   1.8   < >   --    PHOS  4.7  4.8   --    --   5.1*   --    --    AGAP  12  13   --    --   10   < >  8   BUCR  36*  38*   --    --   40*   < >  41*   AP  93   --    --    --    --    --   97   TP  4.2*   --    --    --    --    --   5.0*   ALB  1.6*   --    --    --    --    --   1.9*   GLOB  2.6   --    --    --    --    --   3.1   AGRAT  0.6*   --    --    --    --    --   0.6*    < > = values in this interval not displayed.       CBC w/Diff Recent Labs      02/26/17 0400 02/25/17   0330 02/24/17   1015   WBC  7.8  13.0  17.0*   RBC  2.71*  2.92*  3.12*   HGB  7.5*  8.2*  8.8*   HCT  23.5*  25.6*  27.4*   PLT  248  292  304   GRANS  92*  94*  93*   LYMPH  7*  5*  3*   EOS  0  0  0      Cardiac Enzymes No results found for: CPK, CKMMB, CKMB, RCK3, CKMBT, CKNDX, CKND1, KEVIN, TROPT, TROIQ, GINETTE, TROPT, TNIPOC, BNP, BNPP   Coagulation Recent Labs      02/25/17   0330  02/23/17   2215   PTP  18.3*  16.8*   INR  1.6*  1.4*   APTT   --   41.1*       Lipid Panel Lab Results   Component Value Date/Time    Cholesterol, total 136 09/19/2010 04:50 AM    HDL Cholesterol 54 09/19/2010 04:50 AM    LDL, calculated 71.4 09/19/2010 04:50 AM    VLDL, calculated 10.6 09/19/2010 04:50 AM    Triglyceride 53 09/19/2010 04:50 AM    CHOL/HDL Ratio 2.5 09/19/2010 04:50 AM      BNP No results found for: BNP, BNPP, XBNPT   Liver Enzymes Recent Labs      02/26/17   0400   TP  4.2*   ALB  1.6*   AP  93   SGOT  20      Digoxin    Thyroid Studies Lab Results   Component Value Date/Time    TSH 3.92 02/24/2017 02:40 AM

## 2017-02-26 NOTE — PROGRESS NOTES
Attempted to call patient's daughter, Nicole Boone, to update her on patient's clinical status but no answer on number from facesheet. Will attempt again later today.     Kalpana Shields, DO  Internal Medicine, Hospitalist  Pager: [phone] [facility]

## 2017-02-26 NOTE — PROGRESS NOTES
Internal Medicine Progress Note    Patient's Name: Rachael Ramirez Date: 2/12/2017  Length of Stay: 15      Assessment/Plan     80 y.o. male with a PMHx of HTN, TIA, SIDNEY, SBO and abdominal hernia who presented to the ED with the acute onset of left upper quadrant abdominal pain. CT scan of the abd/pelvis in the ED that was positive for a SBO. s/p Exlap and small bowel resection (191cm) 2/13 am Dr Dru Logan. Course complicated by Afib+RVR 9/59 am requiring rate control and cardiology involvement. Patient only responded temporarily to diltiazem boluses and diltiazem drip to 5mg. The evening of 2/14 the patients rate remained 130-140 and he was becoming increasingly hypotensive, cardizem gtt was stopped and patient was given 250mcg of IV Digoxin with HR resolution into 60's. Eventually coverted to lopresser with HTN medication adjustment. Failed MBS 2/16 and placed NPO, no hx of dysphagia this was thought due to significant trauma from NGT placement for surgery. Was to have Dobhoff placed 2/17 with IR, they could not get a tube down. He received D10 overnight is his PIV, then PICC and TPN 2/18 am. He had repeat speech eval on 02/22, but failed this, leading to PEG tube placement on the following day. His renal function throughout the week worsened and nephrology was consulted. He was also hyperkalemic w/ EKG changes and given insulin/d50 and lasix. Later in the day on 02/23 he went into acute respiratory distress presumed to be 2/2 possible aspiration pneumonia and was transferred to the ICU and later intubated on pressor support for hypotension. He remains intubated at this time and has failed spontaneous breathing tests 2/2 tachypnea. His HR remains poorly controlled and diltiazem was ineffective due to hypotension. Amiodarone gtt was tried several times without success due to bradycardia. Cardiology was reconsulted.     #Acute Hypoxic Respiratory Failure   #Possible Aspiration Pneumonia, Possible Pneumonitis  #B/L Pleural Effusions  #VDRF  #SBO s/p Exlap and Small Bowel Resection 02/13  #Severe Dysphagia on TPN  #Atrial Fibrillation with RVR  #Acute Metabolic Encephalopathy Likely 2/2 Benzodiazepam, improving  #SUSAN/ATN on CKD Stage IIIb  #Hyperkalemia Possibly 2/2 Renal Failure, Possible Heparin-induced w/ EKG Changes  #L Inguinal Hernia  #HTN  #TIA on Long-term AC  #Iron Deficiency Anemia  #DVT PPx     - Cont vent mgmt. Vent bundle. F/u pulm. Failed spon breathing 2/2 tachypnea  - WBCs back WNL and afebrile. Cont broad spec antibiotics per pulm. Sputum cx prelim read is staph aur. Blood cx NGTD. Trend CXR  - Surgery following, no further intervention. Staples out Monday  - PEG tube placed yesterday. Cont TFs. F/u GI  - Will try digoxin for better control as previous documentation of success. Add IV lopressor if needed. Readdress eliquis once stable  - Cr trending up. IVFs per nephro. Trend BMP.  - K back WNL. Trend BMP  - Eliquis on hold, bleed risk outweighs thromboembolism protection at this point 2/2 recent PEG and critical nature  - Cont ferrous sulfate  - SCDs, SQH    I have personally reviewed all pertinent labs and films that have officially resulted over the last 24 hours. I have personally checked for all pending labs that are awaiting final results. I discussed the patient's status and updates with the daughter.     Subjective     Pt s/e @ bedside  Failed spon breath 2/2 tachypnea  Amio had to be d/c'd again 2/2 bradycardia  Updated pt daughter yesterday   Nash Thibodeaux to obtain review of systems    Objective     Visit Vitals    /68    Pulse (!) 120    Temp 97.6 °F (36.4 °C)    Resp 18    Ht 6' (1.829 m)    Wt 75.2 kg (165 lb 12.6 oz)    SpO2 100%    BMI 22.48 kg/m2       Physical Exam:  General Appearance: Intubated and sedated  HENT: normocephalic/atraumatic, + ETT, + thrush  Neck: Supple, no JVD  Lungs: Vent-assisted BS, no w/r/r  CV: IRIR w/ rate in 110s, no m/r/g  Abdomen: soft, bandage in place, normal bowel sounds  Extremities: No edema, SCDs in place  Neuro: sedated, but no obv focal deficits  Skin: Intact, no cyanosis    Intake and Output:  Current Shift:     Last three shifts:  02/24 1901 - 02/26 0700  In: 4846 [I.V.:3596]  Out: 1161 [Urine:1160]    Lab/Data Reviewed:  BMP:   Lab Results   Component Value Date/Time     02/26/2017 04:00 AM    K 4.2 02/26/2017 04:00 AM     02/26/2017 04:00 AM    CO2 19 (L) 02/26/2017 04:00 AM    AGAP 12 02/26/2017 04:00 AM     (H) 02/26/2017 04:00 AM     (H) 02/26/2017 04:00 AM    CREA 3.13 (H) 02/26/2017 04:00 AM    GFRAA 23 (L) 02/26/2017 04:00 AM    GFRNA 19 (L) 02/26/2017 04:00 AM     CBC:   Lab Results   Component Value Date/Time    WBC 7.8 02/26/2017 04:00 AM    HGB 7.5 (L) 02/26/2017 04:00 AM    HCT 23.5 (L) 02/26/2017 04:00 AM     02/26/2017 04:00 AM       Imaging Reviewed:  No results found.     Medications Reviewed:  Current Facility-Administered Medications   Medication Dose Route Frequency    sodium bicarbonate (8.4%) 75 mEq in 0.45% sodium chloride 1,000 mL infusion   IntraVENous CONTINUOUS    ferrous sulfate tablet 325 mg  325 mg Oral ACB    vancomycin (VANCOCIN) 500 mg in 0.9% sodium chloride (MBP/ADV) 100 mL ADV  500 mg IntraVENous Q24H    [START ON 2/27/2017] VANCOMYCIN INFORMATION NOTE   Other Rx Dosing/Monitoring    amiodarone (NEXTERONE) 360 mg in dextrose 200 mL (1.8 mg/mL) infusion  0.5-1 mg/min IntraVENous TITRATE    nystatin (MYCOSTATIN) 100,000 unit/mL oral suspension 500,000 Units  500,000 Units Oral QID    sodium chloride (NS) flush 5-10 mL  5-10 mL IntraVENous Q8H    simethicone (MYLICON) 00VW/4.9QE oral drops 80 mg  1.2 mL Oral Multiple    fentaNYL citrate (PF) injection  mcg   mcg IntraVENous Multiple    pantoprazole (PROTONIX) 40 mg in sodium chloride 0.9 % 10 mL injection  40 mg IntraVENous BID    PHENYLephrine 30 mg in 0.9% sodium chloride 250 mL (TARIK-SYNEPHRINE) infusion   mcg/min IntraVENous TITRATE    NOREPINephrine (LEVOPHED) 8,000 mcg in dextrose 5% 250 mL infusion  2-30 mcg/min IntraVENous TITRATE    vasopressin (VASOSTRICT) 100 Units in 0.9% sodium chloride 100 mL infusion  0.01 Units/min IntraVENous CONTINUOUS    piperacillin-tazobactam (ZOSYN) 3.375 g in 0.9% sodium chloride (MBP/ADV) 100 mL MBP EXTENDED 4 HOUR INFUSION###  3.375 g IntraVENous Q12H    midazolam (VERSED) injection 1-4 mg  1-4 mg IntraVENous Q2H PRN    heparin (porcine) injection 5,000 Units  5,000 Units SubCUTAneous Q8H    acetaminophen (TYLENOL) suppository 650 mg  650 mg Rectal Q4H PRN    zolpidem (AMBIEN) tablet 5 mg  5 mg Oral QHS PRN    insulin lispro (HUMALOG) injection   SubCUTAneous Q6H    glucose chewable tablet 16 g  4 Tab Oral PRN    glucagon (GLUCAGEN) injection 1 mg  1 mg IntraMUSCular PRN    dextrose (D50W) injection syrg 12.5-25 g  25-50 mL IntraVENous PRN    sodium chloride (NS) flush 10 mL  10 mL InterCATHeter PRN    bacitracin 500 unit/gram packet 1 Packet  1 Packet Topical PRN    tamsulosin (FLOMAX) capsule 0.4 mg  0.4 mg Oral DAILY    0.9% sodium chloride infusion 250 mL  250 mL IntraVENous PRN    phenol throat spray (CHLORASEPTIC) 1 Spray  1 Spray Oral PRN    sodium chloride (NS) flush 5-10 mL  5-10 mL IntraVENous Q8H    0.9% sodium chloride infusion 250 mL  250 mL IntraVENous PRN    sodium chloride (NS) flush 5-10 mL  5-10 mL IntraVENous Q8H    HYDROmorphone (PF) (DILAUDID) injection 1 mg  1 mg IntraVENous Q3H PRN    ondansetron (ZOFRAN) injection 4 mg  4 mg IntraVENous Q4H PRN    0.9% sodium chloride infusion 250 mL  250 mL IntraVENous PRN           Katherine Santos, DO  Internal Medicine, Hospitalist  Pager: [phone]  [facility]

## 2017-02-26 NOTE — PROGRESS NOTES
[facility]  Ramesh Jean 2/26/2017    Events noted. Various orders per IM, nephrology & cardiology. Overall stable; gesturing; off pressors - but got agitated and tachypneic on SBT. Will try again SBT later. I have placed order to call me if any positive vasoactive agent is needed (ordered NE to keep MAP >65).     I don't think AC is necessarily contraindicated.     -juan manuel

## 2017-02-26 NOTE — PROGRESS NOTES
RENAL PROGRESS NOTE        Hussein Vanegas         Assessment/Plan:     · SUSAN (ischemic atn in a setting of sbo). Non oliguric but  bun/cr are up again. Continue gentle ivf. · Hyperkalemia in context of susan. Resolved. · Met acidosis. Add bicarb to ivf. · A. Fib with rvr. On amiodarone drip. AC is on hold. · Aspiration pneumonia/sepsis. On abx. · Resp failure. · S/p small bowel resection for sbo on 2/13. · Anemia. Consider transfusion. Subjective:  Patient complaints off: Intubated, sedated. On neosynephrin. Tolerates tf. HR is up with weaning attempt.      Patient Active Problem List   Diagnosis Code    Small bowel obstruction ([facility] [address] K56.69    Atrial fibrillation with rapid ventricular response (HCC) I48.91    Postoperative anemia D64.9    Feeding difficulties R63.3    Debility R53.81       Current Facility-Administered Medications   Medication Dose Route Frequency Provider Last Rate Last Dose    calcium gluconate 2 g in 0.9% sodium chloride 50 mL IVPB  2 g IntraVENous ONCE Vianney Christianson DO        dextrose 5 % - 0.45% NaCl infusion  100 mL/hr IntraVENous CONTINUOUS Rona A Day,  mL/hr at 02/26/17 0813 100 mL/hr at 02/26/17 0813    ferrous sulfate tablet 325 mg  325 mg Oral ACB Vianney Christianson, DO   325 mg at 02/26/17 4787    vancomycin (VANCOCIN) 500 mg in 0.9% sodium chloride (MBP/ADV) 100 mL ADV  500 mg IntraVENous Q24H Vianney Christianson, DO   500 mg at 02/26/17 8121    [START ON 2/27/2017] VANCOMYCIN INFORMATION NOTE   Other Rx Dosing/Monitoring Vianney Christianson DO        amiodarone (NEXTERONE) 360 mg in dextrose 200 mL (1.8 mg/mL) infusion  0.5-1 mg/min IntraVENous TITRATE Wayne Sow MD 16.7 mL/hr at 02/26/17 0653 0.5 mg/min at 02/26/17 0653    nystatin (MYCOSTATIN) 100,000 unit/mL oral suspension 500,000 Units  500,000 Units Oral QID Fransisca Nikolai, DO   500,000 Units at 02/25/17 2209    sodium chloride (NS) flush 5-10 mL  5-10 mL IntraVENous Q8H Carlos Vargas MD   10 mL at 02/26/17 0600    simethicone (MYLICON) 40LV/5.2MD oral drops 80 mg  1.2 mL Oral Multiple Nelida Smith MD        fentaNYL citrate (PF) injection  mcg   mcg IntraVENous Multiple Nelida Smith MD   50 mcg at 02/25/17 1021    pantoprazole (PROTONIX) 40 mg in sodium chloride 0.9 % 10 mL injection  40 mg IntraVENous BID Fransisca Nikolai, DO   40 mg at 02/25/17 1800    PHENYLephrine 30 mg in 0.9% sodium chloride 250 mL (TARIK-SYNEPHRINE) infusion   mcg/min IntraVENous TITRATE Rona A Day, PA   Stopped at 02/25/17 2215    NOREPINephrine (LEVOPHED) 8,000 mcg in dextrose 5% 250 mL infusion  2-30 mcg/min IntraVENous TITRATE Gennette Pallas, MD   Stopped at 02/23/17 2100    vasopressin (VASOSTRICT) 100 Units in 0.9% sodium chloride 100 mL infusion  0.01 Units/min IntraVENous CONTINUOUS Rona A Day, PA   Stopped at 02/23/17 2200    piperacillin-tazobactam (ZOSYN) 3.375 g in 0.9% sodium chloride (MBP/ADV) 100 mL MBP EXTENDED 4 HOUR INFUSION###  3.375 g IntraVENous Q12H Fransisca Nikolai, DO 25 mL/hr at 02/25/17 2212 3.375 g at 02/25/17 2212    sodium chloride 0.45 % in dextrose 10% 1,019.6 mL infusion   IntraVENous CONTINUOUS Rona A Day, PA   Stopped at 02/25/17 2210    midazolam (VERSED) injection 1-4 mg  1-4 mg IntraVENous Q2H PRN Rona A Day, PA   2 mg at 02/26/17 0135    heparin (porcine) injection 5,000 Units  5,000 Units SubCUTAneous Q8H Fransisca Nikolai, DO   5,000 Units at 02/26/17 2981    acetaminophen (TYLENOL) suppository 650 mg  650 mg Rectal Q4H PRN Fransisca Nikolai, DO   650 mg at 02/20/17 1037    zolpidem (AMBIEN) tablet 5 mg  5 mg Oral QHS PRN Juliann Alvarez,         insulin lispro (HUMALOG) injection   SubCUTAneous Q6H Fransisca Menchaca DO   3 Units at 02/26/17 0321    glucose chewable tablet 16 g  4 Tab Oral PRN Stanley Polite, DO        glucagon [facility]) injection 1 mg  1 mg IntraMUSCular PRN Stanley Polite, DO        dextrose (D50W) injection syrg 12.5-25 g  25-50 mL IntraVENous PRN Stanley Polite, DO   25 g at 02/23/17 2205    sodium chloride (NS) flush 10 mL  10 mL InterCATHeter PRN Stanley Polite, DO   10 mL at 02/20/17 0006    bacitracin 500 unit/gram packet 1 Packet  1 Packet Topical PRN Stanley Polite, DO        tamsulosin [facility]) capsule 0.4 mg  0.4 mg Oral DAILY Stanley Polite, DO   Stopped at 02/21/17 0900    0.9% sodium chloride infusion 250 mL  250 mL IntraVENous PRN Stanley Polite, DO        phenol throat spray (CHLORASEPTIC) 1 Spray  1 [place] Oral PRN Oh Lloyd MD   1 [place] at 02/20/17 1158    sodium chloride (NS) flush 5-10 mL  5-10 mL IntraVENous Q8H Tesha Schumacher MD   10 mL at 02/26/17 0600    0.9% sodium chloride infusion 250 mL  250 mL IntraVENous PRN Thalia Minaya CRNA        sodium chloride (NS) flush 5-10 mL  5-10 mL IntraVENous Q8H Tesha Schumacher MD   10 mL at 02/26/17 0600    HYDROmorphone (PF) (DILAUDID) injection 1 mg  1 mg IntraVENous Q3H PRN Tesha Schumacher MD   1 mg at 02/25/17 1330    ondansetron (ZOFRAN) injection 4 mg  4 mg IntraVENous Q4H PRN Tesha Schumacher MD   4 mg at 02/15/17 0225    0.9% sodium chloride infusion 250 mL  250 mL IntraVENous PRN Tesha Schumacher MD           Objective  Vitals:    02/26/17 0630 02/26/17 0700 02/26/17 0748 02/26/17 0756   BP:  114/68     Pulse: (!) 123 (!) 128 (!) 120    Resp: 22 23 30 18   Temp:       SpO2: 99% 100% 100%    Weight:       Height:             Intake/Output Summary (Last 24 hours) at 02/26/17 0911  Last data filed at 02/26/17 0500   Gross per 24 hour   Intake           2907.1 ml   Output              606 ml   Net           2301.1 ml           Admission weight: Weight: 63.5 kg (140 lb) (02/12/17 0603)  Last Weight Metrics:  Weight Loss Metrics 2/26/2017 1/4/2016 11/8/2015   Today's Wt 165 lb 12.6 oz 145 lb 140 lb   BMI 22.48 kg/m2 19.66 kg/m2 18.98 kg/m2             Physical Assessment:     General: intubated, sedated. ET in place. Neck: No jvd. LUNGS: diminished air entry at the bases, bl exp rhonchi. CVS EXM: S1, S2  RRR. Abdomen: non distended. PEG in place. Surgical dressings intact. Lower Extremities:  1+  edema. Lab    CBC w/Diff Recent Labs      02/26/17   0400  02/25/17   0330  02/24/17   1015   WBC  7.8  13.0  17.0*   RBC  2.71*  2.92*  3.12*   HGB  7.5*  8.2*  8.8*   HCT  23.5*  25.6*  27.4*   PLT  248  292  304   GRANS  92*  94*  93*   LYMPH  7*  5*  3*   EOS  0  0  0        Chemistry Recent Labs      02/26/17   0400  02/25/17   0330  02/24/17   1445  02/24/17   0240   02/23/17   1841   GLU  150*  134*   --   111*   < >  31*   NA  137  138   --   138   < >  140   K  4.2  4.8  5.2  5.3   < >  5.3   CL  106  105   --   105   < >  106   CO2  19*  20*   --   23   < >  26   BUN  112*  108*   --   107*   < >  107*   CREA  3.13*  2.84*   --   2.68*   < >  2.58*   CA  6.9*  7.2*   --   7.3*   < >  7.7*   AGAP  12  13   --   10   < >  8   BUCR  36*  38*   --   40*   < >  41*   AP  93   --    --    --    --   97   TP  4.2*   --    --    --    --   5.0*   ALB  1.6*   --    --    --    --   1.9*   GLOB  2.6   --    --    --    --   3.1   AGRAT  0.6*   --    --    --    --   0.6*   PHOS  4.7  4.8   --   5.1*   < >   --     < > = values in this interval not displayed. No results found for: IRON, FE, TIBC, IBCT, PSAT, FERR   Lab Results   Component Value Date/Time    Calcium 6.9 02/26/2017 04:00 AM    Phosphorus 4.7 02/26/2017 04:00 AM        Jw Sterling M.D.   [facility]  Phone

## 2017-02-27 NOTE — PROGRESS NOTES
Inpatient Daily Progress Note    Subjective:    Pt intubated and sedate. Objective:     Physical Exam:  Visit Vitals    /47    Pulse 93    Temp 98 °F (36.7 °C)    Resp 13    Ht 6' (1.829 m)    Wt 84 kg (185 lb 3 oz)    SpO2 100%    BMI 25.12 kg/m2       Gen: Well developed, well nourished 80 y.o. male in no acute distress  Abdomen: soft, non-tender, nondistended, with active bowel sounds, no hernias  Wound: serous fluid from 1 cm section of abdominal surgical site. The rest of the wound is clean dry and intact. Psych: sedate       Recent Results (from the past 12 hour(s))   VANCOMYCIN, TROUGH    Collection Time: 02/27/17  4:00 AM   Result Value Ref Range    Vancomycin,trough 16.2 10.0 - 20.0 ug/mL    Reported dose date: 2272017      Reported dose time: 500      Reported dose: 156LR UNITS   METABOLIC PANEL, BASIC    Collection Time: 02/27/17  4:00 AM   Result Value Ref Range    Sodium 136 136 - 145 mmol/L    Potassium 4.1 3.5 - 5.5 mmol/L    Chloride 103 100 - 108 mmol/L    CO2 19 (L) 21 - 32 mmol/L    Anion gap 14 3.0 - 18 mmol/L    Glucose 149 (H) 74 - 99 mg/dL     (H) 7.0 - 18 MG/DL    Creatinine 3.30 (H) 0.6 - 1.3 MG/DL    BUN/Creatinine ratio 35 (H) 12 - 20      GFR est AA 22 (L) >60 ml/min/1.73m2    GFR est non-AA 18 (L) >60 ml/min/1.73m2    Calcium 6.9 (L) 8.5 - 10.1 MG/DL   CBC WITH AUTOMATED DIFF    Collection Time: 02/27/17  4:00 AM   Result Value Ref Range    WBC 6.6 4.6 - 13.2 K/uL    RBC 3.01 (L) 4.70 - 5.50 M/uL    HGB 8.3 (L) 13.0 - 16.0 g/dL    HCT 25.7 (L) 36.0 - 48.0 %    MCV 85.4 74.0 - 97.0 FL    MCH 27.6 24.0 - 34.0 PG    MCHC 32.3 31.0 - 37.0 g/dL    RDW 15.7 (H) 11.6 - 14.5 %    PLATELET 247 138 - 394 K/uL    MPV 10.9 9.2 - 11.8 FL    NEUTROPHILS 92 (H) 40 - 73 %    LYMPHOCYTES 6 (L) 21 - 52 %    MONOCYTES 2 (L) 3 - 10 %    EOSINOPHILS 0 0 - 5 %    BASOPHILS 0 0 - 2 %    ABS. NEUTROPHILS 6.0 1.8 - 8.0 K/UL    ABS. LYMPHOCYTES 0.4 (L) 0.9 - 3.6 K/UL    ABS.  MONOCYTES 0.2 0.05 - 1.2 K/UL    ABS. EOSINOPHILS 0.0 0.0 - 0.4 K/UL    ABS. BASOPHILS 0.0 0.0 - 0.06 K/UL    DF AUTOMATED     MAGNESIUM    Collection Time: 02/27/17  4:00 AM   Result Value Ref Range    Magnesium 1.8 1.8 - 2.4 mg/dL   PHOSPHORUS    Collection Time: 02/27/17  4:00 AM   Result Value Ref Range    Phosphorus 5.1 (H) 2.5 - 4.9 MG/DL   DIGOXIN    Collection Time: 02/27/17  4:00 AM   Result Value Ref Range    DIGOXIN 1.7 0.9 - 2.0 NG/ML   CALCIUM, IONIZED    Collection Time: 02/27/17  4:00 AM   Result Value Ref Range    Ionized Calcium 0.99 (L) 1.12 - 1.32 MMOL/L   GLUCOSE, POC    Collection Time: 02/27/17 12:51 PM   Result Value Ref Range    Glucose (POC) 183 (H) 70 - 110 mg/dL       Assessment:     Kelsie Crooks is a 80 y.o. male s/p small bowel resection 2/12/17. Plan:   -remove staples  -continue wound dressing changes daily  -from a surgical standpoint patient is no longer in need of our service.     -consult surgery prn

## 2017-02-27 NOTE — PROGRESS NOTES
completed follow up visit with patient and a Spiritual assessment of patient was done. Patient remains on ventilator support with a low blood pressure. Chaplains will continue to follow and will provide pastoral care on an as needed/requested basis. No family were seen at this time.     Turner Leger   Spiritual Care   [phone]

## 2017-02-27 NOTE — PROGRESS NOTES
Daily Progress Note: 2/27/2017 3:03 PM   Admit Date: 2/12/2017    Patient seen in follow up for multiple medical problems as listed below:  Patient Active Problem List   Diagnosis Code    Small bowel obstruction ([facility] [address] K56.69    Atrial fibrillation with rapid ventricular response ([facility] [address] I48.91    Postoperative anemia D64.9    Feeding difficulties R63.3    Debility R53.81       Assesment     80 y.o. male with a PMHx of HTN, TIA, SIDNEY, SBO and abdominal hernia who presented to the ED with the acute onset of left upper quadrant abdominal pain. CT scan of the abd/pelvis in the ED that was positive for a SBO. s/p Exlap and small bowel resection (191cm) 2/13 am Dr Aliza Mondragon. Course complicated by Afib+RVR 5/40 am requiring rate control and cardiology involvement. Patient only responded temporarily to diltiazem boluses and diltiazem drip to 5mg. The evening of 2/14 the patients rate remained 130-140 and he was becoming increasingly hypotensive, cardizem gtt was stopped and patient was given 250mcg of IV Digoxin with HR resolution into 60's. Eventually coverted to lopresser with HTN medication adjustment. Failed MBS 2/16 and placed NPO, no hx of dysphagia this was thought due to significant trauma from NGT placement for surgery. Was to have Dobhoff placed 2/17 with IR, they could not get a tube down. He received D10 overnight is his PIV, then PICC and TPN 2/18 am. He had repeat speech eval on 02/22, but failed this, leading to PEG tube placement on the following day. His renal function throughout the week worsened and nephrology was consulted. He was also hyperkalemic w/ EKG changes and given insulin/d50 and lasix. Later in the day on 02/23 he went into acute respiratory distress presumed to be 2/2 possible aspiration pneumonia and was transferred to the ICU and later intubated on pressor support for hypotension. He remains intubated at this time and has failed spontaneous breathing tests 2/2 tachypnea.  His HR remains poorly controlled and diltiazem was ineffective due to hypotension. Amiodarone gtt was tried several times without success due to bradycardia. Cardiology was reconsulted.     Acute Hypoxic Respiratory Failure   Possible Aspiration Pneumonia, Possible Pneumonitis  B/L Pleural Effusions  VDRF  SBO s/p Exlap and Small Bowel Resection   Severe Dysphagia on TPN  Atrial Fibrillation with RVR  Acute Metabolic Encephalopathy Likely 2/2 Benzodiazepam, improving  SUSAN/ATN on CKD Stage IIIb  Hyperkalemia Possibly 2/2 Renal Failure, Possible Heparin-induced w/ EKG Changes  L Inguinal Hernia  HTN  TIA on Long-term AC  Iron Deficiency Anemia      DVT Protocol Active: yes  Code Status:  Full Code     Disposition: unk    Subjective:     CC: Abdominal Pain (left lower abdomen) and Leg Pain (left leg)    Interval History: minimal improvement today. His SBT's have shown regression, not progression. worrysome for impending tracheostomy. Gets tachycardic easily. Objective:     Visit Vitals    /58    Pulse (!) 109    Temp 98.7 °F (37.1 °C)    Resp 22    Ht 6' (1.829 m)    Wt 84 kg (185 lb 3 oz)    SpO2 100%    BMI 25.12 kg/m2       Temp (24hrs), Av.8 °F (36.6 °C), Min:97.4 °F (36.3 °C), Max:98.7 °F (37.1 °C)        Intake/Output Summary (Last 24 hours) at 17 1503  Last data filed at 17 1200   Gross per 24 hour   Intake             1670 ml   Output              690 ml   Net              980 ml       Gen: NAD, eyes track, intubated  HEENT:  PATRICIA, EOMI. Neck: No Bruits/JVD   Lungs:   Upper phlegm. Poor respiratory effort  Heart:   RR S1 S2 without M/R/G  Abdomen: ND,NT, BSX4,   Extremities:   No LE edema. No cyanosis.   Skin:  no jaundice/lesions      Data Review:     Meds/Labs/Tests reviewed    Current Shift:  701 - 1900  In: -   Out: 125 [Urine:125]  Last three shifts:  1901 -  0700  In: 3515.1 [I.V.:2165.1]  Out:  [Urine:1145]  Recent Labs      17   0400 02/26/17   0400  02/25/17   0330   WBC  6.6  7.8  13.0   RBC  3.01*  2.71*  2.92*   HGB  8.3*  7.5*  8.2*   HCT  25.7*  23.5*  25.6*   PLT  278  248  292   GRANS  92*  92*  94*   LYMPH  6*  7*  5*   EOS  0  0  0       Recent Labs      02/27/17   0400  02/26/17   0400  02/25/17   0330   BUN  116*  112*  108*   CREA  3.30*  3.13*  2.84*   CA  6.9*  6.9*  7.2*   ALB   --   1.6*   --    K  4.1  4.2  4.8   NA  136  137  138   CL  103  106  105   CO2  19*  19*  20*   PHOS  5.1*  4.7  4.8   GLU  149*  150*  134*        Lab Results   Component Value Date/Time    Glucose 149 02/27/2017 04:00 AM    Glucose 150 02/26/2017 04:00 AM    Glucose 134 02/25/2017 03:30 AM    Glucose 111 02/24/2017 02:40 AM    Glucose 50 02/23/2017 10:15 PM          Care coordination with Nursing/Consultants/staff: 10  Prior history, labs, and charting reviewed: 20    Procedures/Imaging:  s/p Exlap and small bowel resection 2/13 am Dr Jose Abreu  TTE-[id number]%  Failed XR guided dobhoff 2/17  PICC 2/18 with TPN  PEG  Intubation    Total time spent with chart review, patient examination/education, discussion with staff on case,documentation and medication management / adjustment  :  39 Minutes      Dr Carlos Metzger  Pager: [phone]

## 2017-02-27 NOTE — PROGRESS NOTES
[facility]  -  Progress Note      Patient: Fernando Frances MRN: [id number]  SSN: [id number]    YOB: 1927  Age: 80 y.o. Sex: male      Admit Date: 2/12/2017      I saw, evaluated, interviewed and examined the patient personally. I agree with the findings and plan of care as documented below with PADARIN note  A.fib with -120s in setting of physiologic stress  Would recommend to change Levo to Neosynephrine (  ICU team)  Would be careful with Digoxin because of worsening renal function. BP borderline to use any other AV renita agent for now. Samson Smyth MD            Assessment:     -- pAF. Prior h/o this. Multiple recurrent episodes this admission, rates up to 140 at times. Unable to aggressively rate control due to low BP. Receiving IV digoxin 0.25 mg IV   -- Acute hypoxic respiratory failure. Intubated several days ago for presumed aspiration  -- Mild cardiomyopathy. EF 45% on admission in setting of sepsis, no evidence of ADHF  -- Admitted with SBO s/p ex lap  -- ARF. Oliguric, no improvement in UO past 24 hrs  -- Post-anemia. Hgb with gradual trend down past few days    Plan:     Rates are reasonably stable given clinical situation  Dig level OK but given rising creatinine will have to stop. BP at 100 and would reserve giving any additional BB at this time, but will adjust as BP allows. No oral anticoagulation until primary clinical situation stable     Subjective:     Remains intubated, awakes and tries to nod to questions. Rates have been in the 90's overnight while sleeping, low 100's currently  Levophed off at this time and SBP's 100.     Objective:      Patient Vitals for the past 8 hrs:   Temp Pulse Resp BP SpO2   02/27/17 0631 - (!) 112 19 - 100 %   02/27/17 0630 - (!) 117 21 106/47 100 %   02/27/17 0600 - (!) 114 19 (!) 72/51 100 %   02/27/17 0530 - (!) 111 22 116/79 100 %   02/27/17 0500 - (!) 104 17 99/54 100 %   02/27/17 0430 - 98 17 (!) 81/51 100 %   02/27/17 0409 - 91 23 - 100 %   02/27/17 0400 97.4 °F (36.3 °C) (!) 103 19 - 98 %   02/27/17 0330 - (!) 116 27 113/87 100 %   02/27/17 0300 - 100 22 109/73 100 %   02/27/17 0230 - 89 20 102/56 100 %   02/27/17 0200 - 99 16 115/60 100 %   02/27/17 0130 - 85 16 105/51 100 %   02/27/17 0100 - 98 20 95/61 100 %   02/27/17 0030 - 90 17 95/59 100 %   02/27/17 0012 - 93 16 - 100 %         Patient Vitals for the past 96 hrs:   Weight   02/27/17 0409 84 kg (185 lb 3 oz)   02/26/17 0430 75.2 kg (165 lb 12.6 oz)   02/25/17 0300 75.5 kg (166 lb 7.2 oz)   02/24/17 0554 70.1 kg (154 lb 8.7 oz)         Intake/Output Summary (Last 24 hours) at 02/27/17 8630  Last data filed at 02/27/17 4226   Gross per 24 hour   Intake             2315 ml   Output              565 ml   Net             1750 ml       Physical Exam:  General:  cooperative, no distress, appears stated age, intubated, nods to some questions  Neck:  no JVD  Lungs:  clear to auscultation bilaterally  Heart:  irregularly irregular rhythm  Extremities:  extremities normal, atraumatic, no cyanosis or edema    Data Review:     Labs: Results:       Chemistry Recent Labs      02/27/17   0400  02/26/17   0400  02/25/17   0330   GLU  149*  150*  134*   NA  136  137  138   K  4.1  4.2  4.8   CL  103  106  105   CO2  19*  19*  20*   BUN  116*  112*  108*   CREA  3.30*  3.13*  2.84*   CA  6.9*  6.9*  7.2*   MG  1.8  1.9  2.0   PHOS  5.1*  4.7  4.8   AGAP  14  12  13   BUCR  35*  36*  38*   AP   --   93   --    TP   --   4.2*   --    ALB   --   1.6*   --    GLOB   --   2.6   --    AGRAT   --   0.6*   --       CBC w/Diff Recent Labs      02/27/17   0400  02/26/17   0400  02/25/17   0330   WBC  6.6  7.8  13.0   RBC  3.01*  2.71*  2.92*   HGB  8.3*  7.5*  8.2*   HCT  25.7*  23.5*  25.6*   PLT  278  248  292   GRANS  92*  92*  94*   LYMPH  6*  7*  5*   EOS  0  0  0      Cardiac Enzymes No results found for: CPK, CKMMB, CKMB, RCK3, CKMBT, CKNDX, CKND1, KEVIN, TROPT, TROIQ, GINETTE, TROPT, TNIPOC, BNP, BNPP   Coagulation Recent Labs      02/25/17   0330   PTP  18.3*   INR  1.6*       Lipid Panel Lab Results   Component Value Date/Time    Cholesterol, total 136 09/19/2010 04:50 AM    HDL Cholesterol 54 09/19/2010 04:50 AM    LDL, calculated 71.4 09/19/2010 04:50 AM    VLDL, calculated 10.6 09/19/2010 04:50 AM    Triglyceride 53 09/19/2010 04:50 AM    CHOL/HDL Ratio 2.5 09/19/2010 04:50 AM      BNP No results found for: BNP, BNPP, XBNPT   Liver Enzymes Recent Labs      02/26/17   0400   TP  4.2*   ALB  1.6*   AP  93   SGOT  20      Digoxin    Thyroid Studies Lab Results   Component Value Date/Time    TSH 3.92 02/24/2017 02:40 AM

## 2017-02-27 NOTE — DIABETES MGMT
NUTRITIONAL RE-ASSESSMENT AND GLYCEMIC CONTROL PLAN OF CARE     Hussein Vanegas           80 y.o.           2/12/2017                 1. SBO (small bowel obstruction) (HCC)    2. Abdominal pain, LUQ (left upper quadrant)    3. Chronic pain of left knee    4. Chronic renal failure, unspecified stage    5. Anemia, unspecified type    6. Debility    7. Feeding difficulties      ASSESSMENT:    Based on  weight  of 153 lbs. , pt is underweight  related to inadequate caloric intake as evidenced by 86% ideal weight and BMI= 20.9kg/m2. Pt's weight is now documented as 185 lbs. (to receive lasix). Nutrient deficit as evidenced by npo status related to dysphagia. Diagnosis-Intake: Inadequate protein-energy intake related to respiratory distress AEB inability to take in oral nourishment and need for enteral feeding. Pt w/hypoalbuminemia as evidenced by albumin=1.6mg/dl. INTERVENTIONS/PLAN:   Pt is receiving Nepro TF at 40ml/hr without tolerance problems. This will provide 78 g protein, 1728 calories/day with 0.7liters free water /d from TF plus 200 ml total from 50 ml water flush q 6hrs. Could decrease water flushes to 25ml q8 hrs if concerned about excess fluid. TF is concentrated. SUBJECTIVE/OBJECTIVE:     Diet:npo plus Nepro TF   No data found. Medications: [x]       Reviewed - Pt was receiving  Levemir  10 units q 12 hrs while on TPN, would reduce to  corrective lispro. Lab Results   Component Value Date/Time    Sodium 136 02/27/2017 04:00 AM    Potassium 4.1 02/27/2017 04:00 AM    Chloride 103 02/27/2017 04:00 AM    CO2 19 02/27/2017 04:00 AM    Anion gap 14 02/27/2017 04:00 AM    Glucose 149 02/27/2017 04:00 AM     02/27/2017 04:00 AM    Creatinine 3.30 02/27/2017 04:00 AM    Calcium 6.9 02/27/2017 04:00 AM    Magnesium 1.8 02/27/2017 04:00 AM    Phosphorus 5.1 02/27/2017 04:00 AM    Albumin 1.6 02/26/2017 04:00 AM       Anthropometrics:  ,  , BMI (calculated): 25.1  Ideal wt 178lbs.    Wt Readings from Last 1 Encounters:   02/27/17 84 kg (185 lb 3 oz)      Ht Readings from Last 1 Encounters:   02/17/17 6' (1.829 m)     Wt Readings from Last 3 Encounters:   02/27/17 84 kg (185 lb 3 oz)   01/04/16 65.8 kg (145 lb)   11/08/15 63.5 kg (140 lb)       Estimated Nutrition Needs: 1900 Kcals/day, Protein reqrts  84g/d   Fluid Requirements 1.8liters/d  Based on:   [x]          Actual BW    []          IBW   []            Adjusted BW      Nutrition Diagnoses:   As stated above. Nutrition Interventions:TF  recommendations   Goals:     Intake will meet >75% of energy and protein requirements by 3/4. Wt maintenance  by 3/10. (or decrease in fluid wt). Glucose will be within target range by 3/2.      Nutrition Monitoring and Evaluation      []     Monitor po intake on meal rounds  [x]     Continue inpatient monitoring and intervention  [x]     Other: TF tolerance    Nutrition Risk:  [x]   High     []  Moderate    []  Minimal/Uncompromised    Rosmery Fleming RD

## 2017-02-27 NOTE — PROGRESS NOTES
[facility] Dosing Services     Pharmacy dosing ABX empirically for treatment intra-Abd infection     Day of Therapy: 3    Labs:   Vancomycin 16.2 mcg/ml (21.5 hrs post)  Bun/Serum Creatinine - 116 mg/dl / 3.3 mg/dl  CrCl - 16.7 ml/min  WBC - 6.6  Max temp: - 97.7    Cultures:   pending    Plan:   Vancomycin:   Goal trough 15-20. Change to 1 gm iv q36h. Zosyn:  Continue 3.375 gm IV q12h extended 4 hours infusion    Pharmacy to follow and will make changes to dose and/or frequency based on clinical status. Thanks for the consult.

## 2017-02-27 NOTE — CDMP QUERY
Please clarify if this patient was treated/managed for:    =>Acute hypoxemic, hypercapnic respiratory failure after brief respiratory arrest, if agreed please document    =>Other Explanation of clinical findings  =>Unable to Determine (no explanation of clinical findings)    The medical record reflects the following clinical findings, treatment, and risk factors:  =>per Pulmonary note 2/25 \"Acute hypoxemic, hypercapnic respiratory failure likely aspiration with previous brief respiratory arrest\"  =>Patient intubated on 2/23, transferred to ICU, ABG's 7.288, 47.8 147 23.2 on NRB mask  =>Remains intubated, serial ABG's       Please clarify and document your clinical opinion in the progress notes and discharge summary including the definitive and/or presumptive diagnosis, (suspected or probable), related to the above clinical findings. Please include clinical findings supporting your diagnosis. If you DECLINE this query or would like to communicate with [facility], please utilize the \"[employer] message box\" at the TOP of the Progress Note on the right.       Thank you,    Lashae Godoy RN, [phone]  [address] [facility], [phone]  Hector Swan RN  [facility] [phone]

## 2017-02-27 NOTE — PROGRESS NOTES
RENAL PROGRESS NOTE        Silas Moncada         Assessment/Plan:     · SUSAN (ischemic atn in a setting of sbo). Non oliguric but  bun/cr are up again. Continue gentle ivf. May be progressing towards dialysis. · Met acidosis. Continue ivf with [place]. · A. Fib with rvr. Rate control per card. Received dig. AC is on hold. · Aspiration pneumonia/sepsis. On abx. · Resp failure. · S/p small bowel resection for sbo on 2/13. · Anemia. H/H is up after transfusion. Subjective:  Patient complaints off: Intubated, alert. Off neosynephrin. Tolerates tf.       Patient Active Problem List   Diagnosis Code    Small bowel obstruction ([facility] [address] K56.69    Atrial fibrillation with rapid ventricular response (HCC) I48.91    Postoperative anemia D64.9    Feeding difficulties R63.3    Debility R53.81       Current Facility-Administered Medications   Medication Dose Route Frequency Provider Last Rate Last Dose    sodium bicarbonate 8.4 % (1 mEq/mL) injection             magnesium sulfate 1 g/100 ml IVPB (premix or compounded)  1 g IntraVENous ONCE Nadja Trevino, DO        PHENYLephrine 30 mg in 0.9% sodium chloride 250 mL (TARIK-SYNEPHRINE) infusion  0-200 mcg/min IntraVENous TITRATE BRITTANY Nagy        [START ON 2/28/2017] vancomycin (VANCOCIN) 1,000 mg in 0.9% sodium chloride (MBP/ADV) 250 mL adv  1,000 mg IntraVENous Q36H BRITTANY Nagy        sodium bicarbonate (8.4%) 75 mEq in 0.45% sodium chloride 1,000 mL infusion   IntraVENous CONTINUOUS Irl Romina WU MD 50 mL/hr at 02/26/17 1106      ferrous sulfate tablet 325 mg  325 mg Oral ACB Nadja Staple, DO   325 mg at 02/26/17 2943    VANCOMYCIN INFORMATION NOTE   Other Rx Dosing/Monitoring Nadja Trevino, DO        nystatin (MYCOSTATIN) 100,000 unit/mL oral suspension 500,000 Units  500,000 Units Oral QID Irven Saver, DO   500,000 Units at 02/26/17 2100    sodium chloride (NS) flush 5-10 mL  5-10 mL IntraVENous Q8H Jody Nunez MD   10 mL at 02/27/17 0074    simethicone (MYLICON) 18XE/6.9CC oral drops 80 mg  1.2 mL Oral Multiple Nick Forrest MD        fentaNYL citrate (PF) injection  mcg   mcg IntraVENous Multiple Nick Forrest MD   50 mcg at 02/25/17 1021    pantoprazole (PROTONIX) 40 mg in sodium chloride 0.9 % 10 mL injection  40 mg IntraVENous BID Irven Saver, DO   40 mg at 02/26/17 1718    piperacillin-tazobactam (ZOSYN) 3.375 g in 0.9% sodium chloride (MBP/ADV) 100 mL MBP EXTENDED 4 HOUR INFUSION###  3.375 g IntraVENous Q12H Irven Saver, DO 25 mL/hr at 02/26/17 2104 3.375 g at 02/26/17 2104    midazolam (VERSED) injection 1-4 mg  1-4 mg IntraVENous Q2H PRN Rona A Day, PA   2 mg at 02/27/17 0350    heparin (porcine) injection 5,000 Units  5,000 Units SubCUTAneous Q8H Irven Saver, DO   5,000 Units at 02/27/17 0351    acetaminophen (TYLENOL) suppository 650 mg  650 mg Rectal Q4H PRN Irven Saver, DO   650 mg at 02/20/17 1037    zolpidem (AMBIEN) tablet 5 mg  5 mg Oral QHS PRN Jacob Rodriguez, DO        insulin lispro (HUMALOG) injection   SubCUTAneous Q6H Irven Saver, DO   Stopped at 02/27/17 0000    glucose chewable tablet 16 g  4 Tab Oral PRN Jacob Rodriguez, DO        glucagon (GLUCAGEN) injection 1 mg  1 mg IntraMUSCular PRN Jacob Rodriguez, DO        dextrose (D50W) injection syrg 12.5-25 g  25-50 mL IntraVENous PRN Jacob Rodriguez, DO   25 g at 02/23/17 2205    sodium chloride (NS) flush 10 mL  10 mL InterCATHeter PRN Jacob Rodriguez, DO   10 mL at 02/20/17 0006    bacitracin 500 unit/gram packet 1 Packet  1 Packet Topical PRN Jacob Rodriguez DO        tamsulosin [facility]) capsule 0.4 mg  0.4 mg Oral DAILY Jacob Rodriguez DO   Stopped at 02/21/17 0900    0.9% sodium chloride infusion 250 mL  250 mL IntraVENous PRN [place],         phenol throat spray (CHLORASEPTIC) 1 Spray  1 Spray Oral PRN Iesha Dickerson MD   1 Spray at 02/20/17 1158    sodium chloride (NS) flush 5-10 mL  5-10 mL IntraVENous Q8H Marifer Alva MD   10 mL at 02/27/17 6605    0.9% sodium chloride infusion 250 mL  250 mL IntraVENous PRN Thalia Minaya CRNA        sodium chloride (NS) flush 5-10 mL  5-10 mL IntraVENous Q8H Marifer Alva MD   10 mL at 02/27/17 0625    HYDROmorphone (PF) (DILAUDID) injection 1 mg  1 mg IntraVENous Q3H PRN Marifer Alva MD   1 mg at 02/26/17 1826    ondansetron [facility]) injection 4 mg  4 mg IntraVENous Q4H PRN Marifer Alva MD   4 mg at 02/15/17 0225    0.9% sodium chloride infusion 250 mL  250 mL IntraVENous PRN Marifer Alva MD           Objective  Vitals:    02/27/17 0530 02/27/17 0600 02/27/17 0630 02/27/17 0631   BP: 116/79 (!) 72/51 106/47    Pulse: (!) 111 (!) 114 (!) 117 (!) 112   Resp: 22 19 21 19   Temp:       SpO2: 100% 100% 100% 100%   Weight:       Height:             Intake/Output Summary (Last 24 hours) at 02/27/17 0905  Last data filed at 02/27/17 0336   Gross per 24 hour   Intake             2265 ml   Output              565 ml   Net             1700 ml           Admission weight: Weight: 63.5 kg (140 lb) (02/12/17 0603)  Last Weight Metrics:  Weight Loss Metrics 2/27/2017 1/4/2016 11/8/2015   Today's Wt 185 lb 3 oz 145 lb 140 lb   BMI 25.12 kg/m2 19.66 kg/m2 18.98 kg/m2             Physical Assessment:     General: intubated, alert, follows simple commands. ET in place. Neck: No jvd. LUNGS: diminished air entry at the bases, bl exp rhonchi. CVS EXM: S1, S2  RRR. Abdomen: non distended. PEG in place. Abdominal dressings intact. Lower Extremities:  1+  edema.        Lab    CBC w/Diff Recent Labs      02/27/17   0400  02/26/17   0400  02/25/17   0330   WBC  6.6  7.8  13.0   RBC  3.01*  2.71*  2.92*   HGB 8. 3*  7.5*  8.2*   HCT  25.7*  23.5*  25.6*   PLT  278  248  292   GRANS  92*  92*  94*   LYMPH  6*  7*  5*   EOS  0  0  0        Chemistry Recent Labs      02/27/17   0400  02/26/17   0400  02/25/17   0330   GLU  149*  150*  134*   NA  136  137  138   K  4.1  4.2  4.8   CL  103  106  105   CO2  19*  19*  20*   BUN  116*  112*  108*   CREA  3.30*  3.13*  2.84*   CA  6.9*  6.9*  7.2*   AGAP  14  12  13   BUCR  35*  36*  38*   AP   --   93   --    TP   --   4.2*   --    ALB   --   1.6*   --    GLOB   --   2.6   --    AGRAT   --   0.6*   --    PHOS  5.1*  4.7  4.8         No results found for: IRON, FE, TIBC, IBCT, PSAT, FERR   Lab Results   Component Value Date/Time    Calcium 6.9 02/27/2017 04:00 AM    Phosphorus 5.1 02/27/2017 04:00 AM        Darrell Mccord M.D.   [facility]  Phone

## 2017-02-27 NOTE — PROGRESS NOTES
[facility]  Pulmonary, Critical Care, and Sleep Medicine      Name: Hussein Vanegas MRN: [id number]   : 1927 Hospital: [facility]   Date: 2017          Critical Care Progress Notes    2017  Unable to tolerate SBT this am due to tachycardia and tachypnea  Awake and alert, trying to write notes, c/o abdominal discomfort  Off pressors, Cr up  CXR reviewed, lasix/albumin given  Sputum +MSSA  Glucose stable   WBC down, hgb stable  Cr up    IMPRESSION:   · Acute hypoxemic, hypercapnic respiratory failure likely aspiration with previous brief respiratory arrest, now requiring MV   · Bilateral pleural effusions with possible underlying infiltrate  · AMS, multifactorial with hypoglycemia and sedation given for PEG , now improved  · Possible sepsis with leukocytosis, hypotension, source likely aspiration, +sputum MSSA  · Hypotension requiring pressor support, afib/volume depletion/sepsis, now off pressors  · A-fib with RVR, stable  · SUSAN   · Anemia  · Hyperkalemia, now resolved  · Dysphagia s/p PEG   · Hx recent Small Bowel Obstruction s/p ex-lap with resection. · Hypoalbuminemia  · Hypocalcemia, replaced      RECOMMENDATIONS:   · Resp -  Vent bundle/protocol. No SBT today, Lasix today x 1. Cont Zosyn/Vanco.  Sputum cx as below . Follow CXR, ABG. Aspiration precautions. SBT in am if stable   · ID - Severe sepsis bundle. Cont zosyn/vanc. Sputum +MSSA. LA normal. Trend WBC. · CVS - Monitor HD. Currently off pressors, tesfaye prn for MAP>65. PICC in place. Currently off xarelto. Lasix and albumin ordered. · Heme/Onc- Trend hgb, transfuse prn. · Renal - Renal following. Continue Nabicarb per renal  Placed on electrolyte replacement protocol. Monitor K+ closely  · Endocrine - Monitor glucose closely, avoid hypoglycemia  · Neuro/ Pain/ Sedation - prn versed or fentanyl for sedation.  Minimize sedating medication swhen possible  · GI - Cont TF, at goal. PPI · Prophylaxis - Heparin, Protonix  · Palliative care following  · FULL CODE          Past Medical History:   Diagnosis Date    Hypertension     TIA (transient ischemic attack)       History reviewed. No pertinent surgical history. Prior to Admission medications    Medication Sig Start Date End Date Taking? Authorizing Provider   apixaban (ELIQUIS) 2.5 mg tablet Take 2.5 mg by mouth every twelve (12) hours. Yes Sadie Other, MD   amLODIPine (NORVASC) 10 mg tablet Take 10 mg by mouth daily. Yes Sadie Other, MD   lisinopril (PRINIVIL, ZESTRIL) 10 mg tablet Take 10 mg by mouth daily. Yes Phys Other, MD   Hydrochlorothiazide (HYDRODIURIL) 12.5 mg tablet Take 12.5 mg by mouth daily. Yes Sadie Other, MD   ferrous sulfate 325 mg (65 mg iron) tablet Take 325 mg by mouth Daily (before breakfast).    Yes Sadie Other, MD     Current Facility-Administered Medications   Medication Dose Route Frequency    sodium bicarbonate 8.4 % (1 mEq/mL) injection        PHENYLephrine 30 mg in 0.9% sodium chloride 250 mL (TARIK-SYNEPHRINE) infusion  0-200 mcg/min IntraVENous TITRATE    [START ON 2/28/2017] vancomycin (VANCOCIN) 1,000 mg in 0.9% sodium chloride (MBP/ADV) 250 mL adv  1,000 mg IntraVENous Q36H    furosemide (LASIX) injection 40 mg  40 mg IntraVENous ONCE    albumin human 25% (BUMINATE) solution 25 g  25 g IntraVENous ONCE    sodium bicarbonate (8.4%) 75 mEq in 0.45% sodium chloride 1,000 mL infusion   IntraVENous CONTINUOUS    ferrous sulfate tablet 325 mg  325 mg Oral ACB    VANCOMYCIN INFORMATION NOTE   Other Rx Dosing/Monitoring    nystatin (MYCOSTATIN) 100,000 unit/mL oral suspension 500,000 Units  500,000 Units Oral QID    sodium chloride (NS) flush 5-10 mL  5-10 mL IntraVENous Q8H    pantoprazole (PROTONIX) 40 mg in sodium chloride 0.9 % 10 mL injection  40 mg IntraVENous BID    piperacillin-tazobactam (ZOSYN) 3.375 g in 0.9% sodium chloride (MBP/ADV) 100 mL MBP EXTENDED 4 HOUR INFUSION###  3.375 g IntraVENous Q12H    heparin (porcine) injection 5,000 Units  5,000 Units SubCUTAneous Q8H    insulin lispro (HUMALOG) injection   SubCUTAneous Q6H    tamsulosin (FLOMAX) capsule 0.4 mg  0.4 mg Oral DAILY    sodium chloride (NS) flush 5-10 mL  5-10 mL IntraVENous Q8H    sodium chloride (NS) flush 5-10 mL  5-10 mL IntraVENous Q8H     No Known Allergies   Social History   Substance Use Topics    Smoking status: Former Smoker    Smokeless tobacco: Not on file    Alcohol use No      History reviewed. No pertinent family history. Review of Systems:  Review of systems not obtained due to patient factors. Objective:   Vital Signs:    Visit Vitals    /47    Pulse (!) 114    Temp 98 °F (36.7 °C)    Resp 24    Ht 6' (1.829 m)    Wt 84 kg (185 lb 3 oz)    SpO2 100%    BMI 25.12 kg/m2       O2 Device: Ventilator   O2 Flow Rate (L/min): 15 l/min   Temp (24hrs), Av.6 °F (36.4 °C), Min:97.4 °F (36.3 °C), Max:98 °F (36.7 °C)       Intake/Output:   Last shift:         Last 3 shifts:  1901 -  0700  In: 3515.1 [I.V.:2165.1]  Out: 1146 [Urine:1145]    Intake/Output Summary (Last 24 hours) at 17 1125  Last data filed at 17 5831   Gross per 24 hour   Intake             2115 ml   Output              565 ml   Net             1550 ml       Ventilator Settings:  Mode Rate Tidal Volume Pressure FiO2 PEEP   Assist control, VC+   500 ml  15 cm H2O 30 % 5 cm H20     Peak airway pressure: 19 cm H2O    Minute ventilation: 12.8 l/min      ARDS network Guidelines: Lung protective strategy and Pl pressure goals____________      Physical Exam:    General/Neuro:  Opens eyes to voice, on vent, follows simple commands all 4 extremities, breathing over vent, appears stated age   Head:  Normocephalic, without obvious abnormality, atraumatic. Eyes:  Conjunctivae/corneas clear. PERRL   Nose: Nares normal. Septum midline. Mucosa normal. No drainage.    Throat: ETT in place   Neck: Supple, symmetrical Lungs:   Good AE B, diminished bibasilar, no wheeze       Heart:  Irregular , no m   Abdomen:   Soft, +tenderness Bowel sounds normal.  PEG site w/o bleeding. Extremities: Extremities normal, atraumatic, no cyanosis or edema. Pulses: 2+ and symmetric all extremities.    Skin: Skin color, texture, turgor normal. No rashes or lesions               Data:     Recent Results (from the past 24 hour(s))   GLUCOSE, POC    Collection Time: 02/26/17 11:41 AM   Result Value Ref Range    Glucose (POC) 149 (H) 70 - 110 mg/dL   GLUCOSE, POC    Collection Time: 02/26/17  5:12 PM   Result Value Ref Range    Glucose (POC) 163 (H) 70 - 110 mg/dL   GLUCOSE, POC    Collection Time: 02/26/17 11:23 PM   Result Value Ref Range    Glucose (POC) 126 (H) 70 - 110 mg/dL   VANCOMYCIN, TROUGH    Collection Time: 02/27/17  4:00 AM   Result Value Ref Range    Vancomycin,trough 16.2 10.0 - 20.0 ug/mL    Reported dose date: 0582276      Reported dose time: 500      Reported dose: 501EO UNITS   METABOLIC PANEL, BASIC    Collection Time: 02/27/17  4:00 AM   Result Value Ref Range    Sodium 136 136 - 145 mmol/L    Potassium 4.1 3.5 - 5.5 mmol/L    Chloride 103 100 - 108 mmol/L    CO2 19 (L) 21 - 32 mmol/L    Anion gap 14 3.0 - 18 mmol/L    Glucose 149 (H) 74 - 99 mg/dL     (H) 7.0 - 18 MG/DL    Creatinine 3.30 (H) 0.6 - 1.3 MG/DL    BUN/Creatinine ratio 35 (H) 12 - 20      GFR est AA 22 (L) >60 ml/min/1.73m2    GFR est non-AA 18 (L) >60 ml/min/1.73m2    Calcium 6.9 (L) 8.5 - 10.1 MG/DL   CBC WITH AUTOMATED DIFF    Collection Time: 02/27/17  4:00 AM   Result Value Ref Range    WBC 6.6 4.6 - 13.2 K/uL    RBC 3.01 (L) 4.70 - 5.50 M/uL    HGB 8.3 (L) 13.0 - 16.0 g/dL    HCT 25.7 (L) 36.0 - 48.0 %    MCV 85.4 74.0 - 97.0 FL    MCH 27.6 24.0 - 34.0 PG    MCHC 32.3 31.0 - 37.0 g/dL    RDW 15.7 (H) 11.6 - 14.5 %    PLATELET 262 700 - 343 K/uL    MPV 10.9 9.2 - 11.8 FL    NEUTROPHILS 92 (H) 40 - 73 %    LYMPHOCYTES 6 (L) 21 - 52 %    MONOCYTES 2 (L) 3 - 10 %    EOSINOPHILS 0 0 - 5 %    BASOPHILS 0 0 - 2 %    ABS. NEUTROPHILS 6.0 1.8 - 8.0 K/UL    ABS. LYMPHOCYTES 0.4 (L) 0.9 - 3.6 K/UL    ABS. MONOCYTES 0.2 0.05 - 1.2 K/UL    ABS. EOSINOPHILS 0.0 0.0 - 0.4 K/UL    ABS. BASOPHILS 0.0 0.0 - 0.06 K/UL    DF AUTOMATED     MAGNESIUM    Collection Time: 02/27/17  4:00 AM   Result Value Ref Range    Magnesium 1.8 1.8 - 2.4 mg/dL   PHOSPHORUS    Collection Time: 02/27/17  4:00 AM   Result Value Ref Range    Phosphorus 5.1 (H) 2.5 - 4.9 MG/DL   DIGOXIN    Collection Time: 02/27/17  4:00 AM   Result Value Ref Range    DIGOXIN 1.7 0.9 - 2.0 NG/ML   CALCIUM, IONIZED    Collection Time: 02/27/17  4:00 AM   Result Value Ref Range    Ionized Calcium 0.99 (L) 1.12 - 1.32 MMOL/L             Telemetry:AFIB    Imaging:  I have personally reviewed the patients radiographs and have reviewed the reports:  CXR 2/27/17    1. Lines and tubes in good position. 2. Stable chest with findings consistent with bilateral pleural effusion and  underlying areas of atelectasis/infiltrate. CT c/a/p 2/23/17  1. Postprocedural changes from same day percutaneous gastrostomy catheter  placement, with gastrostomy catheter in expected position. No evidence of  extravasated contrast. There is small amount of free intraperitoneal gas which  is not unexpected given recent placement. 2. Postsurgical changes of laparotomy and small bowel resection for management  of prior small bowel obstruction. Nonspecific mesenteric swelling noted region  of suture material present in the right lower quadrant; however, no evidence of  recurrent bowel obstruction present. Mild gaseous distention of the colon is  present, possibly related to mild postop ileus. 3. Small-moderate bilateral pleural effusions, new from prior CT examination,  with increased body wall edema, small amount of pelvic fluid. 4. Small amount of intravesicular air , presumably from recent prior Conner  catheter placement.          Marcellus Bricenoing BRITTANY Ruiz

## 2017-02-28 NOTE — PROGRESS NOTES
[facility]  Pulmonary, Critical Care, and Sleep Medicine      Name: Victory Nissen MRN: [id number]   : 1927 Hospital: [facility]   Date: 2017          Critical Care Progress Notes    2017  Failed SBT this am due to drop TF/vt-142-150 and RR 30's  Awake and alert, FC, no distress noted  Hb down 7.9 today-transfuse 1 unit PRBC today, no active external bleeding  Off pressors, Cr up  ABG and CXR today reviewed  Sputum +MSSA-d/c Vancomycin, continue zosyn  Glucose stable   WBC wnl, currently afebrile. IMPRESSION:   · Acute hypoxemic, hypercapnic respiratory failure likely aspiration with previous brief respiratory arrest, now requiring MV   · Bilateral pleural effusions with possible underlying infiltrate  · AMS, multifactorial with hypoglycemia and sedation given for PEG , now improved  · Possible sepsis with leukocytosis, hypotension, source likely aspiration, +sputum MSSA  · Hypotension requiring pressor support, afib/volume depletion/sepsis, now off pressors  · A-fib with RVR, stable  · SUSAN   · Anemia  · Hyperkalemia, now resolved  · Dysphagia s/p PEG   · Hx recent Small Bowel Obstruction s/p ex-lap with resection. · Hypoalbuminemia  · Hypocalcemia, replaced      RECOMMENDATIONS:   · Resp -  Failed SBT today due to drop in TV and increased RR, Vent bundle/protocol. Cont Zosyn, D/c Vanco due to MSSA. Follow CXR, ABG. Aspiration precautions. Lasix/albumin given . CXR and ABG today reviewed  · ID - Severe sepsis bundle. Cont zosyn, vanco d/cd today due to sputum +MSSA. LA normal. Trend WBC. · CVS - Jhon started, titrate for MAP>65. PICC in place. Currently off xarelto. · Heme/Onc- Hb 7.9 today-transfuse 1 unit PRBC, keep Hb>9,Trend hgb and monitor for any active bleeding. · Renal - Renal following. Nabicarb per renal  Placed on electrolyte replacement protocol.  Monitor K+ closely  · Endocrine - Monitor glucose closely, avoid hypoglycemia, TSH elevated 3.9(2/24) repeat TSH, free t4 and T3  · Neuro/ Pain/ Sedation - prn versed or fentanyl for sedation. Minimize sedating medication swhen possible  · GI - Cont TF, at goal. PPI   · Prophylaxis - Heparin, Protonix  · Palliative care following  · FULL CODE          Past Medical History:   Diagnosis Date    Hypertension     TIA (transient ischemic attack)       History reviewed. No pertinent surgical history. Prior to Admission medications    Medication Sig Start Date End Date Taking? Authorizing Provider   apixaban (ELIQUIS) 2.5 mg tablet Take 2.5 mg by mouth every twelve (12) hours. Yes Sadie Other, MD   amLODIPine (NORVASC) 10 mg tablet Take 10 mg by mouth daily. Yes Sadie Other, MD   lisinopril (PRINIVIL, ZESTRIL) 10 mg tablet Take 10 mg by mouth daily. Yes Sadie Other, MD   Hydrochlorothiazide (HYDRODIURIL) 12.5 mg tablet Take 12.5 mg by mouth daily. Yes Sadie Other, MD   ferrous sulfate 325 mg (65 mg iron) tablet Take 325 mg by mouth Daily (before breakfast).    Yes Sadie Other, MD     Current Facility-Administered Medications   Medication Dose Route Frequency    PHENYLephrine 30 mg in 0.9% sodium chloride 250 mL (TARIK-SYNEPHRINE) infusion  0-200 mcg/min IntraVENous TITRATE    dexmedetomidine (PRECEDEX) 400 mcg in 0.9% sodium chloride 100 mL infusion  0-0.7 mcg/kg/hr IntraVENous TITRATE    chlorhexidine (PERIDEX) 0.12 % mouthwash 15 mL  15 mL Oral BID    ferrous sulfate tablet 325 mg  325 mg Oral ACB    nystatin (MYCOSTATIN) 100,000 unit/mL oral suspension 500,000 Units  500,000 Units Oral QID    sodium chloride (NS) flush 5-10 mL  5-10 mL IntraVENous Q8H    pantoprazole (PROTONIX) 40 mg in sodium chloride 0.9 % 10 mL injection  40 mg IntraVENous BID    piperacillin-tazobactam (ZOSYN) 3.375 g in 0.9% sodium chloride (MBP/ADV) 100 mL MBP EXTENDED 4 HOUR INFUSION###  3.375 g IntraVENous Q12H    heparin (porcine) injection 5,000 Units  5,000 Units SubCUTAneous Q8H    insulin lispro (HUMALOG) injection   SubCUTAneous Q6H    tamsulosin (FLOMAX) capsule 0.4 mg  0.4 mg Oral DAILY    sodium chloride (NS) flush 5-10 mL  5-10 mL IntraVENous Q8H    sodium chloride (NS) flush 5-10 mL  5-10 mL IntraVENous Q8H     No Known Allergies   Social History   Substance Use Topics    Smoking status: Former Smoker    Smokeless tobacco: Not on file    Alcohol use No      History reviewed. No pertinent family history. Review of Systems:  Review of systems not obtained due to patient factors. Objective:   Vital Signs:    Visit Vitals    /65    Pulse 90    Temp 98.1 °F (36.7 °C)    Resp 19    Ht 6' (1.829 m)    Wt 82.3 kg (181 lb 7 oz)    SpO2 100%    BMI 24.61 kg/m2       O2 Device: Ventilator   O2 Flow Rate (L/min): 15 l/min   Temp (24hrs), Av.3 °F (36.8 °C), Min:98.1 °F (36.7 °C), Max:98.7 °F (37.1 °C)       Intake/Output:   Last shift:       07 -  190  In: -   Out: 225 [Urine:225]  Last 3 shifts:  190 -  0700  In: 4317.1 [I.V.:2617.1]  Out: 1780 [Urine:1780]    Intake/Output Summary (Last 24 hours) at 17 1154  Last data filed at 17 4618   Gross per 24 hour   Intake          2617.14 ml   Output             1315 ml   Net          1302.14 ml       Ventilator Settings:  Mode Rate Tidal Volume Pressure FiO2 PEEP   Assist control, VC+   500 ml  15 cm H2O 30 % 5 cm H20     Peak airway pressure: 16 cm H2O    Minute ventilation: 5.64 l/min      ARDS network Guidelines: Lung protective strategy and Pl pressure goals____________      Physical Exam:    General/Neuro:  Opens eyes to voice, on vent, follows simple commands all 4 extremities, breathing over vent, appears stated age   Head:  Normocephalic, without obvious abnormality, atraumatic. Eyes:  Conjunctivae/corneas clear. PERRL   Nose: Nares normal. Septum midline. Mucosa normal. No drainage.    Throat: ETT in place   Neck: Supple, symmetrical       Lungs:   Good AE B, diminished bibasilar, no wheeze Heart:  Irregular , no m   Abdomen:   Soft, +tenderness Bowel sounds normal.  PEG site w/o bleeding. Extremities: Extremities normal, atraumatic, no cyanosis or edema. Pulses: 2+ and symmetric all extremities.    Skin: Skin color, texture, turgor normal. No rashes or lesions             Data:     Recent Results (from the past 24 hour(s))   GLUCOSE, POC    Collection Time: 02/27/17 12:51 PM   Result Value Ref Range    Glucose (POC) 183 (H) 70 - 110 mg/dL   GLUCOSE, POC    Collection Time: 02/27/17  3:39 PM   Result Value Ref Range    Glucose (POC) 131 (H) 70 - 110 mg/dL   MAGNESIUM    Collection Time: 02/27/17  9:59 PM   Result Value Ref Range    Magnesium 2.0 1.8 - 2.4 mg/dL   GLUCOSE, POC    Collection Time: 02/27/17 11:45 PM   Result Value Ref Range    Glucose (POC) 148 (H) 70 - 110 mg/dL   MAGNESIUM    Collection Time: 02/28/17  2:12 AM   Result Value Ref Range    Magnesium 1.9 1.8 - 2.4 mg/dL   PHOSPHORUS    Collection Time: 02/28/17  2:12 AM   Result Value Ref Range    Phosphorus 5.0 (H) 2.5 - 4.9 MG/DL   METABOLIC PANEL, BASIC    Collection Time: 02/28/17  2:12 AM   Result Value Ref Range    Sodium 136 136 - 145 mmol/L    Potassium 3.7 3.5 - 5.5 mmol/L    Chloride 103 100 - 108 mmol/L    CO2 18 (L) 21 - 32 mmol/L    Anion gap 15 3.0 - 18 mmol/L    Glucose 163 (H) 74 - 99 mg/dL     (H) 7.0 - 18 MG/DL    Creatinine 3.71 (H) 0.6 - 1.3 MG/DL    BUN/Creatinine ratio 33 (H) 12 - 20      GFR est AA 19 (L) >60 ml/min/1.73m2    GFR est non-AA 15 (L) >60 ml/min/1.73m2    Calcium 7.0 (L) 8.5 - 10.1 MG/DL   CALCIUM, IONIZED    Collection Time: 02/28/17  2:12 AM   Result Value Ref Range    Ionized Calcium 0.95 (L) 1.12 - 1.32 MMOL/L   CBC WITH AUTOMATED DIFF    Collection Time: 02/28/17  2:12 AM   Result Value Ref Range    WBC 7.8 4.6 - 13.2 K/uL    RBC 2.83 (L) 4.70 - 5.50 M/uL    HGB 7.9 (L) 13.0 - 16.0 g/dL    HCT 23.9 (L) 36.0 - 48.0 %    MCV 84.5 74.0 - 97.0 FL    MCH 27.9 24.0 - 34.0 PG    MCHC 33.1 31.0 - 37.0 g/dL    RDW 15.4 (H) 11.6 - 14.5 %    PLATELET 074 197 - 811 K/uL    MPV 10.5 9.2 - 11.8 FL    NEUTROPHILS 93 (H) 40 - 73 %    LYMPHOCYTES 5 (L) 21 - 52 %    MONOCYTES 2 (L) 3 - 10 %    EOSINOPHILS 0 0 - 5 %    BASOPHILS 0 0 - 2 %    ABS. NEUTROPHILS 7.2 1.8 - 8.0 K/UL    ABS. LYMPHOCYTES 0.4 (L) 0.9 - 3.6 K/UL    ABS. MONOCYTES 0.1 0.05 - 1.2 K/UL    ABS. EOSINOPHILS 0.0 0.0 - 0.4 K/UL    ABS. BASOPHILS 0.0 0.0 - 0.06 K/UL    DF AUTOMATED     GLUCOSE, POC    Collection Time: 02/28/17  5:56 AM   Result Value Ref Range    Glucose (POC) 171 (H) 70 - 110 mg/dL   POC G3    Collection Time: 02/28/17  8:21 AM   Result Value Ref Range    Device: VENT      FIO2 (POC) 30 %    pH (POC) 7.471 (H) 7.35 - 7.45      pCO2 (POC) 25.8 (L) 35.0 - 45.0 MMHG    pO2 (POC) 99 80 - 100 MMHG    HCO3 (POC) 18.8 (L) 22 - 26 MMOL/L    sO2 (POC) 98 (H) 92 - 97 %    Base deficit (POC) 5 mmol/L    Mode ASSIST CONTROL      Tidal volume 500 ml    Set Rate 10 bpm    PEEP/CPAP (POC) 5 cmH2O    Allens test (POC) YES      Total resp. rate 27      Site LEFT RADIAL      Patient temp. 98.4      Specimen type (POC) ARTERIAL      Performed by Brandon Saels     Volume control plus YES     GLUCOSE, POC    Collection Time: 02/28/17 11:33 AM   Result Value Ref Range    Glucose (POC) 141 (H) 70 - 110 mg/dL             Telemetry:AFIB    Imaging:  I have personally reviewed the patients radiographs and have reviewed the reports:  CXR 2 /27  1. Lines and tubes in good position. 2. Stable chest with findings consistent with bilateral pleural effusion and  underlying areas of atelectasis/infiltrate. CT c/a/p 2/23/17  1. Postprocedural changes from same day percutaneous gastrostomy catheter  placement, with gastrostomy catheter in expected position. No evidence of  extravasated contrast. There is small amount of free intraperitoneal gas which  is not unexpected given recent placement.   2. Postsurgical changes of laparotomy and small bowel resection for management  of prior small bowel obstruction. Nonspecific mesenteric swelling noted region  of suture material present in the right lower quadrant; however, no evidence of  recurrent bowel obstruction present. Mild gaseous distention of the colon is  present, possibly related to mild postop ileus. 3. Small-moderate bilateral pleural effusions, new from prior CT examination,  with increased body wall edema, small amount of pelvic fluid. 4. Small amount of intravesicular air , presumably from recent prior Conner  catheter placement.        [facility], NP

## 2017-02-28 NOTE — ROUTINE PROCESS
1930 Bedside shift change report given to marky marte (oncoming nurse) by Lamberto Sheth (offgoing nurse). Report included the following information SBAR, Kardex and Recent Results. Side rails up x 3, calllight within reach. Hob elevated 30 degrees. 2000 assessment completed. Oral and johnson care provided. pt awakens for assessment will follow command weakly. 2200 resting in bed.  2230 resting in bed  0000 assessment completed. Johnson and oral care provided. 0200 RESTING.  0400 ASSESSMENT COMPLETED. JOHNSON AND ORAL CARE PROVIDED.  0600 RESTING  0730 Bedside shift change report given to 1330 [facility] (oncoming nurse) by Lance Epstein RN (offgoing nurse). Report included the following information SBAR, Kardex and Recent Results. SIDE RAILS UP X 3, CALL LIGHT WITHIN REACH. HOB ELEVATED 30 DEGREES. BILAT SOFT WRIST RESTRAINTS REMAIN ON AND SKIN INTACT UNDER RESTRAINT,.

## 2017-02-28 NOTE — PROGRESS NOTES
Daily Progress Note: 2/28/2017 3:03 PM   Admit Date: 2/12/2017    Patient seen in follow up for multiple medical problems as listed below:  Patient Active Problem List   Diagnosis Code    Small bowel obstruction ([facility] [address] K56.69    Atrial fibrillation with rapid ventricular response ([facility] [address] I48.91    Postoperative anemia D64.9    Feeding difficulties R63.3    Debility R53.81       Assesment     80 y.o. male with a PMHx of HTN, TIA, SIDNEY, SBO and abdominal hernia who presented to the ED with the acute onset of left upper quadrant abdominal pain. CT scan of the abd/pelvis in the ED that was positive for a SBO. s/p Exlap and small bowel resection (191cm) 2/13 am Dr Jocelyne Foster. Course complicated by Afib+RVR 0/81 am requiring rate control and cardiology involvement. Patient only responded temporarily to diltiazem boluses and diltiazem drip to 5mg. The evening of 2/14 the patients rate remained 130-140 and he was becoming increasingly hypotensive, cardizem gtt was stopped and patient was given 250mcg of IV Digoxin with HR resolution into 60's. Eventually coverted to lopresser with HTN medication adjustment. Failed MBS 2/16 and placed NPO, no hx of dysphagia this was thought due to significant trauma from NGT placement for surgery. Was to have Dobhoff placed 2/17 with IR, they could not get a tube down. He received D10 overnight is his PIV, then PICC and TPN 2/18 am. He had repeat speech eval on 02/22, but failed this, leading to PEG tube placement on the following day. His renal function throughout the week worsened and nephrology was consulted. He was also hyperkalemic w/ EKG changes and given insulin/d50 and lasix. Later in the day on 02/23 he went into acute respiratory distress presumed to be 2/2 possible aspiration pneumonia and was transferred to the ICU and later intubated on pressor support for hypotension. He remains intubated at this time and has failed spontaneous breathing tests 2/2 tachypnea.  His HR remains poorly controlled and diltiazem was ineffective due to hypotension. Amiodarone gtt was tried several times without success due to bradycardia. Cardiology was reconsulted.     Acute Hypoxic Respiratory Failure   Possible Aspiration Pneumonia/Pneumonitis  B/L Pleural Effusions  VDRF  SBO s/p Exlap and Small Bowel Resection   Severe Dysphagia on TPN  Atrial Fibrillation with RVR -Back to Digoxin when GFR improved  Acute Metabolic Encephalopathy Likely 2/2 Benzodiazepam, improving  SUSAN/ATN on CKD Stage IIIb -Neph consulting. Cr worsening. On prn bicarb  Hyperkalemia Possibly 2/2 Renal Failure, Possible Heparin-induced w/ EKG Changes  L Inguinal Hernia  HTN  TIA on Eliquis normaly  Iron Deficiency Anemia      DVT Protocol Active: yes  Code Status:  Full Code     Disposition: unk    Subjective:     CC: Abdominal Pain (left lower abdomen) and Leg Pain (left leg)    Interval History: minimal improvement today. Still poor response to SBT. On tesfaye-synephrine. HR controlled. Cr worsening, Hg falling      Objective:     Visit Vitals    /65    Pulse 91    Temp 98 °F (36.7 °C)    Resp 19    Ht 6' (1.829 m)    Wt 82.3 kg (181 lb 7 oz)    SpO2 99%    BMI 24.61 kg/m2       Temp (24hrs), Av.2 °F (36.8 °C), Min:98 °F (36.7 °C), Max:98.4 °F (36.9 °C)        Intake/Output Summary (Last 24 hours) at 17 1414  Last data filed at 17 1324   Gross per 24 hour   Intake          2297.14 ml   Output             1415 ml   Net           882.14 ml       Gen: NAD, sleepy today, intubated  HEENT:  PATRICIA, EOMI. Neck: No Bruits/JVD   Lungs:   Upper phlegm. Poor respiratory effort  Heart:   RR S1 S2 without M/R/G  Abdomen: ND,NT, BSX4,   Extremities:   No LE edema. No cyanosis.   Skin:  no jaundice/lesions      Data Review:     Meds/Labs/Tests reviewed    Current Shift:  701 - 1900  In: -   Out: 450 [Urine:450]  Last three shifts:  1901 -  0700  In: 4317.1 [I.V.:2617.1]  Out: 8007 [WVLQQ:2589]  Recent Labs      02/28/17   0212  02/27/17   0400  02/26/17   0400   WBC  7.8  6.6  7.8   RBC  2.83*  3.01*  2.71*   HGB  7.9*  8.3*  7.5*   HCT  23.9*  25.7*  23.5*   PLT  259  278  248   GRANS  93*  92*  92*   LYMPH  5*  6*  7*   EOS  0  0  0       Recent Labs      02/28/17   0212  02/27/17   0400  02/26/17   0400   BUN  121*  116*  112*   CREA  3.71*  3.30*  3.13*   CA  7.0*  6.9*  6.9*   ALB   --    --   1.6*   K  3.7  4.1  4.2   NA  136  136  137   CL  103  103  106   CO2  18*  19*  19*   PHOS  5.0*  5.1*  4.7   GLU  163*  149*  150*        Lab Results   Component Value Date/Time    Glucose 163 02/28/2017 02:12 AM    Glucose 149 02/27/2017 04:00 AM    Glucose 150 02/26/2017 04:00 AM    Glucose 134 02/25/2017 03:30 AM    Glucose 111 02/24/2017 02:40 AM          Care coordination with Nursing/Consultants/staff: 10  Prior history, labs, and charting reviewed: 20    Procedures/Imaging:  s/p Exlap and small bowel resection 2/13 am Dr Rance Rubinstein  TTE-EF45%  Failed XR guided dobhoff 2/17  PICC 2/18 with TPN  PEG 2/23  Intubation 2/23    Total time spent with chart review, patient examination/education, discussion with staff on case,documentation and medication management / adjustment  :  39 Minutes      Dr Darryl Smith  Pager: [phone]

## 2017-02-28 NOTE — CDMP QUERY
Please clarify if this patient is being treated/managed for:    =>HAS SEPSIS BEEN RULED OUT?    =>Other Explanation of clinical findings  =>Unable to Determine (no explanation of clinical findings)    The medical record reflects the following clinical findings, treatment, and risk factors:  =>89 yo male with PMH HTN, TIA, SBO with resection 2/13  =>Per Pulmonary 2/27  \"·Possible sepsis with leukocytosis, hypotension, source likely aspiration, +sputum MSSA, · Hypotension requiring pressor support, afib/volume depletion/sepsis, now off pressors\"  =>CXR 2/23 \"Increased hazy opacities at bilateral lung bases, most in keeping with a  combination of posteriorly layering pleural effusions and associated atelectasis  or infiltrate\"  =>2/23 WBC 20.9   BP 89/74  =>\"Acute Hypoxic Respiratory Failure Possible Aspiration Pneumonia, Possible Pneumonitis  B/L Pleural Effusions per Hospitalist documentation         Please clarify and document your clinical opinion in the progress notes and discharge summary including the definitive and/or presumptive diagnosis, (suspected or probable), related to the above clinical findings. Please include clinical findings supporting your diagnosis. If you DECLINE this query or would like to communicate with [facility], please utilize the \"[employer] message box\" at the TOP of the Progress Note on the right.       Thank you,    Lucille Salgado RN  [facility] [phone]

## 2017-02-28 NOTE — PROGRESS NOTES
Problem: Patient Education: Go to Patient Education Activity  Goal: Patient/Family Education  Outcome: Progressing Towards Goal  Explained to daughter importance of restraints    Problem: Small Bowel Obstruction: Day 4 to Discharge  Goal: Nutrition/Diet  Outcome: Progressing Towards Goal  Tube feeds    Problem: Nutrition Deficit  Goal: *Optimize nutritional status  Outcome: Progressing Towards Goal  Tube feeds    Problem: Ventilator Management  Goal: *Normal spontaneous ventilation  Outcome: Not Progressing Towards Goal  Will attempt today

## 2017-02-28 NOTE — PROGRESS NOTES
RENAL PROGRESS NOTE        Paola Sosa         Assessment/Plan:     · SUSAN (ischemic atn in a setting of sbo). Non oliguric but  bun/cr are up again. D/c ivf, volume overloaded at this point. May be progressing towards dialysis. Will need to discuss with family. · Met acidosis. Stable, d/c iv Nabicarb due to volume overload. · A. Fib with rvr. Rate control per card. Received dig. AC is on hold. On neosynephrin for bp support. · Aspiration pneumonia/sepsis. On abx. · Resp failure. · S/p small bowel resection for sbo on 2/13. · Anemia. May need more  transfusions. Subjective:  Patient complaints off: Intubated, alert. Back on neosynephrin since last night. Tolerates tf.       Patient Active Problem List   Diagnosis Code    Small bowel obstruction (HCC) K56.69    Atrial fibrillation with rapid ventricular response (HCC) I48.91    Postoperative anemia D64.9    Feeding difficulties R63.3    Debility R53.81       Current Facility-Administered Medications   Medication Dose Route Frequency Provider Last Rate Last Dose    PHENYLephrine 30 mg in 0.9% sodium chloride 250 mL (TARIK-SYNEPHRINE) infusion  0-200 mcg/min IntraVENous TITRATE Ira Kehr, PA 9 mL/hr at 02/28/17 0850 18 mcg/min at 02/28/17 0850    vancomycin (VANCOCIN) 1,000 mg in 0.9% sodium chloride (MBP/ADV) 250 mL adv  1,000 mg IntraVENous Q36H Ira Kehr, PA   Stopped at 02/28/17 0400    dexmedetomidine (PRECEDEX) 400 mcg in 0.9% sodium chloride 100 mL infusion  0-0.7 mcg/kg/hr IntraVENous TITRATE Ira Kehr, PA   Stopped at 02/28/17 0345    chlorhexidine (PERIDEX) 0.12 % mouthwash 15 mL  15 mL Oral BID William Dunlap MD   15 mL at 02/28/17 0855    hydroxypropyl methylcellulose (ISOPTO TEARS) 0.5 % ophthalmic solution 1 Drop  1 Drop Both Eyes PRN William Dunlap MD   1 Drop at 02/28/17 0052    sodium bicarbonate (8.4%) 75 mEq in 0.45% sodium chloride 1,000 mL infusion   IntraVENous CONTINUOUS Mary Carmen WU MD 50 mL/hr at 02/27/17 2201      ferrous sulfate tablet 325 mg  325 mg Oral ACB Olive Joshua, DO   325 mg at 02/28/17 2823    VANCOMYCIN INFORMATION NOTE   Other Rx Dosing/Monitoring Olive Joshua, DO        nystatin (MYCOSTATIN) 100,000 unit/mL oral suspension 500,000 Units  500,000 Units Oral QID Olive Joshua, DO   500,000 Units at 02/28/17 0849    sodium chloride (NS) flush 5-10 mL  5-10 mL IntraVENous Q8H Maulik Alvarez MD   10 mL at 02/28/17 0557    simethicone (MYLICON) 19AS/1.7KB oral drops 80 mg  1.2 mL Oral Multiple Amina Victoria MD        fentaNYL citrate (PF) injection  mcg   mcg IntraVENous Multiple Amina Victoria MD   50 mcg at 02/25/17 1021    pantoprazole (PROTONIX) 40 mg in sodium chloride 0.9 % 10 mL injection  40 mg IntraVENous BID Erinn Lewis, DO   40 mg at 02/28/17 0849    piperacillin-tazobactam (ZOSYN) 3.375 g in 0.9% sodium chloride (MBP/ADV) 100 mL MBP EXTENDED 4 HOUR INFUSION###  3.375 g IntraVENous Q12H Erinn Lewis, DO 25 mL/hr at 02/28/17 0913 3.375 g at 02/28/17 0913    midazolam (VERSED) injection 1-4 mg  1-4 mg IntraVENous Q2H PRN Rona A Day, PA   2 mg at 02/27/17 0350    heparin (porcine) injection 5,000 Units  5,000 Units SubCUTAneous Q8H Calera Joshua, DO   5,000 Units at 02/28/17 0401    acetaminophen (TYLENOL) suppository 650 mg  650 mg Rectal Q4H PRN Erinn Lewis, DO   650 mg at 02/20/17 1037    zolpidem (AMBIEN) tablet 5 mg  5 mg Oral QHS PRN Gavi Dewey, DO        insulin lispro (HUMALOG) injection   SubCUTAneous Q6H Olive Joshua, DO   3 Units at 02/28/17 0600    glucose chewable tablet 16 g  4 Tab Oral PRN Gavi Dewey, DO        glucagon (GLUCAGEN) injection 1 mg  1 mg IntraMUSCular PRN Gavi Dewey DO       [facility] dextrose (D50W) injection syrg 12.5-25 g  25-50 mL IntraVENous PRN Rachel Leyva, DO   25 g at 02/23/17 2205    sodium chloride (NS) flush 10 mL  10 mL InterCATHeter PRN Rachel Leyva, DO   40 mL at 02/28/17 0254    bacitracin 500 unit/gram packet 1 Packet  1 Packet Topical PRN Rachel Leyva DO        tamsulosin [facility]) capsule 0.4 mg  0.4 mg Oral DAILY Rachel Leyva, DO   0.4 mg at 02/28/17 0849    0.9% sodium chloride infusion 250 mL  250 mL IntraVENous PRN Rachel Leyva, DO        phenol throat spray (CHLORASEPTIC) 1 Spray  1 Spray Oral PRN Luis Larson MD   1 [place] at 02/20/17 1158    sodium chloride (NS) flush 5-10 mL  5-10 mL IntraVENous Q8H Angela Her MD   10 mL at 02/27/17 1444    0.9% sodium chloride infusion 250 mL  250 mL IntraVENous PRN Thalia Minaya CRNA        sodium chloride (NS) flush 5-10 mL  5-10 mL IntraVENous Q8H Angela Her MD   10 mL at 02/28/17 0014    HYDROmorphone (PF) (DILAUDID) injection 1 mg  1 mg IntraVENous Q3H PRN Angela Her MD   0.5 mg at 02/28/17 0913    ondansetron (ZOFRAN) injection 4 mg  4 mg IntraVENous Q4H PRN Angela Her MD   4 mg at 02/15/17 0225    0.9% sodium chloride infusion 250 mL  250 mL IntraVENous PRN Angela Her MD           Objective  Vitals:    02/28/17 0730 02/28/17 0800 02/28/17 0840 02/28/17 0900   BP: 123/84 113/70  129/75   Pulse: 97 98 (!) 101 92   Resp: 21 23 26 25   Temp:  98.1 °F (36.7 °C)     SpO2: 100% 99% 100% 100%   Weight:       Height:             Intake/Output Summary (Last 24 hours) at 02/28/17 0955  Last data filed at 02/28/17 0942   Gross per 24 hour   Intake          2757.14 ml   Output             1390 ml   Net          1367.14 ml           Admission weight: Weight: 63.5 kg (140 lb) (02/12/17 0603)  Last Weight Metrics:  Weight Loss Metrics 2/28/2017 1/4/2016 11/8/2015   Today's Wt 181 lb 7 oz 145 lb 140 lb   BMI 24.61 kg/m2 19.66 kg/m2 18.98 kg/m2 Physical Assessment:     General: intubated, alert, follows simple commands. ET in place. Neck: No jvd. LUNGS: diminished air entry at the bases, bl exp rhonchi. CVS EXM: S1, S2  RRR. Abdomen: non distended. PEG in place. Abdominal dressings intact. Lower Extremities:  1-2+  edema. Lab    CBC w/Diff Recent Labs      02/28/17 0212 02/27/17   0400  02/26/17   0400   WBC  7.8  6.6  7.8   RBC  2.83*  3.01*  2.71*   HGB  7.9*  8.3*  7.5*   HCT  23.9*  25.7*  23.5*   PLT  259  278  248   GRANS  93*  92*  92*   LYMPH  5*  6*  7*   EOS  0  0  0        Chemistry Recent Labs      02/28/17 0212 02/27/17 0400  02/26/17   0400   GLU  163*  149*  150*   NA  136  136  137   K  3.7  4.1  4.2   CL  103  103  106   CO2  18*  19*  19*   BUN  121*  116*  112*   CREA  3.71*  3.30*  3.13*   CA  7.0*  6.9*  6.9*   AGAP  15  14  12   BUCR  33*  35*  36*   AP   --    --   93   TP   --    --   4.2*   ALB   --    --   1.6*   GLOB   --    --   2.6   AGRAT   --    --   0.6*   PHOS  5.0*  5.1*  4.7         No results found for: IRON, FE, TIBC, IBCT, PSAT, FERR   Lab Results   Component Value Date/Time    Calcium 7.0 02/28/2017 02:12 AM    Phosphorus 5.0 02/28/2017 02:12 AM        Jock Councilman, M.D.   [facility]  Phone

## 2017-02-28 NOTE — PROGRESS NOTES
0700 Bedside and Verbal shift change report given to adelina Donald (oncoming nurse) by Cynthia Gaitan (offgoing nurse). Report included the following information SBAR, Kardex and MAR.     0800 Performed shift assessment  Digoxin D/C    1600 Patient seems to be in constant pain   Daughter explained he has terrible joint pain  Precedex ordered    1930 . Bedside and Verbal shift change report given to [address] [place] [address] (oncoming nurse) by adelina Donald (offgoing nurse). Report included the following information SBAR, Kardex and MAR.

## 2017-02-28 NOTE — PROGRESS NOTES
Chart reviewed. Tried Pt weaningfrom Meds/ resp support but did not do well. Will continue to follow. Plan TCC when medically stable and able to tolerate rehab.

## 2017-02-28 NOTE — PROGRESS NOTES
Patient in bed on back with head elevated - BBS equal and diminished - ETT secure at 26 at the lip and moved to the left side of the mouth - patient suctioned and AMERICA tube cleared - vent alarms on and functional - MVB at [facility] - patient suctioned and AMERICA tube cleared - ETT moved to the right side of the mouth    0437 - patient suctioned and AMERICA tube cleared - ETT moved to the center of the mouth

## 2017-02-28 NOTE — PROGRESS NOTES
Problem: Ventilator Management  Goal: *Adequate oxygenation and ventilation  Pt. Intubated due hypercapneic respiratory failure     Current ventilator settings- ACVC+ 10, VT-500, PEEP-5, FIo2-30%     Current ABG-2/28/2017 0821- pH-7.471, PCO2-25.8, Po2-99, HCO3-18.8, SO2-98     Xray-2/28/2017 0421- Bibasilar opacities consistent with small pleural effusions with associated consolidation, atelectasis or pneumonia.      Plan: Maintain ventilatory support     Goal: Wean as tolerated

## 2017-02-28 NOTE — PROGRESS NOTES
-0815-Received patient on ventilator ACVC+ rate10, VT-500, PEEP-5, FIo2-30%. O2 sats-100% HR-104, RR-27. ABG drawn. Pt. Placed on SBT PS-7, PEEP-5, FIo2-30%. O2 Hkal133, HR-109, RR-50 F/vt-142-150. Zaira Barrera NP in room. Therefore, pt. Was returned to full ventilator support. Respiratory will continue to monitor. -[facility]    -1600-Pt. Remains on ACVC+ 10, VT-500, PEEP-5, FIo2-30%. O2 Sats-98% HR-112, RR-30. ETT remains patent and secure. Pt. Appears agitated. No new orders this shift. -[address]

## 2017-02-28 NOTE — PROGRESS NOTES
[facility] - Progress Note  Admit Date: 2/12/2017  Patient seen and examined independently. AF continues with reasonable rate control at present. Agree with assessment and plan as noted below. Anh Hernandez MD  Assessment:     -- pAF. Prior h/o this. Multiple recurrent episodes this admission, rates up to 140 at times. Unable to aggressively rate control due to low BP.  -- Acute hypoxic respiratory failure. Intubated several days ago for presumed aspiration  -- Mild cardiomyopathy. EF 45% on admission in setting of sepsis, no evidence of ADHF  -- Admitted with SBO s/p ex lap  -- ARF. -- Post-anemia. Plan:     - Afib rates controlled as of this AM. On Jhon-synephrine for pressor support  - Unable to use Digoxin with acute renal insufficiency  - Continue with supportive care per primary team and respective consultants    Subjective:     No new complaints.      Objective:      Patient Vitals for the past 8 hrs:   Temp Pulse Resp BP SpO2   02/28/17 1100 - 90 14 109/65 98 %   02/28/17 1041 - 99 22 112/75 98 %   02/28/17 1000 - 93 13 117/68 98 %   02/28/17 0900 - 92 25 129/75 100 %   02/28/17 0840 - (!) 101 26 - 100 %   02/28/17 0800 98.1 °F (36.7 °C) 98 23 113/70 99 %   02/28/17 0730 - 97 21 123/84 100 %   02/28/17 0700 - 93 18 95/81 100 %   02/28/17 0630 - 95 20 106/64 98 %   02/28/17 0600 - 100 19 109/85 92 %   02/28/17 0530 - 93 15 101/54 100 %   02/28/17 0500 - 85 16 92/62 100 %   02/28/17 0434 - 89 20 - 100 %   02/28/17 0430 - 79 15 95/70 100 %   02/28/17 0401 - 85 15 - 98 %   02/28/17 0354 - 94 - (!) 74/45 -   02/28/17 0350 98.4 °F (36.9 °C) - - - -   02/28/17 0330 - 91 15 (!) 81/49 98 %         Patient Vitals for the past 96 hrs:   Weight   02/28/17 0413 181 lb 7 oz (82.3 kg)   02/27/17 0409 185 lb 3 oz (84 kg)   02/26/17 0430 165 lb 12.6 oz (75.2 kg)   02/25/17 0300 166 lb 7.2 oz (75.5 kg)                    Intake/Output Summary (Last 24 hours) at 02/28/17 1128  Last data filed at 02/28/17 7931   Gross per 24 hour   Intake          2617.14 ml   Output             1315 ml   Net          1302.14 ml       Physical Exam:  General:  alert, intubated, responds to verbal stimuli  Neck:  nontender, no JVD  Lungs:  clear to auscultation bilaterally  Heart:  irregularly irregular rhythm  Abdomen:  abdomen is soft without significant tenderness, masses, organomegaly or guarding  Extremities:  extremities normal, atraumatic, no cyanosis or edema    Data Review:     Labs: Results:       Chemistry Recent Labs      02/28/17 0212 02/27/17 2159 02/27/17 0400  02/26/17   0400   GLU  163*   --   149*  150*   NA  136   --   136  137   K  3.7   --   4.1  4.2   CL  103   --   103  106   CO2  18*   --   19*  19*   BUN  121*   --   116*  112*   CREA  3.71*   --   3.30*  3.13*   CA  7.0*   --   6.9*  6.9*   MG  1.9  2.0  1.8  1.9   PHOS  5.0*   --   5.1*  4.7   AGAP  15   --   14  12   BUCR  33*   --   35*  36*   AP   --    --    --   93   TP   --    --    --   4.2*   ALB   --    --    --   1.6*   GLOB   --    --    --   2.6   AGRAT   --    --    --   0.6*      CBC w/Diff Recent Labs      02/28/17 0212 02/27/17   0400  02/26/17   0400   WBC  7.8  6.6  7.8   RBC  2.83*  3.01*  2.71*   HGB  7.9*  8.3*  7.5*   HCT  23.9*  25.7*  23.5*   PLT  259  278  248   GRANS  93*  92*  92*   LYMPH  5*  6*  7*   EOS  0  0  0      Cardiac Enzymes No results found for: CPK, CKMMB, CKMB, RCK3, CKMBT, CKNDX, CKND1, KEVIN, TROPT, TROIQ, GINETTE, TROPT, TNIPOC, BNP, BNPP   Coagulation No results for input(s): PTP, INR, APTT in the last 72 hours.     No lab exists for component: INREXT    Lipid Panel Lab Results   Component Value Date/Time    Cholesterol, total 136 09/19/2010 04:50 AM    HDL Cholesterol 54 09/19/2010 04:50 AM    LDL, calculated 71.4 09/19/2010 04:50 AM    VLDL, calculated 10.6 09/19/2010 04:50 AM    Triglyceride 53 09/19/2010 04:50 AM    CHOL/HDL Ratio 2.5 09/19/2010 04:50 AM      BNP No results found for: BNP, BNPP, XBNPT Liver Enzymes Recent Labs      02/26/17   0400   TP  4.2*   ALB  1.6*   AP  93   SGOT  20      Digoxin    Thyroid Studies Lab Results   Component Value Date/Time    TSH 3.92 02/24/2017 02:40 AM          Signed By: Joel Florez     February 28, 2017

## 2017-03-01 NOTE — PROGRESS NOTES
2000 Nursing assessment done, awake and restless. Restraints continued for safety. 0000 Dilaudid for pain, Precedex restarted for comfort. 0400 Awake most of the night, no distress noted. Bath given.

## 2017-03-01 NOTE — PROGRESS NOTES
-9424-Received patient on ventilator. ACVC+ 10, VT- 500, PEEP-5, FIo2-30% O2 Sats-99% HR-60, RR-17. ETT noted to be patent and secure. Pt. Placed on SBT PS-7, PEEP-5, FIo2-30%. O2 Sats-99% HR-62, RR-22. RSBI-70. Respiratory will continue to monitor-[facility]    -1125-SBT ended to due respiratory distress. Pt. Noted to be tachypneic and agitated. O2 Sats-100% HR-70, RR-mid 40's RSBI--140-152. -[facility]    -1535-Pt. Remains mukul ACVC+ 10, VT-500, PEEP-5, FIO2-30%. O2 Sats-100% HR-77, RR-24. ETT remains patent ands secure. -[address]

## 2017-03-01 NOTE — PROGRESS NOTES
RENAL PROGRESS NOTE        Karthik Del Toro         Assessment/Plan:     · SUSAN (ischemic atn in a setting of sbo). Non oliguric, scr is up again slightly, bun is in uremic range. May be progressing towards dialysis. Discussed with daughter yesterday. She wants to [facility] one day at a time\". · Met acidosis. Stable, I am reluctant to give Na bicarb due to volume overload. · Volume overload. Will try iv lasix. · A. Fib. Rate is controlled at this time. AC is on hold. Off neyosynephrin. · Aspiration pneumonia/sepsis. On abx. · Resp failure. · S/p small bowel resection for sbo on 2/13. · Anemia. Transfused again yesterday. .                                                                                                                                 Subjective:  Patient complaints off: Intubated, alert. Off neosynephrin. Tolerates tf.       Patient Active Problem List   Diagnosis Code    Small bowel obstruction (HCC) K56.69    Atrial fibrillation with rapid ventricular response (HCC) I48.91    Postoperative anemia D64.9    Feeding difficulties R63.3    Debility R53.81       Current Facility-Administered Medications   Medication Dose Route Frequency Provider Last Rate Last Dose    haloperidol lactate (HALDOL) injection 1-2 mg  1-2 mg IntraVENous Q2H PRN King Gotti MD        0.9% sodium chloride infusion 250 mL  250 mL IntraVENous PRN Nickie Weller NP        HYDROmorphone (PF) (DILAUDID) injection 0.5 mg  0.5 mg IntraVENous Q3H PRN Nickie Weller NP   0.5 mg at 02/28/17 2351    chlorhexidine (PERIDEX) 0.12 % mouthwash 15 mL  15 mL Oral BID King Gotti MD   15 mL at 03/01/17 0936    hydroxypropyl methylcellulose (ISOPTO TEARS) 0.5 % ophthalmic solution 1 Drop  1 Drop Both Eyes PRN King Gotti MD   1 Drop at 02/28/17 0052    ferrous sulfate tablet 325 mg  325 mg Oral ACB Johnetta Dakin, DO   325 mg at 03/01/17 0936    nystatin (MYCOSTATIN) 100,000 unit/mL oral suspension 500,000 Units  500,000 Units Oral QID [facility], DO   500,000 Units at 03/01/17 0935    sodium chloride (NS) flush 5-10 mL  5-10 mL IntraVENous Q8H Micaela John MD   10 mL at 03/01/17 2631    simethicone (MYLICON) 60UY/0.4FF oral drops 80 mg  1.2 mL Oral Multiple Clint Fu MD        fentaNYL citrate (PF) injection  mcg   mcg IntraVENous Multiple Clint Fu MD   50 mcg at 02/25/17 1021    pantoprazole (PROTONIX) 40 mg in sodium chloride 0.9 % 10 mL injection  40 mg IntraVENous BID [facility], DO   40 mg at 03/01/17 0936    piperacillin-tazobactam (ZOSYN) 3.375 g in 0.9% sodium chloride (MBP/ADV) 100 mL MBP EXTENDED 4 HOUR INFUSION###  3.375 g IntraVENous Q12H [facility], DO 25 mL/hr at 03/01/17 1059 3.375 g at 03/01/17 1059    midazolam (VERSED) injection 1-4 mg  1-4 mg IntraVENous Q2H PRN Rona A Day, PA   2 mg at 02/27/17 0350    heparin (porcine) injection 5,000 Units  5,000 Units SubCUTAneous Q8H [facility], DO   5,000 Units at 03/01/17 0414    acetaminophen (TYLENOL) suppository 650 mg  650 mg Rectal Q4H PRN [facility], DO   650 mg at 02/20/17 1037    zolpidem (AMBIEN) tablet 5 mg  5 mg Oral QHS PRN Mouthcard Row, DO        insulin lispro (HUMALOG) injection   SubCUTAneous Q6H [facility], DO   6 Units at 03/01/17 8932    glucose chewable tablet 16 g  4 Tab Oral PRN Amanda Row, DO        glucagon [facility]) injection 1 mg  1 mg IntraMUSCular PRN Amanda Row, DO        dextrose (D50W) injection syrg 12.5-25 g  25-50 mL IntraVENous PRN Mouthcard Row, DO   25 g at 02/23/17 2205    sodium chloride (NS) flush 10 mL  10 mL InterCATHeter PRN Amanda Row, DO   40 mL at 02/28/17 0254    bacitracin 500 unit/gram packet 1 Packet  1 Packet Topical PRN Amanda Ventura DO        tamsulosin [facility]) capsule 0.4 mg  0.4 mg Oral DAILY Brittany Medici Delmi Meade DO   Stopped at 03/01/17 0900    0.9% sodium chloride infusion 250 mL  250 mL IntraVENous PRN Sergio Phillips DO        phenol throat spray (CHLORASEPTIC) 1 Spray  1 Platina Oral PRN Miki Shah MD   1 Platina at 02/20/17 1158    sodium chloride (NS) flush 5-10 mL  5-10 mL IntraVENous Q8H Refugio Espinosa MD   10 mL at 03/01/17 1645    0.9% sodium chloride infusion 250 mL  250 mL IntraVENous PRN Thalia Minaya CRNA        sodium chloride (NS) flush 5-10 mL  5-10 mL IntraVENous Q8H Refugio Espinosa MD   10 mL at 03/01/17 0133    ondansetron (ZOFRAN) injection 4 mg  4 mg IntraVENous Q4H PRN Refugio Espinosa MD   4 mg at 02/15/17 0225    0.9% sodium chloride infusion 250 mL  250 mL IntraVENous PRN Refugio Espinosa MD           Objective  Vitals:    03/01/17 0815 03/01/17 0824 03/01/17 0830 03/01/17 0845   BP:       Pulse: 77 62 68 63   Resp: 28 18 25 30   Temp:       SpO2: 100% 100% 100% 100%   Weight:       Height:             Intake/Output Summary (Last 24 hours) at 03/01/17 1127  Last data filed at 03/01/17 1059   Gross per 24 hour   Intake          1417.08 ml   Output             1030 ml   Net           387.08 ml           Admission weight: Weight: 63.5 kg (140 lb) (02/12/17 0603)  Last Weight Metrics:  Weight Loss Metrics 3/1/2017 1/4/2016 11/8/2015   Today's Wt 185 lb 6.5 oz 145 lb 140 lb   BMI 25.15 kg/m2 19.66 kg/m2 18.98 kg/m2             Physical Assessment:     General: intubated, alert, follows simple commands. ET in place. Neck: No jvd. LUNGS: diminished air entry at the bases, bl exp rhonchi. CVS EXM: S1, S2  RRR. Abdomen: non distended. PEG in place. Abdominal dressings intact. 2+ scrotal edema. Lower Extremities:  1-2+  edema.        Lab    CBC w/Diff Recent Labs      03/01/17   0330  02/28/17   0212  02/27/17   0400   WBC  7.6  7.8  6.6   RBC  2.98*  2.83*  3.01*   HGB  8.2*  7.9*  8.3*   HCT  25.1*  23.9*  25.7*   PLT  245  259  278   GRANS  93*  93*  92* LYMPH  5*  5*  6*   EOS  0  0  0        Chemistry Recent Labs      03/01/17   0330  02/28/17   0930  02/28/17   0212  02/27/17   0400   GLU  145*   --   163*  149*   NA  138   --   136  136   K  3.8  3.5  3.7  4.1   CL  103   --   103  103   CO2  19*   --   18*  19*   BUN  120*   --   121*  116*   CREA  3.98*   --   3.71*  3.30*   CA  7.6*   --   7.0*  6.9*   AGAP  16   --   15  14   BUCR  30*   --   33*  35*   PHOS  5.6*   --   5.0*  5.1*         No results found for: IRON, FE, TIBC, IBCT, PSAT, FERR   Lab Results   Component Value Date/Time    Calcium 7.6 03/01/2017 03:30 AM    Phosphorus 5.6 03/01/2017 03:30 AM        Jock Councilman, M.D.   [facility]  Phone

## 2017-03-01 NOTE — PROCEDURES
[facility]  *** FINAL REPORT ***    Name: Kunal Ortega  MRN: [id number]    Inpatient  : 1927  HIS Order #: [id number]  [id number] [facility] Visit #: [id number]  Date: 01 Mar 2017    TYPE OF TEST: Peripheral Venous Testing    REASON FOR TEST  Limb swelling    Right Arm:-  Deep venous thrombosis:           No  Superficial venous thrombosis:    No      INTERPRETATION/FINDINGS  Duplex images were obtained using 2-D gray scale, color flow, and  spectral Doppler analysis. Right arm :  1. Deep vein(s) visualized include the internal jugular, subclavian,  axillary, distal brachial, radial and ulnar veins. The right proximal   to mid brachial veins were unable to be visualized due to the  dressing and PICC line. 2. No evidence of deep venous thrombosis detected in the veins  visualized. 3. PICC line visualized within the right subclavian and axillary veins   with no evidence of thrombus along the PICC line. 4. No evidence of deep vein thrombosis in the contralateral subclavian   vein. 5. Superficial vein(s) visualized include the basilic (upper arm) and  cephalic (upper arm) veins. 6. No evidence of superficial thrombosis detected. ADDITIONAL COMMENTS    I have personally reviewed the data relevant to the interpretation of  this  study. TECHNOLOGIST: Cam Cruz RVT  Signed: 2017 01:25 PM    PHYSICIAN: Lex Escobar.  Meghann Aparicio MD  Signed: 2017 06:15 PM

## 2017-03-01 NOTE — PROGRESS NOTES
Problem: Ventilator Management  Goal: *Adequate oxygenation and ventilation  Pt. Intubated due hypercapneic respiratory failure      Current ventilator settings- ACVC+ 10, VT-500, PEEP-5, FIo2-30%      Current ABG-2/28/2017 0821- pH-7.471, PCO2-25.8, Po2-99, HCO3-18.8, SO2-98      Xray-3/1/2017 0410-   Stable chest with probable bilateral pleural effusions and underlying areas of atelectasis/infiltrate.      Plan: Maintain ventilatory support      Goal: Wean as tolerated

## 2017-03-01 NOTE — PROGRESS NOTES
Daily Progress Note: 3/1/2017 3:03 PM   Admit Date: 2/12/2017    Patient seen in follow up for multiple medical problems as listed below:  Patient Active Problem List   Diagnosis Code    Small bowel obstruction ([facility] [address] K56.69    Atrial fibrillation with rapid ventricular response ([facility] [address] I48.91    Postoperative anemia D64.9    Feeding difficulties R63.3    Debility R53.81       Assesment     80 y.o. male with a PMHx of HTN, TIA, SIDNEY, SBO and abdominal hernia who presented to the ED with the acute onset of left upper quadrant abdominal pain. CT scan of the abd/pelvis in the ED that was positive for a SBO. s/p Exlap and small bowel resection (191cm) 2/13 am Dr Gabriela Santillan. Course complicated by Afib+RVR 1/70 am requiring rate control and cardiology involvement. Patient only responded temporarily to diltiazem boluses and diltiazem drip to 5mg. The evening of 2/14 the patients rate remained 130-140 and he was becoming increasingly hypotensive, cardizem gtt was stopped and patient was given 250mcg of IV Digoxin with HR resolution into 60's. Eventually coverted to lopresser with HTN medication adjustment. Failed MBS 2/16 and placed NPO, no hx of dysphagia this was thought due to significant trauma from NGT placement for surgery. Was to have Dobhoff placed 2/17 with IR, they could not get a tube down. He received D10 overnight is his PIV, then PICC and TPN 2/18 am. He had repeat speech eval on 02/22, but failed this, leading to PEG tube placement on the following day. His renal function throughout the week worsened and nephrology was consulted. He was also hyperkalemic w/ EKG changes and given insulin/d50 and lasix. Later in the day on 02/23 he went into acute respiratory distress presumed to be 2/2 possible aspiration pneumonia and was transferred to the ICU and later intubated on pressor support for hypotension. He remains intubated at this time and has failed spontaneous breathing tests 2/2 tachypnea.  His HR remains poorly controlled and diltiazem was ineffective due to hypotension. Amiodarone gtt was tried several times without success due to bradycardia. Cardiology was reconsulted.     Acute Hypoxic Respiratory Failure   Possible Aspiration Pneumonia/Pneumonitis  B/L Pleural Effusions  SBO s/p Exlap and Small Bowel Resection   Severe Dysphagia on Tube feeds through PEG  Atrial Fibrillation with RVR -Back to Digoxin when GFR improved  Acute Metabolic Encephalopathy Likely 2/2 Benzodiazepam, improving  SUSAN/ATN on CKD Stage IIIb -Neph consulting. Cr worsening. Fluid retention. prn bicarb  Hyperkalemia Possibly 2/2 Renal Failure, Possible Heparin-induced w/ EKG Changes  L Inguinal Hernia  HTN  TIA on Eliquis normaly (held)  Iron Deficiency Anemia      DVT Protocol Active: yes  Code Status:  Full Code     Disposition: unk    Subjective:     CC: Abdominal Pain (left lower abdomen) and Leg Pain (left leg)    Interval History: Good HR control today. Worsening Cr and fluid retention. Objective:     Visit Vitals    /70 (BP 1 Location: Right arm, BP Patient Position: At rest)    Pulse 73    Temp 98.3 °F (36.8 °C)    Resp 28    Ht 6' (1.829 m)    Wt 84.1 kg (185 lb 6.5 oz)    SpO2 99%    BMI 25.15 kg/m2       Temp (24hrs), Av.2 °F (36.8 °C), Min:97.9 °F (36.6 °C), Max:98.4 °F (36.9 °C)        Intake/Output Summary (Last 24 hours) at 17 1324  Last data filed at 17 1200   Gross per 24 hour   Intake          1488.28 ml   Output              945 ml   Net           543.28 ml       Gen: NAD, sleepy today, intubated  HEENT:  PATRICIA, EOMI. Neck: No Bruits/JVD   Lungs:   Upper phlegm. Poor respiratory effort  Heart:   RR S1 S2 without M/R/G  Abdomen: ND,NT, BSX4,   Extremities:   No LE edema. No cyanosis.   Skin:  no jaundice/lesions      Data Review:     Meds/Labs/Tests reviewed    Current Shift:  701 - 1900  In: 312.6 [I.V.:112.6]  Out: 240 [Urine:240]  Last three shifts:  1901 -  0700  In: 3522.4 [I.V.:1362.4]  Out: 1920 [Urine:1920]  Recent Labs      03/01/17   0330  02/28/17 0212 02/27/17   0400   WBC  7.6  7.8  6.6   RBC  2.98*  2.83*  3.01*   HGB  8.2*  7.9*  8.3*   HCT  25.1*  23.9*  25.7*   PLT  245  259  278   GRANS  93*  93*  92*   LYMPH  5*  5*  6*   EOS  0  0  0       Recent Labs      03/01/17   0330  02/28/17   0930  02/28/17 0212  02/27/17   0400   BUN  120*   --   121*  116*   CREA  3.98*   --   3.71*  3.30*   CA  7.6*   --   7.0*  6.9*   K  3.8  3.5  3.7  4.1   NA  138   --   136  136   CL  103   --   103  103   CO2  19*   --   18*  19*   PHOS  5.6*   --   5.0*  5.1*   GLU  145*   --   163*  149*        Lab Results   Component Value Date/Time    Glucose 145 03/01/2017 03:30 AM    Glucose 163 02/28/2017 02:12 AM    Glucose 149 02/27/2017 04:00 AM    Glucose 150 02/26/2017 04:00 AM    Glucose 134 02/25/2017 03:30 AM          Care coordination with Nursing/Consultants/staff: 10  Prior history, labs, and charting reviewed: 20    Procedures/Imaging:  s/p Exlap and small bowel resection 2/13 am Dr Cale Lawrence  TTE-EF45%  Failed XR guided dobhoff 2/17  PICC 2/18 with TPN  PEG 2/23  Intubation 2/23    Total time spent with chart review, patient examination/education, discussion with staff on case,documentation and medication management / adjustment  :  27 Minutes      Dr Kushal Pink  Pager: [phone]

## 2017-03-01 NOTE — ROUTINE PROCESS
Bedside and Verbal shift change report given to Audry Essex RN (oncoming nurse) by Danelle Brooke RN   (offgoing nurse). Report included the following information SBAR, Kardex, Intake/Output, MAR and Recent Results.

## 2017-03-01 NOTE — PROGRESS NOTES
[facility] - Progress Note  Admit Date: 2/12/2017      I saw, evaluated, interviewed and examined the patient personally. I agree with the findings and plan of care as documented below with PADARIN note  Intubated  No overnight event  Continue current meds  Use Bb / CCB as tolerated by BP. If unstable, may need to consider emergent cardioversion for a.fib  No further cardiac work up planned at this time unless clinical status changes. Call us back if needed. Will be available as needed. Will need to follow up in cardiology clinic in 2-3 weeks after discharge. Thank you. Aaron Hay MD          Assessment:     -- pAF. Prior h/o this. Multiple recurrent episodes this admission, rates up to 140 at times. Unable to aggressively rate control due to low BP.  -- Acute hypoxic respiratory failure. Intubated for presumed aspiration  -- Mild cardiomyopathy. EF 45% on admission in setting of sepsis, no evidence of ADHF  -- Admitted with SBO s/p ex lap  -- ARF. -- Post-anemia    Plan:     - Afib rates remained controlled, periods of niyah <60. Off pressors since yesterday. No beta blockers with occasional niyah and BP still marginal. No Digoxin due to renal failure. May be progressing toward dialysis  - Will sign off from cardiology standpoint and be available as needed. Please call with any questions. Subjective:      Intubated, sedated    Objective:      Patient Vitals for the past 8 hrs:   Temp Pulse Resp BP SpO2   03/01/17 1123 - 67 (!) 34 - 100 %   03/01/17 0845 - 63 30 - 100 %   03/01/17 0830 - 68 25 - 100 %   03/01/17 0824 - 62 18 - 100 %   03/01/17 0815 - 77 28 - 100 %   03/01/17 0800 98.2 °F (36.8 °C) 78 24 104/83 99 %   03/01/17 0745 - 82 28 - 100 %   03/01/17 0730 - 85 26 - 99 %   03/01/17 0715 - 78 25 - 100 %   03/01/17 0700 - 71 18 108/69 100 %   03/01/17 0600 - 78 17 (!) 89/56 100 %   03/01/17 0500 - 100 20 104/56 100 %   03/01/17 0408 - 91 14 - 100 %   03/01/17 0400 98.3 °F (36.8 °C) 93 22 93/58 100 %         Patient Vitals for the past 96 hrs:   Weight   03/01/17 0454 185 lb 6.5 oz (84.1 kg)   02/28/17 0413 181 lb 7 oz (82.3 kg)   02/27/17 0409 185 lb 3 oz (84 kg)   02/26/17 0430 165 lb 12.6 oz (75.2 kg)                    Intake/Output Summary (Last 24 hours) at 03/01/17 1139  Last data filed at 03/01/17 1059   Gross per 24 hour   Intake          1417.08 ml   Output             1030 ml   Net           387.08 ml       Physical Exam:  General:  Intubated  Neck:  nontender, no JVD  Lungs:  clear to auscultation bilaterally  Heart:  irregularly irregular rhythm  Abdomen:  abdomen is soft without significant tenderness, masses, organomegaly or guarding  Extremities:  extremities normal, atraumatic, no cyanosis or edema    Data Review:     Labs: Results:       Chemistry Recent Labs      03/01/17   0330 02/28/17   0930  02/28/17 0212 02/27/17 2159 02/27/17   0400   GLU  145*   --   163*   --   149*   NA  138   --   136   --   136   K  3.8  3.5  3.7   --   4.1   CL  103   --   103   --   103   CO2  19*   --   18*   --   19*   BUN  120*   --   121*   --   116*   CREA  3.98*   --   3.71*   --   3.30*   CA  7.6*   --   7.0*   --   6.9*   MG  1.9   --   1.9  2.0  1.8   PHOS  5.6*   --   5.0*   --   5.1*   AGAP  16   --   15   --   14   BUCR  30*   --   33*   --   35*      CBC w/Diff Recent Labs      03/01/17   0330 02/28/17 0212 02/27/17   0400   WBC  7.6  7.8  6.6   RBC  2.98*  2.83*  3.01*   HGB  8.2*  7.9*  8.3*   HCT  25.1*  23.9*  25.7*   PLT  245  259  278   GRANS  93*  93*  92*   LYMPH  5*  5*  6*   EOS  0  0  0      Cardiac Enzymes No results found for: CPK, CKMMB, CKMB, RCK3, CKMBT, CKNDX, CKND1, KEVIN, TROPT, TROIQ, GINETTE, TROPT, TNIPOC, BNP, BNPP   Coagulation No results for input(s): PTP, INR, APTT in the last 72 hours.     No lab exists for component: INREXT    Lipid Panel Lab Results   Component Value Date/Time    Cholesterol, total 136 09/19/2010 04:50 AM    HDL Cholesterol 54 09/19/2010 04:50 AM    LDL, calculated 71.4 09/19/2010 04:50 AM    VLDL, calculated 10.6 09/19/2010 04:50 AM    Triglyceride 53 09/19/2010 04:50 AM    CHOL/HDL Ratio 2.5 09/19/2010 04:50 AM      BNP No results found for: BNP, BNPP, XBNPT   Liver Enzymes No results for input(s): TP, ALB, TBIL, AP, SGOT, GPT in the last 72 hours. No lab exists for component: DBIL   Digoxin    Thyroid Studies Lab Results   Component Value Date/Time    TSH 8.07 02/28/2017 06:26 PM          Signed By: Adilson Gaspar.  Zaina Mclain     March 1, 2017

## 2017-03-01 NOTE — DIABETES MGMT
NUTRITIONAL RE-ASSESSMENT AND GLYCEMIC CONTROL PLAN OF CARE     Madison Oneil           80 y.o.           2/12/2017                 1. SBO (small bowel obstruction) (HCC)    2. Abdominal pain, LUQ (left upper quadrant)    3. Chronic pain of left knee    4. Chronic renal failure, unspecified stage    5. Anemia, unspecified type    6. Debility    7. Feeding difficulties      ASSESSMENT:    Based on  weight  of 153 lbs. , pt is underweight  related to inadequate caloric intake as evidenced by 86% ideal weight and BMI= 20.9kg/m2. Pt's weight is now documented as 185 lbs. (to receive lasix). Nutrient deficit as evidenced by npo status related to dysphagia. Diagnosis-Intake: Inadequate protein-energy intake related to respiratory distress AEB inability to take in oral nourishment and need for enteral feeding. Pt w/hypoalbuminemia as evidenced by albumin=1.6mg/dl. Altered labs as evidenced by  CR 3.98  Phos 5.6. INTERVENTIONS/PLAN:   Pt is receiving Nepro TF at 40ml/hr without tolerance problems. TF is  Providing  78 g protein, 1728 calories/day with 0.7liters free water /d from TF plus 200 ml total from 50 ml water flush q 6hrs. Could decrease water flushes to 25ml q8 hrs if concerned about excess fluid. TF is concentrated. SUBJECTIVE/OBJECTIVE:     Diet:npo plus Nepro TF   No data found. Medications: [x]       Reviewed - Pt was receiving  Levemir  10 units q 12 hrs while on TPN, now reduced to  Corrective VIR scale lispro.     Lab Results   Component Value Date/Time    Sodium 138 03/01/2017 03:30 AM    Potassium 3.8 03/01/2017 03:30 AM    Chloride 103 03/01/2017 03:30 AM    CO2 19 03/01/2017 03:30 AM    Anion gap 16 03/01/2017 03:30 AM    Glucose 145 03/01/2017 03:30 AM     03/01/2017 03:30 AM    Creatinine 3.98 03/01/2017 03:30 AM    Calcium 7.6 03/01/2017 03:30 AM    Magnesium 1.9 03/01/2017 03:30 AM    Phosphorus 5.6 03/01/2017 03:30 AM    Albumin 1.6 02/26/2017 04:00 AM Anthropometrics:  ,  , BMI (calculated): 25.1  [place] wt 178lbs. Wt Readings from Last 1 Encounters:   03/01/17 84.1 kg (185 lb 6.5 oz)      Ht Readings from Last 1 Encounters:   02/17/17 6' (1.829 m)     Wt Readings from Last 3 Encounters:   03/01/17 84.1 kg (185 lb 6.5 oz)   01/04/16 65.8 kg (145 lb)   11/08/15 63.5 kg (140 lb)       Estimated Nutrition Needs: 1900 Kcals/day, Protein reqrts  84g/d   Fluid Requirements 1.8liters/d  Based on:   [x]          Actual BW    []          IBW   []            Adjusted BW      Nutrition Diagnoses:   As stated above. Nutrition Interventions:TF  Monitoring   Goals:     Intake will meet >75% of energy and protein requirements by 3/6. Wt maintenance  by 3/12. (or decrease in fluid wt). Glucose will be within target range by 3/4.      Nutrition Monitoring and Evaluation      []     Monitor po intake on meal rounds  [x]     Continue inpatient monitoring and intervention  [x]     Other: TF tolerance    Nutrition Risk:  [x]   High     []  Moderate    []  Minimal/Uncompromised    Cele Allen, RD

## 2017-03-01 NOTE — PROCEDURES
3/1/2017    PROCEDURE:  Therapeutic Thoracentesis 8F chest tube    INDICATION:  PLEURAL EFFUSION failing to wean from [facility] ventilator    CONSENT:   From daughter via telephone; witnessed by NP Dominic Aguillon. Signed and placed into blue plastic chart. ANESTHESIA:  LOCAL ANESTHESIA WITH 1% LIDOCAINE x 5ml     CHEST ULTRASOUND FINDINGS:  Ultrasound was used to image the chest and localize the RIGHT pleural effusion. It was a simple effusion with typical centrally compressed lung. Diaphragm was located & orientation confirmed. The left side had very minimal fluid and the heart was very close to the chest wall. DESCRIPTION OF PROCEDURE:  After obtaining informed consent and localizing an ideal location for thoracentesis, the intercostal space was marked with ink & a blunt plastic needle cap in approximately the 4th Right Anterior axillary line. Aseptic technique (with chlorhexadine skin prep, mask, hat, sterile drape & gloves) was used. Patient position was maintained as during the ultrasound procedure. Time out was observed. Up to 5 ml 1% lidocaine was injected into the skin and subcutaneous tissue, as well as onto the underlying rib and inter-costal muscles and parietal pleura over the superior portion of the rib. The space was entered immediately with the anesthesia (\"seeker\") needle to confirm fluid - ie, pleural fluid was aspirated to assure proper location, prior to removing the anesthesia needle. A thoracentesis kit was then used to perform the procedure. 8F pigtial \"Safe-T Centesis\" catheter was sutured & taped into place with attention to preserving functionality of the hub. The site was dressed with sterile dressing. *  Procedure details: A stab incision ws made with number 11 scalpel blade straight onto rib.  The needle/ thoracentesis catheter assembly was then introduced into the chest until abutting the rib and then marched over superior margin of the rib at a 90* angle into the pleural space. After aspirating fluid, the thoracentesis catheter was advanced into the chest over the needle trocar. The needle trocar was then removed and the catheter was attached to the supplied tubing without complication. Post procedure CXR:  Will do in AM    Estimated blood loss: zero    FLUID/ SUMMARY/ CONCLUSION: 600 ml of yellow fluid was withdrawn and some was sent for analysis. The tube is left open to gravity in a sealed container with a three way stop cock and a one way aspiration valve.        Yandy Rogers MD, Ally Lozoya  Pager [phone]  ICU: [phone]

## 2017-03-01 NOTE — PROGRESS NOTES
Bedside and Verbal shift change report given to [address] [place] [address], [facility] (oncoming nurse) by Kitty Marquis RN (offgoing nurse). Report included the following information SBAR, Kardex, Intake/Output, MAR, Recent Results and Med Rec Status.

## 2017-03-01 NOTE — PROGRESS NOTES
2035,received pt on vent with head elevated sxn for moderate amount of thick yellow secretions,ett secured and alvino tube cleared BVM at bedside will monitor though the shift.

## 2017-03-01 NOTE — PROGRESS NOTES
[facility]  Pulmonary, Critical Care, and Sleep Medicine      Name: [facility] MRN: [id number]   : 1927 Hospital: [address] [place] [address]   Date: 3/1/2017          Critical Care Progress Notes    3/1/2017  Right UE swollen than left UE-Duplex right UE, no DVT. Failed SBT again today but did better than yesterday, per  RT tachypneic and agitated. O2 Sats-100% HR-70, RR-mid 40's RSBI--140-152. Plan US of chest to evaluate pleural effusion and will give Lasix 40 mg X 1, repeat CXR zhou to assess clearing. Awake and alert, FC, no distress noted   Hb  8.2 improved after 1 unit PRBC yesterday, will hold further transfusion for now, started on Ferrous Sulfate, no active external bleeding  Elevated TSH and normal Free T4, low Free T3-no hx hypothyroidism, will monitor for now. Off pressors, Cr up-Nephrology following  Sputum +MSSA-d/c Vancomycin (), continue zosyn  Glucose stable   WBC wnl, currently afebrile. IMPRESSION:   · Acute hypoxemic, hypercapnic respiratory failure likely aspiration with previous brief respiratory arrest, now requiring MV, Vent ( intubated ), Day # 6   · Bilateral pleural effusions with possible underlying infiltrate  · AMS, multifactorial with hypoglycemia and sedation given for PEG , now improved  · Possible sepsis with leukocytosis, hypotension, source likely aspiration, +sputum MSSA  · Hypotension requiring pressor support, afib/volume depletion/sepsis, now off pressors  · A-fib with RVR, stable  · SUSAN   · Anemia  · Hyperkalemia, now resolved  · Dysphagia s/p PEG   · Hx recent Small Bowel Obstruction s/p ex-lap with resection. · Hypoalbuminemia  · Hypocalcemia, replaced      RECOMMENDATIONS:   · Resp -   Vent ( intubated ), Day # 6 . Failed SBT today-Plan US of chest and Lasix 40 mg X 1 , Vent bundle/protocol. Follow CXR, ABG (last ) PRN. Aspiration precautions, keep HOB and judicious  bronchial hygiene.   Lasix/albumin given (2/27). CXR 3/1 reviewed- lasix 40 mg X 1 today, plan US of chest.  · ID - Severe sepsis bundle. Cont zosyn, vanco d/cd (2/28) due to sputum +MSSA. LA normal. Trend WBC. · CVS - Off Jhon per MAP>65. Right UE PICC in place. Currently off xarelto. · Heme/Onc- Hb 8.2 today, 1 unit PRBC 2/28, will hold further transfusion for now to keep Hb>9, started on ferrous sulfate. Trend hgb and monitor for any active bleeding. · Renal - Renal following. Off Nabicarb per renal.  Placed on electrolyte replacement protocol. Monitor K+ closely, Avoid Nephrotoxic med's, creat trending up 3.98 today, per nephrology note, had discussion with daughter for plan dialysis-will wait \"one day at a time. \"  · Endocrine - Monitor glucose closely, avoid hypoglycemia, TSH elevated 3.9(2/24), repeat TSH (2/28) 8.07, free T4 wnl 0.8 and T3 free low 0.9- no hx hypothyroidism. · Neuro/ Pain/ Sedation - Versed, Fentanyl, Dilaudid and Haldol PRN for pain/sedation. Minimize sedating medication when possible, closely  Monitor. · GI - Cont TF, at goal. PPI   · Prophylaxis - Heparin, Protonix  · Palliative care following  · FULL CODE       Past Medical History:   Diagnosis Date    Hypertension     TIA (transient ischemic attack)       History reviewed. No pertinent surgical history. Prior to Admission medications    Medication Sig Start Date End Date Taking? Authorizing Provider   apixaban (ELIQUIS) 2.5 mg tablet Take 2.5 mg by mouth every twelve (12) hours. Yes Sadie Dent MD   amLODIPine (NORVASC) 10 mg tablet Take 10 mg by mouth daily. Yes Sadie Dent MD   lisinopril (PRINIVIL, ZESTRIL) 10 mg tablet Take 10 mg by mouth daily. Yes Sadie Dent MD   Hydrochlorothiazide (HYDRODIURIL) 12.5 mg tablet Take 12.5 mg by mouth daily. Yes Sadie Dent MD   ferrous sulfate 325 mg (65 mg iron) tablet Take 325 mg by mouth Daily (before breakfast).    Yes Sadie Dent MD     Current Facility-Administered Medications   Medication Dose Route Frequency  chlorhexidine (PERIDEX) 0.12 % mouthwash 15 mL  15 mL Oral BID    ferrous sulfate tablet 325 mg  325 mg Oral ACB    nystatin (MYCOSTATIN) 100,000 unit/mL oral suspension 500,000 Units  500,000 Units Oral QID    sodium chloride (NS) flush 5-10 mL  5-10 mL IntraVENous Q8H    pantoprazole (PROTONIX) 40 mg in sodium chloride 0.9 % 10 mL injection  40 mg IntraVENous BID    piperacillin-tazobactam (ZOSYN) 3.375 g in 0.9% sodium chloride (MBP/ADV) 100 mL MBP EXTENDED 4 HOUR INFUSION###  3.375 g IntraVENous Q12H    heparin (porcine) injection 5,000 Units  5,000 Units SubCUTAneous Q8H    insulin lispro (HUMALOG) injection   SubCUTAneous Q6H    tamsulosin (FLOMAX) capsule 0.4 mg  0.4 mg Oral DAILY    sodium chloride (NS) flush 5-10 mL  5-10 mL IntraVENous Q8H    sodium chloride (NS) flush 5-10 mL  5-10 mL IntraVENous Q8H     No Known Allergies   Social History   Substance Use Topics    Smoking status: Former Smoker    Smokeless tobacco: Not on file    Alcohol use No      History reviewed. No pertinent family history. Review of Systems:  Review of systems not obtained due to patient factors.     Objective:   Vital Signs:    Visit Vitals    /70 (BP 1 Location: Right arm, BP Patient Position: At rest)    Pulse 73    Temp 98.3 °F (36.8 °C)    Resp 28    Ht 6' (1.829 m)    Wt 84.1 kg (185 lb 6.5 oz)    SpO2 99%    BMI 25.15 kg/m2       O2 Device: Endotracheal tube, Ventilator   O2 Flow Rate (L/min): 15 l/min   Temp (24hrs), Av.2 °F (36.8 °C), Min:97.9 °F (36.6 °C), Max:98.4 °F (36.9 °C)       Intake/Output:   Last shift:      701 - 1900  In: 312.6 [I.V.:112.6]  Out: 240 [Urine:240]  Last 3 shifts: 1901 -  0700  In: 3522.4 [I.V.:1362.4]  Out: 1920 [Urine:1920]    Intake/Output Summary (Last 24 hours) at 17 1340  Last data filed at 17 1200   Gross per 24 hour   Intake          1488.28 ml   Output              945 ml   Net           543.28 ml       Ventilator Settings:  Mode Rate Tidal Volume Pressure FiO2 PEEP   Assist control, VC+ (SBT ended)   500 ml  7 cm H2O 30 % 5 cm H20     Peak airway pressure: 25 cm H2O    Minute ventilation: 15.8 l/min      ARDS network Guidelines: Lung protective strategy and Pl pressure goals____________      Physical Exam:    General/Neuro:  Opens eyes to voice, on vent, follows simple commands all 4 extremities, breathing over vent, appears stated age   Head:  Normocephalic, without obvious abnormality, atraumatic. Eyes:  Conjunctivae/corneas clear. PERRL   Nose: Nares normal. Septum midline. Mucosa normal. No drainage. Throat: ETT in place   Neck: Supple, symmetrical       Lungs:   Good AE B, diminished bibasilar, no wheeze       Heart:  Irregular , no m   Abdomen:   Soft, +tenderness Bowel sounds normal.  PEG site w/o bleeding. Extremities: Extremities normal, atraumatic, no cyanosis or edema. Pulses: 2+ and symmetric all extremities.    Skin: Skin color, texture, turgor normal. No rashes or lesions             Data:     Recent Results (from the past 24 hour(s))   TYPE & CROSSMATCH    Collection Time: 02/28/17  6:26 PM   Result Value Ref Range    Crossmatch Expiration 03/03/2017     ABO/Rh(D) AB NEGATIVE     Antibody screen NEG     CALLED TO: ELPIDIO JUAN 2700 AT 20:50 ON 02/28/3017 BY RAMON     Unit number [id number]     Blood component type RC LR AS1     Unit division 00     Status of unit TRANSFUSED     Crossmatch result Compatible    TSH 3RD GENERATION    Collection Time: 02/28/17  6:26 PM   Result Value Ref Range    TSH 8.07 (H) 0.36 - 3.74 uIU/mL   T4, FREE    Collection Time: 02/28/17  6:26 PM   Result Value Ref Range    T4, Free 0.8 0.7 - 1.5 NG/DL   T3, FREE    Collection Time: 02/28/17  6:26 PM   Result Value Ref Range    Triiodothyronine (T3), free 0.9 (L) 2.3 - 4.2 PG/ML   GLUCOSE, POC    Collection Time: 02/28/17  7:17 PM   Result Value Ref Range    Glucose (POC) 177 (H) 70 - 110 mg/dL   GLUCOSE, POC    Collection Time: 03/01/17 12:11 AM   Result Value Ref Range    Glucose (POC) 172 (H) 70 - 110 mg/dL   MAGNESIUM    Collection Time: 03/01/17  3:30 AM   Result Value Ref Range    Magnesium 1.9 1.8 - 2.4 mg/dL   PHOSPHORUS    Collection Time: 03/01/17  3:30 AM   Result Value Ref Range    Phosphorus 5.6 (H) 2.5 - 4.9 MG/DL   METABOLIC PANEL, BASIC    Collection Time: 03/01/17  3:30 AM   Result Value Ref Range    Sodium 138 136 - 145 mmol/L    Potassium 3.8 3.5 - 5.5 mmol/L    Chloride 103 100 - 108 mmol/L    CO2 19 (L) 21 - 32 mmol/L    Anion gap 16 3.0 - 18 mmol/L    Glucose 145 (H) 74 - 99 mg/dL     (H) 7.0 - 18 MG/DL    Creatinine 3.98 (H) 0.6 - 1.3 MG/DL    BUN/Creatinine ratio 30 (H) 12 - 20      GFR est AA 17 (L) >60 ml/min/1.73m2    GFR est non-AA 14 (L) >60 ml/min/1.73m2    Calcium 7.6 (L) 8.5 - 10.1 MG/DL   CALCIUM, IONIZED    Collection Time: 03/01/17  3:30 AM   Result Value Ref Range    Ionized Calcium 1.04 (L) 1.12 - 1.32 MMOL/L   CBC WITH AUTOMATED DIFF    Collection Time: 03/01/17  3:30 AM   Result Value Ref Range    WBC 7.6 4.6 - 13.2 K/uL    RBC 2.98 (L) 4.70 - 5.50 M/uL    HGB 8.2 (L) 13.0 - 16.0 g/dL    HCT 25.1 (L) 36.0 - 48.0 %    MCV 84.2 74.0 - 97.0 FL    MCH 27.5 24.0 - 34.0 PG    MCHC 32.7 31.0 - 37.0 g/dL    RDW 15.5 (H) 11.6 - 14.5 %    PLATELET 234 848 - 448 K/uL    MPV 10.7 9.2 - 11.8 FL    NEUTROPHILS 93 (H) 40 - 73 %    LYMPHOCYTES 5 (L) 21 - 52 %    MONOCYTES 2 (L) 3 - 10 %    EOSINOPHILS 0 0 - 5 %    BASOPHILS 0 0 - 2 %    ABS. NEUTROPHILS 7.0 1.8 - 8.0 K/UL    ABS. LYMPHOCYTES 0.4 (L) 0.9 - 3.6 K/UL    ABS. MONOCYTES 0.1 0.05 - 1.2 K/UL    ABS. EOSINOPHILS 0.0 0.0 - 0.4 K/UL    ABS.  BASOPHILS 0.0 0.0 - 0.06 K/UL    DF AUTOMATED     GLUCOSE, POC    Collection Time: 03/01/17  6:29 AM   Result Value Ref Range    Glucose (POC) 205 (H) 70 - 110 mg/dL   GLUCOSE, POC    Collection Time: 03/01/17 11:10 AM   Result Value Ref Range    Glucose (POC) 176 (H) 70 - 110 mg/dL Telemetry:AFIB    Imaging:  I have personally reviewed the patients radiographs and have reviewed the reports:  CXR 2 /27  1. Lines and tubes in good position. 2. Stable chest with findings consistent with bilateral pleural effusion and  underlying areas of atelectasis/infiltrate. CT c/a/p 2/23/17  1. Postprocedural changes from same day percutaneous gastrostomy catheter  placement, with gastrostomy catheter in expected position. No evidence of  extravasated contrast. There is small amount of free intraperitoneal gas which  is not unexpected given recent placement. 2. Postsurgical changes of laparotomy and small bowel resection for management  of prior small bowel obstruction. Nonspecific mesenteric swelling noted region  of suture material present in the right lower quadrant; however, no evidence of  recurrent bowel obstruction present. Mild gaseous distention of the colon is  present, possibly related to mild postop ileus. 3. Small-moderate bilateral pleural effusions, new from prior CT examination,  with increased body wall edema, small amount of pelvic fluid. 4. Small amount of intravesicular air , presumably from recent prior Conner  catheter placement.        Rama Grace, JESSICA

## 2017-03-02 NOTE — PROGRESS NOTES
-0807-Received patient on ventilator ACVC+ 10, VT-500, PEEP-5, FIO2-30%. O2 [id number]% HR-89, RR-23. ETT noted to be patent and secure. -SBT PS-7, PEEP-5, FIO2-30% O2 Sats-100% HR-19, RSBI-53. Respiratory will continue to monitor. Pt. Noted to be easily stimulated and agitated by excessive noise, Nurse aware and door shut. Respiratory will continue to monitor-[facility]    -1115-SBT ended a this time. RR-42-51, RSBI-125 despite efforts of coaching and minimizing stimulation. O2 Sats-100% HR-84. Pt. Returned to full ventilatory support ACVC+ 10, VT-500, PEEP-5, FIO2-30%. O2 Sats-100% HR-67, RR-24. Pt to receive lasix. -[address]      -1445-Pt. Placed on SBT PS-7, PEEP-5, FIo2-30%. O2 Sats-100% HR-98, RR-23. RSBI-53. Respiratory will continue to monitor. -[facility]    -1540-Pt. Returned to full ventilatory support. ACVC+ 10, VT-500, PEEP-5, FIO2-30%. Pt. Noted to be tachycardiac HR-122-124. O2 Sats -99% HR-119, RR-23, currently. ETT remains patent and secure. -[address]    -1605-Pt. Found on PS-7, PEEP-2, FIO2-30%. O2 sats-98% HR-118, RR-33. Per RN patient was given Haldol per MD and placed on SBT by MD.-[facility]    -1738-Pt.  Extubated per MD order and placed on O2 via nasal cannula at 5lpm. O2 Sats-97% HR-118, RR-27.-[facility]

## 2017-03-02 NOTE — PROGRESS NOTES
2000,received pt on vent with head elevated,sxn for small amount of white secretions,ett moved to right side and secured,alvino tube cleared BMV at bedside pt resting with eyes closed will monitor though the shift.

## 2017-03-02 NOTE — ROUTINE PROCESS
Bedside and Verbal shift change report given to Kinsey [address] (oncoming nurse) by Jenny Levine RN   (offgoing nurse). Report included the following information SBAR, Kardex, Intake/Output, MAR and Recent Results.

## 2017-03-02 NOTE — PROGRESS NOTES
Problem: Falls - Risk of  Goal: *Absence of falls  Outcome: Progressing Towards Goal  Patient does not attempt to get OOB. Problem: Pressure Ulcer - Risk of  Goal: *Prevention of pressure ulcer  Outcome: Progressing Towards Goal  Patient turned and repositioned Q2hrs    Problem: Pain  Goal: *Control of Pain  Outcome: Progressing Towards Goal  Patient nods appropriately and denies pain or discomfort at this time     Problem: Ventilator Management  Goal: *Adequate oxygenation and ventilation  Outcome: Progressing Towards Goal  Patient currently on SBT tolerating SBT.  Maintains O2 sats and RR WDL

## 2017-03-02 NOTE — PROGRESS NOTES
Problem: Ventilator Management  Goal: *Adequate oxygenation and ventilation  Pt. Intubated due hypercapneic respiratory failure      Current ventilator settings- ACVC+ 10, VT-500, PEEP-5, FIo2-30%      Current ABG-2/28/2017 0821- pH-7.471, PCO2-25.8, Po2-99, HCO3-18.8, SO2-98      Xray-3/2/2017 0320-     Interval placement of right thoracostomy catheter with slightly decreased small right pleural effusion. Unchanged small left pleural effusion and retrocardiac  Atelectasis/infiltrate.      Plan: Maintain ventilatory support        Goal: Wean as tolerated

## 2017-03-02 NOTE — PROGRESS NOTES
Daily Progress Note: 3/2/2017 3:03 PM   Admit Date: 2/12/2017    Patient seen in follow up for multiple medical problems as listed below:  Patient Active Problem List   Diagnosis Code    Small bowel obstruction ([facility] [place] [address] K56.69    Atrial fibrillation with rapid ventricular response ([facility] [place] [address] I48.91    Postoperative anemia D64.9    Feeding difficulties R63.3    Debility R53.81       Assesment     80 y.o. male with a PMHx of HTN, TIA, SIDNEY, SBO and abdominal hernia who presented to the ED with the acute onset of left upper quadrant abdominal pain. CT scan of the abd/pelvis in the ED that was positive for a SBO. s/p Exlap and small bowel resection (191cm) 2/13 am Dr John Lao. Course complicated by Afib+RVR 6/58 am requiring rate control and cardiology involvement. Patient only responded temporarily to diltiazem boluses and diltiazem drip to 5mg. The evening of 2/14 the patients rate remained 130-140 and he was becoming increasingly hypotensive, cardizem gtt was stopped and patient was given 250mcg of IV Digoxin with HR resolution into 60's. Eventually coverted to lopresser with HTN medication adjustment. Failed MBS 2/16 and placed NPO, no hx of dysphagia this was thought due to significant trauma from NGT placement for surgery. Was to have Dobhoff placed 2/17 with IR, they could not get a tube down. He received D10 overnight is his PIV, then PICC and TPN 2/18 am. He had repeat speech eval on 02/22, but failed this, leading to PEG tube placement on the following day. His renal function throughout the week worsened and nephrology was consulted. He was also hyperkalemic w/ EKG changes and given insulin/d50 and lasix. Later in the day on 02/23 he went into acute respiratory distress presumed to be 2/2 possible aspiration pneumonia and was transferred to the ICU and later intubated on pressor support for hypotension. He remains intubated at this time and has failed spontaneous breathing tests 2/2 tachypnea.  His HR remains poorly controlled and diltiazem was ineffective due to hypotension. Amiodarone gtt was tried several times without success due to bradycardia. Cardiology was reconsulted.     Acute hypoxemic, hypercapnic respiratory failure after brief respiratory arrest  Possible Aspiration Pneumonia/Pneumonitis  Sepsis criteria with leukocytosis, hypotension, requiring temp pressor support. Possible aspiration source. B/L Pleural Effusions  SBO s/p Exlap and Small Bowel Resection   Severe Dysphagia on Tube feeds through PEG  Atrial Fibrillation with RVR -Back to Digoxin when GFR improved  Acute Metabolic Encephalopathy Likely 2/2 Benzodiazepam, improving  SUSAN/ATN on CKD Stage IIIb -Neph consulting. Cr worsening. Fluid retention. prn bicarb  Hyperkalemia Possibly 2/2 Renal Failure, Possible Heparin-induced w/ EKG Changes  L Inguinal Hernia  HTN  TIA on Eliquis normaly (held)  Iron Deficiency Anemia      DVT Protocol Active: yes  Code Status:  Full Code     Disposition: unk    Subjective:     CC: Abdominal Pain (left lower abdomen) and Leg Pain (left leg)    Interval History: Worsening Cr and fluid retention. SBT this am RR to 30, some good TV into 500's      Objective:     Visit Vitals    /79 (BP 1 Location: Right arm, BP Patient Position: At rest)    Pulse 72    Temp 97.2 °F (36.2 °C)    Resp 25    Ht 6' (1.829 m)    Wt 83.3 kg (183 lb 10.3 oz)    SpO2 100%    BMI 24.91 kg/m2       Temp (24hrs), Av °F (36.7 °C), Min:97.2 °F (36.2 °C), Max:98.5 °F (36.9 °C)        Intake/Output Summary (Last 24 hours) at 17 1325  Last data filed at 17 1300   Gross per 24 hour   Intake             1260 ml   Output             1938 ml   Net             -678 ml       Gen: NAD, sleepy today, intubated  HEENT:  PATRICIA, EOMI. Neck: No Bruits/JVD   Lungs:   Upper phlegm. Poor respiratory effort  Heart:   RR S1 S2 without M/R/G  Abdomen: ND,NT, BSX4,   Extremities:   No LE edema. No cyanosis.   Skin:  no jaundice/lesions      Data Review:     Meds/Labs/Tests reviewed    Current Shift:  03/02 0701 - 03/02 1900  In: 440 [I.V.:150]  Out: 603 [Urine:228]  Last three shifts:  02/28 1901 - 03/02 0700  In: 2000.4 [I.V.:250.4]  Out: 2290 [Urine:1640]  Recent Labs      03/02/17 0345 03/01/17   0330  02/28/17   0212   WBC  8.3  7.6  7.8   RBC  2.79*  2.98*  2.83*   HGB  7.8*  8.2*  7.9*   HCT  23.5*  25.1*  23.9*   PLT  243  245  259   GRANS  93*  93*  93*   LYMPH  5*  5*  5*   EOS  0  0  0       Recent Labs      03/02/17 0345 03/01/17 0330 02/28/17   0930  02/28/17 0212   BUN  132*  120*   --   121*   CREA  4.38*  3.98*   --   3.71*   CA  7.5*  7.6*   --   7.0*   K  3.6  3.8  3.5  3.7   NA  141  138   --   136   CL  105  103   --   103   CO2  17*  19*   --   18*   PHOS  6.3*  5.6*   --   5.0*   GLU  174*  145*   --   163*        Lab Results   Component Value Date/Time    Glucose 174 03/02/2017 03:45 AM    Glucose 145 03/01/2017 03:30 AM    Glucose 163 02/28/2017 02:12 AM    Glucose 149 02/27/2017 04:00 AM    Glucose 150 02/26/2017 04:00 AM          Care coordination with Nursing/Consultants/staff: 10  Prior history, labs, and charting reviewed: 20    Procedures/Imaging:  s/p Exlap and small bowel resection 2/13 am Dr House Expose  [id number]-EF45%  Failed XR guided dobhoff 2/17  PICC 2/18 with TPN  PEG 2/23  Intubation 2/23    Total time spent with chart review, patient examination/education, discussion with staff on case,documentation and medication management / adjustment  :  27 Minutes      Dr Riya Nunez  Pager: [phone]

## 2017-03-02 NOTE — PROGRESS NOTES
Patient remains unable to communicate due to his condition and his still being on life support at this time.  offered prayer. No family seen at time of this visit. [facility] Chaplains will continue to follow and will provide pastoral care on an as needed/requested basis.     Ainsley Garcia   Spiritual Care   [phone]

## 2017-03-02 NOTE — PROGRESS NOTES
RENAL PROGRESS NOTE        [facility]         Assessment/Plan:     · SUSAN on CKD3: Non oliguric. Creat slowly rising. Doubt RRT candidate. No urgent indication today. · Met Acidosis: Stable. PH compensated. No bicarb need. · Volume overload: Lasix 40 mg IV today. · Afib: Rate controlled. AC is on hold. Off neyosynephrin. · Aspiration pneumonia/sepsis. On abx. · Resp failure. · S/p small bowel resection for SBO on 2/13. · Anemia: S/p PRBC.                                                                                                                                  Subjective:  intubated      Patient Active Problem List   Diagnosis Code    Small bowel obstruction (HCC) K56.69    Atrial fibrillation with rapid ventricular response (HCC) I48.91    Postoperative anemia D64.9    Feeding difficulties R63.3    Debility R53.81       Current Facility-Administered Medications   Medication Dose Route Frequency Provider Last Rate Last Dose    calcium gluconate 2 g in 0.9% sodium chloride 50 mL IVPB  2 g IntraVENous ONCE King Gotti MD        haloperidol lactate (HALDOL) injection 1-2 mg  1-2 mg IntraVENous Q2H PRN King Gotti MD   2 mg at 03/02/17 0014    0.9% sodium chloride infusion 250 mL  250 mL IntraVENous PRN Rama Grace NP        HYDROmorphone (PF) (DILAUDID) injection 0.5 mg  0.5 mg IntraVENous Q3H PRN Rama Grace NP   0.5 mg at 03/02/17 0254    chlorhexidine (PERIDEX) 0.12 % mouthwash 15 mL  15 mL Oral BID King Gotti MD   15 mL at 03/02/17 0859    hydroxypropyl methylcellulose (ISOPTO TEARS) 0.5 % ophthalmic solution 1 Drop  1 Drop Both Eyes PRN King Gotti MD   1 Drop at 02/28/17 0052    ferrous sulfate tablet 325 mg  325 mg Oral ACB Chuck Shiver, DO   325 mg at 03/02/17 5427    nystatin (MYCOSTATIN) 100,000 unit/mL oral suspension 500,000 Units  500,000 Units Oral QID Chuck Shiver, DO   500,000 Units at 03/02/17 6982    sodium chloride (NS) flush 5-10 mL  5-10 mL IntraVENous Q8H Carlos Vargas MD   10 mL at 03/02/17 8526    simethicone (MYLICON) 56WY/6.0TS oral drops 80 mg  1.2 mL Oral Multiple Nelida Smith MD        fentaNYL citrate (PF) injection  mcg   mcg IntraVENous Multiple Nelida Smith MD   50 mcg at 03/01/17 1234    pantoprazole (PROTONIX) 40 mg in sodium chloride 0.9 % 10 mL injection  40 mg IntraVENous BID Fransisca Nikolai, DO   40 mg at 03/02/17 0816    piperacillin-tazobactam (ZOSYN) 3.375 g in 0.9% sodium chloride (MBP/ADV) 100 mL MBP EXTENDED 4 HOUR INFUSION###  3.375 g IntraVENous Q12H Fransisca Nikolai, DO 25 mL/hr at 03/02/17 0907 3.375 g at 03/02/17 0643    midazolam (VERSED) injection 1-4 mg  1-4 mg IntraVENous Q2H PRN Rona A Day, PA   1 mg at 03/02/17 0721    heparin (porcine) injection 5,000 Units  5,000 Units SubCUTAneous Q8H Fransisca Nikolai, DO   5,000 Units at 03/02/17 0503    acetaminophen (TYLENOL) suppository 650 mg  650 mg Rectal Q4H PRN Fransisca Nikolai, DO   650 mg at 02/20/17 1037    zolpidem (AMBIEN) tablet 5 mg  5 mg Oral QHS PRN Elmon Pries, DO        insulin lispro (HUMALOG) injection   SubCUTAneous Q6H Fransisca Nikolai, DO   3 Units at 03/02/17 5005    glucose chewable tablet 16 g  4 Tab Oral PRN Elmon Pries, DO        glucagon (GLUCAGEN) injection 1 mg  1 mg IntraMUSCular PRN Elmon Pries, DO        dextrose (D50W) injection syrg 12.5-25 g  25-50 mL IntraVENous PRN Elmon Pries, DO   25 g at 02/23/17 2205    sodium chloride (NS) flush 10 mL  10 mL InterCATHeter PRN Elmon Pries, DO   40 mL at 02/28/17 0254    bacitracin 500 unit/gram packet 1 Packet  1 Packet Topical PRN Elmon Pries, DO        tamsulosin [facility]) capsule 0.4 mg  0.4 mg Oral DAILY Juliann Alvarez DO   0.4 mg at 03/02/17 0827    0.9% sodium chloride infusion 250 mL  250 mL IntraVENous PRN Mary Sims DO [place]        phenol throat spray (CHLORASEPTIC) 1 Spray  1 Spray Oral PRN Joya Hernandes MD   1 [place] at 02/20/17 1158    sodium chloride (NS) flush 5-10 mL  5-10 mL IntraVENous Q8H Yohana Mohan MD   10 mL at 03/02/17 7892    0.9% sodium chloride infusion 250 mL  250 mL IntraVENous PRN Thalia Minaya CRNA        sodium chloride (NS) flush 5-10 mL  5-10 mL IntraVENous Q8H Yohana Mohan MD   10 mL at 03/02/17 4763    ondansetron (ZOFRAN) injection 4 mg  4 mg IntraVENous Q4H PRN Yohana Mohan MD   4 mg at 02/15/17 0225    0.9% sodium chloride infusion 250 mL  250 mL IntraVENous PRN Yohana Mohan MD           Objective  Vitals:    03/02/17 0645 03/02/17 0700 03/02/17 0800 03/02/17 0820   BP:  109/55 144/62    Pulse: 61 61 71 72   Resp: 16 15 25 23   Temp:   98 °F (36.7 °C)    SpO2: 100% 100% 100% 100%   Weight:       Height:             Intake/Output Summary (Last 24 hours) at 03/02/17 1006  Last data filed at 03/02/17 0853   Gross per 24 hour   Intake             1130 ml   Output             2003 ml   Net             -873 ml           Admission weight: Weight: 63.5 kg (140 lb) (02/12/17 0603)  Last Weight Metrics:  Weight Loss Metrics 3/2/2017 1/4/2016 11/8/2015   Today's Wt 183 lb 10.3 oz 145 lb 140 lb   BMI 24.91 kg/m2 19.66 kg/m2 18.98 kg/m2             Physical Assessment:     General: intubated. Neck: No jvd. LUNGS: Clear to Auscultation, No rales, rhonchi or wheezes. CVS EXM: S1, S2  RRR, no murmurs/gallops/rubs. Abdomen: soft, non tender. Lower Extremities: + edema.        Lab    CBC w/Diff Recent Labs      03/02/17   0345  03/01/17   0330  02/28/17   0212   WBC  8.3  7.6  7.8   RBC  2.79*  2.98*  2.83*   HGB  7.8*  8.2*  7.9*   HCT  23.5*  25.1*  23.9*   PLT  243  245  259   GRANS  93*  93*  93*   LYMPH  5*  5*  5*   EOS  0  0  0        Chemistry Recent Labs      03/02/17   0345  03/01/17   0330  02/28/17   0930  02/28/17   0212   GLU  174*  145*   --   163*   NA  141 138   --   136   K  3.6  3.8  3.5  3.7   CL  105  103   --   103   CO2  17*  19*   --   18*   BUN  132*  120*   --   121*   CREA  4.38*  3.98*   --   3.71*   CA  7.5*  7.6*   --   7.0*   AGAP  19*  16   --   15   BUCR  30*  30*   --   33*   PHOS  6.3*  5.6*   --   5.0*         No results found for: IRON, FE, TIBC, IBCT, PSAT, FERR Lab Results   Component Value Date/Time    Calcium 7.5 03/02/2017 03:45 AM    Phosphorus 6.3 03/02/2017 03:45 AM        Hermilo Lewis MD  [facility]  Pager: [phone]

## 2017-03-02 NOTE — PROGRESS NOTES
2000 Nursing assessment done, awake, banging his arms on the bed. Restraints continued for safety. Chest tube with dressing intact. 2130 Dilaudid for pain and Haldol for agitation. Trying to talk. 0030 Wide awake, Haldol given as ordered. 0530 Awake most of the night, no distress noted. Bath given and repositioned again.

## 2017-03-02 NOTE — ROUTINE PROCESS
Bedside and Verbal shift change report given to [address] [place] [address]   (oncoming nurse) by Amanda Cordero RN   (offgoing nurse). Report included the following information SBAR, Kardex, Intake/Output, MAR and Recent Results.

## 2017-03-02 NOTE — PROGRESS NOTES
[facility]  Pulmonary, Critical Care, and Sleep Medicine      Name: Jodene Siemens MRN: [id number]   : 1927 Hospital: [address] [place] [address]   Date: 3/2/2017          Critical Care Progress Notes    3/2/2017  Awake and alert, FC, no distress noted,   SBT trial-pt get anxious, given PRN med's. Plan repeat SBT trial for possible extubation. S/P US of chest 3/1 removed 600 ml yellow fluid on right chest, placed chest tube intact/with dressing on, attached to close drainage bag, per  ml so far today, await analysis specimen sent. Lasix 40 mg X 1 yesterday, repeat today per Nephrology, creat trending up  CXR today-mild improvement   Right UE swollen than left UE-Duplex right UE, no DVT. Hb  7.8, s/p 1 unit PRBC2/, will hold further transfusion for now,  no active external bleeding  Elevated TSH and normal Free T4, low Free T3-no hx hypothyroidism, will monitor for now. Off pressors,   Sputum +MSSA-d/c Vancomycin (), continue zosyn  Glucose stable   WBC wnl, currently afebrile. IMPRESSION:   · Acute hypoxemic, hypercapnic respiratory failure likely aspiration with previous brief respiratory arrest, now requiring MV, Vent ( intubated ), Day # 7   · Bilateral pleural effusions with possible underlying infiltrate,   · AMS, multifactorial with hypoglycemia and sedation given for PEG , now improved  · Possible sepsis with leukocytosis, hypotension, source likely aspiration, +sputum MSSA  · Hypotension requiring pressor support, afib/volume depletion/sepsis, now off pressors  · A-fib with RVR, stable  · SUSAN   · Anemia  · Hyperkalemia, now resolved  · Dysphagia s/p PEG   · Hx recent Small Bowel Obstruction s/p ex-lap with resection. · Hypoalbuminemia  · Hypocalcemia, replaced      RECOMMENDATIONS:   · Resp -   Vent ( intubated ), Day # 7. Vent bundle/protocol. SBT per protocol for possible extubation.   S/P US of chest 3/1- removed 600 ml, on right lung and placed chest tube attached to closed drainage bag. Had 100 ml drainage so far today. Recieved Lasix 40 mg X 1 yesterday and repeat again today, CXR 3/2 reviewed- mildly improved. Follow CXR, ABG (last 2/28) PRN/or condition change. Aspiration precautions, keep HOB and judicious  bronchial hygiene. Lasix/albumin given (2/27). · ID - Severe sepsis bundle. Cont zosyn, vanco d/cd (2/28) due to sputum +MSSA. LA normal. Trend WBC. · CVS - Off Jhon per MAP>65. Right UE PICC in place. Currently off xarelto. · Heme/Onc- Hb 7. 8 today, 1 unit PRBC 2/28, will hold further transfusion for now, started on ferrous sulfate. Trend hgb and monitor for any active bleeding. · Renal - Renal following. Off Nabicarb per renal.  Placed on electrolyte replacement protocol. Monitor K+ closely, Avoid Nephrotoxic med's, creat continue to trend up 4.38 today, per nephrology note, had discussion with daughter for plan dialysis-will wait \"one day at a time. \"  · Endocrine - Monitor glucose closely, avoid hypoglycemia, TSH elevated 3.9(2/24), repeat TSH (2/28) 8.07, free T4 wnl 0.8 and T3 free low 0.9- no hx hypothyroidism. · Neuro/ Pain/ Sedation - Versed, Fentanyl, Dilaudid and Haldol PRN for pain/sedation. Minimize sedating medication when possible, closely  Monitor. · GI - Cont TF- at goal. PPI   · Prophylaxis - Heparin, Protonix  · Palliative care following  · FULL CODE       Past Medical History:   Diagnosis Date    Hypertension     TIA (transient ischemic attack)       History reviewed. No pertinent surgical history. Prior to Admission medications    Medication Sig Start Date End Date Taking? Authorizing Provider   apixaban (ELIQUIS) 2.5 mg tablet Take 2.5 mg by mouth every twelve (12) hours. Yes Phys Other, MD   amLODIPine (NORVASC) 10 mg tablet Take 10 mg by mouth daily. Yes Phys Other, MD   lisinopril (PRINIVIL, ZESTRIL) 10 mg tablet Take 10 mg by mouth daily.    Yes Phys Other, MD   Hydrochlorothiazide (HYDRODIURIL) 12.5 mg tablet Take 12.5 mg by mouth daily. Yes Phys Other, MD   ferrous sulfate 325 mg (65 mg iron) tablet Take 325 mg by mouth Daily (before breakfast). Yes Phys Other, MD     Current Facility-Administered Medications   Medication Dose Route Frequency    chlorhexidine (PERIDEX) 0.12 % mouthwash 15 mL  15 mL Oral BID    ferrous sulfate tablet 325 mg  325 mg Oral ACB    nystatin (MYCOSTATIN) 100,000 unit/mL oral suspension 500,000 Units  500,000 Units Oral QID    sodium chloride (NS) flush 5-10 mL  5-10 mL IntraVENous Q8H    pantoprazole (PROTONIX) 40 mg in sodium chloride 0.9 % 10 mL injection  40 mg IntraVENous BID    piperacillin-tazobactam (ZOSYN) 3.375 g in 0.9% sodium chloride (MBP/ADV) 100 mL MBP EXTENDED 4 HOUR INFUSION###  3.375 g IntraVENous Q12H    heparin (porcine) injection 5,000 Units  5,000 Units SubCUTAneous Q8H    insulin lispro (HUMALOG) injection   SubCUTAneous Q6H    tamsulosin (FLOMAX) capsule 0.4 mg  0.4 mg Oral DAILY    sodium chloride (NS) flush 5-10 mL  5-10 mL IntraVENous Q8H    sodium chloride (NS) flush 5-10 mL  5-10 mL IntraVENous Q8H     No Known Allergies   Social History   Substance Use Topics    Smoking status: Former Smoker    Smokeless tobacco: Not on file    Alcohol use No      History reviewed. No pertinent family history. Review of Systems:  Review of systems not obtained due to patient factors.     Objective:   Vital Signs:    Visit Vitals    /76    Pulse (!) 116    Temp 97.2 °F (36.2 °C)    Resp 18    Ht 6' (1.829 m)    Wt 83.3 kg (183 lb 10.3 oz)    SpO2 99%    BMI 24.91 kg/m2       O2 Device: Ventilator   O2 Flow Rate (L/min): 15 l/min   Temp (24hrs), Av °F (36.7 °C), Min:97.2 °F (36.2 °C), Max:98.5 °F (36.9 °C)       Intake/Output:   Last shift:       07 -  1900  In: 440 [I.V.:150]  Out: 603 [Urine:228]  Last 3 shifts: 02/ 0700  In: 2000.4 [I.V.:250.4]  Out: 2290 [Urine:1640]    Intake/Output Summary (Last 24 hours) at 03/02/17 1427  Last data filed at 03/02/17 1300   Gross per 24 hour   Intake             1220 ml   Output             1908 ml   Net             -688 ml       Ventilator Settings:  Mode Rate Tidal Volume Pressure FiO2 PEEP   Assist control, VC+   500 ml  7 cm H2O 30 % 5 cm H20     Peak airway pressure: 22 cm H2O    Minute ventilation: 12 l/min      ARDS network Guidelines: Lung protective strategy and Pl pressure goals____________      Physical Exam:    General/Neuro:  Opens eyes to voice, on vent, follows simple commands all 4 extremities, breathing over vent, appears stated age   Head:  Normocephalic, without obvious abnormality, atraumatic. Eyes:  Conjunctivae/corneas clear. PERRL   Nose: Nares normal. Septum midline. Mucosa normal. No drainage. Throat: ETT in place   Neck: Supple, symmetrical       Lungs:   Good AE B, diminished bibasilar, no wheeze       Heart:  Irregular , no m   Abdomen:   Soft, +tenderness Bowel sounds normal.  PEG site w/o bleeding. Extremities: Extremities normal, atraumatic, no cyanosis or edema. Pulses: 2+ and symmetric all extremities. Skin: Skin color, texture, turgor normal. No rashes or lesions             Data:     Recent Results (from the past 24 hour(s))   GLUCOSE, POC    Collection Time: 03/01/17  4:53 PM   Result Value Ref Range    Glucose (POC) 202 (H) 70 - 110 mg/dL   CELL COUNT AND DIFF, BODY FLUID    Collection Time: 03/01/17  5:36 PM   Result Value Ref Range    BODY FLUID TYPE PLEURAL FLUID      FLUID COLOR YELLOW      FLUID APPEARANCE HAZY      FLUID RBC COUNT 3425 (A) NRRE /cu mm    FLD NUCLEATED CELLS 35 (A) NRRE /cu mm    FLD SEGS 54 %    FLD BANDS 0 %    FLD LYMPHS 21 %    FLD MONOCYTES 12 %    FLD EOSINS 0 %    MACROPHAGE 13 %   LDH, BODY FLUID    Collection Time: 03/01/17  5:36 PM   Result Value Ref Range    Fluid Type: PLEURAL FLUID      LD, body fld.  143 U/L   CULTURE, BODY FLUID Willistine Fam STAIN    Collection Time: 03/01/17  5:36 PM   Result Value Ref Range    Special Requests: NO SPECIAL REQUESTS      GRAM STAIN RARE  WBC'S        GRAM STAIN NO ORGANISMS SEEN      Culture result: NO GROWTH AFTER 15 HOURS     PROTEIN TOTAL, FLUID    Collection Time: 03/01/17  5:36 PM   Result Value Ref Range    Fluid Type: PLEURAL FLUID      Protein total, body fld. 2.1 g/dL   GLUCOSE, FLUID    Collection Time: 03/01/17  5:36 PM   Result Value Ref Range    Fluid Type: PLEURAL FLUID      Glucose, body fld. 197 MG/DL   GLUCOSE, POC    Collection Time: 03/01/17 11:20 PM   Result Value Ref Range    Glucose (POC) 184 (H) 70 - 110 mg/dL   MAGNESIUM    Collection Time: 03/02/17  3:45 AM   Result Value Ref Range    Magnesium 1.9 1.8 - 2.4 mg/dL   PHOSPHORUS    Collection Time: 03/02/17  3:45 AM   Result Value Ref Range    Phosphorus 6.3 (H) 2.5 - 4.9 MG/DL   CALCIUM, IONIZED    Collection Time: 03/02/17  3:45 AM   Result Value Ref Range    Ionized Calcium 1.02 (L) 1.12 - 1.32 MMOL/L   CBC WITH AUTOMATED DIFF    Collection Time: 03/02/17  3:45 AM   Result Value Ref Range    WBC 8.3 4.6 - 13.2 K/uL    RBC 2.79 (L) 4.70 - 5.50 M/uL    HGB 7.8 (L) 13.0 - 16.0 g/dL    HCT 23.5 (L) 36.0 - 48.0 %    MCV 84.2 74.0 - 97.0 FL    MCH 28.0 24.0 - 34.0 PG    MCHC 33.2 31.0 - 37.0 g/dL    RDW 15.7 (H) 11.6 - 14.5 %    PLATELET 983 909 - 483 K/uL    MPV 11.0 9.2 - 11.8 FL    NEUTROPHILS 93 (H) 40 - 73 %    LYMPHOCYTES 5 (L) 21 - 52 %    MONOCYTES 2 (L) 3 - 10 %    EOSINOPHILS 0 0 - 5 %    BASOPHILS 0 0 - 2 %    ABS. NEUTROPHILS 7.6 1.8 - 8.0 K/UL    ABS. LYMPHOCYTES 0.4 (L) 0.9 - 3.6 K/UL    ABS. MONOCYTES 0.2 0.05 - 1.2 K/UL    ABS. EOSINOPHILS 0.0 0.0 - 0.4 K/UL    ABS.  BASOPHILS 0.0 0.0 - 0.06 K/UL    DF AUTOMATED     METABOLIC PANEL, BASIC    Collection Time: 03/02/17  3:45 AM   Result Value Ref Range    Sodium 141 136 - 145 mmol/L    Potassium 3.6 3.5 - 5.5 mmol/L    Chloride 105 100 - 108 mmol/L    CO2 17 (L) 21 - 32 mmol/L    Anion gap 19 (H) 3.0 - 18 mmol/L    Glucose 174 (H) 74 - 99 mg/dL  (H) 7.0 - 18 MG/DL    Creatinine 4.38 (H) 0.6 - 1.3 MG/DL    BUN/Creatinine ratio 30 (H) 12 - 20      GFR est AA 16 (L) >60 ml/min/1.73m2    GFR est non-AA 13 (L) >60 ml/min/1.73m2    Calcium 7.5 (L) 8.5 - 10.1 MG/DL   GLUCOSE, POC    Collection Time: 03/02/17  6:15 AM   Result Value Ref Range    Glucose (POC) 194 (H) 70 - 110 mg/dL   GLUCOSE, POC    Collection Time: 03/02/17 12:58 PM   Result Value Ref Range    Glucose (POC) 143 (H) 70 - 110 mg/dL             Telemetry:AFIB    Imaging:  I have personally reviewed the patients radiographs and have reviewed the reports:  CXR 2 /27  1. Lines and tubes in good position. 2. Stable chest with findings consistent with bilateral pleural effusion and  underlying areas of atelectasis/infiltrate. CT c/a/p 2/23/17  1. Postprocedural changes from same day percutaneous gastrostomy catheter  placement, with gastrostomy catheter in expected position. No evidence of  extravasated contrast. There is small amount of free intraperitoneal gas which  is not unexpected given recent placement. 2. Postsurgical changes of laparotomy and small bowel resection for management  of prior small bowel obstruction. Nonspecific mesenteric swelling noted region  of suture material present in the right lower quadrant; however, no evidence of  recurrent bowel obstruction present. Mild gaseous distention of the colon is  present, possibly related to mild postop ileus. 3. Small-moderate bilateral pleural effusions, new from prior CT examination,  with increased body wall edema, small amount of pelvic fluid. 4. Small amount of intravesicular air , presumably from recent prior [place]  catheter placement.        Derek Clemens, JESSICA

## 2017-03-02 NOTE — PROGRESS NOTES
[id number]% HR-89, RR-23. ETT noted to be patent and secure. -SBT PS-7, PEEP-5, FIO2-30% O2 Sats-100% HR-19, RSBI-53. Respiratory will continue to monitor. Pt. Noted to be easily stimulated and agitated by excessive noise, Nurse aware and door shut. Respiratory will continue to monitor-[facility]     -1115-SBT ended a this time. RR-42-51, RSBI-125 despite efforts of coaching and minimizing stimulation. O2 Sats-100% HR-84. Pt. Returned to full ventilatory support ACVC+ 10, VT-500, PEEP-5, FIO2-30%. O2 Sats-100% HR-67, RR-24. Pt to receive lasix. -[facility]        -1445-Pt. Placed on SBT PS-7, PEEP-5, FIo2-30%. O2 Sats-100% HR-98, RR-23. RSBI-53. Respiratory will continue to monitor. -[address]     -1540-Pt. Returned to full ventilatory support. ACVC+ 10, VT-500, PEEP-5, FIO2-30%. Pt. Noted to be tachycardiac HR-122-124. O2 Sats -99% HR-119, RR-23, currently. ETT remains patent and secure. -[address]     -1605-Pt. Found on PS-7, PEEP-2, FIO2-30%. O2 sats-98% HR-118, RR-33. Per RN patient was given Haldol per MD and placed on SBT by MD.-[facility]     -8048-Pt.  Extubated per MD order and placed on O2 via nasal cannula at 5lpm. O2 Sats-97% HR-118, RR-27.-[facility]

## 2017-03-03 NOTE — PROGRESS NOTES
RENAL PROGRESS NOTE        Paola Sosa         Assessment/Plan:     · SUSAN (ischemic atn in a setting of sbo). Non oliguric, scr is up again slightly, bun is up and in the uremic range. Patient is progressing towards dialysis, although no acute indications today. Discussed with daughter. She overall wants \"everything done\". Per her patient was highly functioning prior to current illness and \"wants to fight\" so he can go home. In terms of dialysis, daughter wants to see if patient's kidneys improve on their own over next few days. If not, she is open to acute dialysis. · Met acidosis. Stable, I am reluctant to give Na bicarb due to volume overload. · Volume overload. Improving, continue iv lasix. · A. Fib. Rate is better controlled at this time. AC is on hold. Off neyosynephrin. · Aspiration pneumonia/sepsis. On abx. · Resp failure. · S/p small bowel resection for sbo on 2/13. · Anemia. S/p multiple transfusions. Will add epogen. · Hyperphosphatemia. Add renvela powder. Subjective:  Patient complaints off: Stable after extubation. Weak but alert, appropriate. Denies cp/sob. Tolerates tf.       Patient Active Problem List   Diagnosis Code    Small bowel obstruction ([facility] [address] K56.69    Atrial fibrillation with rapid ventricular response (HCC) I48.91    Postoperative anemia D64.9    Feeding difficulties R63.3    Debility R53.81       Current Facility-Administered Medications   Medication Dose Route Frequency Provider Last Rate Last Dose    ELECTROLYTE REPLACEMENT PROTOCOL  1 Each Other PRN Sofía Stoll, DO        ELECTROLYTE REPLACEMENT PROTOCOL  1 Each Other PRN Sofía Stoll, DO        ELECTROLYTE REPLACEMENT PROTOCOL  1 Each Other PRN Sofía Stoll, DO        ELECTROLYTE REPLACEMENT PROTOCOL  1 Each Other PRN Jes Burns DO [place]        HYDROcodone-acetaminophen (HYCET) 0.5-21.7 mg/mL oral solution 5-7.5 mg  5-7.5 mg Per G Tube Q4H PRN Dale Dickson PA-C   7.5 mg at 03/03/17 0554    haloperidol lactate (HALDOL) injection 1-2 mg  1-2 mg IntraVENous Q2H PRN King Gotti MD   2 mg at 03/02/17 1555    0.9% sodium chloride infusion 250 mL  250 mL IntraVENous PRN Madan Taylor NP        HYDROmorphone (PF) (DILAUDID) injection 0.5 mg  0.5 mg IntraVENous Q3H PRN Madan Taylor NP   0.5 mg at 03/02/17 0254    chlorhexidine (PERIDEX) 0.12 % mouthwash 15 mL  15 mL Oral BID Jeyson Hutton MD   Stopped at 03/03/17 0900    hydroxypropyl methylcellulose (ISOPTO TEARS) 0.5 % ophthalmic solution 1 Drop  1 Drop Both Eyes PRN Jeyson Hutton MD   1 Drop at 02/28/17 0052    ferrous sulfate tablet 325 mg  325 mg Oral ACB [facility], DO   325 mg at 03/03/17 0815    nystatin (MYCOSTATIN) 100,000 unit/mL oral suspension 500,000 Units  500,000 Units Oral QID [facility], DO   500,000 Units at 03/03/17 1319    sodium chloride (NS) flush 5-10 mL  5-10 mL IntraVENous Q8H Mare Valenzuela MD   10 mL at 03/03/17 1321    simethicone (MYLICON) 46CE/8.9TY oral drops 80 mg  1.2 mL Oral Multiple Vianney Duval MD        fentaNYL citrate (PF) injection  mcg   mcg IntraVENous Multiple Vianney Duval MD   50 mcg at 03/01/17 1234    pantoprazole (PROTONIX) 40 mg in sodium chloride 0.9 % 10 mL injection  40 mg IntraVENous BID Gracie Gómez, DO   40 mg at 03/03/17 0815    piperacillin-tazobactam (ZOSYN) 3.375 g in 0.9% sodium chloride (MBP/ADV) 100 mL MBP EXTENDED 4 HOUR INFUSION###  3.375 g IntraVENous Q12H Gracie Gómez, DO 25 mL/hr at 03/03/17 1040 3.375 g at 03/03/17 1040    midazolam (VERSED) injection 1-4 mg  1-4 mg IntraVENous Q2H PRN Rona A Day, PA   1 mg at 03/02/17 1410    heparin (porcine) injection 5,000 Units  5,000 Units SubCUTAneous [address] [facility],  5,000 Units at 03/03/17 1319    acetaminophen (TYLENOL) suppository 650 mg  650 mg Rectal Q4H PRN Domitila Vázquez, DO   650 mg at 02/20/17 1037    zolpidem (AMBIEN) tablet 5 mg  5 mg Oral QHS PRN Garth Perry, DO        insulin lispro (HUMALOG) injection   SubCUTAneous Q6H Domitila Vázquez, DO   3 Units at 03/03/17 1319    glucose chewable tablet 16 g  4 Tab Oral PRN Garth Perry, DO        glucagon (GLUCAGEN) injection 1 mg  1 mg IntraMUSCular PRN Garth Perry, DO        dextrose (D50W) injection syrg 12.5-25 g  25-50 mL IntraVENous PRN Garth Perry, DO   25 g at 02/23/17 2205    sodium chloride (NS) flush 10 mL  10 mL InterCATHeter PRN Garth Perry, DO   40 mL at 02/28/17 0254    bacitracin 500 unit/gram packet 1 Packet  1 Packet Topical PRN Garth Perry,         tamsulosin [facility]) capsule 0.4 mg  0.4 mg Oral DAILY Garth Perry, DO   0.4 mg at 03/03/17 0815    0.9% sodium chloride infusion 250 mL  250 mL IntraVENous PRN Garth Perry, DO        phenol throat spray (CHLORASEPTIC) 1 Spray  1 [place] Oral PRN Papito Mackey MD   1 [place] at 02/20/17 1158    sodium chloride (NS) flush 5-10 mL  5-10 mL IntraVENous Oralia Luis MD   Stopped at 03/03/17 1400    0.9% sodium chloride infusion 250 mL  250 mL IntraVENous PRN Thalia Minaya CRNA        sodium chloride (NS) flush 5-10 mL  5-10 mL IntraVENous Q8H Mariano Black MD   Stopped at 03/03/17 1400    ondansetron (ZOFRAN) injection 4 mg  4 mg IntraVENous Q4H PRN Mariano Black MD   4 mg at 02/15/17 0225    0.9% sodium chloride infusion 250 mL  250 mL IntraVENous PRN Mariano Black MD           Objective  Vitals:    03/03/17 0600 03/03/17 0700 03/03/17 0800 03/03/17 1200   BP: 117/80 115/54     Pulse: (!) 119 88     Resp: 30 17     Temp:   97.3 °F (36.3 °C) 97.7 °F (36.5 °C)   SpO2: 100% 100%     Weight:       Height:             Intake/Output Summary (Last 24 hours) at 03/03/17 1352  Last data filed at 03/03/17 0700   Gross per 24 hour   Intake             1160 ml   Output             1299 ml   Net             -139 ml           Admission weight: Weight: 63.5 kg (140 lb) (02/12/17 0603)  Last Weight Metrics:  Weight Loss Metrics 3/3/2017 1/4/2016 11/8/2015   Today's Wt 175 lb 0.7 oz 145 lb 140 lb   BMI 23.74 kg/m2 19.66 kg/m2 18.98 kg/m2             Physical Assessment:     General: extubated, nad, follows commands. Daughter at the bedise. ET in place. Neck: No jvd. LUNGS: diminished air entry at the bases, bl exp rhonchi. Rt pleural catheter. CVS EXM: S1, S2  RRR. Abdomen: non distended. PEG in place. Abdominal dressings intact. 2+ scrotal edema. Lower Extremities:  1-2+  edema. Lab    CBC w/Diff Recent Labs      03/03/17   0410  03/02/17   0345  03/01/17   0330   WBC  7.7  8.3  7.6   RBC  2.99*  2.79*  2.98*   HGB  8.5*  7.8*  8.2*   HCT  25.4*  23.5*  25.1*   PLT  270  243  245   GRANS  92*  93*  93*   LYMPH  6*  5*  5*   EOS  0  0  0        Chemistry Recent Labs      03/03/17   0410  03/02/17   1950  03/02/17   0345  03/01/17   0330   GLU  145*   --   174*  145*   NA  141   --   141  138   K  4.0  3.9  3.6  3.8   CL  105   --   105  103   CO2  19*   --   17*  19*   BUN  136*   --   132*  120*   CREA  4.55*   --   4.38*  3.98*   CA  7.8*   --   7.5*  7.6*   AGAP  17   --   19*  16   BUCR  30*   --   30*  30*   PHOS  7.1*   --   6.3*  5.6*         No results found for: IRON, FE, TIBC, IBCT, PSAT, FERR   Lab Results   Component Value Date/Time    Calcium 7.8 03/03/2017 04:10 AM    Phosphorus 7.1 03/03/2017 04:10 AM        Romel Beckford M.D.   [facility]  Phone

## 2017-03-03 NOTE — PROGRESS NOTES
1930 Bedside shift change report given to marky rn (oncoming nurse) by Gary Pa rn (offgoing nurse). Report included the following information SBAR, ED Summary and Recent Results. Side rails up x 3, call light within reach . Hob elevated 30 degree. 2000 assessment completed. johnson and oral care provided. incont stool cleansed and pericare provided. zinc cream applied to red area on sacrum and periarea. Tolerated well. 2115 placed on bipap after oral care provided at 40%. 2200 repositioned in bed, placed back on nasal canua humdified at 5 l , pt tachycardic and keep pulling at mask., CARMEN [facility] pac NOTIFIED OF PT NOT WEARING BIPAP, NO NEW ORDERS. 2332 C/O PAIN MEDICATED.   0000 johnson and oral care provided. Assessment completed. 0030 SLEEPING.  0200 RESTING IN BED.   0400 ASSESSMENT COMPLETED. ORAL AND JOHNSON CARE PROVIDED.   0532 C/O LEG PAIN, MEDICATED AS ORDERED.  0630 SLEEPING. RESP UNLABORED.  0730 Bedside shift change report given to Odette Enrique (oncoming nurse) by Simba Vallejo RN (offgoing nurse). Report included the following information SBAR, Kardex and Recent Results. ECHO TECH IN DOING ECHO. PT SLEEPING. SIDE RAILS UP X 3,  ALL LIGHT WITHIN REACH. HOB ELEVATED 30 DEGREES.

## 2017-03-03 NOTE — PROGRESS NOTES
1900 Bedside and Verbal shift change report given to [address] [place] [address]  (oncoming nurse) by Pawan Iraheta RN (offgoing nurse). Report included the following information SBAR, Kardex, ED Summary, Intake/Output, MAR, Recent Results and Cardiac Rhythm AFib.

## 2017-03-03 NOTE — PROGRESS NOTES
0800-Received pt resting in bed with eyes closed, easily awakened, no sign of distress, vs stable, Able to state name, year, and that he is in a hospital in [place], calm and cooperative, will continue to monitor  1000-Resting in bed with eyes closed, no sign of distress, vs stable  1200-No sign of distress, family at bedside  1400-Resting in bed with eyes open, calls out for help when needed, vs stable, will continue to monitor  1600-Resting in bed with eyes closed, no sign of distress, vs stable  1800-No change in pt status, no sign of distress

## 2017-03-03 NOTE — PROGRESS NOTES
Daily Progress Note: 3/3/2017 3:03 PM   Admit Date: 2/12/2017    Patient seen in follow up for multiple medical problems as listed below:  Patient Active Problem List   Diagnosis Code    Small bowel obstruction ([facility] [address] K56.69    Atrial fibrillation with rapid ventricular response ([facility] [address] I48.91    Postoperative anemia D64.9    Feeding difficulties R63.3    Debility R53.81       Assesment     80 y.o. male with a PMHx of HTN, TIA, SIDNEY, SBO and abdominal hernia who presented to the ED with the acute onset of left upper quadrant abdominal pain. CT scan of the abd/pelvis in the ED that was positive for a SBO. s/p Exlap and small bowel resection (191cm) 2/13 am Dr Adeel Cobos. Course complicated by Afib+RVR 4/36 am requiring rate control and cardiology involvement. Patient only responded temporarily to diltiazem boluses and diltiazem drip to 5mg. The evening of 2/14 the patients rate remained 130-140 and he was becoming increasingly hypotensive, cardizem gtt was stopped and patient was given 250mcg of IV Digoxin with HR resolution into 60's. Eventually coverted to lopresser with HTN medication adjustment. Failed MBS 2/16 and placed NPO, no hx of dysphagia this was thought due to significant trauma from NGT placement for surgery. Was to have Dobhoff placed 2/17 with IR, they could not get a tube down. He received D10 overnight is his PIV, then PICC and TPN 2/18 am. He had repeat speech eval on 02/22, but failed this, leading to PEG tube placement on the following day. His renal function has continued to worsen while intubated in the ICU and nephrology was consulted. On 02/23 he went into acute respiratory distress presumed to be 2/2 possible aspiration pneumonia and was transferred to the ICU and later intubated on pressor support for hypotension. He repeatedly failed spontaneous breathing tests 2/2 tachypnea. His HR remains poorly controlled and diltiazem was ineffective due to hypotension.  Amiodarone gtt was tried several times without success due to bradycardia, Cardiology was reconsulted. Patients cardiac and respiratory status eventually improved and was extubated 3/2 evening. He has developed significant LE edema and anasarca.     Acute hypoxemic, hypercapnic respiratory failure after brief respiratory arrest-extubated 3/  Aspiration Pneumonia/Pneumonitis  Sepsis criteria with leukocytosis, hypotension, requiring temp pressor support. Possible aspiration source. B/L Pleural Effusions  SBO s/p Exlap and Small Bowel Resection   Severe Dysphagia on Tube feeds through PEG  Atrial Fibrillation with RVR -Back to Digoxin when GFR improved  Acute Metabolic Encephalopathy Likely 2/2 Benzodiazepam, improving  SUSAN/ATN on CKD Stage IIIb -Neph consulting. Cr worsening. Fluid retention. prn bicarb  Hyperkalemia Possibly 2/2 Renal Failure, Possible Heparin-induced w/ EKG Changes  L Inguinal Hernia  HTN  TIA on Eliquis normaly (held)  Iron Deficiency Anemia  Anasarca and LE edema - due to SUSAN, malnutrition, and relative HF from AfibRVR on lasix      DVT Protocol Active: yes  Code Status:  Full Code     Disposition: unk    Subjective:     CC: Abdominal Pain (left lower abdomen) and Leg Pain (left leg)    Interval History: Extubated last evening. sleepy but talking, making eye contact. Bun and Cr still continuing to rise. Objective:     Visit Vitals    /54    Pulse 88    Temp 97.3 °F (36.3 °C)    Resp 17    Ht 6' (1.829 m)    Wt 79.4 kg (175 lb 0.7 oz)    SpO2 100%    BMI 23.74 kg/m2       Temp (24hrs), Av.7 °F (36.5 °C), Min:97.2 °F (36.2 °C), Max:98.3 °F (36.8 °C)        Intake/Output Summary (Last 24 hours) at 17 1201  Last data filed at 17 0700   Gross per 24 hour   Intake             1250 ml   Output             1329 ml   Net              -79 ml       Gen: NAD, sleepy but talking, making eye contact  HEENT:  PATRICIA, EOMI. Neck: No Bruits/JVD   Lungs:   Upper phlegm.  Poor respiratory effort  Heart:   RR S1 S2 without M/R/G  Abdomen: ND,NT, BSX4,   Extremities:   2cm pitting LE edema up thighs. No cyanosis.   Skin:  no jaundice/lesions      Data Review:     Meds/Labs/Tests reviewed    Current Shift:     Last three shifts:  03/01 1901 - 03/03 0700  In: 2180 [I.V.:505]  Out: 2677 [Urine:1977]  Recent Labs      03/03/17 0410  03/02/17   0345  03/01/17   0330   WBC  7.7  8.3  7.6   RBC  2.99*  2.79*  2.98*   HGB  8.5*  7.8*  8.2*   HCT  25.4*  23.5*  25.1*   PLT  270  243  245   GRANS  92*  93*  93*   LYMPH  6*  5*  5*   EOS  0  0  0       Recent Labs      03/03/17 0410  03/02/17   1950  03/02/17   0345  03/01/17   0330   BUN  136*   --   132*  120*   CREA  4.55*   --   4.38*  3.98*   CA  7.8*   --   7.5*  7.6*   K  4.0  3.9  3.6  3.8   NA  141   --   141  138   CL  105   --   105  103   CO2  19*   --   17*  19*   PHOS  7.1*   --   6.3*  5.6*   GLU  145*   --   174*  145*        Lab Results   Component Value Date/Time    Glucose 145 03/03/2017 04:10 AM    Glucose 174 03/02/2017 03:45 AM    Glucose 145 03/01/2017 03:30 AM    Glucose 163 02/28/2017 02:12 AM    Glucose 149 02/27/2017 04:00 AM          Care coordination with Nursing/Consultants/staff: 10  Prior history, labs, and charting reviewed: 20    Procedures/Imaging:  s/p Exlap and small bowel resection 2/13 am Dr Lizette Rowe  [id number]%  Failed XR guided dobhoff 2/17  PICC 2/18 with TPN  PEG 2/23  Intubation 2/23, extubation 3/2    Total time spent with chart review, patient examination/education, discussion with staff on case,documentation and medication management / adjustment  :  27 Minutes      Dr Bharati Orellana  Pager: [phone]

## 2017-03-03 NOTE — PROGRESS NOTES
[facility]  Pulmonary, Critical Care, and Sleep Medicine      Name: Kelsie Crooks MRN: [id number]   : 1927 Hospital: [address] [place] [address]   Date: 3/3/2017          Critical Care Progress Notes    3/3/2017  Awake and alert, FC, no distress noted,   Extubated 3/2, doing well on O2/NC @ Lpm with sats 100%, did not do well on BIPAP last night. Anasarca, Right UE swollen than left UE-Duplex right UE, no DVT. Hb  8.5 today,  s/p 1 unit PRBC , will hold further transfusion for now,  no active external bleeding  Elevated TSH and normal Free T4, low Free T3-no hx hypothyroidism, will monitor for now. Off pressors,   Sputum +MSSA-d/c Vancomycin (), continue zosyn  Glucose stable   WBC wnl, currently afebrile. IMPRESSION:   · Acute hypoxemic, hypercapnic respiratory failure likely aspiration with previous brief respiratory arrest, now requiring MV, Vent ( intubated ), Day # 7, Extubated 3/2, on O2/NC doing well, BIPAP PRN   · Bilateral pleural effusions with possible underlying infiltrate, right chest tube 3/1 (drain 1 L). · AMS, multifactorial with hypoglycemia and sedation given for PEG , now improved  · Possible sepsis with leukocytosis, hypotension, source likely aspiration, +sputum MSSA  · Hypotension requiring pressor support, afib/volume depletion/sepsis, now off pressors  · A-fib with RVR, stable  · SUSAN   · Anemia  · Hyperkalemia, now resolved  · Dysphagia s/p PEG   · Hx recent Small Bowel Obstruction s/p ex-lap with resection. · Hypoalbuminemia  · Hypocalcemia, replaced      RECOMMENDATIONS:   · Resp -   Extubated 3/2. On O2/NC, titrate to keep SpO2>90%,  BIPAP PRN adjust for comfort. S/P US of chest 3/1. Right chest tube intact drain a total 1 Liter. CXR 3/3 reviewed. ABG (last ) PRN/or condition change. Aspiration precautions, keep HOB and judicious  bronchial hygiene. Lasix/albumin given ().  Lasix 40 mg X 1 (3/1)& repeat (3/2),   · ID - Cont zosyn, D/c'd Vanco (2/28) due to sputum +MSSA. LA normal. No leukocytosis and currently afebrile. Trend WBC and temp. Severe sepsis bundle  · CVS - Off Jhon per MAP>65. Right UE PICC in place. Currently off xarelto. · Heme/Onc- Hb better, 1 unit PRBC 2/28, will hold further transfusion for now, ferrous sulfate. Trend hgb and monitor for any active bleeding. · Renal - Renal following. Off Nabicarb per renal.  Placed on electrolyte replacement protocol. Monitor K+ closely, Avoid Nephrotoxic med's, creat continue to trend up, per nephrology note, had discussion with daughter for plan dialysis-non emergent. · Endocrine - Monitor glucose closely, avoid hypoglycemia, TSH elevated 3.9(2/24), repeat TSH (2/28) 8.07, free T4 wnl 0.8 and T3 free low 0.9- no hx hypothyroidism. · Neuro/ Pain/ Sedation - Versed, Fentanyl, Dilaudid and Haldol PRN for pain/sedation. Minimize sedating medication when possible, closely  Monitor. · GI - Cont TF- at goal. PPI   · Prophylaxis - Heparin, Protonix  · Palliative care following  · FULL CODE       Past Medical History:   Diagnosis Date    Hypertension     TIA (transient ischemic attack)       History reviewed. No pertinent surgical history. Prior to Admission medications    Medication Sig Start Date End Date Taking? Authorizing Provider   apixaban (ELIQUIS) 2.5 mg tablet Take 2.5 mg by mouth every twelve (12) hours. Yes Sadie Dent MD   amLODIPine (NORVASC) 10 mg tablet Take 10 mg by mouth daily. Yes Sadie Dent MD   lisinopril (PRINIVIL, ZESTRIL) 10 mg tablet Take 10 mg by mouth daily. Yes Sadie Dent MD   Hydrochlorothiazide (HYDRODIURIL) 12.5 mg tablet Take 12.5 mg by mouth daily. Yes Sadie Dent MD   ferrous sulfate 325 mg (65 mg iron) tablet Take 325 mg by mouth Daily (before breakfast).    Yes Sadie Dent MD     Current Facility-Administered Medications   Medication Dose Route Frequency    epoetin liam (EPOGEN;PROCRIT) injection 40,000 Units  40,000 Units IntraVENous Q7D    sevelamer carbonate (RENVELA) oral powder 2,400 mg  2.4 g Oral TID WITH MEALS    chlorhexidine (PERIDEX) 0.12 % mouthwash 15 mL  15 mL Oral BID    ferrous sulfate tablet 325 mg  325 mg Oral ACB    nystatin (MYCOSTATIN) 100,000 unit/mL oral suspension 500,000 Units  500,000 Units Oral QID    sodium chloride (NS) flush 5-10 mL  5-10 mL IntraVENous Q8H    pantoprazole (PROTONIX) 40 mg in sodium chloride 0.9 % 10 mL injection  40 mg IntraVENous BID    piperacillin-tazobactam (ZOSYN) 3.375 g in 0.9% sodium chloride (MBP/ADV) 100 mL MBP EXTENDED 4 HOUR INFUSION###  3.375 g IntraVENous Q12H    heparin (porcine) injection 5,000 Units  5,000 Units SubCUTAneous Q8H    insulin lispro (HUMALOG) injection   SubCUTAneous Q6H    tamsulosin (FLOMAX) capsule 0.4 mg  0.4 mg Oral DAILY    sodium chloride (NS) flush 5-10 mL  5-10 mL IntraVENous Q8H    sodium chloride (NS) flush 5-10 mL  5-10 mL IntraVENous Q8H     No Known Allergies   Social History   Substance Use Topics    Smoking status: Former Smoker    Smokeless tobacco: Not on file    Alcohol use No      History reviewed. No pertinent family history. Review of Systems:  Review of systems not obtained due to patient factors.     Objective:   Vital Signs:    Visit Vitals    /70    Pulse 99    Temp 97.5 °F (36.4 °C)    Resp 24    Ht 6' (1.829 m)    Wt 79.4 kg (175 lb 0.7 oz)    SpO2 100%    BMI 23.74 kg/m2       O2 Device: Nasal cannula   O2 Flow Rate (L/min): 4 l/min   Temp (24hrs), Av.7 °F (36.5 °C), Min:97.3 °F (36.3 °C), Max:98.3 °F (36.8 °C)       Intake/Output:   Last shift:      701 - 1900  In: 510 [I.V.:100]  Out: -   Last 3 shifts: 1901 -  07  In: 2180 [I.V.:505]  Out: 4278 [Urine:]    Intake/Output Summary (Last 24 hours) at 17 1614  Last data filed at 17 1600   Gross per 24 hour   Intake             1500 ml   Output             1074 ml   Net              426 ml Ventilator Settings:  Mode Rate Tidal Volume Pressure FiO2 PEEP   Assist control, VC+   500 ml  7 cm H2O 30 % 5 cm H20     Peak airway pressure: 24 cm H2O    Minute ventilation: 16.5 l/min      ARDS network Guidelines: Lung protective strategy and Pl pressure goals____________      Physical Exam:    General/Neuro:  Opens eyes to voice, on vent, follows simple commands all 4 extremities, breathing over vent, appears stated age   Head:  Normocephalic, without obvious abnormality, atraumatic. Eyes:  Conjunctivae/corneas clear. PERRL   Nose: Nares normal. Septum midline. Mucosa normal. No drainage. Throat: ETT in place   Neck: Supple, symmetrical       Lungs:   Good AE B, diminished bibasilar, no wheeze       Heart:  Irregular , no m   Abdomen:   Soft, +tenderness Bowel sounds normal.  PEG site w/o bleeding. Extremities: Extremities normal, atraumatic, no cyanosis or edema. Pulses: 2+ and symmetric all extremities.    Skin: Skin color, texture, turgor normal. No rashes or lesions             Data:     Recent Results (from the past 24 hour(s))   GLUCOSE, POC    Collection Time: 03/02/17  6:18 PM   Result Value Ref Range    Glucose (POC) 175 (H) 70 - 110 mg/dL   VANCOMYCIN, TROUGH    Collection Time: 03/02/17  7:50 PM   Result Value Ref Range    Vancomycin,trough 19.7 10.0 - 20.0 ug/mL    Reported dose date: 22817      Reported dose time: 206      Reported dose: 1000 UNITS   CALCIUM, IONIZED    Collection Time: 03/02/17  7:50 PM   Result Value Ref Range    Ionized Calcium 1.05 (L) 1.12 - 1.32 MMOL/L   POTASSIUM    Collection Time: 03/02/17  7:50 PM   Result Value Ref Range    Potassium 3.9 3.5 - 5.5 mmol/L   GLUCOSE, POC    Collection Time: 03/02/17 11:42 PM   Result Value Ref Range    Glucose (POC) 173 (H) 70 - 110 mg/dL   MAGNESIUM    Collection Time: 03/03/17  4:10 AM   Result Value Ref Range    Magnesium 2.0 1.8 - 2.4 mg/dL   PHOSPHORUS    Collection Time: 03/03/17  4:10 AM   Result Value Ref Range Phosphorus 7.1 (H) 2.5 - 4.9 MG/DL   CALCIUM, IONIZED    Collection Time: 03/03/17  4:10 AM   Result Value Ref Range    Ionized Calcium 1.05 (L) 1.12 - 1.31 MMOL/L   METABOLIC PANEL, BASIC    Collection Time: 03/03/17  4:10 AM   Result Value Ref Range    Sodium 141 136 - 145 mmol/L    Potassium 4.0 3.5 - 5.5 mmol/L    Chloride 105 100 - 108 mmol/L    CO2 19 (L) 21 - 32 mmol/L    Anion gap 17 3.0 - 18 mmol/L    Glucose 145 (H) 74 - 99 mg/dL     (H) 7.0 - 18 MG/DL    Creatinine 4.55 (H) 0.6 - 1.3 MG/DL    BUN/Creatinine ratio 30 (H) 12 - 20      GFR est AA 15 (L) >60 ml/min/1.73m2    GFR est non-AA 12 (L) >60 ml/min/1.73m2    Calcium 7.8 (L) 8.5 - 10.1 MG/DL   CBC WITH AUTOMATED DIFF    Collection Time: 03/03/17  4:10 AM   Result Value Ref Range    WBC 7.7 4.6 - 13.2 K/uL    RBC 2.99 (L) 4.70 - 5.50 M/uL    HGB 8.5 (L) 13.0 - 16.0 g/dL    HCT 25.4 (L) 36.0 - 48.0 %    MCV 84.9 74.0 - 97.0 FL    MCH 28.4 24.0 - 34.0 PG    MCHC 33.5 31.0 - 37.0 g/dL    RDW 15.9 (H) 11.6 - 14.5 %    PLATELET 811 932 - 147 K/uL    MPV 11.4 9.2 - 11.8 FL    NEUTROPHILS 92 (H) 40 - 73 %    LYMPHOCYTES 6 (L) 21 - 52 %    MONOCYTES 2 (L) 3 - 10 %    EOSINOPHILS 0 0 - 5 %    BASOPHILS 0 0 - 2 %    ABS. NEUTROPHILS 7.0 1.8 - 8.0 K/UL    ABS. LYMPHOCYTES 0.5 (L) 0.9 - 3.6 K/UL    ABS. MONOCYTES 0.2 0.05 - 1.2 K/UL    ABS. EOSINOPHILS 0.0 0.0 - 0.4 K/UL    ABS. BASOPHILS 0.0 0.0 - 0.06 K/UL    DF AUTOMATED     GLUCOSE, POC    Collection Time: 03/03/17  5:56 AM   Result Value Ref Range    Glucose (POC) 132 (H) 70 - 110 mg/dL   GLUCOSE, POC    Collection Time: 03/03/17 11:00 AM   Result Value Ref Range    Glucose (POC) 157 (H) 70 - 110 mg/dL             Telemetry:AFIB    Imaging:  I have personally reviewed the patients radiographs and have reviewed the reports:  CXR 2 /27  1. Lines and tubes in good position. 2. Stable chest with findings consistent with bilateral pleural effusion and  underlying areas of atelectasis/infiltrate.      CT c/a/p 2/23/17  1. Postprocedural changes from same day percutaneous gastrostomy catheter  placement, with gastrostomy catheter in expected position. No evidence of  extravasated contrast. There is small amount of free intraperitoneal gas which  is not unexpected given recent placement. 2. Postsurgical changes of laparotomy and small bowel resection for management  of prior small bowel obstruction. Nonspecific mesenteric swelling noted region  of suture material present in the right lower quadrant; however, no evidence of  recurrent bowel obstruction present. Mild gaseous distention of the colon is  present, possibly related to mild postop ileus. 3. Small-moderate bilateral pleural effusions, new from prior CT examination,  with increased body wall edema, small amount of pelvic fluid. 4. Small amount of intravesicular air , presumably from recent prior Conner  catheter placement.        Jet Rivero, NP

## 2017-03-03 NOTE — PROGRESS NOTES
2115 Orders to placed patient on BIPAP at Bedtime. Patient on 14/6 and FiO2 30%. , SpO2 99%, and RR 22.    2131: Patient refusing to wear BIPAP. Patient placed back on 5 lpm via NC. Will continue to monitor patient closely.

## 2017-03-03 NOTE — PROGRESS NOTES
Gastrointestinal Progress Note    Patient Name: [facility]    KAMALA Date: 3/3/2017    Admit Date: 2/12/2017    Assessment-Recommendation:   Tolerating PEG feedings. PEG bumper moved to 5.5 cm.  Will sign off at this time    Subjective:     Stable        Current Facility-Administered Medications   Medication Dose Route Frequency    ELECTROLYTE REPLACEMENT PROTOCOL  1 Each Other PRN    ELECTROLYTE REPLACEMENT PROTOCOL  1 Each Other PRN    ELECTROLYTE REPLACEMENT PROTOCOL  1 Each Other PRN    ELECTROLYTE REPLACEMENT PROTOCOL  1 Each Other PRN    calcium gluconate 2 g in 0.9% sodium chloride 50 mL IVPB  2 g IntraVENous ONCE    HYDROcodone-acetaminophen (HYCET) 0.5-21.7 mg/mL oral solution 5-7.5 mg  5-7.5 mg Per G Tube Q4H PRN    haloperidol lactate (HALDOL) injection 1-2 mg  1-2 mg IntraVENous Q2H PRN    0.9% sodium chloride infusion 250 mL  250 mL IntraVENous PRN    HYDROmorphone (PF) (DILAUDID) injection 0.5 mg  0.5 mg IntraVENous Q3H PRN    chlorhexidine (PERIDEX) 0.12 % mouthwash 15 mL  15 mL Oral BID    hydroxypropyl methylcellulose (ISOPTO TEARS) 0.5 % ophthalmic solution 1 Drop  1 Drop Both Eyes PRN    ferrous sulfate tablet 325 mg  325 mg Oral ACB    nystatin (MYCOSTATIN) 100,000 unit/mL oral suspension 500,000 Units  500,000 Units Oral QID    sodium chloride (NS) flush 5-10 mL  5-10 mL IntraVENous Q8H    simethicone (MYLICON) 97LG/4.6HW oral drops 80 mg  1.2 mL Oral Multiple    fentaNYL citrate (PF) injection  mcg   mcg IntraVENous Multiple    pantoprazole (PROTONIX) 40 mg in sodium chloride 0.9 % 10 mL injection  40 mg IntraVENous BID    piperacillin-tazobactam (ZOSYN) 3.375 g in 0.9% sodium chloride (MBP/ADV) 100 mL MBP EXTENDED 4 HOUR INFUSION###  3.375 g IntraVENous Q12H    midazolam (VERSED) injection 1-4 mg  1-4 mg IntraVENous Q2H PRN    heparin (porcine) injection 5,000 Units  5,000 Units SubCUTAneous Q8H    acetaminophen (TYLENOL) suppository 650 mg  650 mg Rectal Q4H PRN    zolpidem (AMBIEN) tablet 5 mg  5 mg Oral QHS PRN    insulin lispro (HUMALOG) injection   SubCUTAneous Q6H    glucose chewable tablet 16 g  4 Tab Oral PRN    glucagon (GLUCAGEN) injection 1 mg  1 mg IntraMUSCular PRN    dextrose (D50W) injection syrg 12.5-25 g  25-50 mL IntraVENous PRN    sodium chloride (NS) flush 10 mL  10 mL InterCATHeter PRN    bacitracin 500 unit/gram packet 1 Packet  1 Packet Topical PRN    tamsulosin (FLOMAX) capsule 0.4 mg  0.4 mg Oral DAILY    0.9% sodium chloride infusion 250 mL  250 mL IntraVENous PRN    phenol throat spray (CHLORASEPTIC) 1 Spray  1 Spray Oral PRN    sodium chloride (NS) flush 5-10 mL  5-10 mL IntraVENous Q8H    0.9% sodium chloride infusion 250 mL  250 mL IntraVENous PRN    sodium chloride (NS) flush 5-10 mL  5-10 mL IntraVENous Q8H    ondansetron (ZOFRAN) injection 4 mg  4 mg IntraVENous Q4H PRN    0.9% sodium chloride infusion 250 mL  250 mL IntraVENous PRN          Objective:     Physical Exam:    NAD  CV RRR  Abdomen soft NT ND  PEG site clean, dry, no erythema or edema    Data Review:    Labs: Results:       Chemistry Recent Labs      03/03/17   0410  03/02/17   1950  03/02/17   0345  03/01/17   0330   GLU  145*   --   174*  145*   NA  141   --   141  138   K  4.0  3.9  3.6  3.8   CL  105   --   105  103   CO2  19*   --   17*  19*   BUN  136*   --   132*  120*   CREA  4.55*   --   4.38*  3.98*   CA  7.8*   --   7.5*  7.6*   AGAP  17   --   19*  16   BUCR  30*   --   30*  30*      CBC w/Diff Recent Labs      03/03/17   0410  03/02/17   0345  03/01/17   0330   WBC  7.7  8.3  7.6   RBC  2.99*  2.79*  2.98*   HGB  8.5*  7.8*  8.2*   HCT  25.4*  23.5*  25.1*   PLT  270  243  245   GRANS  92*  93*  93*   LYMPH  6*  5*  5*   EOS  0  0  0      Coagulation No results for input(s): PTP, INR, APTT in the last 72 hours.     No lab exists for component: INREXT    Liver Enzymes No results for input(s): TP, ALB, TBIL, AP, SGOT, ALT in the last 72 hours.    No lab exists for component: ABDELRAHMAN Stauffer MD  March 3, 2017

## 2017-03-04 NOTE — PROGRESS NOTES
RENAL DAILY PROGRESS NOTE            Assessment:   1.SUSAN/ATN-Cr/bun about the same, nonoliguric-plans for HD if no recovery or worsening renal fxns by Monday, please see 's note from yesterday detailing plan of care dw family      Plan:   1.continue w current diuretic dose  2. Supportive care  3. Abx  4.  Avoid sedatives   5.add po bicarb   Will chack labs tomorrow and see back on Monday, please call for any questions        Josue Bazzi MD   [id number]:[phone]  3/4/2017  1:03 PM  Subjective:   Sleepy--had some pain pills last night       Objective:   Patient Vitals for the past 24 hrs:   Temp Pulse Resp BP SpO2   03/04/17 1200 97.5 °F (36.4 °C) - - - -   03/04/17 0846 - - - - 100 %   03/04/17 0800 97.9 °F (36.6 °C) 92 18 100/62 100 %   03/04/17 0700 - 88 18 107/59 100 %   03/04/17 0600 - 93 17 (!) 89/61 100 %   03/04/17 0500 - (!) 110 (!) 34 109/66 100 %   03/04/17 0404 - (!) 113 (!) 37 112/53 100 %   03/04/17 0400 97.9 °F (36.6 °C) (!) 111 29 112/53 100 %   03/04/17 0300 - 96 19 107/66 100 %   03/04/17 0200 - 99 22 106/81 100 %   03/04/17 0100 - 100 25 106/62 100 %   03/04/17 0001 97.5 °F (36.4 °C) (!) 114 28 97/63 100 %   03/04/17 0000 97.5 °F (36.4 °C) (!) 114 28 - 100 %   03/03/17 2310 - (!) 113 26 - 100 %   03/03/17 2300 - (!) 116 (!) 39 111/71 91 %   03/03/17 2254 - - - - 93 %   03/03/17 2200 - 93 21 113/78 100 %   03/03/17 2100 - 94 23 104/66 100 %   03/03/17 2033 97.8 °F (36.6 °C) - - - -   03/03/17 2000 97.6 °F (36.4 °C) (!) 109 27 116/73 100 %   03/03/17 1900 - (!) 101 19 102/62 100 %   03/03/17 1800 - (!) 105 24 133/67 100 %   03/03/17 1700 - (!) 109 30 100/63 100 %   03/03/17 1600 97.5 °F (36.4 °C) 99 24 126/70 100 %   03/03/17 1500 - (!) 113 (!) 37 127/74 100 %   03/03/17 1400 - 93 22 108/73 100 %           03/02 1901 - 03/04 0700  In: 2470 [I.V.:655]  Out: 1894 [Urine:1819]    Intake/Output Summary (Last 24 hours) at 03/04/17 1303  Last data filed at 03/04/17 0800   Gross per 24 hour Intake             1210 ml   Output              970 ml   Net              240 ml       Physical Exam:  NAD    HEENT: No JVD  Cardiovascular: S1/S2 regular HB  C/L: CTA katrina   Abdomen: soft NT/ND  Ext:+++Edema     Data Review:     LABS:   Hematology: Recent Labs      03/04/17 0220 03/03/17 0410 03/02/17 0345   WBC  7.3  7.7  8.3   HGB  8.2*  8.5*  7.8*   HCT  25.2*  25.4*  23.5*     Chemistry: Recent Labs      03/04/17 0220 03/03/17 0410 03/02/17   1950  03/02/17 0345   BUN  134*  136*   --   132*   CREA  4.80*  4.55*   --   4.38*   CA  7.9*  7.8*   --   7.5*   K  3.8  4.0  3.9  3.6   NA  141  141   --   141   CL  105  105   --   105   CO2  17*  19*   --   17*   PHOS  6.9*  7.1*   --   6.3*   GLU  131*  145*   --   174*

## 2017-03-04 NOTE — ROUTINE PROCESS
Bedside and Verbal shift change report given to [address] [place], [place] [address]maria c Martinez (oncoming nurse) by Meli Carney RN (offgoing nurse). Report included the following information SBAR, Kardex, Intake/Output, MAR, Recent Results, Med Rec Status and Cardiac Rhythm Afib.

## 2017-03-04 NOTE — PROGRESS NOTES
[facility]: This patient meets P & T approved criteria for conversion from IV to oral therapy for the following medication:     Pantoprazole 40 mg IV q12h for peptic ulcer was discontinued and Pantoprazole 40 mg Granule in apple sauce via GT/PEG BID was ordered. If the patient no longer meets all criteria for oral therapy, the pharmacist will switch back to IV therapy. Thanks.

## 2017-03-04 NOTE — PROGRESS NOTES
Rec'd pt on 3 lpm NC--bipap on standby at side of bed--pt off bipap upon my arrival in am  -pt HR 85, Sat 100% on 3 lpm NC--BS clear & diminished

## 2017-03-04 NOTE — PROGRESS NOTES
[facility]  Pulmonary, Critical Care, and Sleep Medicine      Name: Nat Pal MRN: [id number]   : 1927 Hospital: [address] [place] [address]   Date: 3/4/2017          Critical Care Progress Notes    3/4/2017  Awake and alert, FC, no distress noted,   Extubated 3/2, doing well on O2/NC @ 3 Lpm with sats 100%, did not do well on BIPAP (3/2)  Anasarca, Right UE swollen than left UE-Duplex right UE, no DVT. Hb  8.2,  s/p 1 unit PRBC , will hold further transfusion for now,  no active external bleeding  Elevated TSH and normal Free T4, low Free T3-no hx hypothyroidism, will monitor for now. Off pressors,   Sputum +MSSA-d/c Vancomycin (), continue zosyn  Glucose stable   WBC wnl, currently afebrile. IMPRESSION:   · Acute hypoxemic, hypercapnic respiratory failure likely aspiration with previous brief respiratory arrest, now requiring MV, Vent ( intubated ), Day # 7, Extubated 3/2, on O2/NC doing well, BIPAP PRN   · Bilateral pleural effusions with possible underlying infiltrate, right chest tube 3/1 (drain 1 L). · AMS, multifactorial with hypoglycemia and sedation given for PEG , now improved  · Possible sepsis with leukocytosis, hypotension, source likely aspiration, +sputum MSSA  · Hypotension requiring pressor support, afib/volume depletion/sepsis, now off pressors  · A-fib with RVR, stable  · SUSAN   · Anemia  · Hyperkalemia, now resolved  · Dysphagia s/p PEG   · Hx recent Small Bowel Obstruction s/p ex-lap with resection. · Hypoalbuminemia  · Hypocalcemia, replaced      RECOMMENDATIONS:   · Resp -   Extubated 3/2. On O2/NC, titrate to keep SpO2>90%,  BIPAP PRN adjust for comfort. S/P US of chest 3/1. Right chest tube intact drain a total 1 Liter. CXR 3/3 reviewed. ABG (last ) PRN/or condition change. Aspiration precautions, keep HOB and judicious  bronchial hygiene. Lasix/albumin given ().  Lasix 40 mg X 1 (3/1)& repeat (3/2),   · ID - Cont zosyn, D/c'd Vanco (2/28) due to sputum +MSSA. LA normal. No leukocytosis and currently afebrile. Trend WBC and temp. Severe sepsis bundle  · CVS - Off Jhon per MAP>65. Right UE PICC in place. Currently off xarelto. · Heme/Onc- Hb better, 1 unit PRBC 2/28, will hold further transfusion for now, ferrous sulfate. Trend hgb and monitor for any active bleeding. · Renal - Renal following. Off Nabicarb per renal.  Placed on electrolyte replacement protocol. Monitor K+ closely, Avoid Nephrotoxic med's, creat continue to trend up, per nephrology note, had discussion with daughter for plan dialysis-non emergent. · Endocrine - Monitor glucose closely, avoid hypoglycemia, TSH elevated 3.9(2/24), repeat TSH (2/28) 8.07, free T4 wnl 0.8 and T3 free low 0.9- no hx hypothyroidism. · Neuro/ Pain/ Sedation - Versed, Fentanyl, Dilaudid and Haldol PRN for pain/sedation. Minimize sedating medication when possible, closely  Monitor. · GI - Cont TF- at goal. PPI   · Prophylaxis - Heparin, Protonix  · Palliative care following  · FULL CODE       Past Medical History:   Diagnosis Date    Hypertension     TIA (transient ischemic attack)       History reviewed. No pertinent surgical history. Prior to Admission medications    Medication Sig Start Date End Date Taking? Authorizing Provider   apixaban (ELIQUIS) 2.5 mg tablet Take 2.5 mg by mouth every twelve (12) hours. Yes Sadie Dent MD   amLODIPine (NORVASC) 10 mg tablet Take 10 mg by mouth daily. Yes Sadie Dent MD   lisinopril (PRINIVIL, ZESTRIL) 10 mg tablet Take 10 mg by mouth daily. Yes Sadie Dent MD   Hydrochlorothiazide (HYDRODIURIL) 12.5 mg tablet Take 12.5 mg by mouth daily. Yes Sadie Dent MD   ferrous sulfate 325 mg (65 mg iron) tablet Take 325 mg by mouth Daily (before breakfast).    Yes Sadie Dent MD     Current Facility-Administered Medications   Medication Dose Route Frequency    sevelamer carbonate (RENVELA) oral powder 2,400 mg  2.4 g Oral TID WITH MEALS    epoetin liam (EPOGEN;PROCRIT) injection 40,000 Units  40,000 Units SubCUTAneous Q7D    chlorhexidine (PERIDEX) 0.12 % mouthwash 15 mL  15 mL Oral BID    ferrous sulfate tablet 325 mg  325 mg Oral ACB    nystatin (MYCOSTATIN) 100,000 unit/mL oral suspension 500,000 Units  500,000 Units Oral QID    sodium chloride (NS) flush 5-10 mL  5-10 mL IntraVENous Q8H    pantoprazole (PROTONIX) 40 mg in sodium chloride 0.9 % 10 mL injection  40 mg IntraVENous BID    piperacillin-tazobactam (ZOSYN) 3.375 g in 0.9% sodium chloride (MBP/ADV) 100 mL MBP EXTENDED 4 HOUR INFUSION###  3.375 g IntraVENous Q12H    heparin (porcine) injection 5,000 Units  5,000 Units SubCUTAneous Q8H    insulin lispro (HUMALOG) injection   SubCUTAneous Q6H    tamsulosin (FLOMAX) capsule 0.4 mg  0.4 mg Oral DAILY    sodium chloride (NS) flush 5-10 mL  5-10 mL IntraVENous Q8H    sodium chloride (NS) flush 5-10 mL  5-10 mL IntraVENous Q8H     No Known Allergies   Social History   Substance Use Topics    Smoking status: Former Smoker    Smokeless tobacco: Not on file    Alcohol use No      History reviewed. No pertinent family history. Review of Systems:  Review of systems not obtained due to patient factors.     Objective:   Vital Signs:    Visit Vitals    /62    Pulse 92    Temp 97.9 °F (36.6 °C)    Resp 18    Ht 6' (1.829 m)    Wt 78.9 kg (173 lb 15.1 oz)    SpO2 100%    BMI 23.59 kg/m2       O2 Device: Nasal cannula   O2 Flow Rate (L/min): 3 l/min   Temp (24hrs), Av.7 °F (36.5 °C), Min:97.5 °F (36.4 °C), Max:97.9 °F (36.6 °C)       Intake/Output:   Last shift:       07 -  1900  In: 40   Out: -   Last 3 shifts:  190 -  0700  In: 2470 [I.V.:655]  Out: 1894 [Urine:1819]    Intake/Output Summary (Last 24 hours) at 17 1029  Last data filed at 17 0800   Gross per 24 hour   Intake             1480 ml   Output              970 ml   Net              510 ml      Physical Exam:    General/Neuro: Awake and alert, response appropriately, appear very weak, appears stated age   Head:  Normocephalic, without obvious abnormality, atraumatic. Eyes:  Conjunctivae/corneas clear. PERRL   Nose: Nares normal. Septum midline. Mucosa normal. No drainage. Throat: Neg   Neck: Supple, symmetrical       Lungs:   Good AE B, diminished bibasilar, few rhonchi,no wheeze       Heart:  Irregular , no m   Abdomen:   Soft,  Bowel sounds normal.  PEG site w/o bleeding. Extremities: Extremities normal, atraumatic, no cyanosis or edema. Pulses: 2+ and symmetric all extremities.    Skin: Skin color, texture, turgor normal. No rashes or lesions             Data:     Recent Results (from the past 24 hour(s))   GLUCOSE, POC    Collection Time: 03/03/17 11:00 AM   Result Value Ref Range    Glucose (POC) 157 (H) 70 - 110 mg/dL   CALCIUM, IONIZED    Collection Time: 03/03/17  3:30 PM   Result Value Ref Range    Ionized Calcium 1.10 (L) 1.12 - 1.32 MMOL/L   GLUCOSE, POC    Collection Time: 03/03/17  6:04 PM   Result Value Ref Range    Glucose (POC) 152 (H) 70 - 110 mg/dL   GLUCOSE, POC    Collection Time: 03/04/17 12:21 AM   Result Value Ref Range    Glucose (POC) 165 (H) 70 - 110 mg/dL   MAGNESIUM    Collection Time: 03/04/17  2:20 AM   Result Value Ref Range    Magnesium 2.0 1.8 - 2.4 mg/dL   PHOSPHORUS    Collection Time: 03/04/17  2:20 AM   Result Value Ref Range    Phosphorus 6.9 (H) 2.5 - 4.9 MG/DL   CALCIUM, IONIZED    Collection Time: 03/04/17  2:20 AM   Result Value Ref Range    Ionized Calcium 1.08 (L) 1.12 - 5.11 MMOL/L   METABOLIC PANEL, BASIC    Collection Time: 03/04/17  2:20 AM   Result Value Ref Range    Sodium 141 136 - 145 mmol/L    Potassium 3.8 3.5 - 5.5 mmol/L    Chloride 105 100 - 108 mmol/L    CO2 17 (L) 21 - 32 mmol/L    Anion gap 19 (H) 3.0 - 18 mmol/L    Glucose 131 (H) 74 - 99 mg/dL     (H) 7.0 - 18 MG/DL    Creatinine 4.80 (H) 0.6 - 1.3 MG/DL    BUN/Creatinine ratio 28 (H) 12 - 20      GFR est AA 14 (L) >60 ml/min/1.73m2    GFR est non-AA 12 (L) >60 ml/min/1.73m2    Calcium 7.9 (L) 8.5 - 10.1 MG/DL   CBC WITH AUTOMATED DIFF    Collection Time: 03/04/17  2:20 AM   Result Value Ref Range    WBC 7.3 4.6 - 13.2 K/uL    RBC 2.93 (L) 4.70 - 5.50 M/uL    HGB 8.2 (L) 13.0 - 16.0 g/dL    HCT 25.2 (L) 36.0 - 48.0 %    MCV 86.0 74.0 - 97.0 FL    MCH 28.0 24.0 - 34.0 PG    MCHC 32.5 31.0 - 37.0 g/dL    RDW 16.0 (H) 11.6 - 14.5 %    PLATELET 437 939 - 394 K/uL    MPV 11.0 9.2 - 11.8 FL    NEUTROPHILS 91 (H) 40 - 73 %    LYMPHOCYTES 7 (L) 21 - 52 %    MONOCYTES 2 (L) 3 - 10 %    EOSINOPHILS 0 0 - 5 %    BASOPHILS 0 0 - 2 %    ABS. NEUTROPHILS 6.6 1.8 - 8.0 K/UL    ABS. LYMPHOCYTES 0.5 (L) 0.9 - 3.6 K/UL    ABS. MONOCYTES 0.2 0.05 - 1.2 K/UL    ABS. EOSINOPHILS 0.0 0.0 - 0.4 K/UL    ABS. BASOPHILS 0.0 0.0 - 0.06 K/UL    DF AUTOMATED     GLUCOSE, POC    Collection Time: 03/04/17  5:11 AM   Result Value Ref Range    Glucose (POC) 152 (H) 70 - 110 mg/dL             Telemetry:AFIB    Imaging:  I have personally reviewed the patients radiographs and have reviewed the reports:  CXR 2 /27/17:  1. Lines and tubes in good position. 2. Stable chest with findings consistent with bilateral pleural effusion and  underlying areas of atelectasis/infiltrate. CXR 2/28/17:  1. Overall, no significant change in this one-day interval.   2. Stable and satisfactory position of endotracheal tube.   3. Bibasilar opacities consistent with small pleural effusions with associated  consolidation, atelectasis or pneumonia. CXR 3/1/17:  1. Lines and tubes in good position. 2. Stable chest with probable bilateral pleural effusions and underlying areas  of atelectasis/infiltrate. CXR 3/2/2017:  Interval placement of right thoracostomy catheter with slightly decreased small  right pleural effusion. Unchanged small left pleural effusion and retrocardiac  atelectasis/infiltrate    CXR 3/3/2017:  1. Lines and tubes in good position.  Patient has been extubated. 2. No evidence of right pneumothorax. Small right lower lobe atelectasis versus  infiltrate.   3. Stable or slightly worsened left effusion with left lower lobe atelectasis. US CHEST 31/17:  1. Moderate right and small left pleural effusions. Right lower lobe atelectasis    Duplex right UE 3/1/17:  Right arm :  1. Deep vein(s) visualized include the internal jugular, subclavian,  axillary, distal brachial, radial and ulnar veins. The right proximal  to mid brachial veins were unable to be visualized due to the  dressing and PICC line. 2. No evidence of deep venous thrombosis detected in the veins  visualized. 3. PICC line visualized within the right subclavian and axillary veins  with no evidence of thrombus along the PICC line. 4. No evidence of deep vein thrombosis in the contralateral subclavian  vein. 5. Superficial vein(s) visualized include the basilic (upper arm) and  cephalic (upper arm) veins. 6. No evidence of superficial thrombosis detected. CT c/a/p 2/24/17  1. Postprocedural changes from same day percutaneous gastrostomy catheter  placement, with gastrostomy catheter in expected position. No evidence of  extravasated contrast. There is small amount of free intraperitoneal gas which  is not unexpected given recent placement. 2. Postsurgical changes of laparotomy and small bowel resection for management  of prior small bowel obstruction. Nonspecific mesenteric swelling noted region  of suture material present in the right lower quadrant; however, no evidence of  recurrent bowel obstruction present. Mild gaseous distention of the colon is  present, possibly related to mild postop ileus. 3. Small-moderate bilateral pleural effusions, new from prior CT examination,  with increased body wall edema, small amount of pelvic fluid. 4. Small amount of intravesicular air , presumably from recent prior Conner  catheter placement.      ECHO 2/15/17:  SUMMARY:  Left ventricle: Systolic function was mildly reduced by visual assessment. Ejection fraction was estimated to be 45 %. There was mild diffuse  hypokinesis. Right ventricle: Systolic function was normal. Estimated peak pressure was  in the range of 40 mmHg to 45 mmHg. Left atrium: The atrium was dilated. Right atrium: The atrium was mildly dilated. Mitral valve: There was mild annular calcification. There was mild  regurgitation. Aortic valve: There was mild stenosis. Valve mean gradient was 10 mmHg. Tricuspid valve: There was moderate regurgitation. Inferior vena cava, hepatic veins: The respirophasic change in diameter  was less than 50%. Pericardium: A trivial pericardial effusion was identified. ECHO 3/2/17:  SUMMARY:  Left ventricle: patient in A.fib during study. Systolic function was  mildly reduced. Ejection fraction was estimated in the range of 40 % to 45  %. There was mild concentric hypertrophy. Right ventricle: Estimated peak pressure was 50 mmHg. Left atrium: The atrium was moderately to severely dilated. Right atrium: The atrium was dilated. Mitral valve: There was no evidence for stenosis. There was moderate  regurgitation. Aortic valve: The valve was trileaflet. Leaflets exhibited mild to  moderate calcification, mildly reduced cuspal separation, and sclerosis. Tricuspid valve: There was no evidence for tricuspid stenosis. There was  moderate to severe regurgitation. Inferior vena cava, hepatic veins: The inferior vena cava was dilated. The  respirophasic change in diameter was less than 50%.     Nory Marshall NP

## 2017-03-04 NOTE — PROGRESS NOTES
Daily Progress Note: 3/4/2017 3:03 PM   Admit Date: 2/12/2017    Patient seen in follow up for multiple medical problems as listed below:  Patient Active Problem List   Diagnosis Code    Small bowel obstruction ([facility] [address] K56.69    Atrial fibrillation with rapid ventricular response ([facility] [address] I48.91    Postoperative anemia D64.9    Feeding difficulties R63.3    Debility R53.81       Assesment     80 y.o. male with a PMHx of HTN, TIA, SIDNEY, SBO and abdominal hernia who presented to the ED with the acute onset of left upper quadrant abdominal pain. CT scan of the abd/pelvis in the ED that was positive for a SBO. s/p Exlap and small bowel resection (191cm) 2/13 am Dr Lacy Arce. Course complicated by Afib+RVR 9/02 am requiring rate control and cardiology involvement. Patient only responded temporarily to diltiazem boluses and diltiazem drip to 5mg. The evening of 2/14 the patients rate remained 130-140 and he was becoming increasingly hypotensive, cardizem gtt was stopped and patient was given 250mcg of IV Digoxin with HR resolution into 60's. Eventually coverted to lopresser with HTN medication adjustment. Failed MBS 2/16 and placed NPO, no hx of dysphagia this was thought due to significant trauma from NGT placement for surgery. Was to have Dobhoff placed 2/17 with IR, they could not get a tube down. He received D10 overnight is his PIV, then PICC and TPN 2/18 am. He had repeat speech eval on 02/22, but failed this, leading to PEG tube placement on the following day. His renal function has continued to worsen while intubated in the ICU and nephrology was consulted. On 02/23 he went into acute respiratory distress presumed to be 2/2 possible aspiration pneumonia and was transferred to the ICU and later intubated on pressor support for hypotension. He repeatedly failed spontaneous breathing tests 2/2 tachypnea. His HR remains poorly controlled and diltiazem was ineffective due to hypotension.  Amiodarone gtt was tried several times without success due to bradycardia, Cardiology was reconsulted. Patients cardiac and respiratory status eventually improved and was extubated 3/2 evening. He has developed significant LE edema and anasarca and is slowly being diureses.     Acute hypoxemic, hypercapnic respiratory failure after brief respiratory arrest-extubated 3/  Aspiration Pneumonia/Pneumonitis  Sepsis criteria with leukocytosis, hypotension, requiring temp pressor support. Possible aspiration source. B/L Pleural Effusions  SBO s/p Exlap and Small Bowel Resection   Severe Dysphagia on Tube feeds through PEG  Atrial Fibrillation with RVR -Back to Digoxin when GFR improved  Acute Metabolic Encephalopathy Likely 2/2 Benzodiazepam, improving  SUSAN/ATN on CKD Stage IIIb -Neph consulting. Cr worsening. Fluid retention. prn bicarb  Hyperkalemia Possibly 2/2 Renal Failure, Possible Heparin-induced w/ EKG Changes  L Inguinal Hernia  HTN  TIA on Eliquis normaly (held)  Iron Deficiency Anemia  Anasarca and LE edema - due to SUSAN, malnutrition, and relative HF from Afib/RVR on prn lasix      DVT Protocol Active: yes  Code Status:  Full Code     Disposition: unk. Out of ICU soon    Subjective:     CC: Abdominal Pain (left lower abdomen) and Leg Pain (left leg)    Interval History: sleepy but arousable this am. Kidney function stable. Tachycardia/Afib increasing to 1teens    Objective:     Visit Vitals    /74    Pulse 87    Temp 97.5 °F (36.4 °C)    Resp 27    Ht 6' (1.829 m)    Wt 78.9 kg (173 lb 15.1 oz)    SpO2 100%    BMI 23.59 kg/m2       Temp (24hrs), Av.7 °F (36.5 °C), Min:97.5 °F (36.4 °C), Max:97.9 °F (36.6 °C)        Intake/Output Summary (Last 24 hours) at 17 1422  Last data filed at 17 0800   Gross per 24 hour   Intake             1170 ml   Output              970 ml   Net              200 ml       Gen: NAD, sleepy but talking, making eye contact  HEENT:  ARSENIO GOMEZ.  Dry mouth  Neck: No Bruits/JVD Lungs:   Upper phlegm. Poor respiratory effort  Heart:   RR S1 S2 without M/R/G  Abdomen: ND,NT, BSX4,   Extremities:   2cm pitting LE edema up thighs. No cyanosis.   Skin:  no jaundice/lesions      Data Review:     Meds/Labs/Tests reviewed    Current Shift:  03/04 0701 - 03/04 1900  In: 40   Out: -   Last three shifts:  03/02 1901 - 03/04 0700  In: 2470 [I.V.:655]  Out: 1894 [Urine:1819]  Recent Labs      03/04/17 0220 03/03/17 0410 03/02/17   0345   WBC  7.3  7.7  8.3   RBC  2.93*  2.99*  2.79*   HGB  8.2*  8.5*  7.8*   HCT  25.2*  25.4*  23.5*   PLT  262  270  243   GRANS  91*  92*  93*   LYMPH  7*  6*  5*   EOS  0  0  0       Recent Labs      03/04/17 0220 03/03/17 0410 03/02/17   1950  03/02/17   0345   BUN  134*  136*   --   132*   CREA  4.80*  4.55*   --   4.38*   CA  7.9*  7.8*   --   7.5*   K  3.8  4.0  3.9  3.6   NA  141  141   --   141   CL  105  105   --   105   CO2  17*  19*   --   17*   PHOS  6.9*  7.1*   --   6.3*   GLU  131*  145*   --   174*        Lab Results   Component Value Date/Time    Glucose 131 03/04/2017 02:20 AM    Glucose 145 03/03/2017 04:10 AM    Glucose 174 03/02/2017 03:45 AM    Glucose 145 03/01/2017 03:30 AM    Glucose 163 02/28/2017 02:12 AM          Care coordination with Nursing/Consultants/staff: 10  Prior history, labs, and charting reviewed: 20    Procedures/Imaging:  s/p Exlap and small bowel resection 2/13 am Dr Cedrick Kenney  [id number]%  Failed XR guided dobhoff 2/17  PICC 2/18 with TPN  PEG 2/23  Intubation 2/23, extubation 3/2    Total time spent with chart review, patient examination/education, discussion with staff on case,documentation and medication management / adjustment  :  27 Minutes      Dr Bekah Mcfadden  Pager: [phone]

## 2017-03-04 NOTE — PROGRESS NOTES
[facility]  Pulmonary, Critical Care, and Sleep Medicine      Name: [facility] MRN: [id number]   : 1927 Hospital: [address] [place] [address]   Date: 3/4/2017          Critical Care Progress Notes    3/4/2017  Awake and alert, FC, no distress noted,   Extubated 3/2, doing well on O2/NC @ 3 Lpm with sats 100%, did not do well on BIPAP (3/2)  Anasarca, Right UE swollen than left UE-Duplex right UE, no DVT. Hb  8.2,  s/p 1 unit PRBC , will hold further transfusion for now,  no active external bleeding  Elevated TSH and normal Free T4, low Free T3-no hx hypothyroidism, will monitor for now. Off pressors, HD stable  Sputum +MSSA-d/c Vancomycin (), continue zosyn  Glucose stable   WBC wnl, currently afebrile. Creat trending up-nephrology following    IMPRESSION:   · Acute hypoxemic, hypercapnic respiratory failure likely aspiration with previous brief respiratory arrest, now requiring MV, Vent ( intubated ), Day # 7, Extubated 3/2, on O2/NC doing well, BIPAP PRN   · Bilateral pleural effusions with possible underlying infiltrate, right chest tube 3/1 (drain 1 L). · AMS, multifactorial with hypoglycemia and sedation given for PEG , now improved  · Possible sepsis with leukocytosis, hypotension, source likely aspiration, +sputum MSSA  · Hypotension requiring pressor support, afib/volume depletion/sepsis, now off pressors  · A-fib with RVR, stable  · SUSAN   · Anemia  · Hyperkalemia, now resolved  · Dysphagia s/p PEG   · Hx recent Small Bowel Obstruction s/p ex-lap with resection. · Hypoalbuminemia  · Hypocalcemia, replaced      RECOMMENDATIONS:   · Resp -   Extubated 3/2. On O2/NC, titrate to keep SpO2>90%,  BIPAP PRN adjust for comfort. S/P US of chest 3/1. Right chest tube intact drain a total 1 Liter. CXR 3/3 reviewed. ABG (last ) PRN/or condition change. Aspiration precautions, keep HOB and judicious  bronchial hygiene. Lasix/albumin given ().  Lasix 40 mg X 1 (3/1)& repeat (3/2),   · ID - Cont zosyn, D/c'd Vanco (2/28) due to sputum +MSSA. LA normal. No leukocytosis and currently afebrile. Trend WBC and temp. Severe sepsis bundle  · CVS - Off Jhon per MAP>65. Right UE PICC in place. Currently off xarelto. · Heme/Onc- Hb better, 1 unit PRBC 2/28, will hold further transfusion for now, on ferrous sulfate. Trend hgb and monitor for any active bleeding. · Renal - Renal following. Off Nabicarb per renal.  Placed on electrolyte replacement protocol. Monitor K+ closely, Avoid Nephrotoxic med's, creat continue to trend up, per nephrology note, had discussion with daughter for plan dialysis-non emergent. · Endocrine - Monitor glucose closely, avoid hypoglycemia, TSH elevated 3.9(2/24), repeat TSH (2/28) 8.07, free T4 wnl 0.8 and T3 free low 0.9- no hx hypothyroidism. · Neuro/ Pain/ Sedation - Versed, Fentanyl, Dilaudid and Haldol PRN for pain/sedation. Minimize sedating medication when possible, closely  Monitor. · GI - Cont TF- at goal. PPI   · Prophylaxis - Heparin, Protonix  · Palliative care following  · FULL CODE       Past Medical History:   Diagnosis Date    Hypertension     TIA (transient ischemic attack)       History reviewed. No pertinent surgical history. Prior to Admission medications    Medication Sig Start Date End Date Taking? Authorizing Provider   apixaban (ELIQUIS) 2.5 mg tablet Take 2.5 mg by mouth every twelve (12) hours. Yes Sadie Dent MD   amLODIPine (NORVASC) 10 mg tablet Take 10 mg by mouth daily. Yes Sadie Dent MD   lisinopril (PRINIVIL, ZESTRIL) 10 mg tablet Take 10 mg by mouth daily. Yes Sadie Dent MD   Hydrochlorothiazide (HYDRODIURIL) 12.5 mg tablet Take 12.5 mg by mouth daily. Yes Sadie Dent MD   ferrous sulfate 325 mg (65 mg iron) tablet Take 325 mg by mouth Daily (before breakfast).    Yes Sadie Dent MD     Current Facility-Administered Medications   Medication Dose Route Frequency    sevelamer carbonate (RENVELA) oral powder 2,400 mg  2.4 g Oral TID WITH MEALS    epoetin liam (EPOGEN;PROCRIT) injection 40,000 Units  40,000 Units SubCUTAneous Q7D    chlorhexidine (PERIDEX) 0.12 % mouthwash 15 mL  15 mL Oral BID    ferrous sulfate tablet 325 mg  325 mg Oral ACB    nystatin (MYCOSTATIN) 100,000 unit/mL oral suspension 500,000 Units  500,000 Units Oral QID    sodium chloride (NS) flush 5-10 mL  5-10 mL IntraVENous Q8H    pantoprazole (PROTONIX) 40 mg in sodium chloride 0.9 % 10 mL injection  40 mg IntraVENous BID    piperacillin-tazobactam (ZOSYN) 3.375 g in 0.9% sodium chloride (MBP/ADV) 100 mL MBP EXTENDED 4 HOUR INFUSION###  3.375 g IntraVENous Q12H    heparin (porcine) injection 5,000 Units  5,000 Units SubCUTAneous Q8H    insulin lispro (HUMALOG) injection   SubCUTAneous Q6H    tamsulosin (FLOMAX) capsule 0.4 mg  0.4 mg Oral DAILY    sodium chloride (NS) flush 5-10 mL  5-10 mL IntraVENous Q8H    sodium chloride (NS) flush 5-10 mL  5-10 mL IntraVENous Q8H     No Known Allergies   Social History   Substance Use Topics    Smoking status: Former Smoker    Smokeless tobacco: Not on file    Alcohol use No      History reviewed. No pertinent family history. Review of Systems:  Review of systems not obtained due to patient factors.     Objective:   Vital Signs:    Visit Vitals    /62    Pulse 92    Temp 97.9 °F (36.6 °C)    Resp 18    Ht 6' (1.829 m)    Wt 78.9 kg (173 lb 15.1 oz)    SpO2 100%    BMI 23.59 kg/m2       O2 Device: Nasal cannula   O2 Flow Rate (L/min): 3 l/min   Temp (24hrs), Av.7 °F (36.5 °C), Min:97.5 °F (36.4 °C), Max:97.9 °F (36.6 °C)       Intake/Output:   Last shift:      701 -  1900  In: 40   Out: -   Last 3 shifts:  190 -  0700  In: 2470 [I.V.:655]  Out: 1894 [Urine:1819]    Intake/Output Summary (Last 24 hours) at 17 1029  Last data filed at 17 0800   Gross per 24 hour   Intake             1480 ml   Output              970 ml   Net 510 ml      Physical Exam:    General/Neuro:  Awake and alert, response appropriately, appear very weak, appears stated age   Head:  Normocephalic, without obvious abnormality, atraumatic. Eyes:  Conjunctivae/corneas clear. PERRL   Nose: Nares normal. Septum midline. Mucosa normal. No drainage. Throat: Neg   Neck: Supple, symmetrical       Lungs:   Good AE B, diminished bibasilar, few rhonchi,no wheeze       Heart:  Irregular , no m   Abdomen:   Soft,  Bowel sounds normal.  PEG site w/o bleeding. Extremities: Extremities normal, atraumatic, no cyanosis or edema. Pulses: 2+ and symmetric all extremities.    Skin: Skin color, texture, turgor normal. No rashes or lesions             Data:     Recent Results (from the past 24 hour(s))   GLUCOSE, POC    Collection Time: 03/03/17 11:00 AM   Result Value Ref Range    Glucose (POC) 157 (H) 70 - 110 mg/dL   CALCIUM, IONIZED    Collection Time: 03/03/17  3:30 PM   Result Value Ref Range    Ionized Calcium 1.10 (L) 1.12 - 1.32 MMOL/L   GLUCOSE, POC    Collection Time: 03/03/17  6:04 PM   Result Value Ref Range    Glucose (POC) 152 (H) 70 - 110 mg/dL   GLUCOSE, POC    Collection Time: 03/04/17 12:21 AM   Result Value Ref Range    Glucose (POC) 165 (H) 70 - 110 mg/dL   MAGNESIUM    Collection Time: 03/04/17  2:20 AM   Result Value Ref Range    Magnesium 2.0 1.8 - 2.4 mg/dL   PHOSPHORUS    Collection Time: 03/04/17  2:20 AM   Result Value Ref Range    Phosphorus 6.9 (H) 2.5 - 4.9 MG/DL   CALCIUM, IONIZED    Collection Time: 03/04/17  2:20 AM   Result Value Ref Range    Ionized Calcium 1.08 (L) 1.12 - 3.08 MMOL/L   METABOLIC PANEL, BASIC    Collection Time: 03/04/17  2:20 AM   Result Value Ref Range    Sodium 141 136 - 145 mmol/L    Potassium 3.8 3.5 - 5.5 mmol/L    Chloride 105 100 - 108 mmol/L    CO2 17 (L) 21 - 32 mmol/L    Anion gap 19 (H) 3.0 - 18 mmol/L    Glucose 131 (H) 74 - 99 mg/dL     (H) 7.0 - 18 MG/DL    Creatinine 4.80 (H) 0.6 - 1.3 MG/DL BUN/Creatinine ratio 28 (H) 12 - 20      GFR est AA 14 (L) >60 ml/min/1.73m2    GFR est non-AA 12 (L) >60 ml/min/1.73m2    Calcium 7.9 (L) 8.5 - 10.1 MG/DL   CBC WITH AUTOMATED DIFF    Collection Time: 03/04/17  2:20 AM   Result Value Ref Range    WBC 7.3 4.6 - 13.2 K/uL    RBC 2.93 (L) 4.70 - 5.50 M/uL    HGB 8.2 (L) 13.0 - 16.0 g/dL    HCT 25.2 (L) 36.0 - 48.0 %    MCV 86.0 74.0 - 97.0 FL    MCH 28.0 24.0 - 34.0 PG    MCHC 32.5 31.0 - 37.0 g/dL    RDW 16.0 (H) 11.6 - 14.5 %    PLATELET 971 408 - 159 K/uL    MPV 11.0 9.2 - 11.8 FL    NEUTROPHILS 91 (H) 40 - 73 %    LYMPHOCYTES 7 (L) 21 - 52 %    MONOCYTES 2 (L) 3 - 10 %    EOSINOPHILS 0 0 - 5 %    BASOPHILS 0 0 - 2 %    ABS. NEUTROPHILS 6.6 1.8 - 8.0 K/UL    ABS. LYMPHOCYTES 0.5 (L) 0.9 - 3.6 K/UL    ABS. MONOCYTES 0.2 0.05 - 1.2 K/UL    ABS. EOSINOPHILS 0.0 0.0 - 0.4 K/UL    ABS. BASOPHILS 0.0 0.0 - 0.06 K/UL    DF AUTOMATED     GLUCOSE, POC    Collection Time: 03/04/17  5:11 AM   Result Value Ref Range    Glucose (POC) 152 (H) 70 - 110 mg/dL             Telemetry:AFIB    Imaging:  I have personally reviewed the patients radiographs and have reviewed the reports:  CXR 2 /27/17:  1. Lines and tubes in good position. 2. Stable chest with findings consistent with bilateral pleural effusion and  underlying areas of atelectasis/infiltrate. CXR 2/28/17:  1. Overall, no significant change in this one-day interval.   2. Stable and satisfactory position of endotracheal tube.   3. Bibasilar opacities consistent with small pleural effusions with associated  consolidation, atelectasis or pneumonia. CXR 3/1/17:  1. Lines and tubes in good position. 2. Stable chest with probable bilateral pleural effusions and underlying areas  of atelectasis/infiltrate. CXR 3/2/2017:  Interval placement of right thoracostomy catheter with slightly decreased small  right pleural effusion.  Unchanged small left pleural effusion and retrocardiac  atelectasis/infiltrate    CXR 3/3/2017:  1. Lines and tubes in good position. Patient has been extubated. 2. No evidence of right pneumothorax. Small right lower lobe atelectasis versus  infiltrate.   3. Stable or slightly worsened left effusion with left lower lobe atelectasis. US CHEST 31/17:  1. Moderate right and small left pleural effusions. Right lower lobe atelectasis    Duplex right UE 3/1/17:  Right arm :  1. Deep vein(s) visualized include the internal jugular, subclavian,  axillary, distal brachial, radial and ulnar veins. The right proximal  to mid brachial veins were unable to be visualized due to the  dressing and PICC line. 2. No evidence of deep venous thrombosis detected in the veins  visualized. 3. PICC line visualized within the right subclavian and axillary veins  with no evidence of thrombus along the PICC line. 4. No evidence of deep vein thrombosis in the contralateral subclavian  vein. 5. Superficial vein(s) visualized include the basilic (upper arm) and  cephalic (upper arm) veins. 6. No evidence of superficial thrombosis detected. CT c/a/p 2/24/17  1. Postprocedural changes from same day percutaneous gastrostomy catheter  placement, with gastrostomy catheter in expected position. No evidence of  extravasated contrast. There is small amount of free intraperitoneal gas which  is not unexpected given recent placement. 2. Postsurgical changes of laparotomy and small bowel resection for management  of prior small bowel obstruction. Nonspecific mesenteric swelling noted region  of suture material present in the right lower quadrant; however, no evidence of  recurrent bowel obstruction present. Mild gaseous distention of the colon is  present, possibly related to mild postop ileus. 3. Small-moderate bilateral pleural effusions, new from prior CT examination,  with increased body wall edema, small amount of pelvic fluid.   4. Small amount of intravesicular air , presumably from recent prior Conner  catheter placement. ECHO 2/15/17:  SUMMARY:  Left ventricle: Systolic function was mildly reduced by visual assessment. Ejection fraction was estimated to be 45 %. There was mild diffuse  hypokinesis. Right ventricle: Systolic function was normal. Estimated peak pressure was  in the range of 40 mmHg to 45 mmHg. Left atrium: The atrium was dilated. Right atrium: The atrium was mildly dilated. Mitral valve: There was mild annular calcification. There was mild  regurgitation. Aortic valve: There was mild stenosis. Valve mean gradient was 10 mmHg. Tricuspid valve: There was moderate regurgitation. Inferior vena cava, hepatic veins: The respirophasic change in diameter  was less than 50%. Pericardium: A trivial pericardial effusion was identified. ECHO 3/2/17:  SUMMARY:  Left ventricle: patient in A.fib during study. Systolic function was  mildly reduced. Ejection fraction was estimated in the range of 40 % to 45  %. There was mild concentric hypertrophy. Right ventricle: Estimated peak pressure was 50 mmHg. Left atrium: The atrium was moderately to severely dilated. Right atrium: The atrium was dilated. Mitral valve: There was no evidence for stenosis. There was moderate  regurgitation. Aortic valve: The valve was trileaflet. Leaflets exhibited mild to  moderate calcification, mildly reduced cuspal separation, and sclerosis. Tricuspid valve: There was no evidence for tricuspid stenosis. There was  moderate to severe regurgitation. Inferior vena cava, hepatic veins: The inferior vena cava was dilated. The  respirophasic change in diameter was less than 50%.     Allyson Gil NP

## 2017-03-04 NOTE — PROGRESS NOTES
Problem: Patient Education: Go to Patient Education Activity  Goal: Patient/Family Education  Outcome: Not Progressing Towards Goal  Pt moans intermittently , at time tlks clearly. Can stat he is Paula Mcgarry but does not know president, year. Get name correct and age. Will continue to tach pt about care in hope he understands.     Problem: Small Bowel Obstruction: Day 4 to Discharge  Goal: Activity/Safety  Outcome: Not Progressing Towards Goal  Pt turns in bed, moan when rom done to extremtires

## 2017-03-04 NOTE — ROUTINE PROCESS
1930 Bedside shift change report given to marky rn (oncoming nurse) by Ramsey Husain rn (offgoing nurse). Report included the following information SBAR, Kardex and Recent Results. Pt asleep easily awaken for report. Hob elevated 30 degrees, side rails up x 3, call light within reach. resp unlabored. 2000 aassessment completed, oriented to self, tht he in in hospital in [address], BUT NOT TO YEAR, OR WHO PRESIDENT IS, Dayne and alessandro lawson. 2132 moaning and  resp rate increased medicated . 2200 incontinent of medium brown stool, cleansed and new bed pad, turn for comfort. 2300 resp rate increased haldol 2 mg given for restless . 18 daughter called and state that her Dad wants to be home , and stated to her earlier in day that he had lots of things to get done at home. She states she told him she had taken care of everything and to rest, and she left, after he was resting. 2330 resting in bed quietly. 0000 assessment completed. Phillips and oral care provided. 0200 resting in bed.   0400 assessment completed. Oral and phillips care provided. 0600 resting in bed, asleep. 0730 Bedside shift change report given to liang marte (oncoming nurse) by marky rn (offgoing nurse). Report included the following information SBAR, Kardex and Recent Results. Side rails up x 3, hob elevated 30 . Call light within reach. resp unlabored.

## 2017-03-05 NOTE — PROGRESS NOTES
1930  Received bedside sbar report from [employer], rn. Patient denies pain at this time, however, he states he is thirsty. Mouth and phillips are provided. 2100    Patient continuously yelling out that he is thirsty \"help me, help me\". Explained to him that he can't have anything to drink because the fluid will go into his lungs and not his gut. He is very sad hearing this news. Begging to have something. Performed mouth care again. Mouth is mouth on the inside, but he states that he is thirsty. 0023   Patient crying out again \"help me, help me\" with me in the room. He started to shake and get upset. Haldol provided. 56  Hycel provided for any pain. He states that he has none, but when I turned him to his left to listen to lungs and assess chest tube he said that he hurt in his ribs. When asked if he was in pain he said he didn't know, I told him I could give him pain medication and he said \"I don''t care, I am just thirsty\"  He is oriented x3. Bi pap was placed on him by RT to see if that would help him to sleep. Once the mask was on, his resp went into 40's, his resp finally slowed down after awhile. 0220  Bi pap taken off due to patient pulling at mask and crying out that he is thirsty. Provided mouth care. 0400  Eyes are open, has not slept . Not crying out though. Noticed that urine in cath is pink in bag, no bright red blood. Cleaned eugenia area and offered to give him bath but he stated he wanted to wait until later in the day      0730  Bedside SBAR report given to [address] [place] [address] Drive, RN. Patient moaning off and on but denies being in pain. Good urine output throughout the night. No additional chest tube drainage from day shift.   Daughter called overnight and said that she would be in today

## 2017-03-05 NOTE — PROGRESS NOTES
RENAL DAILY PROGRESS NOTE          Assessment:   1.SUSAN/ATN-Cr/bun about the same, nonoliguric-plans for HD if no recovery or worsening renal fxns by Monday, please see 's note from yesterday detailing plan of care dw family      Plan: 1. Lasix IV bid keep O>I  2. Supportive care  3. Abx  4.  Avoid sedatives   5. po bicarb            Opal White MD   BFQQF:[id number]  3/4/2017  1:03 PM  Subjective:   Sleepy--had some pain pills last night          Objective:   Patient Vitals for the past 24 hrs:   Temp Pulse Resp BP SpO2   03/05/17 1000 - 86 (!) 34 112/73 100 %   03/05/17 0900 - 85 (!) 31 - 100 %   03/05/17 0800 98.5 °F (36.9 °C) 86 21 125/69 100 %   03/05/17 0700 - 94 25 (!) 127/95 100 %   03/05/17 0600 - 91 27 145/71 100 %   03/05/17 0500 - 90 23 119/68 100 %   03/05/17 0400 98.2 °F (36.8 °C) 80 19 115/73 100 %   03/05/17 0300 - 82 18 113/66 100 %   03/05/17 0200 - 92 29 118/69 100 %   03/05/17 0100 - 85 (!) 38 113/69 99 %   03/05/17 0054 - - - - 100 %   03/05/17 0000 97.8 °F (36.6 °C) 94 28 120/90 98 %   03/04/17 2300 - 89 20 129/85 100 %   03/04/17 2200 - 90 30 135/64 100 %   03/04/17 2100 - 82 19 133/68 100 %   03/04/17 2000 97.4 °F (36.3 °C) 88 28 119/64 100 %   03/04/17 1900 - 94 (!) 36 115/74 100 %   03/04/17 1800 - 83 18 102/56 100 %   03/04/17 1700 - 86 30 110/65 100 %   03/04/17 1600 97.7 °F (36.5 °C) 87 27 95/70 100 %   03/04/17 1500 - 84 22 118/57 100 %   03/04/17 1400 - 87 25 122/58 100 %   03/04/17 1300 - 87 27 114/74 100 %   03/04/17 1200 97.5 °F (36.4 °C) (!) 117 26 111/72 100 %           03/03 1901 - 03/05 0700  In: 1835 [I.V.:560]  Out: 1270 [Urine:1170]    Intake/Output Summary (Last 24 hours) at 03/05/17 1105  Last data filed at 03/05/17 0900   Gross per 24 hour   Intake              779 ml   Output              975 ml   Net             -196 ml       Physical Exam:  NAD    HEENT: No JVD  Cardiovascular: S1/S2 regular HB  C/L: CTA katrina   Abdomen: soft NT/ND  Ext: +Edema     Data Review:     LABS:   Hematology: Recent Labs      03/05/17   0430 03/04/17 0220 03/03/17 0410   WBC  8.4  7.3  7.7   HGB  8.0*  8.2*  8.5*   HCT  24.6*  25.2*  25.4*     Chemistry: Recent Labs      03/05/17   1000  03/05/17 0430 03/04/17 0220 03/03/17 0410 03/02/17   1950   BUN  149*   --   134*  136*   --    CREA  5.02*   --   4.80*  4.55*   --    CA  8.0*   --   7.9*  7.8*   --    K  3.9   --   3.8  4.0  3.9   NA  143   --   141  141   --    CL  109*   --   105  105   --    CO2  20*   --   17*  19*   --    PHOS   --   6.5*  6.9*  7.1*   --    GLU  106*   --   131*  145*   --

## 2017-03-05 NOTE — PROGRESS NOTES
Assumed care from off going nurse at the bedside. Patient lying in bed without distress. Patient alert and orient X 3. Patient has call bell within reach, and bed locked in low position. See complex assessment for further findings. 7796 patient lying in bed no distress or discomfort noted at this time while on nasal cannula. Patient denies any pain at this time. Chest tube right side minimal to no drainage at this time. Phillips draining and intact, oral and phillips care performed at this time. Patient moves extremities with weakness noted. Iv site intact, no redness, swelling or tenderness noted at the site. 1030 patient repositioned at this time no distress or discomfort noted at this time. Phillips draining and intact. Patient has no complaints of pain at this time. 1200 daughter at the bedside, no distress or discomfort noted at this time. Patient turned and repositioned for comfort. 1545 patient repositioned at this time, phillips draining and intact, no distress or discomfort noted. 1800 patient repositioned at this time no distress or discomfort noted at this time. 1900 Bedside and Verbal shift change report given to Sreekanth Flaherty, [place] (oncoming nurse) by Almaz Hu RN (offgoing nurse). Report given with SBAR, Kardex, Intake/Output, MAR and Recent Results. Pretty Matos

## 2017-03-05 NOTE — PROGRESS NOTES
Placed pt on Bipap according to order. Pt seemed very uncomfortable. He was also this way ;ast night. He seems to be panting in the mask and yelling out. His RR increased into the 40's and he has become very anxious. Nurse offered pain medication.

## 2017-03-05 NOTE — ROUTINE PROCESS
Bedside and Verbal shift change report given to 1540 Maryjane Ríos (oncoming nurse) by Martine Real RN (offgoing nurse). Report included the following information SBAR, Kardex, Intake/Output, MAR, Recent Results, Med Rec Status and Cardiac Rhythm Afib.

## 2017-03-05 NOTE — PROGRESS NOTES
[facility]  Pulmonary, Critical Care, and Sleep Medicine      Name: Hilary Butler MRN: [id number]   : 1927 Hospital: [address] [place] [address]   Date: 3/5/2017          Critical Care Progress Notes    3/5/2017  Awake and alert, FC, no distress noted, remain weak-PT/OT  Extubated 3/2, doing well on O2/NC @ 3 Lpm with sats 100%, did not do well on BIPAP (3/2)  Anasarca and scrotal swelling. Right UE swollen than left UE-Duplex right UE, no DVT. Right chest tube intact, no drainage X 24 hrs (had total 1 Liter drain). Hb  stable,  s/p 1 unit PRBC , will hold further transfusion for now,  no active external bleeding  Elevated TSH and normal Free T4, low Free T3-no hx hypothyroidism, will monitor for now. Off pressors, HD stable  Glucose stable   WBC wnl, currently afebrile. Creat trending up-nephrology following    IMPRESSION:   · Acute hypoxemic, hypercapnic respiratory failure likely aspiration with previous brief respiratory arrest, now requiring MV, Vent ( intubated ), Day # 7, Extubated 3/2, on O2/NC doing well, BIPAP PRN   · Bilateral pleural effusions with possible underlying infiltrate, right chest tube 3/1 (drain 1 L). · AMS, multifactorial with hypoglycemia and sedation given for PEG , now improved  · Possible sepsis with leukocytosis, hypotension, source likely aspiration, +sputum MSSA  · Hypotension requiring pressor support, afib/volume depletion/sepsis, now off pressors  · A-fib with RVR, stable  · SUSAN   · Anemia  · Hyperkalemia, now resolved  · Dysphagia s/p PEG   · Hx recent Small Bowel Obstruction s/p ex-lap with resection. · Hypoalbuminemia  · Hypocalcemia, replaced      RECOMMENDATIONS:   · Resp -   On O2/NC, titrate to keep SpO2>90%,  BIPAP PRN adjust for comfort. S/P US of chest 3/1. Right chest tube intact drain a total 1 Liter-no further drainage per RN today. CXR 3/3 reviewed-repeat tomorrow to assess clearing .  ABG (last ) PRN/or condition change. Aspiration precautions, keep HOB and judicious  bronchial hygiene. Lasix/albumin given (2/27). Lasix 40 mg X 1 (3/1)& repeat (3/2),   · ID - Cont zosyn, D/c'd Vanco (2/28) due to sputum +MSSA. LA normal. No leukocytosis and currently afebrile. Trend WBC and temp. Severe sepsis bundle  · CVS - Off Jhon per MAP>65. Right UE PICC in place. Currently off xarelto. · Heme/Onc- Hb better, 1 unit PRBC 2/28, will hold further transfusion for now, on ferrous sulfate. Trend hgb and monitor for any active bleeding. · Renal - Renal following. Off Nabicarb per renal.  Placed on electrolyte replacement protocol. Monitor K+ closely, Avoid Nephrotoxic med's, creat continue to trend up, per nephrology note, had discussion with daughter for plan dialysis-non emergent. · Endocrine - Monitor glucose closely, avoid hypoglycemia, TSH elevated 3.9(2/24), repeat TSH (2/28) 8.07, free T4 wnl 0.8 and T3 free low 0.9- no hx hypothyroidism. · Neuro/ Pain/ Sedation - Versed, Fentanyl, Dilaudid and Haldol PRN for pain/sedation. Minimize sedating medication when possible, closely  Monitor. · GI - Cont TF- at goal. PPI   · Prophylaxis - Heparin, Protonix  · Palliative care following  · FULL CODE       Past Medical History:   Diagnosis Date    Hypertension     TIA (transient ischemic attack)       History reviewed. No pertinent surgical history. Prior to Admission medications    Medication Sig Start Date End Date Taking? Authorizing Provider   apixaban (ELIQUIS) 2.5 mg tablet Take 2.5 mg by mouth every twelve (12) hours. Yes Sadie Other, MD   amLODIPine (NORVASC) 10 mg tablet Take 10 mg by mouth daily. Yes Sadie Other, MD   lisinopril (PRINIVIL, ZESTRIL) 10 mg tablet Take 10 mg by mouth daily. Yes Sadie Other, MD   Hydrochlorothiazide (HYDRODIURIL) 12.5 mg tablet Take 12.5 mg by mouth daily. Yes Sadie Other, MD   ferrous sulfate 325 mg (65 mg iron) tablet Take 325 mg by mouth Daily (before breakfast).    Yes Phys Jailene, MD     Current Facility-Administered Medications   Medication Dose Route Frequency    sodium bicarbonate tablet 650 mg  650 mg Oral TID    pantoprazole (PROTONIX) granules for oral suspension 40 mg  40 mg Per G Tube BID    sevelamer carbonate (RENVELA) oral powder 2,400 mg  2.4 g Oral TID WITH MEALS    epoetin liam (EPOGEN;PROCRIT) injection 40,000 Units  40,000 Units SubCUTAneous Q7D    chlorhexidine (PERIDEX) 0.12 % mouthwash 15 mL  15 mL Oral BID    ferrous sulfate tablet 325 mg  325 mg Oral ACB    nystatin (MYCOSTATIN) 100,000 unit/mL oral suspension 500,000 Units  500,000 Units Oral QID    sodium chloride (NS) flush 5-10 mL  5-10 mL IntraVENous Q8H    piperacillin-tazobactam (ZOSYN) 3.375 g in 0.9% sodium chloride (MBP/ADV) 100 mL MBP EXTENDED 4 HOUR INFUSION###  3.375 g IntraVENous Q12H    heparin (porcine) injection 5,000 Units  5,000 Units SubCUTAneous Q8H    insulin lispro (HUMALOG) injection   SubCUTAneous Q6H    tamsulosin (FLOMAX) capsule 0.4 mg  0.4 mg Oral DAILY    sodium chloride (NS) flush 5-10 mL  5-10 mL IntraVENous Q8H    sodium chloride (NS) flush 5-10 mL  5-10 mL IntraVENous Q8H     No Known Allergies   Social History   Substance Use Topics    Smoking status: Former Smoker    Smokeless tobacco: Not on file    Alcohol use No      History reviewed. No pertinent family history. Review of Systems:  Negative except on HPI.     Objective:   Vital Signs:    Visit Vitals    /69    Pulse 86    Temp 98.5 °F (36.9 °C)    Resp 21    Ht 6' (1.829 m)    Wt 78 kg (171 lb 15.3 oz)    SpO2 100%    BMI 23.32 kg/m2       O2 Device: Nasal cannula   O2 Flow Rate (L/min): 3 l/min   Temp (24hrs), Av.9 °F (36.6 °C), Min:97.4 °F (36.3 °C), Max:98.5 °F (36.9 °C)       Intake/Output:   Last shift:      701 - 1900  In: 24   Out: 75 [Urine:75]  Last 3 shifts: 1901 -  0700  In: 1835 [I.V.:560]  Out: 1270 [Urine:1170]    Intake/Output Summary (Last 24 hours) at 03/05/17 1033  Last data filed at 03/05/17 0900   Gross per 24 hour   Intake              819 ml   Output              975 ml   Net             -156 ml      Physical Exam:    General/Neuro:  Awake and alert, response appropriately, very weak, appears stated age   Head:  Normocephalic, without obvious abnormality, atraumatic. Eyes:  Conjunctivae/corneas clear. PERRL   Nose: Nares normal. Septum midline. Mucosa normal. No drainage. Throat: Neg   Neck: Supple, symmetrical       Lungs:   Good AE B, diminished bibasilar, few rhonchi,no wheeze       Heart:  Irregular , no m   Abdomen:   Soft,  Bowel sounds normal.  PEG site w/o bleeding. Extremities: Extremities UE edema. Pulses: 2+ and symmetric all extremities.    Skin: Skin color, texture, turgor normal. No rashes or lesions             Data:     Recent Results (from the past 24 hour(s))   GLUCOSE, POC    Collection Time: 03/04/17 11:14 AM   Result Value Ref Range    Glucose (POC) 124 (H) 70 - 110 mg/dL   CALCIUM, IONIZED    Collection Time: 03/04/17  3:29 PM   Result Value Ref Range    Ionized Calcium 1.10 (L) 1.12 - 1.32 MMOL/L   GLUCOSE, POC    Collection Time: 03/04/17  5:32 PM   Result Value Ref Range    Glucose (POC) 172 (H) 70 - 110 mg/dL   CALCIUM, IONIZED    Collection Time: 03/05/17 12:32 AM   Result Value Ref Range    Ionized Calcium 1.11 (L) 1.12 - 1.32 MMOL/L   GLUCOSE, POC    Collection Time: 03/05/17 12:42 AM   Result Value Ref Range    Glucose (POC) 158 (H) 70 - 110 mg/dL   MAGNESIUM    Collection Time: 03/05/17  4:30 AM   Result Value Ref Range    Magnesium 2.2 1.8 - 2.4 mg/dL   PHOSPHORUS    Collection Time: 03/05/17  4:30 AM   Result Value Ref Range    Phosphorus 6.5 (H) 2.5 - 4.9 MG/DL   CALCIUM, IONIZED    Collection Time: 03/05/17  4:30 AM   Result Value Ref Range    Ionized Calcium 1.09 (L) 1.12 - 1.32 MMOL/L   CBC WITH AUTOMATED DIFF    Collection Time: 03/05/17  4:30 AM   Result Value Ref Range    WBC 8.4 4.6 - 13.2 K/uL    RBC 2.86 (L) 4.70 - 5.50 M/uL    HGB 8.0 (L) 13.0 - 16.0 g/dL    HCT 24.6 (L) 36.0 - 48.0 %    MCV 86.0 74.0 - 97.0 FL    MCH 28.0 24.0 - 34.0 PG    MCHC 32.5 31.0 - 37.0 g/dL    RDW 16.1 (H) 11.6 - 14.5 %    PLATELET 238 555 - 879 K/uL    MPV 11.4 9.2 - 11.8 FL    NEUTROPHILS 92 (H) 40 - 73 %    LYMPHOCYTES 6 (L) 21 - 52 %    MONOCYTES 2 (L) 3 - 10 %    EOSINOPHILS 0 0 - 5 %    BASOPHILS 0 0 - 2 %    ABS. NEUTROPHILS 7.7 1.8 - 8.0 K/UL    ABS. LYMPHOCYTES 0.5 (L) 0.9 - 3.6 K/UL    ABS. MONOCYTES 0.2 0.05 - 1.2 K/UL    ABS. EOSINOPHILS 0.0 0.0 - 0.4 K/UL    ABS. BASOPHILS 0.0 0.0 - 0.06 K/UL    DF AUTOMATED     GLUCOSE, POC    Collection Time: 03/05/17  7:01 AM   Result Value Ref Range    Glucose (POC) 156 (H) 70 - 110 mg/dL             Telemetry:AFIB    Imaging:  I have personally reviewed the patients radiographs and have reviewed the reports:  CXR 2 /27/17:  1. Lines and tubes in good position. 2. Stable chest with findings consistent with bilateral pleural effusion and  underlying areas of atelectasis/infiltrate. CXR 2/28/17:  1. Overall, no significant change in this one-day interval.   2. Stable and satisfactory position of endotracheal tube.   3. Bibasilar opacities consistent with small pleural effusions with associated  consolidation, atelectasis or pneumonia. CXR 3/1/17:  1. Lines and tubes in good position. 2. Stable chest with probable bilateral pleural effusions and underlying areas  of atelectasis/infiltrate. CXR 3/2/2017:  Interval placement of right thoracostomy catheter with slightly decreased small  right pleural effusion. Unchanged small left pleural effusion and retrocardiac  atelectasis/infiltrate    CXR 3/3/2017:  1. Lines and tubes in good position. Patient has been extubated. 2. No evidence of right pneumothorax. Small right lower lobe atelectasis versus  infiltrate.   3. Stable or slightly worsened left effusion with left lower lobe atelectasis. US CHEST 31/17:  1.  Moderate right and small left pleural effusions. Right lower lobe atelectasis    Duplex right UE 3/1/17:  Right arm :  1. Deep vein(s) visualized include the internal jugular, subclavian,  axillary, distal brachial, radial and ulnar veins. The right proximal  to mid brachial veins were unable to be visualized due to the  dressing and PICC line. 2. No evidence of deep venous thrombosis detected in the veins  visualized. 3. PICC line visualized within the right subclavian and axillary veins  with no evidence of thrombus along the PICC line. 4. No evidence of deep vein thrombosis in the contralateral subclavian  vein. 5. Superficial vein(s) visualized include the basilic (upper arm) and  cephalic (upper arm) veins. 6. No evidence of superficial thrombosis detected. CT c/a/p 2/24/17  1. Postprocedural changes from same day percutaneous gastrostomy catheter  placement, with gastrostomy catheter in expected position. No evidence of  extravasated contrast. There is small amount of free intraperitoneal gas which  is not unexpected given recent placement. 2. Postsurgical changes of laparotomy and small bowel resection for management  of prior small bowel obstruction. Nonspecific mesenteric swelling noted region  of suture material present in the right lower quadrant; however, no evidence of  recurrent bowel obstruction present. Mild gaseous distention of the colon is  present, possibly related to mild postop ileus. 3. Small-moderate bilateral pleural effusions, new from prior CT examination,  with increased body wall edema, small amount of pelvic fluid. 4. Small amount of intravesicular air , presumably from recent prior Conner  catheter placement. ECHO 2/15/17:  SUMMARY:  Left ventricle: Systolic function was mildly reduced by visual assessment. Ejection fraction was estimated to be 45 %. There was mild diffuse  hypokinesis.     Right ventricle: Systolic function was normal. Estimated peak pressure was  in the range of 40 mmHg to 45 mmHg. Left atrium: The atrium was dilated. Right atrium: The atrium was mildly dilated. Mitral valve: There was mild annular calcification. There was mild  regurgitation. Aortic valve: There was mild stenosis. Valve mean gradient was 10 mmHg. Tricuspid valve: There was moderate regurgitation. Inferior vena cava, hepatic veins: The respirophasic change in diameter  was less than 50%. Pericardium: A trivial pericardial effusion was identified. ECHO 3/2/17:  SUMMARY:  Left ventricle: patient in A.fib during study. Systolic function was  mildly reduced. Ejection fraction was estimated in the range of 40 % to 45  %. There was mild concentric hypertrophy. Right ventricle: Estimated peak pressure was 50 mmHg. Left atrium: The atrium was moderately to severely dilated. Right atrium: The atrium was dilated. Mitral valve: There was no evidence for stenosis. There was moderate  regurgitation. Aortic valve: The valve was trileaflet. Leaflets exhibited mild to  moderate calcification, mildly reduced cuspal separation, and sclerosis. Tricuspid valve: There was no evidence for tricuspid stenosis. There was  moderate to severe regurgitation. Inferior vena cava, hepatic veins: The inferior vena cava was dilated. The  respirophasic change in diameter was less than 50%.     Sindi Liu NP

## 2017-03-05 NOTE — PROGRESS NOTES
Daily Progress Note: 3/5/2017 3:03 PM   Admit Date: 2/12/2017    Patient seen in follow up for multiple medical problems as listed below:  Patient Active Problem List   Diagnosis Code    Small bowel obstruction ([facility] [address] K56.69    Atrial fibrillation with rapid ventricular response ([facility] [address] I48.91    Postoperative anemia D64.9    Feeding difficulties R63.3    Debility R53.81       Assesment     80 y.o. male with a PMHx of HTN, TIA, SIDNEY, SBO and abdominal hernia who presented to the ED with the acute onset of left upper quadrant abdominal pain. CT scan of the abd/pelvis in the ED that was positive for a SBO. s/p Exlap and small bowel resection (191cm) 2/13 am Dr Su Valentin. Course complicated by Afib+RVR 2/37 am requiring rate control and cardiology involvement. Patient only responded temporarily to diltiazem boluses and diltiazem drip to 5mg. The evening of 2/14 the patients rate remained 130-140 and he was becoming increasingly hypotensive, cardizem gtt was stopped and patient was given 250mcg of IV Digoxin with HR resolution into 60's. Eventually coverted to lopresser with HTN medication adjustment. Failed MBS 2/16 and placed NPO, no hx of dysphagia this was thought due to significant trauma from NGT placement for surgery. Was to have Dobhoff placed 2/17 with IR, they could not get a tube down. He received D10 overnight is his PIV, then PICC and TPN 2/18 am. He had repeat speech eval on 02/22, but failed this, leading to PEG tube placement on the following day. His renal function has continued to worsen while intubated in the ICU and nephrology was consulted. On 02/23 he went into acute respiratory distress presumed to be 2/2 possible aspiration pneumonia and was transferred to the ICU and later intubated on pressor support for hypotension. He repeatedly failed spontaneous breathing tests 2/2 tachypnea. His HR remains poorly controlled and diltiazem was ineffective due to hypotension.  Amiodarone gtt was tried several times without success due to bradycardia, Cardiology was reconsulted. Patients cardiac and respiratory status eventually improved and was extubated 3/2 evening. He has developed significant LE edema and anasarca and is slowly being diuresed. His dysphagia and feeding situation is still un-resolved. Recommend continued ST therapy and repeat MBS at some point.     Acute hypoxemic, hypercapnic respiratory failure after brief respiratory arrest-extubated 3/2  Aspiration Pneumonia/Pneumonitis  Sepsis criteria with leukocytosis, hypotension, requiring temp pressor support. Possible aspiration source. B/L Pleural Effusions  SBO s/p Exlap and Small Bowel Resection   Severe Dysphagia on Tube feeds through PEG  Atrial Fibrillation with RVR -Back to Digoxin when GFR improved  Acute Metabolic Encephalopathy Likely 2/2 Benzodiazepam, improving  SUSAN/ATN on CKD Stage IIIb -Neph consulting. Cr worsening. Fluid retention. prn bicarb  Hyperkalemia Possibly 2/2 Renal Failure, Possible Heparin-induced w/ EKG Changes  L Inguinal Hernia  HTN  TIA on Eliquis normaly (held)  Iron Deficiency Anemia  Anasarca and LE edema - due to SUSAN, malnutrition, and relative HF from Afib/RVR on prn lasix      DVT Protocol Active: yes  Code Status:  Full Code     Disposition: Out of ICU today. SNF later in week if improving. Subjective:     CC: Abdominal Pain (left lower abdomen) and Leg Pain (left leg)    Interval History: on 3L NC. Great HR control. Patient continued to be more alert. Requests water.      Objective:     Visit Vitals    /69    Pulse 86    Temp 98.5 °F (36.9 °C)    Resp 21    Ht 6' (1.829 m)    Wt 78 kg (171 lb 15.3 oz)    SpO2 100%    BMI 23.32 kg/m2       Temp (24hrs), Av.9 °F (36.6 °C), Min:97.4 °F (36.3 °C), Max:98.5 °F (36.9 °C)        Intake/Output Summary (Last 24 hours) at 17 0930  Last data filed at 17 0900   Gross per 24 hour   Intake              859 ml   Output 975 ml   Net             -116 ml       Gen: NAD, AOx2, talking, making eye contact  HEENT:  PATRICIA, EOMI. Dry mouth  Neck: No Bruits/JVD   Lungs:   Upper phlegm. Poor respiratory effort  Heart:   RR S1 S2 without M/R/G  Abdomen: ND,NT, BSX4,   Extremities:   2cm pitting LE edema up thighs. No cyanosis.   Skin:  no jaundice/lesions      Data Review:     Meds/Labs/Tests reviewed    Current Shift:  03/05 0701 - 03/05 1900  In: 24   Out: 75 [Urine:75]  Last three shifts:  03/03 1901 - 03/05 0700  In: 1835 [I.V.:560]  Out: 1270 [Urine:1170]  Recent Labs      03/05/17 0430 03/04/17 0220 03/03/17 0410   WBC  8.4  7.3  7.7   RBC  2.86*  2.93*  2.99*   HGB  8.0*  8.2*  8.5*   HCT  24.6*  25.2*  25.4*   PLT  245  262  270   GRANS  92*  91*  92*   LYMPH  6*  7*  6*   EOS  0  0  0       Recent Labs      03/05/17 0430 03/04/17 0220 03/03/17 0410 03/02/17   1950   BUN   --   134*  136*   --    CREA   --   4.80*  4.55*   --    CA   --   7.9*  7.8*   --    K   --   3.8  4.0  3.9   NA   --   141  141   --    CL   --   105  105   --    CO2   --   17*  19*   --    PHOS  6.5*  6.9*  7.1*   --    GLU   --   131*  145*   --         Lab Results   Component Value Date/Time    Glucose 131 03/04/2017 02:20 AM    Glucose 145 03/03/2017 04:10 AM    Glucose 174 03/02/2017 03:45 AM    Glucose 145 03/01/2017 03:30 AM    Glucose 163 02/28/2017 02:12 AM          Care coordination with Nursing/Consultants/staff: 10  Prior history, labs, and charting reviewed: 20    Procedures/Imaging:  s/p Exlap and small bowel resection 2/13 am Dr Cale Lawrence  [id number]%  Failed XR guided dobhoff 2/17  PICC 2/18 with TPN  PEG 2/23  Intubation 2/23, extubation 3/2    Total time spent with chart review, patient examination/education, discussion with staff on case,documentation and medication management / adjustment  :  27 Minutes      Dr Kushal Pink  Pager: [phone]

## 2017-03-06 NOTE — PROGRESS NOTES
Daily Progress Note: 3/6/2017 3:03 PM   Admit Date: 2/12/2017    Patient seen in follow up for multiple medical problems as listed below:  Patient Active Problem List   Diagnosis Code    Small bowel obstruction ([facility] [address] K56.69    Atrial fibrillation with rapid ventricular response ([facility] [address] I48.91    Postoperative anemia D64.9    Feeding difficulties R63.3    Debility R53.81       Assesment     80 y.o. male with a PMHx of HTN, TIA, SIDNEY, SBO and abdominal hernia who presented to the ED with the acute onset of left upper quadrant abdominal pain. CT scan of the abd/pelvis in the ED that was positive for a SBO. s/p Exlap and small bowel resection (191cm) 2/13 am Dr Aliza Mondragon. Course complicated by Afib+RVR 5/42 am requiring rate control and cardiology involvement. Patient only responded temporarily to diltiazem boluses and diltiazem drip to 5mg. The evening of 2/14 the patients rate remained 130-140 and he was becoming increasingly hypotensive, cardizem gtt was stopped and patient was given 250mcg of IV Digoxin with HR resolution into 60's. Eventually coverted to lopresser with HTN medication adjustment. Failed MBS 2/16 and placed NPO, no hx of dysphagia this was thought due to significant trauma from NGT placement for surgery. Was to have Dobhoff placed 2/17 with IR, they could not get a tube down. He received D10 overnight is his PIV, then PICC and TPN 2/18 am. He had repeat speech eval on 02/22, but failed this, leading to PEG tube placement on the following day. His renal function has continued to worsen while intubated in the ICU and nephrology was consulted. On 02/23 he went into acute respiratory distress presumed to be 2/2 possible aspiration pneumonia and was transferred to the ICU and later intubated on pressor support for hypotension. He repeatedly failed spontaneous breathing tests 2/2 tachypnea. His HR remains poorly controlled and diltiazem was ineffective due to hypotension.  Amiodarone gtt was tried several times without success due to bradycardia, Cardiology was reconsulted. Patients cardiac and respiratory status eventually improved and was extubated 3/2 evening. He has developed significant LE edema and anasarca and is slowly being diuresed. His dysphagia and feeding situation is still un-resolved. Recommend continued ST therapy and repeat MBS at some point.     1-Acute hypoxemic, hypercapnic respiratory failure after brief respiratory arrest-extubated 3/2  2-Aspiration Pneumonia/Pneumonitis  3-Sepsis criteria with leukocytosis, hypotension, requiring temp pressor support. Possible aspiration source. 3-B/L Pleural Effusions  4-SBO s/p Exlap and Small Bowel Resection 02/13  5-Severe Dysphagia on Tube feeds through PEG  6-Atrial Fibrillation with RVR -Back to Digoxin when GFR improved  7-Acute Metabolic Encephalopathy Likely 2/2 Benzodiazepam, improving  8-SUSAN/ATN on CKD Stage IIIb -Neph consulting. Cr worsening. Fluid retention. prn bicarb  9-Hyperkalemia Possibly 2/2 Renal Failure, Possible Heparin-induced w/ EKG Changes-Resolved  10-L Inguinal Hernia  11-HTN  12-TIA on Eliquis normaly (held)  13-Iron Deficiency Anemia  14-Anasarca and LE edema - due to SUSAN, malnutrition, and relative HF from Afib/RVR on prn lasix  15-Excessive mucus in mouth:suction prn instructions given to nurse. DVT Protocol Active: yes  Code Status:  Full Code     Disposition: Out of ICU on 3/5. SNF later in week if improving. Subjective:     CC: Abdominal Pain (left lower abdomen) and Leg Pain (left leg)    Interval History: on 3L NC. Great HR control. Patient's daughter here present is concerned that patient is more sleepy today,saying that patient does not do well with benzo and pain meds. Also reporting that patient has a lot of mucus in the mouth.     Objective:     Visit Vitals    /74 (BP 1 Location: Right arm, BP Patient Position: At rest)    Pulse (!) 107    Temp 97.6 °F (36.4 °C)    Resp 20    Ht 6' (1.829 m)    Wt 80.1 kg (176 lb 9.6 oz)    SpO2 100%    BMI 23.95 kg/m2       Temp (24hrs), Av °F (36.7 °C), Min:97.5 °F (36.4 °C), Max:98.9 °F (37.2 °C)        Intake/Output Summary (Last 24 hours) at 17 1412  Last data filed at 17 0730   Gross per 24 hour   Intake              595 ml   Output             1305 ml   Net             -710 ml       Gen: NAD, AOx2, talking, making eye contact  HEENT:  PATRICIA, EOMI. Dry mouth  Neck: No Bruits/JVD   Lungs:   Upper phlegm. Poor respiratory effort  Heart:   RR S1 S2 without M/R/G  Abdomen: ND,NT, BSX4,   Extremities:   2cm pitting LE edema up thighs. No cyanosis.   Skin:  no jaundice/lesions      Data Review:     Meds/Labs/Tests reviewed    Current Shift:  701 - 1900  In: -   Out: 300 [Urine:300]  Last three shifts:  1901 - 700  In: 1784 [I.V.:535]  Out: 178 [Urine:1780]  Recent Labs      17   0600  17   0430  17   0220   WBC  8.0  8.4  7.3   RBC  2.85*  2.86*  2.93*   HGB  7.9*  8.0*  8.2*   HCT  24.5*  24.6*  25.2*   PLT  295  245  262   GRANS  92*  92*  91*   LYMPH  6*  6*  7*   EOS  0  0  0       Recent Labs      17   0600  17   1000  17   0430  17   0220   BUN   --   149*   --   134*   CREA   --   5.02*   --   4.80*   CA   --   8.0*   --   7.9*   K   --   3.9   --   3.8   NA   --   143   --   141   CL   --   109*   --   105   CO2   --   20*   --   17*   PHOS  6.2*   --   6.5*  6.9*   GLU   --   106*   --   131*        Lab Results   Component Value Date/Time    Glucose 106 2017 10:00 AM    Glucose 131 2017 02:20 AM    Glucose 145 2017 04:10 AM    Glucose 174 2017 03:45 AM    Glucose 145 2017 03:30 AM       Procedures/Imaging:  s/p Exlap and small bowel resection  am Dr Dleia Cole  [id number]%  Failed XR guided dobhoff   PICC  with TPN  PEG   Intubation , extubation 3/2    Case d/w nurse and daughter     [facility]  Hospitalist Division  Pager: [phone]

## 2017-03-06 NOTE — PROGRESS NOTES
NUTRITION follow-up/Plan of care    RECOMMENDATIONS:   1. Enteral Nutrition: Nepro @ 40 ml/hr providin kcal, 80 g protein and 700 ml free water  2. Free water: 50 ml Q 6 hours  3. Monitor weight and PO intake  4. RD to follow    GOALS:   1. Met/Ongoing: Enteral Nutrition meets >75% of protein/calorie needs by 3/9  2. Ongoing: Maintain weight (+/- 1-2 lb) by 3/13  3. Ongoing: BG in target range by 3/9    ASSESSMENT:   Per BMI of 23.9, weight is in the normal classification. Enteral Nutrition is currently meeting 100% of estimated needs. Labs noted. Phosphorus level is elevated. Average BG in the last 24 hours: 106-174. BUN and Creatinine levels are elevated vs SUSAN (GFR: 11). Nutrition recommendations listed. RD to follow. Nutrition Risk:  [x]   High []  Moderate [] Low    SUBJECTIVE/OBJECTIVE:   Transfer from ICU. Pt was previously on TPN (was d/c on ). Pt with severe dysphagia, anasarca, LE edema, SUSAN, s/p exlap w/ small bowel resection (191 cm) on . Per family estimated patient's weight is around 140 lb. Current weight per bed scale: 186 lb. Pt is tolerating Enteral Nutrition through PEG tube. Pt has a BM on 3/5 per I&Os. Will follow. Information Obtained From:   [x] Chart Review  [x] Patient  [x] Family/Caregiver  [] Nurse/Physician   [] Patient Rounds/Interdisciplinary Meeting    Diet: NPO / Nepro @ 40 ml/hr  Patient Vitals for the past 100 hrs:   % Diet Eaten   17 1600 0 %   17 1200 0 %     Medications: [x] Reviewed (Calcium chloride, Ferrous sulfate, Lasix, Lispro, Renvela)  Encounter Diagnoses     ICD-10-CM ICD-9-CM   1. SBO (small bowel obstruction) (HCC) K56.69 560.9   2. Abdominal pain, LUQ (left upper quadrant) R10.12 789.02   3. Chronic pain of left knee M25.562 719.46    G89.29 338.29   4. Chronic renal failure, unspecified stage N18.9 585.9   5. Anemia, unspecified type D64.9 285.9   6. Debility R53.81 799.3   7. Feeding difficulties R63.3 783. 3     Past Medical History:   Diagnosis Date    Hypertension     TIA (transient ischemic attack)      Labs:  Lab Results   Component Value Date/Time    Sodium 143 03/05/2017 10:00 AM    Potassium 3.9 03/05/2017 10:00 AM    Chloride 109 03/05/2017 10:00 AM    CO2 20 03/05/2017 10:00 AM    Anion gap 14 03/05/2017 10:00 AM    Glucose 106 03/05/2017 10:00 AM     03/05/2017 10:00 AM    Creatinine 5.02 03/05/2017 10:00 AM    Calcium 8.0 03/05/2017 10:00 AM    Magnesium 2.0 03/06/2017 06:00 AM    Phosphorus 6.2 03/06/2017 06:00 AM    Albumin 1.6 02/26/2017 04:00 AM     Anthropometrics: BMI (calculated): 23.9 Last 3 Recorded Weights in this Encounter    03/04/17 0001 03/05/17 0000 03/05/17 1943   Weight: 78.9 kg (173 lb 15.1 oz) 78 kg (171 lb 15.3 oz) 80.1 kg (176 lb 9.6 oz)    Ht Readings from Last 1 Encounters:   03/05/17 6' (1.829 m)     []  Weight Loss  [x]  Weight Gain (fluid)  []  Weight Stable   []  New wt n/a on record     Estimated Nutrition Needs:   1900 Kcals/day  Protein (g): 75 g    Nutrition Problems Identified:   [x] Suboptimal PO intake   [] Food Allergies  [x] Difficulty chewing/swallowing/poor dentition  [] Constipation/Diarrhea   [] Nausea/Vomiting   [] None  [] Other:     Plan:   [] Therapeutic Diet  []  Obtained/adjusted food preferences/tolerances and/or snacks options   []  Supplements added   [] Occupational therapy following for feeding techniques  []  HS snack added   []  Modify diet texture   []  Modify diet for food allergies   []  Educate patient   []  Assist with menu selection   [x]  Monitor PO intake on meal rounds   []  Continue inpatient monitoring and intervention   []  Participated in discharge planning/Interdisciplinary rounds/Team meetings   [x]  Other: Enteral Nutrition    Education Needs:   [x] Not appropriate for teaching at this time    [] Identified and addressed    Nutrition Monitoring and Evaluation:   [x] Continue inpatient monitoring and interventions    [] Other:     Rubén Baker RANDEE  Pager: [phone]

## 2017-03-06 NOTE — CONSULTS
[facility]  Name: Mary Hill   : 1927   MRN: [id number]   Date: 3/6/2017    []I have reviewed the flowsheet and previous days notes. []The patient is unable to give any meaningful history or review of systems because the patient is:  []Intubated []Sedated   []Unresponsive      []The patient is critically ill on      []Mechanical ventilation []Pressors   []BiPAP []   : None              ROS:Review of systems not obtained due to patient factors. Events and notes from last 24 hours reviewed. Care plan discussed on multidisciplinary rounds.     Vital Signs:    Visit Vitals    /74 (BP 1 Location: Right arm, BP Patient Position: At rest)    Pulse (!) 107    Temp 97.6 °F (36.4 °C)    Resp 20    Ht 6' (1.829 m)    Wt 80.1 kg (176 lb 9.6 oz)    SpO2 100%    BMI 23.95 kg/m2       O2 Device: Nasal cannula   O2 Flow Rate (L/min): 3 l/min   Temp (24hrs), Av.1 °F (36.7 °C), Min:97.5 °F (36.4 °C), Max:98.9 °F (37.2 °C)       Intake/Output:   Last shift:       07 - 1900  In: -   Out: 300 [Urine:300]  Last 3 shifts:  190 -  0700  In: 1784 [I.V.:535]  Out: 1780 [Urine:1780]    Intake/Output Summary (Last 24 hours) at 17 1154  Last data filed at 17 0730   Gross per 24 hour   Intake              815 ml   Output             1455 ml   Net             -640 ml     Hemodynamics:       :      Ventilator Settings:  Ventilator  Mode: Assist control, VC+  Respiratory Rate  Resp Rate Observed: 41  Back-Up Rate: 10  Insp Time (sec): 1 sec  I:E Ratio: 1:2.19  Ventilator Volumes  Vt Set (ml): 500 ml  Vt Exhaled (Machine Breath) (ml): 522 ml  Vt Spont (ml): 562 ml  Ve Observed (l/min): 34.3 l/min  Ventilator Pressures  Pressure Support (cm H2O): 7 cm H2O  PIP Observed (cm H2O): 24 cm H2O  Plateau Pressure (cm H2O): 18 cm H2O  MAP (cm H2O): 13  PEEP/VENT (cm H2O): 5 cm H20  Auto PEEP Observed (cm H2O): 0 cm H2O    Physical Exam:    General: in no apparent distress, non-toxic and in no respiratory distress and acyanotic   HEENT: Normal for age   Neck: No abnormally enlarged lymph nodes.    Chest: increased AP diameter   Lungs: decreased air exchange bilaterally, distant lung sounds and prolonged expiration  no dullness/ tenderness/ rash   Heart: Regular rate and rhythm or S1S2 present   Abdomen: non distended, bowel sounds normoactive, tympanic, abdomen is soft without significant tenderness, masses, organomegaly or guarding   Extremity: Trace edema   Neuro: lethargic   Skin: Skin color, texture, turgor normal. No rashes or lesions      DATA:   Current Facility-Administered Medications   Medication Dose Route Frequency    ferrous sulfate tablet 325 mg  1 Tab Oral DAILY WITH BREAKFAST    furosemide (LASIX) injection 40 mg  40 mg IntraVENous Q12H    sodium bicarbonate tablet 650 mg  650 mg Oral TID    pantoprazole (PROTONIX) granules for oral suspension 40 mg  40 mg Per G Tube BID    ELECTROLYTE REPLACEMENT PROTOCOL  1 Each Other PRN    ELECTROLYTE REPLACEMENT PROTOCOL  1 Each Other PRN    ELECTROLYTE REPLACEMENT PROTOCOL  1 Each Other PRN    ELECTROLYTE REPLACEMENT PROTOCOL  1 Each Other PRN    sevelamer carbonate (RENVELA) oral powder 2,400 mg  2.4 g Oral TID WITH MEALS    epoetin liam (EPOGEN;PROCRIT) injection 40,000 Units  40,000 Units SubCUTAneous Q7D    HYDROcodone-acetaminophen (HYCET) 0.5-21.7 mg/mL oral solution 5-7.5 mg  5-7.5 mg Per G Tube Q4H PRN    haloperidol lactate (HALDOL) injection 1-2 mg  1-2 mg IntraVENous Q2H PRN    0.9% sodium chloride infusion 250 mL  250 mL IntraVENous PRN    HYDROmorphone (PF) (DILAUDID) injection 0.5 mg  0.5 mg IntraVENous Q3H PRN    chlorhexidine (PERIDEX) 0.12 % mouthwash 15 mL  15 mL Oral BID    hydroxypropyl methylcellulose (ISOPTO TEARS) 0.5 % ophthalmic solution 1 Drop  1 Drop Both Eyes PRN    nystatin (MYCOSTATIN) 100,000 unit/mL oral suspension 500,000 Units  500,000 Units Oral QID    sodium chloride (NS) flush 5-10 mL  5-10 mL IntraVENous Q8H    simethicone (MYLICON) 95JA/6.5SL oral drops 80 mg  1.2 mL Oral Multiple    fentaNYL citrate (PF) injection  mcg   mcg IntraVENous Multiple    piperacillin-tazobactam (ZOSYN) 3.375 g in 0.9% sodium chloride (MBP/ADV) 100 mL MBP EXTENDED 4 HOUR INFUSION###  3.375 g IntraVENous Q12H    midazolam (VERSED) injection 1-4 mg  1-4 mg IntraVENous Q2H PRN    heparin (porcine) injection 5,000 Units  5,000 Units SubCUTAneous Q8H    acetaminophen (TYLENOL) suppository 650 mg  650 mg Rectal Q4H PRN    zolpidem (AMBIEN) tablet 5 mg  5 mg Oral QHS PRN    insulin lispro (HUMALOG) injection   SubCUTAneous Q6H    glucose chewable tablet 16 g  4 Tab Oral PRN    glucagon (GLUCAGEN) injection 1 mg  1 mg IntraMUSCular PRN    dextrose (D50W) injection syrg 12.5-25 g  25-50 mL IntraVENous PRN    sodium chloride (NS) flush 10 mL  10 mL InterCATHeter PRN    bacitracin 500 unit/gram packet 1 Packet  1 Packet Topical PRN    tamsulosin (FLOMAX) capsule 0.4 mg  0.4 mg Oral DAILY    0.9% sodium chloride infusion 250 mL  250 mL IntraVENous PRN    phenol throat spray (CHLORASEPTIC) 1 Spray  1 Spray Oral PRN    sodium chloride (NS) flush 5-10 mL  5-10 mL IntraVENous Q8H    0.9% sodium chloride infusion 250 mL  250 mL IntraVENous PRN    sodium chloride (NS) flush 5-10 mL  5-10 mL IntraVENous Q8H    ondansetron (ZOFRAN) injection 4 mg  4 mg IntraVENous Q4H PRN    0.9% sodium chloride infusion 250 mL  250 mL IntraVENous PRN       Telemetry: [x]Sinus []A-flutter []Paced    []A-fib []Multiple PVCs                  Labs:  Recent Labs      03/06/17   0600  03/05/17   0430  03/04/17   0220   WBC  8.0  8.4  7.3   HGB  7.9*  8.0*  8.2*   HCT  24.5*  24.6*  25.2*   PLT  295  245  262     Recent Labs      03/06/17   0600  03/05/17   1000  03/05/17   0430  03/04/17   0220   NA   --   143   --   141   K   --   3.9   --   3.8   CL   --   109*   --   105   CO2   --   20*   --   17* GLU   --   106*   --   131*   BUN   --   149*   --   134*   CREA   --   5.02*   --   4.80*   CA   --   8.0*   --   7.9*   MG  2.0   --   2.2  2.0   PHOS  6.2*   --   6.5*  6.9*     No results for input(s): PH, PCO2, PO2, HCO3, FIO2 in the last 72 hours. Imaging:  [x]I have personally reviewed the patients radiographs  []Radiographs reviewed with radiologist   [x] No CXR study available for review today   []No change from prior, tubes and lines in adequate position  []Improved   []Worsening       IMPRESSION:   Patient Active Problem List   Diagnosis Code    Small bowel obstruction ([address] K56.69    Atrial fibrillation with rapid ventricular response ([address] I48.91    Postoperative anemia D64.9    Feeding difficulties R63.3    Debility R53.81 ·   Acute hypoxemic, hypercapnic respiratory failure likely aspiration with previous brief respiratory arrest, now requiring MV, Vent ( intubated 2/23), Day # 7, Extubated 3/2, on O2/NC doing well, BIPAP PRN   · Bilateral pleural effusions with possible underlying infiltrate, right chest tube 3/1 (drain 1 L). · AMS, multifactorial with hypoglycemia and sedation given for PEG 2/23, now improved  · Possible sepsis with leukocytosis, hypotension, source likely aspiration, +sputum MSSA  · Hypotension requiring pressor support, afib/volume depletion/sepsis, now off pressors  · A-fib with RVR, stable  · SUSAN   · Anemia  · Hyperkalemia, now resolved  · Dysphagia s/p PEG 2/23  · Hx recent Small Bowel Obstruction s/p ex-lap with resection.    · Hypoalbuminemia  · Hypocalcemia, replaced  ·    PLAN:   · IM/Renal notes read  · Continue present therapy as he is improving/Discussed with his daughter  · Pulmonary will follow intermittently: Please call ME for any PULMONARY questions/concerns/interventions or procedures     The patient is: [] acutely ill Risk of deterioration: [x] moderate    [] critically ill  [] high     []See my orders for details    My assessment, plan of care, findings, medications, side effects etc were discussed with:  []nursing []PT/OT    []respiratory therapy []   [x]family: Daughter []     [x]Total critical care time exclusive of procedures 25 minutes  Joe Romero MD

## 2017-03-06 NOTE — PROGRESS NOTES
0730 Report received; Alert to himself; Reoriented to his room; place; and time; denies any pain; no acute distress noted; medication scheduled administered in the peg tube. Call bell within reach. Daughter at the bedside. Bedside shift change report given to [address] (oncoming nurse) by Erika Epstein (offgoing nurse). Report included the following information SBAR, Kardex and MAR.

## 2017-03-06 NOTE — PROGRESS NOTES
RENAL PROGRESS NOTE        Evalina Castleman         Assessment/Plan:     · SUSAN on CKD3: Nonoliguric. Creat stabilizing. Doubt RRT candidate. No overt indication yet. Will re-eval daily. · Met Acidosis: better on oral bicarb. · Volume overload: continue IV Lasix. · Afib: Rate controlled. AC is on hold. · Aspiration pneumonia/sepsis. On abx. · Resp failure: compensated. · S/p small bowel resection for SBO 2/13. · Anemia: S/p PRBC                                                                                                                                 Subjective:  Patient complaints of: No complaints. No SOB/CP/N/V.       Patient Active Problem List   Diagnosis Code    Small bowel obstruction (HCC) K56.69    Atrial fibrillation with rapid ventricular response (HCC) I48.91    Postoperative anemia D64.9    Feeding difficulties R63.3    Debility R53.81       Current Facility-Administered Medications   Medication Dose Route Frequency Provider Last Rate Last Dose    ferrous sulfate tablet 325 mg  1 Tab Oral DAILY WITH BREAKFAST Natalee Vela MD        furosemide (LASIX) injection 40 mg  40 mg IntraVENous Q12H Richar Robbins MD   40 mg at 03/06/17 8188    sodium bicarbonate tablet 650 mg  650 mg Oral TID Richar Robbins MD   650 mg at 03/06/17 0918    pantoprazole (PROTONIX) granules for oral suspension 40 mg  40 mg Per G Tube BID Edwardo Lomeli, DO   40 mg at 03/06/17 1260    ELECTROLYTE REPLACEMENT PROTOCOL  1 Each Other PRN Edwardo Lomeli, DO        ELECTROLYTE REPLACEMENT PROTOCOL  1 Each Other PRN Edwardo Lomeli, DO        ELECTROLYTE REPLACEMENT PROTOCOL  1 Each Other PRN Edwardo Scriver, DO        ELECTROLYTE REPLACEMENT PROTOCOL  1 Each Other PRN Edwardo Yostiver, DO        sevelamer carbonate (RENVELA) oral powder 2,400 mg  2.4 g Oral TID WITH MEALS Mitali WU MD   2,400 mg at 03/05/17 1816    epoetin liam (EPOGEN;PROCRIT) injection 40,000 Units  40,000 Units SubCUTAneous Q7D David Covarrubias MD   40,000 Units at 03/03/17 2133    HYDROcodone-acetaminophen (HYCET) 0.5-21.7 mg/mL oral solution 5-7.5 mg  5-7.5 mg Per G Tube Q4H PRN Demarcus Barajas PA-C   7.5 mg at 03/05/17 0053    haloperidol lactate (HALDOL) injection 1-2 mg  1-2 mg IntraVENous Q2H PRN King Gotti MD   2 mg at 03/06/17 0112    0.9% sodium chloride infusion 250 mL  250 mL IntraVENous PRN Dina Trujillo NP        HYDROmorphone (PF) (DILAUDID) injection 0.5 mg  0.5 mg IntraVENous Q3H PRN Dina Trujillo NP   0.5 mg at 03/04/17 0547    chlorhexidine (PERIDEX) 0.12 % mouthwash 15 mL  15 mL Oral BID King Gotti MD   15 mL at 03/06/17 0918    hydroxypropyl methylcellulose (ISOPTO TEARS) 0.5 % ophthalmic solution 1 Drop  1 Drop Both Eyes PRN King Gotti MD   1 Drop at 03/04/17 2129    nystatin (MYCOSTATIN) 100,000 unit/mL oral suspension 500,000 Units  500,000 Units Oral QID Doran Peacemaker, DO   500,000 Units at 03/05/17 2135    sodium chloride (NS) flush 5-10 mL  5-10 mL IntraVENous Q8H Yvonne Dunn MD   10 mL at 03/06/17 0555    simethicone (MYLICON) 80GL/9.9NY oral drops 80 mg  1.2 mL Oral Multiple Lina Holt MD        fentaNYL citrate (PF) injection  mcg   mcg IntraVENous Multiple Lina Holt MD   50 mcg at 03/05/17 2012    piperacillin-tazobactam (ZOSYN) 3.375 g in 0.9% sodium chloride (MBP/ADV) 100 mL MBP EXTENDED 4 HOUR INFUSION###  3.375 g IntraVENous Q12H Doran Peacemaker, DO 25 mL/hr at 03/05/17 2147 3.375 g at 03/05/17 2147    midazolam (VERSED) injection 1-4 mg  1-4 mg IntraVENous Q2H PRN Rona A Day, PA   1 mg at 03/02/17 1410    heparin (porcine) injection 5,000 Units  5,000 Units SubCUTAneous Q8H Doran Peacemaker, DO   5,000 Units at 03/06/17 0400    acetaminophen (TYLENOL) suppository 650 mg  650 mg Rectal Q4H PRN Doran Peacemaker, DO   650 mg at 02/20/17 1037    zolpidem (AMBIEN) tablet 5 mg  5 mg Oral QHS PRN Randye Ad, DO        insulin lispro (HUMALOG) injection   SubCUTAneous Q6H Mandy Thompson, DO   Stopped at 03/06/17 0600    glucose chewable tablet 16 g  4 Tab Oral PRN Randye Ad, DO        glucagon [facility]) injection 1 mg  1 mg IntraMUSCular PRN Randye Ad, DO        dextrose (D50W) injection syrg 12.5-25 g  25-50 mL IntraVENous PRN Randye Ad, DO   25 g at 02/23/17 2205    sodium chloride (NS) flush 10 mL  10 mL InterCATHeter PRN Randye Da, DO   40 mL at 02/28/17 0254    bacitracin 500 unit/gram packet 1 Packet  1 Packet Topical PRN Randye Ad, DO        tamsulosin [facility]) capsule 0.4 mg  0.4 mg Oral DAILY Randye Ad, DO   0.4 mg at 03/06/17 5067    0.9% sodium chloride infusion 250 mL  250 mL IntraVENous PRN Randye Ad, DO        phenol throat spray (CHLORASEPTIC) 1 Spray  1 [place] Oral PRN Hiwot Hurley MD   1 [place] at 02/20/17 1158    sodium chloride (NS) flush 5-10 mL  5-10 mL IntraVENous Q8H Shan Beltran MD   10 mL at 03/06/17 0556    0.9% sodium chloride infusion 250 mL  250 mL IntraVENous PRN Thalia Minaya CRNA        sodium chloride (NS) flush 5-10 mL  5-10 mL IntraVENous Q8H Shan Beltran MD   10 mL at 03/06/17 0555    ondansetron (ZOFRAN) injection 4 mg  4 mg IntraVENous Q4H PRN Shan Beltran MD   4 mg at 02/15/17 0225    0.9% sodium chloride infusion 250 mL  250 mL IntraVENous PRN Shan Beltran MD           Objective  Vitals:    03/05/17 2300 03/05/17 2345 03/06/17 0445 03/06/17 0755   BP: 106/61 (!) 105/91 111/72 120/62   Pulse: (!) 112 (!) 108 77 (!) 106   Resp: 24 22 22 20   Temp:  97.8 °F (36.6 °C) 97.5 °F (36.4 °C) 97.6 °F (36.4 °C)   SpO2: 99% 100% 99% 100%   Weight:       Height:             Intake/Output Summary (Last 24 hours) at 03/06/17 1028  Last data filed at 03/06/17 0730   Gross per 24 hour   Intake              855 ml   Output             1505 ml   Net             -650 ml           Admission weight: Weight: 63.5 kg (140 lb) (02/12/17 0603)  Last Weight Metrics:  Weight Loss Metrics 3/5/2017 1/4/2016 11/8/2015   Today's Wt 176 lb 9.6 oz 145 lb 140 lb   BMI 23.95 kg/m2 19.66 kg/m2 18.98 kg/m2             Physical Assessment:     General: Nonverbal.  Neck: No jvd. LUNGS: Clear to Auscultation, No rales, rhonchi or wheezes. CVS EXM: S1, S2  RRR, no murmurs/gallops/rubs. Abdomen: soft, non tender. Lower Extremities: ++edema. Lab    CBC w/Diff Recent Labs      03/06/17   0600 03/05/17   0430  03/04/17   0220   WBC  8.0  8.4  7.3   RBC  2.85*  2.86*  2.93*   HGB  7.9*  8.0*  8.2*   HCT  24.5*  24.6*  25.2*   PLT  295  245  262   GRANS  92*  92*  91*   LYMPH  6*  6*  7*   EOS  0  0  0        Chemistry Recent Labs      03/06/17   0600  03/05/17   1000   03/04/17   0220   GLU   --   106*   --   131*   NA   --   143   --   141   K   --   3.9   --   3.8   CL   --   109*   --   105   CO2   --   20*   --   17*   BUN   --   149*   --   134*   CREA   --   5.02*   --   4.80*   CA   --   8.0*   --   7.9*   AGAP   --   14   --   19*   BUCR   --   30*   --   28*   PHOS  6.2*   --    < >  6.9*    < > = values in this interval not displayed.          No results found for: IRON, FE, TIBC, IBCT, PSAT, FERR Lab Results   Component Value Date/Time    Calcium 8.0 03/05/2017 10:00 AM    Phosphorus 6.2 03/06/2017 06:00 AM        Hermilo Lewis MD  [facility]  Pager: [phone]

## 2017-03-06 NOTE — ROUTINE PROCESS
2210 Report received from [address] [place], [place] [address]y 190 on pt Gwen Vaca, transfer from ICU   Pt received to floor via bed from ICU, pt alert to self,made comfrotable in bed, phillips intact and draining yelow urine, right chest drain to gravity drainage, 800 cc noted in bag. Mepilex intact  to both heels, prevalon boots intact, mepilex intact to sacrum, small  abrasion noted to pt right knee,blister noted to pt upper right thigh. 0000 pt resting quietly in bed, tube feeding resumed, pt placed on tele box # [phone]  0100 Pt awake calling out,pulling off gown and blankets, stating I want to go home, pt reoriented to place and time.   0120 Pt medicated with 2mg haoldol as ordered  0215 Pt in bed resting quietly, HOB elevated, nasal O2 intact

## 2017-03-06 NOTE — PROGRESS NOTES
80  SBAR report provided by Tammy Subramanian RN. This RN familiar with this patient since I took care of him last night. No family present. Patient denies pain. 2000  Provided him with fentanyl prior to bathing . Tolerated turning pretty well. 2236     SBAR report given to Omero Bennett, patient will be going to room 2207 in about 1/2 hour. Allowed time for questions and clarification. [address] [place] [address] patient over to 2207 in his own bed. Reviewed skin with Omero Bennett. Patient complaining of pain in his right side. [address] with daughter via phone to  Make her aware that patient was transferred to 2207 and to give her update on patients status.

## 2017-03-06 NOTE — PROGRESS NOTES
Chart reviewed. Pt napping at this time. Plan was for pt to go to snf when medically able. FYI places to see if can restart PT/OT. Will continue to monitor for needs.

## 2017-03-06 NOTE — PROGRESS NOTES
Discussed pt's potential snf placements with pts daughter. She again said she woud like him to go to [facility] snf when medically stable. Will cont to follow.

## 2017-03-06 NOTE — ROUTINE PROCESS
Bedside and Verbal shift change report given to Keiry Reyes RN (oncoming nurse) by Lc Larson   (offgoing nurse). Report included the following information SBAR, OR Summary, Procedure Summary, Intake/Output, MAR and Recent Results.

## 2017-03-07 NOTE — PROGRESS NOTES
Daily Progress Note: 3/7/2017 3:03 PM   Admit Date: 2/12/2017    Patient seen in follow up for multiple medical problems as listed below:  Patient Active Problem List   Diagnosis Code    Small bowel obstruction ([facility] K56.69    Atrial fibrillation with rapid ventricular response ([facility] I48.91    Postoperative anemia D64.9    Feeding difficulties R63.3    Debility R53.81       Assesment     80 y.o. male with a PMHx of HTN, TIA, SIDNEY, SBO and abdominal hernia who presented to the ED with the acute onset of left upper quadrant abdominal pain. CT scan of the abd/pelvis in the ED that was positive for a SBO. s/p Exlap and small bowel resection (191cm) 2/13 am Dr Eddie Concepcion. Course complicated by Afib+RVR 7/53 am requiring rate control and cardiology involvement. Patient only responded temporarily to diltiazem boluses and diltiazem drip to 5mg. The evening of 2/14 the patients rate remained 130-140 and he was becoming increasingly hypotensive, cardizem gtt was stopped and patient was given 250mcg of IV Digoxin with HR resolution into 60's. Eventually coverted to lopresser with HTN medication adjustment. Failed MBS 2/16 and placed NPO, no hx of dysphagia this was thought due to significant trauma from NGT placement for surgery. Was to have Dobhoff placed 2/17 with IR, they could not get a tube down. He received D10 overnight is his PIV, then PICC and TPN 2/18 am. He had repeat speech eval on 02/22, but failed this, leading to PEG tube placement on the following day. His renal function has continued to worsen while intubated in the ICU and nephrology was consulted. On 02/23 he went into acute respiratory distress presumed to be 2/2 possible aspiration pneumonia and was transferred to the ICU and later intubated on pressor support for hypotension. He repeatedly failed spontaneous breathing tests 2/2 tachypnea. His HR remains poorly controlled and diltiazem was ineffective due to hypotension.  Amiodarone gtt was tried several times without success due to bradycardia, Cardiology was reconsulted. Patients cardiac and respiratory status eventually improved and was extubated 3/2 evening. He has developed significant LE edema and anasarca and is slowly being diuresed. His dysphagia and feeding situation is still un-resolved. Recommend continued ST therapy and repeat MBS at some point.     1-Acute hypoxemic, hypercapnic respiratory failure after brief respiratory arrest-extubated 3/2;patient remains stable on nasa cannula oxygen 3 liters. 2-Aspiration Pneumonia/Pneumonitis  3-Sepsis criteria with leukocytosis, hypotension, requiring temp pressor support-resolved  3-B/L Pleural Effusions  4-SBO s/p Exlap and Small Bowel Resection 02/13  5-Severe Dysphagia on Tube feeds through PEG  6-Atrial Fibrillation with RVR -Back to Digoxin when GFR improved  7-Acute Metabolic Encephalopathy Likely 2/2 Benzodiazepam, improving  8-SUSAN/ATN on CKD Stage IIIb -Neph consulting. Cr worsening. Discussed with nephrology today,patient will be started on iv fluid since he has good urine output. prn bicarb  9-Hyperkalemia Possibly 2/2 Renal Failure, Possible Heparin-induced w/ EKG Changes-Resolved. 10-Hypokalemia:will replace  11-L Inguinal Hernia  12-HTN  13-TIA on Eliquis normaly (held)  14-Iron Deficiency Anemia  15-Anasarca and LE edema - due to SUSAN, malnutrition, and relative HF from Afib/RVR on prn lasix  15-Excessive mucus in mouth:suction prn instructions given to nurse. DVT Protocol Active: yes  Code Status:  Full Code     Disposition: Out of ICU on 3/5. SNF later in week if improving. Subjective:     CC: Abdominal Pain (left lower abdomen) and Leg Pain (left leg)    Interval History: on 3L NC. Great HR control. Patient's daughter here present says that patient is more awake to and has had a good conversation with her.     Objective:     Visit Vitals    /69 (BP 1 Location: Right arm, BP Patient Position: At rest)    Pulse 88  Temp 97.4 °F (36.3 °C)    Resp 20    Ht 6' (1.829 m)    Wt 86.4 kg (190 lb 7.6 oz)    SpO2 94%    BMI 25.83 kg/m2       Temp (24hrs), Av.5 °F (36.4 °C), Min:97.4 °F (36.3 °C), Max:97.7 °F (36.5 °C)        Intake/Output Summary (Last 24 hours) at 17 1519  Last data filed at 17 1034   Gross per 24 hour   Intake              800 ml   Output             1500 ml   Net             -700 ml       Gen: NAD, Interacting,talking to daughter at bedside. HEENT:  PATRICIA, EOMI. Dry mouth  Neck: No Bruits/JVD   Lungs:   CTAb  Heart:   RR S1 S2 without M/R/G  Abdomen: ND,NT, BSX4,   Extremities:   Trace edema. No cyanosis. Skin:  no jaundice/lesions      Data Review:     Meds/Labs/Tests reviewed    Current Shift:  701 - 1900  In: -   Out: 300 [Urine:300]  Last three shifts:  1901 - 700  In: 1365 [P.O.:700;  I.V.:275]  Out: 2280 [Urine:2280]  Recent Labs      175  17   0617   0430   WBC  8.7  8.0  8.4   RBC  2.85*  2.85*  2.86*   HGB  7.9*  7.9*  8.0*   HCT  24.5*  24.5*  24.6*   PLT  323  295  245   GRANS  91*  92*  92*   LYMPH  6*  6*  6*   EOS  0  0  0       Recent Labs      17   0435  17   0600  17   1000  17   0430   BUN  157*   --   149*   --    CREA  5.27*   --   5.02*   --    CA  7.7*   --   8.0*   --    ALB  1.7*   --    --    --    K  3.4*   --   3.9   --    NA  145   --   143   --    CL  108   --   109*   --    CO2  20*   --   20*   --    PHOS  5.9*  6.2*   --   6.5*   GLU  175*   --   106*   --         Lab Results   Component Value Date/Time    Glucose 175 2017 04:35 AM    Glucose 106 2017 10:00 AM    Glucose 131 2017 02:20 AM    Glucose 145 2017 04:10 AM    Glucose 174 2017 03:45 AM       Procedures/Imaging:  s/p Exlap and small bowel resection  am Dr Estrellita Camarillo  [id number]%  Failed XR guided dobhoff   PICC  with TPN  PEG   Intubation , extubation 3/2    Case d/w nurse and daughter     [facility]  Hospitalist Division  Pager: [phone]

## 2017-03-07 NOTE — PROGRESS NOTES
Emptied 600ml of yellow fluids from chest tube drainage bag. Hemolyzed blood remaining in bag, unable to remove.

## 2017-03-07 NOTE — PROGRESS NOTES
TCC indicated that due to the decline in the patients condition he will need a reevaluation. FYI has been left for physician.   Keith Mcclain RN

## 2017-03-07 NOTE — PROGRESS NOTES
RENAL PROGRESS NOTE        Fernando Frances         Assessment/Plan:     SUSAN on CKD3: Nonoliguric. Good outputs on diuretics. Creat rather stable with rising BUN & Na c/w intravascular volume depletion, not surprising given his severe HypoAlbuminemia. His edema will never be fully mobilized with diuretics without risking SUSAN. He has no problem breathing & I don't have any problem giving him IVF. Doubt RRT candidate. No overt indication yet either. Re-eval daily. Stop diuretic. Met Acidosis: better on oral bicarb. Afib: Rate controlled. AC is on hold. Aspiration pneumonia/sepsis. On abx. Resp failure: compensated. No SOB reported. S/p small bowel resection for SBO 2/13. Anemia: S/p PRBC                                                                                                                                 Subjective:  Patient complaints of: No complaints. No SOB/CP/N/V.       Patient Active Problem List   Diagnosis Code    Small bowel obstruction (HCC) K56.69    Atrial fibrillation with rapid ventricular response (HCC) I48.91    Postoperative anemia D64.9    Feeding difficulties R63.3    Debility R53.81       Current Facility-Administered Medications   Medication Dose Route Frequency Provider Last Rate Last Dose    ferrous sulfate tablet 325 mg  1 Tab Oral DAILY WITH BREAKFAST Isaak Mckay MD   325 mg at 03/07/17 0931    furosemide (LASIX) injection 40 mg  40 mg IntraVENous Q12H Nicole Landon MD   40 mg at 03/07/17 0930    sodium bicarbonate tablet 650 mg  650 mg Oral TID Nicole Landon MD   650 mg at 03/07/17 0049    pantoprazole (PROTONIX) granules for oral suspension 40 mg  40 mg Per G Tube BID Naveen Buenrostros, DO   40 mg at 03/07/17 0930    ELECTROLYTE REPLACEMENT PROTOCOL  1 Each Other PRN Naveen Buenrostros, DO        ELECTROLYTE REPLACEMENT PROTOCOL  1 Each Other PRN Naveen Buenrostros, DO        ELECTROLYTE REPLACEMENT PROTOCOL  1 Each Other PRN Garth Perry DO        ELECTROLYTE REPLACEMENT PROTOCOL  1 Each Other PRN Garth Perry DO        sevelamer carbonate (RENVELA) oral powder 2,400 mg  2.4 g Oral TID WITH MEALS Manoj WU MD   2,400 mg at 03/07/17 0931    epoetin liam (EPOGEN;PROCRIT) injection 40,000 Units  40,000 Units SubCUTAneous Q7D Silvano Ray MD   40,000 Units at 03/03/17 2133    HYDROcodone-acetaminophen (HYCET) 0.5-21.7 mg/mL oral solution 5-7.5 mg  5-7.5 mg Per G Tube Q4H PRN Kaycee Cruz PA-C   7.5 mg at 03/05/17 0053    haloperidol lactate (HALDOL) injection 1-2 mg  1-2 mg IntraVENous Q2H PRN King Gotti MD   2 mg at 03/06/17 0112    0.9% sodium chloride infusion 250 mL  250 mL IntraVENous PRN Glorine Dancer, NP        HYDROmorphone (PF) (DILAUDID) injection 0.5 mg  0.5 mg IntraVENous Q3H PRN Glorine Dancer, NP   0.5 mg at 03/04/17 0547    chlorhexidine (PERIDEX) 0.12 % mouthwash 15 mL  15 mL Oral BID King Rodriguez MD   15 mL at 03/07/17 0931    hydroxypropyl methylcellulose (ISOPTO TEARS) 0.5 % ophthalmic solution 1 Drop  1 Drop Both Eyes PRN King Gotti MD   1 Drop at 03/04/17 2129    nystatin (MYCOSTATIN) 100,000 unit/mL oral suspension 500,000 Units  500,000 Units Oral QID Domitila Vázquez DO   500,000 Units at 03/07/17 0041    sodium chloride (NS) flush 5-10 mL  5-10 mL IntraVENous Q8H Sandor Salgado MD   10 mL at 03/06/17 1400    simethicone (MYLICON) 41QR/5.3MO oral drops 80 mg  1.2 mL Oral Multiple Nathaniel Rogers MD        fentaNYL citrate (PF) injection  mcg   mcg IntraVENous Multiple Nathaniel Rogers MD   50 mcg at 03/05/17 2012    piperacillin-tazobactam (ZOSYN) 3.375 g in 0.9% sodium chloride (MBP/ADV) 100 mL MBP EXTENDED 4 HOUR INFUSION###  3.375 g IntraVENous Q12H Domitila Vázquez DO 25 mL/hr at 03/07/17 0930 3.375 g at 03/07/17 0930    midazolam (VERSED) injection 1-4 mg  1-4 mg IntraVENous Q2H PRN Rona A Janeth, BRITTANY Parsons mg at 03/02/17 1410    heparin (porcine) injection 5,000 Units  5,000 Units SubCUTAneous Q8H Vianney Cocking, DO   5,000 Units at 03/07/17 0553    acetaminophen (TYLENOL) suppository 650 mg  650 mg Rectal Q4H PRN Vianney Cocking, DO   650 mg at 03/06/17 2226    zolpidem (AMBIEN) tablet 5 mg  5 mg Oral QHS PRN Sergio Gallon, DO        insulin lispro (HUMALOG) injection   SubCUTAneous Q6H Vianney Cocking, DO   3 Units at 03/07/17 0041    glucose chewable tablet 16 g  4 Tab Oral PRN Sergio Gallon, DO        glucagon [facility]) injection 1 mg  1 mg IntraMUSCular PRN Sergio Gallon, DO        dextrose (D50W) injection syrg 12.5-25 g  25-50 mL IntraVENous PRN Sergio Gallon, DO   25 g at 02/23/17 2205    sodium chloride (NS) flush 10 mL  10 mL InterCATHeter PRN Sergio Gallon, DO   40 mL at 02/28/17 0254    bacitracin 500 unit/gram packet 1 Packet  1 Packet Topical PRN Sergio Gallon, DO        tamsulosin [facility]) capsule 0.4 mg  0.4 mg Oral DAILY Sergio Gallon, DO   0.4 mg at 03/07/17 0931    0.9% sodium chloride infusion 250 mL  250 mL IntraVENous PRN Sergio Gallon, DO        phenol throat spray (CHLORASEPTIC) 1 Spray  1 [place] Oral PRN Miki Shah MD   1 [place] at 02/20/17 1158    sodium chloride (NS) flush 5-10 mL  5-10 mL IntraVENous Q8H Refugio Espinosa MD   10 mL at 03/07/17 0044    0.9% sodium chloride infusion 250 mL  250 mL IntraVENous PRN Thalia Minaya CRNA        sodium chloride (NS) flush 5-10 mL  5-10 mL IntraVENous Q8H Refugio Espinosa MD   Stopped at 03/07/17 0045    ondansetron (ZOFRAN) injection 4 mg  4 mg IntraVENous Q4H PRN Refugio Espinosa MD   4 mg at 02/15/17 0225    0.9% sodium chloride infusion 250 mL  250 mL IntraVENous PRN Refugio Espinosa MD           Objective  Vitals:    03/06/17 2339 03/07/17 0401 03/07/17 0549 03/07/17 0749   BP: 136/81 152/82  135/78   Pulse: 92 72  83   Resp: 20 20  20   Temp: 97.4 °F (36.3 °C) 97.5 °F (36.4 °C)  97.7 °F (36.5 °C)   SpO2: 92% 93%  96%   Weight:   86.4 kg (190 lb 7.6 oz)    Height:             Intake/Output Summary (Last 24 hours) at 03/07/17 0949  Last data filed at 03/06/17 2340   Gross per 24 hour   Intake              800 ml   Output             1200 ml   Net             -400 ml           Admission weight: Weight: 63.5 kg (140 lb) (02/12/17 0603)  Last Weight Metrics:  Weight Loss Metrics 3/7/2017 1/4/2016 11/8/2015   Today's Wt 190 lb 7.6 oz 145 lb 140 lb   BMI 25.83 kg/m2 19.66 kg/m2 18.98 kg/m2             Physical Assessment:     General: awake alert. Neck: No jvd. LUNGS: Clear to Auscultation, No rales, rhonchi or wheezes. CVS EXM: S1, S2  RRR, no murmurs/gallops/rubs. Abdomen: soft, non tender. Lower Extremities: ++edema. Lab    CBC w/Diff Recent Labs      03/07/17   0435  03/06/17   0600  03/05/17   0430   WBC  8.7  8.0  8.4   RBC  2.85*  2.85*  2.86*   HGB  7.9*  7.9*  8.0*   HCT  24.5*  24.5*  24.6*   PLT  323  295  245   GRANS  91*  92*  92*   LYMPH  6*  6*  6*   EOS  0  0  0        Chemistry Recent Labs      03/07/17   0435   03/05/17   1000   GLU  175*   --   106*   NA  145   --   143   K  3.4*   --   3.9   CL  108   --   109*   CO2  20*   --   20*   BUN  157*   --   149*   CREA  5.27*   --   5.02*   CA  7.7*   --   8.0*   AGAP  17   --   14   BUCR  30*   --   30*   AP  147*   --    --    TP  5.1*   --    --    ALB  1.7*   --    --    GLOB  3.4   --    --    AGRAT  0.5*   --    --    PHOS  5.9*   < >   --     < > = values in this interval not displayed.          No results found for: IRON, FE, TIBC, IBCT, PSAT, FERR Lab Results   Component Value Date/Time    Calcium 7.7 03/07/2017 04:35 AM    Phosphorus 5.9 03/07/2017 04:35 AM        Marlene Del Toro MD  [facility]  Pager: [phone]

## 2017-03-07 NOTE — CONSULTS
IM and Nephrology notes read. NO RESPIRATORY DISTRESS at present. No specific intervention from our part is currently needed. We will be available to see him as IP or OP, as needed.   PLEASE call ME for PULMONARY questions/concerns/interventions or procedures, thanks  [phone]

## 2017-03-07 NOTE — PROGRESS NOTES
Received bedside verbal report from [facility]. Patient is in bed,Conner's present,PEG tube present and feeding is running 40 ml/hr,chest tube present,pt is on tele box 53,also on continuous pulse OX,controlled A-fib,pt is on NPO,Oxygen at 4 ltr/min, prevalon boot is present on both legs. 0845 Complete assessment done,pt wants to get water,applied mouth swabs. 0931 Morning med given from the PEG tube,checked the residual before giving med,flushed PEG tube with 30 ml water,chlorhexidine mouth swabs applied to the pt's mouth. 1130 Pt wants to have some mouth swabs,applied,bed at the lowest position,call bell within reach. [address] residual on PEG tube before giving med,flushed tube with 30 ml water after giving med,daughter entered in room. 0 Daughter is wondering that weather pt can get physical therapy done,will ask doctor to get order for PT.    1530 Pt has bowel movement,pt get cleaned up by CNA,assisted pt to turn,will continue to monitor him. 1730 Again checked residual from PEG tube,is 10 ml,flushed with 30 ml water after med given,applied chlorhexidine mouth wash swab on pt's mouth. 1830 Pt is resting in bed,call bell within reach,will continue to monitor him. Bedside and Verbal shift change report given to [facility] (oncoming nurse) by Jerline Lundborg (offgoing nurse). Report included the following information SBAR, Kardex, Procedure Summary, Intake/Output, MAR and Recent Results.

## 2017-03-07 NOTE — PROGRESS NOTES
Received pt in bed hob elevated on Oxygen 2L via NC. Pt has Nepro @45 ml/hr infusing via peg tube, 0 residual noted. PICC line double lumen to right upper arm, Flushed and patent. Pt on Telemetry box # 53. Pt is in Afib. Noted bilateral lowwer edema +4 non pitting parlon boots in place. 2030 Pt daughter called Nicole Venturanella she requesting pt not have any pain medication. \" because he is out of it in the am and he can't talk in the morning\". MD notified and Nursing supervisor aware. 2230 Pt has denied pain but is moaning and grimacing. Acetaminophen suppository given for pain. Monitoring ongoing. 2014 [facility] Pt daughter Nicole Boone called she was informed of pt condition and the necessity of pain medication or versed if pt continues to go into V-Tach. She stated \" please do what is necessary for him \". Monitoring ongoing. 0000 Pt resting quietly in bed HOB elevated, TF infusing without difficulty and he is receiving IV ABT without adverse effect noted at this time. Monitoring ongoing.

## 2017-03-08 NOTE — PROGRESS NOTES
7671 per offgoing nurse Italia Felix , patient stage 1 pressure ulcer noted to sacral POA, mepilex in place    1119 chest tube output 175 yellow straw with red flakes, patient in bed, no acute distress noted, call bell within reach    1123 paiged MD Rhina Regan, awaiting call back     1215 Xray at bedside, spoke to [facility] in regards to potassium powder form     1340 daughter present at bedside, patient in bed, no acute distress noted, meds given    MD Rhina Regan made aware of patient's daughter concerns in regards of father's narcotic and sedative medications. MD made aware daughter request wrist restraints versus narcotics or sedative medications due to daughter states\"with sedatives and narcotics it takes a 24 hour period to get out of his systems and he cannot talk to me or participate in therapy. \"    2011 Bedside and Verbal shift change report given to Viri Singh (oncoming nurse) by Alexandra Martinez RN (offgoing nurse). Report included the following information SBAR, Kardex and MAR. Oncoming nurse made aware of patient's daughter wishes in regards to narcotics and sedatives, given daughter's number listed on white communication board. Patient's chest tube level remain at 175 output during shift.

## 2017-03-08 NOTE — PROGRESS NOTES
NUTRITION follow-up/Plan of care    RECOMMENDATIONS:   1. Enteral Nutrition: Nepro @ 45 ml/hr providin kcal, 85 g protein and 750 ml free water  2. Free water: 200 ml Q 6 hours  3. Monitor weight and PO intake  4. RD to follow    GOALS:   1. Met/Ongoing: Enteral Nutrition meets >75% of protein/calorie needs by 3/11  2. Ongoing: Maintain weight (+/- 1-2 lb) by 3/15  3. Ongoing: BG in target range by 3/11    ASSESSMENT:   Per BMI of 23.9, weight is in the normal classification. Enteral Nutrition is currently meeting 100% of estimated needs. Labs noted. Phosphorus level is elevated but potassium level is low. Average BG in the last 24 hours: 146-186. BUN and Creatinine levels are elevated vs SUSAN (GFR: 10). Nutrition recommendations listed. RD to follow. Nutrition Risk:  []   High [x]  Moderate [] Low    SUBJECTIVE/OBJECTIVE:   Transfer from ICU. Pt was previously on TPN (was d/c on ). Pt with severe dysphagia, anasarca, LE edema, SUSAN, s/p exlap w/ small bowel resection (191 cm) on . Per family estimated patient's weight is around 140 lb. Current weight per bed scale: 176.6 lb. Pt is tolerating Enteral Nutrition through PEG tube. Pt has a BM on 3/8 per I&Os. Will follow. Information Obtained From:   [x] Chart Review  [x] Patient  [] Family/Caregiver  [] Nurse/Physician   [] Patient Rounds/Interdisciplinary Meeting    Diet: NPO / Nepro @ 40 ml/hr  Medications: [x] Reviewed (Calcium chloride, Ferrous sulfate, Lispro, Renvela)  Encounter Diagnoses     ICD-10-CM ICD-9-CM   1. SBO (small bowel obstruction) (HCC) K56.69 560.9   2. Abdominal pain, LUQ (left upper quadrant) R10.12 789.02   3. Chronic pain of left knee M25.562 719.46    G89.29 338.29   4. Chronic renal failure, unspecified stage N18.9 585.9   5. Anemia, unspecified type D64.9 285.9   6. Debility R53.81 799.3   7. Feeding difficulties R63.3 783. 3     Past Medical History:   Diagnosis Date    Hypertension     TIA (transient ischemic attack) Labs:    Lab Results   Component Value Date/Time    Sodium 145 03/08/2017 04:15 AM    Potassium 3.3 03/08/2017 04:15 AM    Chloride 108 03/08/2017 04:15 AM    CO2 20 03/08/2017 04:15 AM    Anion gap 17 03/08/2017 04:15 AM    Glucose 149 03/08/2017 04:15 AM     03/08/2017 04:15 AM    Creatinine 5.30 03/08/2017 04:15 AM    Calcium 7.8 03/08/2017 04:15 AM    Magnesium 2.2 03/08/2017 04:15 AM    Phosphorus 5.3 03/08/2017 04:15 AM    Albumin 1.7 03/07/2017 04:35 AM     Anthropometrics: BMI (calculated): 23.9   Last 3 Recorded Weights in this Encounter    03/07/17 0549 03/08/17 0519 03/08/17 1401   Weight: 86.4 kg (190 lb 7.6 oz) 79.8 kg (175 lb 14.8 oz) 80.1 kg (176 lb 9.6 oz)      Ht Readings from Last 1 Encounters:   03/08/17 6' (1.829 m)     []  Weight Loss  []  Weight Gain  []  Weight Stable   [x]  New wt n/a on record     Estimated Nutrition Needs:   1900 Kcals/day  Protein (g): 75 g    Nutrition Problems Identified:   [x] Suboptimal PO intake   [] Food Allergies  [x] Difficulty chewing/swallowing/poor dentition  [] Constipation/Diarrhea   [] Nausea/Vomiting   [] None  [] Other:     Plan:   [] Therapeutic Diet  []  Obtained/adjusted food preferences/tolerances and/or snacks options   []  Supplements added   [] Occupational therapy following for feeding techniques  []  HS snack added   []  Modify diet texture   []  Modify diet for food allergies   []  Educate patient   []  Assist with menu selection   []  Monitor PO intake on meal rounds   [x]  Continue inpatient monitoring and intervention   []  Participated in discharge planning/Interdisciplinary rounds/Team meetings   [x]  Other: Enteral Nutrition    Education Needs:   [x] Not appropriate for teaching at this time    [] Identified and addressed    Nutrition Monitoring and Evaluation:   [x] Continue inpatient monitoring and interventions    [] Other:     Deven Andersen, [address] [place] [address]  Pager: [phone]

## 2017-03-08 NOTE — PROGRESS NOTES
Daily Progress Note: 3/8/2017 3:03 PM   Admit Date: 2/12/2017    Patient seen in follow up for multiple medical problems as listed below:  Patient Active Problem List   Diagnosis Code    Small bowel obstruction ([facility] K56.69    Atrial fibrillation with rapid ventricular response ([facility] I48.91    Postoperative anemia D64.9    Feeding difficulties R63.3    Debility R53.81       Assesment     80 y.o. male with a PMHx of HTN, TIA, SIDNEY, SBO and abdominal hernia who presented to the ED with the acute onset of left upper quadrant abdominal pain. CT scan of the abd/pelvis in the ED that was positive for a SBO. s/p Exlap and small bowel resection (191cm) 2/13 am Dr Rance Rubinstein. Course complicated by Afib+RVR 9/57 am requiring rate control and cardiology involvement. Patient only responded temporarily to diltiazem boluses and diltiazem drip to 5mg. The evening of 2/14 the patients rate remained 130-140 and he was becoming increasingly hypotensive, cardizem gtt was stopped and patient was given 250mcg of IV Digoxin with HR resolution into 60's. Eventually coverted to lopresser with HTN medication adjustment. Failed MBS 2/16 and placed NPO, no hx of dysphagia this was thought due to significant trauma from NGT placement for surgery. Was to have Dobhoff placed 2/17 with IR, they could not get a tube down. He received D10 overnight is his PIV, then PICC and TPN 2/18 am. He had repeat speech eval on 02/22, but failed this, leading to PEG tube placement on the following day. His renal function has continued to worsen while intubated in the ICU and nephrology was consulted. On 02/23 he went into acute respiratory distress presumed to be 2/2 possible aspiration pneumonia and was transferred to the ICU and later intubated on pressor support for hypotension. He repeatedly failed spontaneous breathing tests 2/2 tachypnea. His HR remains poorly controlled and diltiazem was ineffective due to hypotension.  Amiodarone gtt was tried several times without success due to bradycardia, Cardiology was reconsulted. Patients cardiac and respiratory status eventually improved and was extubated 3/2 evening. He has developed significant LE edema and anasarca and is slowly being diuresed. His dysphagia and feeding situation is still un-resolved. Recommend continued ST therapy and repeat MBS at some point.     1-Acute hypoxemic, hypercapnic respiratory failure after brief respiratory arrest-extubated 3/2;O2sat 92% on RA. 2-Aspiration Pneumonia/Pneumonitis;on atbx  3-Sepsis criteria with leukocytosis, hypotension, requiring temp pressor support-resolved  4-B/L Pleural Effusions  5-SBO s/p Exlap and Small Bowel Resection 02/13  6-Severe Dysphagia on Tube feeds through PEG  7-Atrial Fibrillation with RVR -Back to Digoxin when GFR improved  8-Acute Metabolic Encephalopathy Likely 2/2 Benzodiazepam, improved  9-SSUAN/ATN on CKD Stage IIIb -Neph consulting. Cr worsening. Discussed with nephrology,patient will be started on iv fluid since he has good urine output. prn bicarb  10-Hyperkalemia Possibly 2/2 Renal Failure, Possible Heparin-induced w/ EKG Changes-Resolved. 11-Hypokalemia:will replace  12-L Inguinal Hernia  13-HTN  14-TIA on Eliquis normaly (held)  15-Iron Deficiency Anemia  16-Anasarca and LE edema - due to SUSAN, malnutrition, and relative HF from Afib/RVR on prn lasix  16-Excessive mucus in mouth:suction prn instructions given to nurse. 17-debility:PT/OT      DVT Protocol Active: yes  Code Status:  Full Code     Disposition: Out of ICU on 3/5. SNF later in week if improving. Subjective:     CC: Abdominal Pain (left lower abdomen) and Leg Pain (left leg)    Interval History: today on RA O2SAT 92%. Great HR control. Patient looks somewhat sleepy today but wakes up to touch stimulation.     Objective:     Visit Vitals    /83 (BP 1 Location: Left arm, BP Patient Position: At rest)    Pulse 60    Temp 97.6 °F (36.4 °C)    Resp 18    Ht 6' (1.829 m)    Wt 80.1 kg (176 lb 9.6 oz)    SpO2 92%    BMI 23.95 kg/m2       Temp (24hrs), Av.5 °F (36.4 °C), Min:97.2 °F (36.2 °C), Max:97.8 °F (36.6 °C)        Intake/Output Summary (Last 24 hours) at 17 1722  Last data filed at 17 1418   Gross per 24 hour   Intake            972.5 ml   Output             3050 ml   Net          -2077.5 ml       Gen: NAD, sleepy today. HEENT:  PATRICIA, EOMI. Dry mouth  Neck: No Bruits/JVD   Lungs:   CTAb  Heart:   RR S1 S2 without M/R/G  Abdomen: ND,NT, BSX4,   Extremities:   Trace edema. No cyanosis.   Skin:  no jaundice/lesions      Data Review:     Meds/Labs/Tests reviewed    Current Shift:  701 - 1900  In: -   Out: 850 [Urine:850]  Last three shifts:  1901 - 700  In: 1222.5 [I.V.:882.5]  Out: 3700 [Urine:3700]  Recent Labs      17   0435  17   0600   WBC  9.0  8.7  8.0   RBC  2.79*  2.85*  2.85*   HGB  7.7*  7.9*  7.9*   HCT  24.1*  24.5*  24.5*   PLT  352  323  295   GRANS  90*  91*  92*   LYMPH  7*  6*  6*   EOS  1  0  0       Recent Labs      175  17   0435  17   0600   BUN  157*  157*   --    CREA  5.30*  5.27*   --    CA  7.8*  7.7*   --    ALB   --   1.7*   --    K  3.3*  3.4*   --    NA  145  145   --    CL  108  108   --    CO2  20*  20*   --    PHOS  5.3*  5.9*  6.2*   GLU  149*  175*   --         Lab Results   Component Value Date/Time    Glucose 149 2017 04:15 AM    Glucose 175 2017 04:35 AM    Glucose 106 2017 10:00 AM    Glucose 131 2017 02:20 AM    Glucose 145 2017 04:10 AM       Procedures/Imaging:  s/p Exlap and small bowel resection  am Dr Dru Logan  [id number]%  Failed XR guided dobhoff   PICC  with TPN  PEG   Intubation , extubation 3/2    Case d/w nephrology    [address] [facility]  Hospitalist Division  Pager: [phone]

## 2017-03-08 NOTE — PROGRESS NOTES
Bedside and Verbal shift change report given to [address] [place] [address] (oncoming nurse) by Zane Stewart RN (offgoing nurse). Report included the following information SBAR, Kardex, Intake/Output, MAR and Recent Results.

## 2017-03-08 NOTE — PROGRESS NOTES
RENAL PROGRESS NOTE        Minnie Nelson         Assessment  - SUSAN / CKD stage 3   - Hypokalemia   - AG metabolic acidosis   - HyperpO4   - Sever protein malnutrition     Plan   - Looking at his kidney function his Cr is slowly and gradually increasing since admission which can favor a pre-renal etiology secondary to volume depletion , ATN is another possibility but usually you see bigger jumps , his Vancomycin levels are ok ( but they can be nephrotoxic in the setting of volume depletion ) . BUN / Cr  stable today with adequate UO . Currently on TF . Not a good candidate for HD   - Add some free water to TF   - Replace K   - Add NaHCO3  - Follow labs     ·                                                                                                                                Subjective:    Patient is awake , says that he is doing ok .        Patient Active Problem List   Diagnosis Code    Small bowel obstruction (HCC) K56.69    Atrial fibrillation with rapid ventricular response (HCC) I48.91    Postoperative anemia D64.9    Feeding difficulties R63.3    Debility R53.81       Current Facility-Administered Medications   Medication Dose Route Frequency Provider Last Rate Last Dose    ferrous sulfate tablet 325 mg  1 Tab Oral DAILY WITH BREAKFAST Enrique Pa MD   325 mg at 03/07/17 0931    sodium bicarbonate tablet 650 mg  650 mg Oral TID Jose Cai MD   650 mg at 03/07/17 2237    pantoprazole (PROTONIX) granules for oral suspension 40 mg  40 mg Per G Tube BID Melinda Oz, DO   40 mg at 03/07/17 1730    sevelamer carbonate (RENVELA) oral powder 2,400 mg  2.4 g Oral TID WITH MEALS Georgia WU MD   2,400 mg at 03/07/17 1727    epoetin liam (EPOGEN;PROCRIT) injection 40,000 Units  40,000 Units SubCUTAneous Mira Snellen, MD   40,000 Units at 03/03/17 2133    HYDROcodone-acetaminophen (HYCET) 0.5-21.7 mg/mL oral solution 5-7.5 mg  5-7.5 mg Per G Tube Q4H PRN Arnaldo Ortega PA-C   7.5 mg at 03/05/17 0053    haloperidol lactate (HALDOL) injection 1-2 mg  1-2 mg IntraVENous Q2H PRN King Gotti MD   1 mg at 03/08/17 0450    0.9% sodium chloride infusion 250 mL  250 mL IntraVENous PRN Derek Clemens, JESSICA        HYDROmorphone (PF) (DILAUDID) injection 0.5 mg  0.5 mg IntraVENous Q3H PRN Derek Clemens NP   0.5 mg at 03/04/17 0547    chlorhexidine (PERIDEX) 0.12 % mouthwash 15 mL  15 mL Oral BID King Gotti MD   15 mL at 03/07/17 1730    hydroxypropyl methylcellulose (ISOPTO TEARS) 0.5 % ophthalmic solution 1 Drop  1 Drop Both Eyes PRN King Gotti MD   1 Drop at 03/04/17 2129    nystatin (MYCOSTATIN) 100,000 unit/mL oral suspension 500,000 Units  500,000 Units Oral QID [facility], DO   500,000 Units at 03/07/17 2237    sodium chloride (NS) flush 5-10 mL  5-10 mL IntraVENous Q8H Micaela John MD   10 mL at 03/07/17 1730    simethicone (MYLICON) 91QA/9.2ZN oral drops 80 mg  1.2 mL Oral Multiple Clint Fu MD        fentaNYL citrate (PF) injection  mcg   mcg IntraVENous Multiple Clint Fu MD   50 mcg at 03/05/17 2012    piperacillin-tazobactam (ZOSYN) 3.375 g in 0.9% sodium chloride (MBP/ADV) 100 mL MBP EXTENDED 4 HOUR INFUSION###  3.375 g IntraVENous Q12H [facility], DO 25 mL/hr at 03/07/17 2237 3.375 g at 03/07/17 2237    heparin (porcine) injection 5,000 Units  5,000 Units SubCUTAneous Q8H [facility], DO   5,000 Units at 03/08/17 0449    acetaminophen (TYLENOL) suppository 650 mg  650 mg Rectal Q4H PRN [facility], DO   650 mg at 03/06/17 2226    zolpidem (AMBIEN) tablet 5 mg  5 mg Oral QHS PRN Amanda Ventura, DO        insulin lispro (HUMALOG) injection   SubCUTAneous Q6H Nory Johnson DO   3 Units at 03/08/17 5365    glucose chewable tablet 16 g  4 Tab Oral PRN Amanda Ventura, DO        glucagon [facility]) injection 1 mg  1 mg IntraMUSCular PRN Canary Kristyn, DO        dextrose (D50W) injection syrg 12.5-25 g  25-50 mL IntraVENous PRN Canary Kristyn, DO   25 g at 02/23/17 2205    sodium chloride (NS) flush 10 mL  10 mL InterCATHeter PRN Canary Kristyn, DO   40 mL at 02/28/17 0254    bacitracin 500 unit/gram packet 1 Packet  1 Packet Topical PRN Canary Kristyn, DO        tamsulosin [facility]) capsule 0.4 mg  0.4 mg Oral DAILY Canary Kristyn, DO   0.4 mg at 03/07/17 0931    0.9% sodium chloride infusion 250 mL  250 mL IntraVENous PRN Canary Kristyn, DO        phenol throat spray (CHLORASEPTIC) 1 Spray  1 Spray Oral PRN Bushra Arana MD   1 Spray at 02/20/17 1158    sodium chloride (NS) flush 5-10 mL  5-10 mL IntraVENous Q8H Carmelo Wang MD   10 mL at 03/08/17 0057    0.9% sodium chloride infusion 250 mL  250 mL IntraVENous PRN Thalia Minaya CRNA        sodium chloride (NS) flush 5-10 mL  5-10 mL IntraVENous Q8H Carmelo Wang MD   10 mL at 03/07/17 2315    ondansetron (ZOFRAN) injection 4 mg  4 mg IntraVENous Q4H PRN Carmelo Wang MD   4 mg at 02/15/17 0225    0.9% sodium chloride infusion 250 mL  250 mL IntraVENous PRN Carmelo Wang MD           Objective  Vitals:    03/08/17 2471 03/08/17 0410 03/08/17 0519 03/08/17 0756   BP: 141/73 133/83  144/83   Pulse: 76 87  60   Resp: 18 18  18   Temp: 97.8 °F (36.6 °C) 97.4 °F (36.3 °C)  97.6 °F (36.4 °C)   SpO2: 95% 92%  92%   Weight:   79.8 kg (175 lb 14.8 oz)    Height:   6' (1.829 m)          Intake/Output Summary (Last 24 hours) at 03/08/17 0948  Last data filed at 03/08/17 0527   Gross per 24 hour   Intake           1012.5 ml   Output             2500 ml   Net          -1487.5 ml           Admission weight: Weight: 63.5 kg (140 lb) (02/12/17 0603)  Last Weight Metrics:  Weight Loss Metrics 3/8/2017 1/4/2016 11/8/2015   Today's Wt 175 lb 14.8 oz 145 lb 140 lb   BMI 23.86 kg/m2 19.66 kg/m2 18.98 kg/m2             Physical Assessment: General: NAD, cachetic   Neck: No jvd. LUNGS: Clear to Auscultation, No rales, rhonchi or wheezes from anterior   CVS EXM: S1, S2  RRR, no murmurs/gallops/rubs. Abdomen: soft, non tender. PEG tube   Lower Extremities:  no edema.  : Conner catheter       Lab    CBC w/Diff Recent Labs      03/08/17 0415 03/07/17 0435 03/06/17   0600   WBC  9.0  8.7  8.0   RBC  2.79*  2.85*  2.85*   HGB  7.7*  7.9*  7.9*   HCT  24.1*  24.5*  24.5*   PLT  352  323  295   GRANS  90*  91*  92*   LYMPH  7*  6*  6*   EOS  1  0  0        Chemistry Recent Labs      03/08/17 0415 03/07/17 0435 03/05/17   1000   GLU  149*  175*   --   106*   NA  145  145   --   143   K  3.3*  3.4*   --   3.9   CL  108  108   --   109*   CO2  20*  20*   --   20*   BUN  157*  157*   --   149*   CREA  5.30*  5.27*   --   5.02*   CA  7.8*  7.7*   --   8.0*   AGAP  17  17   --   14   BUCR  30*  30*   --   30*   AP   --   147*   --    --    TP   --   5.1*   --    --    ALB   --   1.7*   --    --    GLOB   --   3.4   --    --    AGRAT   --   0.5*   --    --    PHOS  5.3*  5.9*   < >   --     < > = values in this interval not displayed.          No results found for: IRON, FE, TIBC, IBCT, PSAT, FERR Lab Results   Component Value Date/Time    Calcium 7.8 03/08/2017 04:15 AM    Phosphorus 5.3 03/08/2017 04:15 AM          Darrell Pennington MD  3/8/2017  9:48 AM

## 2017-03-08 NOTE — PROGRESS NOTES
Received pt in bed hob elevated on humidified oxygen at 3L  Tele box 53, in A-fib. PT has PICC line to right upper forearm. The purple port has been flushed and has no blood return. The red port has been flushed and is patent with good blood return. Pt is on NS @ 75 l /hr. P[place]llon boots in place. Pt has chest tube that is draining serosanguinous fluid 100 ml noted in bag. Peg-tube flushed and patent Nephro infusing @ 40 ml/hr. Call bell within reach monitoring ongoing. 2330 Pt has increased respiratory rate and noted JVD coarse crackles noted on left lower lobe. Hospitalist called. Received order to hold IV fluids until am for further evaluation by MD. Monitoring ongoing. 0030 Pt resting quietly in bed no s/s of acute distress. Monitoring ongoing. 0500 Pt increasingly anxious and pulling off Tele, and attempting to pull at phillips and peg tube, PRN haldol given after checking QTc and Magnesium level. Bed bath given, Tele strips and pulse ox changed. Monitoring ongoing.

## 2017-03-08 NOTE — PROGRESS NOTES
Problem: Mobility Impaired (Adult and Pediatric)  Goal: *Acute Goals and Plan of Care (Insert Text)  Physical Therapy Goals  Initiated 3/8/2017 and to be accomplished within 7 day(s)  1. Patient will roll bilaterally with max assist for repositioning. 2. Patient will participate in 5-10 minutes of BLE exercises in order to improve strength for carryover for functional tasks. 3. Activity Tolerance: Tolerate EOB sitting 2-5 minutes for change of position. 4. Patient will transfer supine to/from sit with max assist x2 to increase functional mobility. Outcome: Progressing Towards Goal  PHYSICAL THERAPY EVALUATION     Patient: Chaya Mccoy (1 y.o. male)  Date: 3/8/2017  Primary Diagnosis: SBO  Small Bowel Obstruction  Small bowel obstruction (HCC)  Failed swallowing evaluations x2  Procedure(s) (LRB):  PERCUTANEOUS ENDOSCOPIC GASTROSTOMY TUBE INSERTION (N/A) 13 Days Post-Op   Precautions:   Fall, Skin      ASSESSMENT :  Based on the objective data described below, the patient presents with significant impairments to strength with generalized deconditioning and visible muscle atrophy to BLE's (apparent at distal gastroc/soleus with proximal LE's significantly edematous), ROM BLE's (bilateral DF to neutral, hip/knee flex ~20 degrees PROM), and significantly impaired activity tolerance. These impairments significantly limit pt's functional mobility. Pt currently total assist for rolling in bed. Not yet appropriate for supine to sit transfers due to impairments to ROM and strength (pt dependent at this time). Pt tolerated few reps of PROM to bilateral LE's. Pt repositioned for comfort, needs in reach and call bell in reach. I did speak with Elo Zarate RN regarding concern for turn & position schedule and obtaining an air mattress for pressure relief. I did also recommend not securing Prevalon boots as pt was noted to have significant edema pooling above velcro straps on boot.      Recommendations for nursing: turn and position  Written on communication board: yes   Verbally communicated to: Julissa Artis RN  Patient will benefit from skilled intervention to address the above impairments. Patients rehabilitation potential is considered to be Fair  Factors which may influence rehabilitation potential include:   [ ]         None noted  [ ]         Mental ability/status  [X]         Medical condition  [ ]         Home/family situation and support systems  [ ]         Safety awareness  [X]         Pain tolerance/management  [ ]         Other:        PLAN :  Recommendations and Planned Interventions:  [X]           Bed Mobility Training             [X]    Neuromuscular Re-Education  [X]           Transfer Training                   [ ]    Orthotic/Prosthetic Training  [X]           Gait Training                          [ ]    Modalities  [X]           Therapeutic Exercises          [ ]    Edema Management/Control  [X]           Therapeutic Activities            [X]    Patient and Family Training/Education  [ ]           Other (comment):     Frequency/Duration: Patient will be followed by physical therapy 3 times a week to address goals. Will recommend 3 day trial to determine if patient able to participate with therapy POC. Discharge Recommendations: Fracisco Lemus  Further Equipment Recommendations for Discharge: to be determined and N/A       G-CODES       Mobility  Current  CN= 100%   Goal  CM= 80-99%. The severity rating is based on the [facility] :  [employer] Sitting Balance Scale  0: Pt performs 25% or less of sitting activity (Max assist) CN, 100% impaired. 1: Pt supports self with upper extremities but requires therapist assistance. Pt performs 25-50% of effort (Mod assist) CM, 80% to <100% impaired. 1+: Pt supports self with upper extremities but requires therapist assistance. Pt performs >50% effort. (Min assist). CL, 60% to <80% impaired.   2: Pt supports self independently with both upper extremities. CL, 60% to <80% impaired. 2+: Pt support self independently with 1 upper extremity. CK, 40% to <60% impaired. 3: Pt sits without upper extremity support for up to 30 seconds. CK, 40% to <60% impaired. 3+: Pt sits without upper extremity support for 30 seconds or greater. CJ, 20% to <40% impaired. 4: Pt moves and returns trunkal midpoint 1-2 inches in one plane. CJ, 20% to <40% impaired. 4+: Pt moves and returns trunkal midpoint 1-2 inches in multiple planes. CI, 1% to <20% impaired. 5: Pt moves and returns trunkal midpoint in all planes greater than 2 inches. CH, 0% impaired. Eval Complexity: History: HIGH Complexity :3+ comorbidities / personal factors will impact the outcome/ POC Exam:HIGH Complexity : 4+ Standardized tests and measures addressing body structure, function, activity limitation and / or participation in recreation  Presentation: HIGH Complexity : Unstable and unpredictable characteristics  Clinical Decision Making:Other outcome measures [place] Sitting balance scale  HIGH Overall Complexity:HIGH          SUBJECTIVE:   Patient with occasional attempted verbalizations which are too low to be heard. OBJECTIVE DATA SUMMARY:       Past Medical History:   Diagnosis Date    Hypertension      TIA (transient ischemic attack)     History reviewed. No pertinent surgical history. Prior Level of Function/Home Situation: Lives with dtr and was previously ambulatory with ww vs. SPC.   Home Situation  Home Environment: Private residence  # Steps to Enter: 6  Rails to Enter: Yes  Hand Rails : Bilateral  Wheelchair Ramp: Yes (with bilateral rails)  One/Two Story Residence: Two story, live on 1st floor  Living Alone: No  Support Systems: Child(carolina)  Patient Expects to be Discharged to[de-identified] Unknown  Current DME Used/Available at Home: Cane, straight, Grab bars, Shower chair, Walker, rolling  Tub or Shower Type: Tub/Shower combination  Critical Behavior:  Neurologic State: Alert  Orientation Level: Unable to verbalize  Cognition: Decreased command following  Safety/Judgement: Fall prevention  Psychosocial  Patient Behaviors: Calm; Cooperative  Strength:    Strength: Grossly decreased, non-functional  Tone & Sensation:   Tone: Normal  Sensation:  (appears intact, pt unable to verbaize)  Range Of Motion:  AROM: Grossly decreased, non-functional  PROM: Grossly decreased, non-functional  Functional Mobility:  Bed Mobility:  Rolling: Total assistance  Supine to Sit:  (n/a)  Transfers:  Sit to Stand:  (n/a)  Balance:   Sitting:  (n/a)  Ambulation/Gait Training:  Gait Description (WDL):  (n/a)  Therapeutic Exercises:   Bilateral LE PROM: hip abd/add, heel slides, ankle pumps x 8 reps each  Pain:     Pain Intensity 1: 0  Pain Location 1: Generalized  Pain Orientation 1: Anterior; Lower  Pain Description 1: Aching  Pain Intervention(s) 1: Declines  Pre-treatment level: 0  Location: n/a  Post-treatment level: did not rate  Activity Tolerance:   Poor-  Please refer to the flowsheet for vital signs taken during this treatment. After treatment:   [ ]         Patient left in no apparent distress sitting up in chair  [X]         Patient left in no apparent distress in bed  [X]         Call bell left within reach  [X]         Nursing notified  [ ]         Caregiver present  [ ]         Bed alarm activated      COMMUNICATION/EDUCATION:   [X]         Fall prevention education was provided and the patient/caregiver indicated understanding. [X]         Patient/family have participated as able in goal setting and plan of care. [X]         Patient/family agree to work toward stated goals and plan of care. [ ]         Patient understands intent and goals of therapy, but is neutral about his/her participation. [ ]         Patient is unable to participate in goal setting and plan of care. Thank you for this referral.  Jihan Nails, PT   Time Calculation: 15 mins

## 2017-03-09 NOTE — PROGRESS NOTES
RENAL PROGRESS NOTE        Mart Yoon       Explained prognosis to daughter at bedside in detail. Dense SUSAN/ATN in the elderly could take weeks to recover. No indication for RRT, neither pt a good candidate in my opinion. She verbalized understanding. Assessment/Plan:     SUSAN on CKD3: Nonoliguric. Good outputs on diuretics. Creat & BUN stable. HypoAlbuminemia: unlikely to tolerate aggressive diuresis. Breathing comfortably. His edema will never be fully mobilized with diuretics without risking SUSAN. Not RRT candidate. No overt indication. Met Acidosis: better on oral bicarb. Afib: Rate controlled. AC is on hold. Aspiration pneumonia/sepsis. On abx. Resp failure: compensated. No SOB reported. S/p small bowel resection for SBO 2/13. Anemia: S/p PRBC . Subjective:  Patient complaints of: No complaints. No SOB/CP/N/V.       Patient Active Problem List   Diagnosis Code    Small bowel obstruction (HCC) K56.69    Atrial fibrillation with rapid ventricular response (HCC) I48.91    Postoperative anemia D64.9    Feeding difficulties R63.3    Debility R53.81       Current Facility-Administered Medications   Medication Dose Route Frequency Provider Last Rate Last Dose    ferrous sulfate tablet 325 mg  1 Tab Oral DAILY WITH BREAKFAST Javier Del Real MD   325 mg at 03/09/17 0851    sodium bicarbonate tablet 650 mg  650 mg Oral TID Coco Guaman MD   650 mg at 03/09/17 0851    pantoprazole (PROTONIX) granules for oral suspension 40 mg  40 mg Per G Tube BID Aj Ocasio DO   40 mg at 03/09/17 0851    sevelamer carbonate (RENVELA) oral powder 2,400 mg  2.4 g Oral TID WITH MEALS Arcenio WU MD   2,400 mg at 03/09/17 0851    epoetin liam (EPOGEN;PROCRIT) injection 40,000 Units  40,000 Units SubCUTAneous Q7D Courtney Darby MD   40,000 Units at 03/03/17 2133    HYDROcodone-acetaminophen (HYCET) 0.5-21.7 mg/mL oral solution 5-7.5 mg  5-7.5 mg Per G Tube Q4H PRN Nadia Villafana PA-C   5 mg at 03/08/17 2355    haloperidol lactate (HALDOL) injection 1-2 mg  1-2 mg IntraVENous Q2H PRN King Gotti MD   1 mg at 03/08/17 0450    0.9% sodium chloride infusion 250 mL  250 mL IntraVENous PRN Neva Pineda NP        HYDROmorphone (PF) (DILAUDID) injection 0.5 mg  0.5 mg IntraVENous Q3H PRN Neva Pineda NP   0.5 mg at 03/04/17 0547    chlorhexidine (PERIDEX) 0.12 % mouthwash 15 mL  15 mL Oral BID King Gotti MD   15 mL at 03/09/17 0851    hydroxypropyl methylcellulose (ISOPTO TEARS) 0.5 % ophthalmic solution 1 Drop  1 Drop Both Eyes PRN King Gotti MD   1 Drop at 03/04/17 2129    nystatin (MYCOSTATIN) 100,000 unit/mL oral suspension 500,000 Units  500,000 Units Oral QID Wolfgang Leyva DO   500,000 Units at 03/09/17 0851    sodium chloride (NS) flush 5-10 mL  5-10 mL IntraVENous Q8H Rock Omkar MD   10 mL at 03/09/17 0852    simethicone (MYLICON) 44HL/5.4UN oral drops 80 mg  1.2 mL Oral Multiple Swathi Hanna MD        fentaNYL citrate (PF) injection  mcg   mcg IntraVENous Multiple Swathi Hanna MD   50 mcg at 03/05/17 2012    piperacillin-tazobactam (ZOSYN) 3.375 g in 0.9% sodium chloride (MBP/ADV) 100 mL MBP EXTENDED 4 HOUR INFUSION###  3.375 g IntraVENous Q12H Wolfgang Leyva DO 25 mL/hr at 03/09/17 1044 3.375 g at 03/09/17 1044    heparin (porcine) injection 5,000 Units  5,000 Units SubCUTAneous Q8H Wolfgang Leyva DO   5,000 Units at 03/09/17 0432    acetaminophen (TYLENOL) suppository 650 mg  650 mg Rectal Q4H PRN Wolfgang Leyva, DO   650 mg at 03/06/17 2226    zolpidem (AMBIEN) tablet 5 mg  5 mg Oral QHS PRN Caitlin Villagran DO        insulin lispro (HUMALOG) injection   SubCUTAneous Q6H Wolfgang Leyva DO   Stopped at 03/09/17 0600    glucose chewable tablet 16 g  4 Tab Oral PRN Rachel Leyva, DO        glucagon [facility]) injection 1 mg  1 mg IntraMUSCular PRN Rachel Leyva, DO        dextrose (D50W) injection syrg 12.5-25 g  25-50 mL IntraVENous PRN Rachel Leyva, DO   25 g at 02/23/17 2205    sodium chloride (NS) flush 10 mL  10 mL InterCATHeter PRN Rachel Leyva, DO   40 mL at 02/28/17 0254    bacitracin 500 unit/gram packet 1 Packet  1 Packet Topical PRN Rachel Leyva, DO        tamsulosin [facility]) capsule 0.4 mg  0.4 mg Oral DAILY Rachel Leyva, DO   0.4 mg at 03/09/17 0851    0.9% sodium chloride infusion 250 mL  250 mL IntraVENous PRN Rachel Leyva, DO        phenol throat spray (CHLORASEPTIC) 1 Spray  1 Spray Oral PRN Luis Larson MD   1 [place] at 02/20/17 1158    sodium chloride (NS) flush 5-10 mL  5-10 mL IntraVENous Q8H Angela Her MD   10 mL at 03/09/17 0852    0.9% sodium chloride infusion 250 mL  250 mL IntraVENous PRN Thalia Minaya CRNA        sodium chloride (NS) flush 5-10 mL  5-10 mL IntraVENous Q8H Angela Her MD   10 mL at 03/09/17 0852    ondansetron (ZOFRAN) injection 4 mg  4 mg IntraVENous Q4H PRN Angela Her MD   4 mg at 02/15/17 0225    0.9% sodium chloride infusion 250 mL  250 mL IntraVENous PRN Angela Her MD           Objective  Vitals:    03/08/17 2359 03/09/17 0116 03/09/17 0355 03/09/17 1047   BP: 132/80 136/85 121/83 127/72   Pulse: (!) 103 (!) 103 82 98   Resp: 19 19 19 19   Temp: 97.4 °F (36.3 °C) 97.1 °F (36.2 °C) 97.5 °F (36.4 °C) 98 °F (36.7 °C)   SpO2: 96% 95% 93% 100%   Weight:       Height:             Intake/Output Summary (Last 24 hours) at 03/09/17 1158  Last data filed at 03/09/17 0759   Gross per 24 hour   Intake             1730 ml   Output             1825 ml   Net              -95 ml           Admission weight: Weight: 63.5 kg (140 lb) (02/12/17 0603)  Last Weight Metrics:  Weight Loss Metrics 3/8/2017 1/4/2016 11/8/2015   Today's Wt 176 lb 9.6 oz 145 lb 140 lb   BMI 23.95 kg/m2 19.66 kg/m2 18.98 kg/m2             Physical Assessment:     General: NAD, alert and oriented. Neck: No jvd. LUNGS: Clear to Auscultation, No rales, rhonchi or wheezes. CVS EXM: S1, S2  RRR, no murmurs/gallops/rubs. Abdomen: soft, non tender. Lower Extremities:  no edema.        Lab    CBC w/Diff Recent Labs      03/09/17 0430 03/08/17 0415 03/07/17 0435   WBC  8.3  9.0  8.7   RBC  2.96*  2.79*  2.85*   HGB  8.3*  7.7*  7.9*   HCT  25.7*  24.1*  24.5*   PLT  385  352  323   GRANS  90*  90*  91*   LYMPH  8*  7*  6*   EOS  0  1  0        Chemistry Recent Labs      03/09/17 0430 03/08/17 0415 03/07/17 0435   GLU  133*  149*  175*   NA  145  145  145   K  3.5  3.3*  3.4*   CL  109*  108  108   CO2  20*  20*  20*   BUN  156*  157*  157*   CREA  5.36*  5.30*  5.27*   CA  8.1*  7.8*  7.7*   AGAP  16  17  17   BUCR  29*  30*  30*   AP   --    --   147*   TP   --    --   5.1*   ALB   --    --   1.7*   GLOB   --    --   3.4   AGRAT   --    --   0.5*   PHOS  5.5*  5.3*  5.9*         No results found for: IRON, FE, TIBC, IBCT, PSAT, FERR Lab Results   Component Value Date/Time    Calcium 8.1 03/09/2017 04:30 AM    Phosphorus 5.5 03/09/2017 04:30 AM        Fransisca Michael MD  [facility]  Pager: [phone]

## 2017-03-09 NOTE — CDMP QUERY
Please clarify if this patient is being treated/managed for:    => MODERATE  PROTEIN CALORIE MALNUTRITION  =>Other Explanation of clinical findings  =>Unable to Determine (no explanation of clinical findings)    The medical record reflects the following clinical findings, treatment, and risk factors:  =>88 yo male admitted with bowel obstruction, sp/ bowel resection  =>S/P Respiratory arrest, intubation, A fib with RVR, acute hypoxic respiratory failure, pleural effusion  =>Per Hospitalist note 3/8  \"Anasarca, muscle wasting, bilateral LE edema\"  =>Per Dietician note 3/8  \"Pt with severe dysphagia, anasarca, LE edema, SUSAN, s/p exlap w/ small bowel resection (191 cm) on 2/13. Nutrition Risk:   Moderate\"    Please clarify and document your clinical opinion in the progress notes and discharge summary including the definitive and/or presumptive diagnosis, (suspected or probable), related to the above clinical findings. Please include clinical findings supporting your diagnosis. If you DECLINE this query or would like to communicate with [facility], please utilize the \"[facility] message box\" at the TOP of the Progress Note on the right.       Thank you,    Denise Cruz RN, [phone] [facility], Dylan Denson [phone]  Santiago Bashir RN  [facility] [phone]

## 2017-03-09 NOTE — PROGRESS NOTES
completed follow up visit with patient and family in room 2207 around 1300 today and a Spiritual assessment of patient was done. According to patient and family he is doing much better.    Chaplains will continue to follow and will provide pastoral care on an as needed/requested basis    Chaplain Earle Qureshi   Board Certified [address] [place] [address]   [phone]

## 2017-03-09 NOTE — WOUND CARE
Patient seen for wound care consult \"pressure ulcer to sacrum. \"  Upon wound care assessment, patient was noted to have intact erythema blanchable sacrum. Mepilex removed and patient cleansed, new Mepilex sacral border placed. Patient turned to Left side. TIMUR Hinds bedside.

## 2017-03-09 NOTE — PROGRESS NOTES
Daily Progress Note: 3/9/2017 3:03 PM   Admit Date: 2/12/2017    Patient seen in follow up for multiple medical problems as listed below:  Patient Active Problem List   Diagnosis Code    Small bowel obstruction ([facility] [address] K56.69    Atrial fibrillation with rapid ventricular response ([facility] [address] I48.91    Postoperative anemia D64.9    Feeding difficulties R63.3    Debility R53.81       Assesment     80 y.o. male with a PMHx of HTN, TIA, SIDNEY, SBO and abdominal hernia who presented to the ED with the acute onset of left upper quadrant abdominal pain. CT scan of the abd/pelvis in the ED that was positive for a SBO. s/p Exlap and small bowel resection (191cm) 2/13 am Dr Eddie Concepcion. Course complicated by Afib+RVR 6/24 am requiring rate control and cardiology involvement. Patient only responded temporarily to diltiazem boluses and diltiazem drip to 5mg. The evening of 2/14 the patients rate remained 130-140 and he was becoming increasingly hypotensive, cardizem gtt was stopped and patient was given 250mcg of IV Digoxin with HR resolution into 60's. Eventually coverted to lopresser with HTN medication adjustment. Failed MBS 2/16 and placed NPO, no hx of dysphagia this was thought due to significant trauma from NGT placement for surgery. Was to have Dobhoff placed 2/17 with IR, they could not get a tube down. He received D10 overnight is his PIV, then PICC and TPN 2/18 am. He had repeat speech eval on 02/22, but failed this, leading to PEG tube placement on the following day. His renal function has continued to worsen while intubated in the ICU and nephrology was consulted. On 02/23 he went into acute respiratory distress presumed to be 2/2 possible aspiration pneumonia and was transferred to the ICU and later intubated on pressor support for hypotension. He repeatedly failed spontaneous breathing tests 2/2 tachypnea. His HR remains poorly controlled and diltiazem was ineffective due to hypotension.  Amiodarone gtt was tried several times without success due to bradycardia, Cardiology was reconsulted. Patients cardiac and respiratory status eventually improved and was extubated 3/2 evening. He has developed significant LE edema and anasarca and is slowly being diuresed. His dysphagia and feeding situation is still un-resolved. Recommend continued ST therapy and repeat MBS at some point.     1-Acute hypoxemic, hypercapnic respiratory failure after brief respiratory arrest-extubated 3/2:resolved,O2sat 100 on 3liters  2-Aspiration Pneumonia/Pneumonitis;on atbx  3-Sepsis criteria with leukocytosis, hypotension, requiring temp pressor support-resolved  4-B/L Pleural Effusions  5-SBO s/p Exlap and Small Bowel Resection 02/13  6-Severe Dysphagia on Tube feeds through PEG  7-Atrial Fibrillation with RVR -Back to Digoxin when GFR improved  8-Acute Metabolic Encephalopathy Likely 2/2 Benzodiazepam, improved  9-SUSAN/ATN on CKD Stage IIIb -Neph consulting. Cr worsening. Discussed with nephrology who has indicated that patient to continue with iv fluid hoping that his bun and creatinine will at one point start improving. 10-Hyperkalemia Possibly 2/2 Renal Failure, Possible Heparin-induced w/ EKG Changes-Resolved. 11-Hypokalemia:will replace  12-L Inguinal Hernia  13-HTN  14-TIA on Eliquis normaly (held)  15-Iron Deficiency Anemia  16-Anasarca and LE edema - due to SUSAN, malnutrition, and relative HF from Afib/RVR on prn lasix  16-Excessive mucus in mouth:suction prn instructions given to nurse. 17-debility:PT/OT. PT has recommended SNF on discharge. DVT Protocol Active: yes  Code Status:  Full Code     Disposition: Out of ICU on 3/5. SNF later in week if improving. Subjective:     CC: Abdominal Pain (left lower abdomen) and Leg Pain (left leg)    Interval History: today O2sat 100% on 3 liters. . Great HR control. Patient looks somewhat sleepy today but wakes up to touch stimulation.     Objective:     Visit Vitals    /72 (BP 1 Location: Left arm, BP Patient Position: At rest)    Pulse 98    Temp 98 °F (36.7 °C)    Resp 19    Ht 6' (1.829 m)    Wt 80.1 kg (176 lb 9.6 oz)    SpO2 100%    BMI 23.95 kg/m2       Temp (24hrs), Av.5 °F (36.4 °C), Min:97.1 °F (36.2 °C), Max:98 °F (36.7 °C)        Intake/Output Summary (Last 24 hours) at 17 1344  Last data filed at 17 1201   Gross per 24 hour   Intake             1730 ml   Output             2125 ml   Net             -395 ml       Gen: NAD, sleepy today. HEENT:  PATRICIA, EOMI. Dry mouth  Neck: No Bruits/JVD   Lungs:   CTAb  Heart:   RR S1 S2 without M/R/G  Abdomen: ND,NT, BSX4,   Extremities:   Trace edema. No cyanosis.   Skin:  no jaundice/lesions      Data Review:     Meds/Labs/Tests reviewed    Current Shift:  701 - 1900  In: 200   Out: 300 [Urine:300]  Last three shifts:  1901 -  07  In: 2042.5 [I.V.:512.5]  Out: 7380 [Urine:3250]  Recent Labs      17   WBC  8.3  9.0  8.7   RBC  2.96*  2.79*  2.85*   HGB  8.3*  7.7*  7.9*   HCT  25.7*  24.1*  24.5*   PLT  385  352  323   GRANS  90*  90*  91*   LYMPH  8*  7*  6*   EOS  0  1  0       Recent Labs      175  17   BUN  156*  157*  157*   CREA  5.36*  5.30*  5.27*   CA  8.1*  7.8*  7.7*   ALB   --    --   1.7*   K  3.5  3.3*  3.4*   NA  145  145  145   CL  109*  108  108   CO2  20*  20*  20*   PHOS  5.5*  5.3*  5.9*   GLU  133*  149*  175*        Lab Results   Component Value Date/Time    Glucose 133 2017 04:30 AM    Glucose 149 2017 04:15 AM    Glucose 175 2017 04:35 AM    Glucose 106 2017 10:00 AM    Glucose 131 2017 02:20 AM       Procedures/Imaging:  s/p Exlap and small bowel resection  am Dr Spears Never  [id number]%  Failed XR guided dobhoff   PICC  with TPN  PEG   Intubation , extubation 3/2    Case d/w nephrology    [facility]  Hospitalist Division  Pager: [phone]

## 2017-03-09 NOTE — PROGRESS NOTES
Problem: Mobility Impaired (Adult and Pediatric)  Goal: *Acute Goals and Plan of Care (Insert Text)  Physical Therapy Goals  Initiated 3/8/2017 and to be accomplished within 7 day(s)  1. Patient will roll bilaterally with max assist for repositioning. 2. Patient will participate in 5-10 minutes of BLE exercises in order to improve strength for carryover for functional tasks. 3. Activity Tolerance: Tolerate EOB sitting 2-5 minutes for change of position. 4. Patient will transfer supine to/from sit with max assist x2 to increase functional mobility. Outcome: Progressing Towards Goal  PHYSICAL THERAPY TREATMENT     Patient: Kelsie Crooks (65 y.o. male)  Date: 3/9/2017  Diagnosis: SBO  Small Bowel Obstruction  Small bowel obstruction (HCC)  Failed swallowing evaluations x2 Feeding difficulties  Procedure(s) (LRB):  PERCUTANEOUS ENDOSCOPIC GASTROSTOMY TUBE INSERTION (N/A) 14 Days Post-Op  Precautions: Fall, Skin   Chart, physical therapy assessment, plan of care and goals were reviewed. ASSESSMENT:  Patient found supine in bed willing to work with PT. Pt is very difficult to understand due to mumbling, but some words come out clearly. Pt reports he needs to have a BM just before starting therex. Pt placed on bed pan with total A X 3. Pt had small BM before rolling and eugenia-care perfomed by FARHAD Correa . Pt left on bed pan with needs in reach and prevlon boots removed. Nurse Lizet notified. Education: bed mobility. Progression toward goals:  [ ]      Improving appropriately and progressing toward goals  [X]      Improving slowly and progressing toward goals  [ ]      Not making progress toward goals and plan of care will be adjusted       PLAN:  Patient continues to benefit from skilled intervention to address the above impairments. Continue treatment per established plan of care.   Discharge Recommendations:  Fracisco Lemus  Further Equipment Recommendations for Discharge:  [facility] SUBJECTIVE:   Patient stated I need to have a BM      OBJECTIVE DATA SUMMARY:   Critical Behavior:  Neurologic State: Alert, Eyes open spontaneously  Orientation Level: Oriented to person  Cognition: Decreased command following, Poor safety awareness, Impaired decision making  Safety/Judgement: Fall prevention  Functional Mobility Training:  Bed Mobility:  Rolling: Total assistance  Pain:  Pre tx pain: pt reports pain in LEs but does not rate  Post tx pain: pt reports pain in LEs but does not rate  Pain Scale 1: Adult Nonverbal Pain Scale  Pain Intensity 1: 0  Activity Tolerance:   Fair  Please refer to the flowsheet for vital signs taken during this treatment.   After treatment:   [ ] Patient left in no apparent distress sitting up in chair  [X] Patient left in no apparent distress in bed  [X] Call bell left within reach  [X] Nursing notified  [ ] Caregiver present  [ ] Bed alarm activated      Ibeth Morgan PTA   Time Calculation: 14 mins

## 2017-03-09 NOTE — PROGRESS NOTES
Problem: Small Bowel Obstruction: Day 4 to Discharge  Goal: Activity/Safety  Outcome: Progressing Towards Goal  On bedrest. Works with pt  Goal: Nutrition/Diet  Outcome: Progressing Towards Goal  On tube feeds

## 2017-03-09 NOTE — ROUTINE PROCESS
1950 Bedside shift change report given to marky marte (oncoming nurse) by Carmencita Noriega (offgoing nurse). Report included the following information SBAR, Kardex and Recent Results. Side rails up x 3 , call light within reach. Hob elevated 30 degrees. 2000 ASSESSMENT COMPLETE , JOHNSON AND ORAL CARE COMPLETED.   2200 RESTING IN BED.  1473 spoke with daughter on phone and discussed need for pain medication, pt heart rate increased and maons, believe pain in legs , she agrees. 2355 MEDICATED FOR PAIN MOANING IN BED HEART RATE SLIGHTLY INCREASED.  0000 ASSESSMENT COMPLETED ORAL AND JOHNSON CARE COMPLETED.  0200 PLACED ON TO ENVISION BED. CLEANED OF STOOL.  0348 resting in  bed assessment completed. Johnson and oral care provided. 0600 pt pulling at lines, at shaking arms in air, redirected pt to not pull at lines and to squeeze nurse hand , he did and then talked with pt about need for lines and importance of having so he can possibly leave hospital . Calmed pt .  0730 Bedside shift change report given to halie marte (oncoming nurse) by Berenice Vázquez (offgoing nurse). Report included the following information SBAR, Kardex and Recent Results. Side rails up x 3, call light within reach. Hob elevated 30 degrees.

## 2017-03-10 PROBLEM — N17.9 ACUTE RENAL FAILURE SUPERIMPOSED ON STAGE 3 CHRONIC KIDNEY DISEASE (HCC): Status: ACTIVE | Noted: 2017-01-01

## 2017-03-10 PROBLEM — N18.30 ACUTE RENAL FAILURE SUPERIMPOSED ON STAGE 3 CHRONIC KIDNEY DISEASE (HCC): Status: ACTIVE | Noted: 2017-01-01

## 2017-03-10 NOTE — PROGRESS NOTES
NUTRITION follow-up/Plan of care    RECOMMENDATIONS:   1. Enteral Nutrition: Nepro @ 45 ml/hr providin kcal, 85 g protein and 750 ml free water  2. Free water: 200 ml Q 6 hours  3. Monitor weight and PO intake  4. RD to follow    GOALS:   1. Met/Ongoing: Enteral Nutrition meets >75% of protein/calorie needs by 3/13  2. Ongoing: Maintain weight (+/- 1-2 lb) by 3/17  3. Ongoing: BG in target range by 3/13    ASSESSMENT:   Per BMI of 23.9, weight is in the normal classification. Enteral Nutrition is currently meeting 100% of estimated needs. Labs noted. Phosphorus and sodium levels are elevated but potassium level is low. Average BG in the last 24 hours: 103-191. BUN and Creatinine levels are elevated vs SUSAN (GFR: 10). Nutrition recommendations listed. RD to follow.     Nutrition Risk:  [x]   High []  Moderate [] Low    SUBJECTIVE/OBJECTIVE:   Transfer from ICU. Pt was previously on TPN (was d/c on ). Pt with severe dysphagia, anasarca, LE edema, SUSAN, s/p exlap w/ small bowel resection (191 cm) on . Per family estimated patient's weight is around 140 lb. Current weight per bed scale: 176 lb. Pt is tolerating Enteral Nutrition through PEG tube. Pt has a BM on 3/8 per I&Os. Will follow. Information Obtained From:   [x] Chart Review  [x] Patient  [] Family/Caregiver  [] Nurse/Physician   [] Patient Rounds/Interdisciplinary Meeting    Diet: NPO / Nepro @ 45 ml/hr  Medications: [x] Reviewed (Lispro, Renvela)  Encounter Diagnoses     ICD-10-CM ICD-9-CM   1. SBO (small bowel obstruction) (HCC) K56.69 560.9   2. Abdominal pain, LUQ (left upper quadrant) R10.12 789.02   3. Chronic pain of left knee M25.562 719.46    G89.29 338.29   4. Chronic renal failure, unspecified stage N18.9 585.9   5. Anemia, unspecified type D64.9 285.9   6. Debility R53.81 799.3   7. Feeding difficulties R63.3 783. 3     Past Medical History:   Diagnosis Date    Hypertension     TIA (transient ischemic attack)      Labs:    Lab Results   Component Value Date/Time    Sodium 146 03/10/2017 03:40 AM    Potassium 3.4 03/10/2017 03:40 AM    Chloride 110 03/10/2017 03:40 AM    CO2 20 03/10/2017 03:40 AM    Anion gap 16 03/10/2017 03:40 AM    Glucose 103 03/10/2017 03:40 AM     03/10/2017 03:40 AM    Creatinine 5.41 03/10/2017 03:40 AM    Calcium 7.7 03/10/2017 03:40 AM    Magnesium 2.3 03/10/2017 03:40 AM    Phosphorus 5.7 03/10/2017 03:40 AM    Albumin 1.7 03/07/2017 04:35 AM     Anthropometrics: BMI (calculated): 24.8   Last 3 Recorded Weights in this Encounter    03/08/17 0519 03/08/17 1401 03/09/17 1454   Weight: 79.8 kg (175 lb 14.8 oz) 80.1 kg (176 lb 9.6 oz) 83 kg (183 lb)      Ht Readings from Last 1 Encounters:   03/08/17 6' (1.829 m)     []  Weight Loss  [x]  Weight Gain  []  Weight Stable   []  New wt n/a on record     Estimated Nutrition Needs:   1900 Kcals/day  Protein (g): 75 g    Nutrition Problems Identified:   [x] Suboptimal PO intake   [] Food Allergies  [x] Difficulty chewing/swallowing/poor dentition  [] Constipation/Diarrhea   [] Nausea/Vomiting   [] None  [] Other:     Plan:   [] Therapeutic Diet  []  Obtained/adjusted food preferences/tolerances and/or snacks options   []  Supplements added   [] Occupational therapy following for feeding techniques  []  HS snack added   []  Modify diet texture   []  Modify diet for food allergies   []  Educate patient   []  Assist with menu selection   []  Monitor PO intake on meal rounds   [x]  Continue inpatient monitoring and intervention   []  Participated in discharge planning/Interdisciplinary rounds/Team meetings   [x]  Other: Enteral Nutrition     Education Needs:   [x] Not appropriate for teaching at this time   [] Identified and addressed    Nutrition Monitoring and Evaluation:   [x] Continue inpatient monitoring and interventions    [] Other:     Alysa Perez, [address] [place] [address]  Pager: [phone]

## 2017-03-10 NOTE — PROGRESS NOTES
Problem: Mobility Impaired (Adult and Pediatric)  Goal: *Acute Goals and Plan of Care (Insert Text)  Physical Therapy Goals  Initiated 3/8/2017 and to be accomplished within 7 day(s)  1. Patient will roll bilaterally with max assist for repositioning. 2. Patient will participate in 5-10 minutes of BLE exercises in order to improve strength for carryover for functional tasks. 3. Activity Tolerance: Tolerate EOB sitting 2-5 minutes for change of position. 4. Patient will transfer supine to/from sit with max assist x2 to increase functional mobility. Outcome: Progressing Towards Goal  PHYSICAL THERAPY TREATMENT     Patient: Mary Hill (19 y.o. male)  Date: 3/10/2017  Diagnosis: SBO  Small Bowel Obstruction  Small bowel obstruction (HCC)  Failed swallowing evaluations x2 Feeding difficulties  Procedure(s) (LRB):  PERCUTANEOUS ENDOSCOPIC GASTROSTOMY TUBE INSERTION (N/A) 15 Days Post-Op  Precautions: Fall, Skin  Chart, physical therapy assessment, plan of care and goals were reviewed. ASSESSMENT:  Pt with ongoing deficits to strength/ROM limiting functional mobility. Pt performed light exercises as per below. Pt with bilateral knee flexion limited to ~30 degrees during heel slides due to c/o pain (max assist to achieve full avail ROM). Pt with decreased R heel slide vs. Left due to increased pain response to right knee flexion. Pt initially attempting to verbalize weakly and mumbling. Pt able to eventually state, \"orange juice\", and then perseverates on this throughout exercises. I did verify with nsg that pt ok for swab of ice water and provided for pt comfort. Pt then able to communicate verbally with increased ease, however confused and with difficulty processing education on diet restrictions (see subjective below). Pt able to initiate and perform supine to long sitting exercises as progression towards supine to sit transfers today.   Positioned pt with bilateral pillow support to feet to reduce PF contracture     Progression toward goals:  [ ]      Improving appropriately and progressing toward goals  [X]      Improving slowly and progressing toward goals  [ ]      Not making progress toward goals and plan of care will be adjusted       PLAN:  Patient continues to benefit from skilled intervention to address the above impairments. Continue treatment per established plan of care. Discharge Recommendations:  Fracisco Lemus  Further Equipment Recommendations for Discharge: To be determined and N/A       SUBJECTIVE:   Patient stated The doctor told me to peel a peach and cut it up into pieces. I want my peaches.       OBJECTIVE DATA SUMMARY:   Critical Behavior:  Neurologic State: Alert  Orientation Level: Unable to verbalize  Cognition: Follows commands  Safety/Judgement: Fall prevention, Decreased insight into deficits  Functional Mobility Training:  Bed Mobility:  Supine to Sit:  (reclined (HOB 30 deg) to long sit with total assist)  Transfers:  Sit to Stand:  (n/a)  Balance:  Sitting: Impaired; With support  Sitting - Static: Poor (constant support)  Sitting - Dynamic: None  Standing:  (n/a)  Ambulation/Gait Training:  Neuro Re-Education:  n/a  Therapeutic Exercises:   Supine BLE AA/PROM heel slides, ankle pumps, hip abd/add, SLR x  10 reps each. Passive bilateral DF stretches 2x30\". Pull with BUE using bed rails from reclined (HOB 30 deg) to long sit with total assist; maintains sitting for 10 seconds x 5 reps. Pain:  Pain Scale 1: Visual  Pain Intensity 1: 0  Pain Location 1: Generalized  Pain Intervention(s) 1: Medication (see MAR)  Activity Tolerance:   poor  Please refer to the flowsheet for vital signs taken during this treatment.   After treatment:   [ ] Patient left in no apparent distress sitting up in chair  [X] Patient left in no apparent distress in bed  [X] Call bell left within reach  [ ] Nursing notified  [ ] Caregiver present  [ ] Bed alarm activated      Emilee BARBER Jaqui, PT   Time Calculation: 25 mins

## 2017-03-10 NOTE — PROGRESS NOTES
Assumed care; Pt resting in bed; no distress noted; No nonverbal indicator of pain; bed in low position; Side rails up x 3; Call light and personal belonging within reach. Will continue to monitor. 9085: Oral Care completed. 1140: Family at bedside. No distress noted. 1400: Pt restless, pulling off clothes, moaning and attempting to pull catheter. See Mar for intervention.

## 2017-03-10 NOTE — ROUTINE PROCESS
Bedside and Verbal shift change report given to [facility] (oncoming nurse) by Jarvis Cameron RN (offgoing nurse). Report included the following information SBAR, Kardex, Intake/Output and MAR.

## 2017-03-10 NOTE — PROGRESS NOTES
[facility]: [phone] ([phone])  [facility]: [phone]   [facility]: [phone]    Patient Name: Mart Yoon  YOB: 1927    Date of Initial Consult: 2/17/2017   Reason for Consult: care decisions  Requesting Provider: Dr. Matt  Primary Care Physician: Oswaldo Hunter MD      SUMMARY:   Mart Yoon is a 80y.o. year old with a past history of hypertension, TIA, SBO, abdominal hernia , who was admitted on 2/12/2017 from home with a diagnosis of abdominal pain . He was found to have a small bowel obstruction. Surgery was consulted and he was taken for an exploratory laparotomy where a closed loop obstruction from internal hernia with necrotic bowel was found. He underwent a central small bowel resection (191 cm). His hospital course has been complicated by atrial fib with RVR and hypotension not tolerant of cardizem drip. Heart rate has improved with the addition of digoxin and lopressor. Mr. Kurt Anna was noted to have coughing and choking with liquids. Speech therapy was consulted, MBS was performed, unfortunately with aspiration of all textures. Did require INTUBATION and vent support, was extubated 3-2 and now rec'ing hydration/nutrition via PEG. Being asked to reconsult as kidney failure is progressive and dialysis is not recommended by Renal. Palliative medicine is consulted for care decisions. PALLIATIVE DIAGNOSES:   1. Acute on CKD stage 3  2. Feeding difficulties   3. SBO  4. Debility        PLAN:   1. Patient was seen by Palliative Care team early in this admission, he lacked decision making capacity and his dtr preferred not to talk about anything but aggressive care and that he wanted everything done. Social hx-Pt lives with his wife who is frail and elderly and the daughter lives next door. Daughter oversees their care and prepares all their meals. She is their only child.    2. Goals of care and Code Status- I met patient and his dtr Nasim Kauffman at bedside. Patient is very weak, talking softly but he was oriented to himself, dtr and that he was in [place]. He recalled his being intubated, in fact he said he would not want that ever again. We talked openly about getting his wishes on paper, if he did not want to have CPR or SHOCKS or INTUBATION, he should complete a DDNR-he agreed, since he was too weak to sign the paper his dtr did it. DNR entered into EMR, copies placed in chart, copy to dtr. Dtr did talk to Tana Cunningham about going to [facility] for ongoing PEG care and some therapy if he can participate, if after 3 wks he is no better dtr might have CNAs set up at home to take him home with [address] [place] [address] GROUP. She will meet me on Monday 3-13 at 11am to complete a POST FORM. 3. SUSAN on CKD Stage 3-dtr did share that his renal function was not good, she agreed he would not be candidate for dialysis. She is aware that given this degree of renal failure with his overall debility he would be a candidate for HOSPICE/COMFORT CARE. Introduced the focus of this care-to stop doing IV/Tests/aggressive interventions and instead focus on his comfort, to allow him to just have his symptoms managed and not return to hospital.  4. SBO s/p resection of 191 cm of bowel. Having BM, Nutrition via PEG and denies pain. 5. Debility;profoundly weak, thin and unlikely will improve enough to benefit from PT/OT. Dtr agreed that he is not likely to recover from this hospitalization but she wants to be optimistic in front of him and not give up hope. 6. Initial consult note routed to primary continuity provider  7. Communicated plan of care with: Palliative IDT,  Attending, Care Manager, RN        GOALS OF CARE:     [====Goals of Care====]  GOALS OF CARE:  Patient / health care proxy stated goals: patient is still hopeful he will get stronger, but dtr is less optimistic now.         TREATMENT PREFERENCES:   Code Status: DNR    Advance Care Planning:  Advance Care Planning 2/12/2017   Patient's Healthcare Decision Maker is: Legal Next of Son   Primary Decision Maker Name Cj Sandoval   Primary Decision Maker Phone Number    Primary Decision Maker Relationship to Patient Other relative   Secondary Decision [address] Name Davey 425 Phone Number    Secondary Decision Maker Relationship to Patient Adult child   Confirm Advance Directive None   Patient Would Like to Complete Advance Directive No   Does the patient have other document types Other (comment)       Other:    The palliative care team has discussed with patient / health care proxy about goals of care / treatment preferences for patient.  [====Goals of Care====]    Advance Care Planning 2/12/2017   Patient's Healthcare Decision Maker is: Legal Next of Adryan Hager   Primary Decision Maker Name Cj Sandoval   Primary Decision Maker Phone Number    Primary Decision Maker Relationship to Patient Other relative   Secondary Decision [address] Name Davey 425 Phone Number    Secondary Decision Maker Relationship to Patient Adult child   Confirm Advance Directive None   Patient Would Like to Complete Advance Directive No   Does the patient have other document types Other (comment)           HISTORY:     History obtained from: chart, patient, daughter     Jennifer Russell: feeding difficulties     HPI/SUBJECTIVE:    The patient is:   [x] Verbal and participatory  [] Non-participatory due to:    97.3,91, 156/82, 18 on 3L N/C @93%, wt 183lb. Conner 600 out, CHest Tube no drainage, stooled on 3-8.  PEG in 1769  MAR-Iron, Haldol 1-2mg IV Q2hr prn-rec'd one mg on 3-7 at 4am. Heparin, Hycet prn 5-7.5mg Q4hr prn-rec'd 10 mg 3-8, Nystatin, Protonix, Zosyn, Renvela, Sodium Bicarg 650mg TID, Flomax  LABS-WBC 8.8, H&H 8.3 & 25.8, Plat 387, BUN/Creat 164/5.41, Albumin 1.7, Phos 5.7,  TSH 8.07, Urine, Blood,Pleural fluid C&S NGTD, Sputum grew staph aureus (MSSA)  CXR-Small pleural effusions evident. Left perihilar pulmonary opacity appears  evident which could be related to unilateral edema. ECHO-Left ventricle: patient in A.fib during study. Systolic function was  mildly reduced. Ejection fraction was estimated in the range of 40 % to 45  %. There was mild concentric hypertrophy. Right ventricle: Estimated peak pressure was 50 mmHg. Left atrium: The atrium was moderately to severely dilated. Right atrium: The atrium was dilated. Mitral valve: There was no evidence for stenosis. There was moderate  regurgitation. Aortic valve: The valve was trileaflet. Leaflets exhibited mild to  moderate calcification, mildly reduced cuspal separation, and sclerosis. Tricuspid valve: There was no evidence for tricuspid stenosis. There was  moderate to severe regurgitation. Inferior vena cava, hepatic veins: The inferior vena cava was dilated. The  respirophasic change in diameter was less than 50%. Pericardium: A trivial pericardial effusion was identified.          Clinical Pain Assessment (nonverbal scale for nonverbal patients): Pain: 0  Denies pain        FUNCTIONAL ASSESSMENT:      Palliative Performance Scale (PPS): 30%          PSYCHOSOCIAL/SPIRITUAL SCREENING:     Advance Care Planning:  Advance Care Planning 2/12/2017   Patient's Healthcare Decision Maker is: Legal Next of Kin   Primary Decision Maker Name Brenda Hodgkin   Primary Decision Maker Phone Number    Primary Decision Maker Relationship to Patient Other relative   Secondary Decision Maker Name Davey Carreon Phone Number    Secondary Decision Maker Relationship to Patient Adult child   Confirm Advance Directive None   Patient Would Like to Complete Advance Directive No   Does the patient have other document types Other (comment)        Any spiritual / Muslim concerns:  [] Yes /  [x] No    Caregiver Burnout:  [] Yes /  [x] No /  [] No Caregiver Present      Anticipatory grief assessment:   [x] Normal  / [] Maladaptive       ESAS Anxiety: Anxiety: 0    ESAS Depression: Depression: 0        REVIEW OF SYSTEMS:     Positive and pertinent negative findings in ROS are noted above in HPI. Mostly with dtr-patient very weak, hard to understand. Denies pain. While I was there he fell asleep. The following systems were [x] reviewed / [] unable to be reviewed as noted in HPI  Other findings are noted below. Systems: constitutional, ears/nose/mouth/throat, respiratory, gastrointestinal, genitourinary, musculoskeletal, integumentary, neurologic, psychiatric, endocrine. Positive findings noted below. Modified ESAS Completed by: provider   Fatigue: 5 Drowsiness: 4   Depression: 0 Pain: 0   Anxiety: 0 Nausea: 0   Anorexia: 0 Dyspnea: 0     Constipation: No     Stool Occurrence(s): 1        PHYSICAL EXAM:     Wt Readings from Last 3 Encounters:   03/09/17 83 kg (183 lb)   01/04/16 65.8 kg (145 lb)   11/08/15 63.5 kg (140 lb)     Blood pressure 128/72, pulse 88, temperature 97.4 °F (36.3 °C), resp. rate 18, height 6' (1.829 m), weight 83 kg (183 lb), SpO2 93 %.   Pain:  Pain Scale 1: Visual  Pain Intensity 1: 0  Pain Onset 1:  (intermittent)  Pain Location 1: Generalized  Pain Orientation 1: Anterior, Lower  Pain Description 1: Aching  Pain Intervention(s) 1: Medication (see MAR)      Constitutional: elderly thin man, in specialty bed, [place] and talking very softly  Respiratory: breathing not labored   Skin: warm, dry  Neurologic: moving his hands making eye contact and following commands       HISTORY:     Principal Problem:    Feeding difficulties (2/17/2017)    Active Problems:    Small bowel obstruction (HCC) (2/13/2017)      Atrial fibrillation with rapid ventricular response (HCC) (2/14/2017)      Postoperative anemia (2/14/2017)      Debility (2/17/2017)      Acute renal failure superimposed on stage 3 chronic kidney disease ([facility] [address] (3/10/2017)      Past Medical History:   Diagnosis Date    Hypertension     TIA (transient ischemic attack)       History reviewed. No pertinent surgical history. History reviewed. No pertinent family history. History reviewed, no pertinent family history.   Social History   Substance Use Topics    Smoking status: Former Smoker    Smokeless tobacco: Not on file    Alcohol use No     No Known Allergies   Current Facility-Administered Medications   Medication Dose Route Frequency    ferrous sulfate tablet 325 mg  1 Tab Oral DAILY WITH BREAKFAST    sodium bicarbonate tablet 650 mg  650 mg Oral TID    pantoprazole (PROTONIX) granules for oral suspension 40 mg  40 mg Per G Tube BID    sevelamer carbonate (RENVELA) oral powder 2,400 mg  2.4 g Oral TID WITH MEALS    epoetin liam (EPOGEN;PROCRIT) injection 40,000 Units  40,000 Units SubCUTAneous Q7D    HYDROcodone-acetaminophen (HYCET) 0.5-21.7 mg/mL oral solution 5-7.5 mg  5-7.5 mg Per G Tube Q4H PRN    haloperidol lactate (HALDOL) injection 1-2 mg  1-2 mg IntraVENous Q2H PRN    0.9% sodium chloride infusion 250 mL  250 mL IntraVENous PRN    HYDROmorphone (PF) (DILAUDID) injection 0.5 mg  0.5 mg IntraVENous Q3H PRN    chlorhexidine (PERIDEX) 0.12 % mouthwash 15 mL  15 mL Oral BID    hydroxypropyl methylcellulose (ISOPTO TEARS) 0.5 % ophthalmic solution 1 Drop  1 Drop Both Eyes PRN    nystatin (MYCOSTATIN) 100,000 unit/mL oral suspension 500,000 Units  500,000 Units Oral QID    sodium chloride (NS) flush 5-10 mL  5-10 mL IntraVENous Q8H    simethicone (MYLICON) 33QM/7.2NE oral drops 80 mg  1.2 mL Oral Multiple    fentaNYL citrate (PF) injection  mcg   mcg IntraVENous Multiple    piperacillin-tazobactam (ZOSYN) 3.375 g in 0.9% sodium chloride (MBP/ADV) 100 mL MBP EXTENDED 4 HOUR INFUSION###  3.375 g IntraVENous Q12H    heparin (porcine) injection 5,000 Units  5,000 Units SubCUTAneous Q8H    acetaminophen (TYLENOL) suppository 650 mg  650 mg Rectal Q4H PRN    zolpidem (AMBIEN) tablet 5 mg  5 mg Oral QHS PRN    insulin lispro (HUMALOG) injection   SubCUTAneous Q6H    glucose chewable tablet 16 g  4 Tab Oral PRN    glucagon (GLUCAGEN) injection 1 mg  1 mg IntraMUSCular PRN    dextrose (D50W) injection syrg 12.5-25 g  25-50 mL IntraVENous PRN    sodium chloride (NS) flush 10 mL  10 mL InterCATHeter PRN    bacitracin 500 unit/gram packet 1 Packet  1 Packet Topical PRN    tamsulosin (FLOMAX) capsule 0.4 mg  0.4 mg Oral DAILY    0.9% sodium chloride infusion 250 mL  250 mL IntraVENous PRN    phenol throat spray (CHLORASEPTIC) 1 Spray  1 Spray Oral PRN    sodium chloride (NS) flush 5-10 mL  5-10 mL IntraVENous Q8H    0.9% sodium chloride infusion 250 mL  250 mL IntraVENous PRN    sodium chloride (NS) flush 5-10 mL  5-10 mL IntraVENous Q8H    ondansetron (ZOFRAN) injection 4 mg  4 mg IntraVENous Q4H PRN    0.9% sodium chloride infusion 250 mL  250 mL IntraVENous PRN        LAB AND IMAGING FINDINGS:     Lab Results   Component Value Date/Time    WBC 8.8 03/10/2017 03:40 AM    HGB 8.3 03/10/2017 03:40 AM    PLATELET 452 48/96/1439 03:40 AM     Lab Results   Component Value Date/Time    Sodium 146 03/10/2017 03:40 AM    Potassium 3.4 03/10/2017 03:40 AM    Chloride 110 03/10/2017 03:40 AM    CO2 20 03/10/2017 03:40 AM     03/10/2017 03:40 AM    Creatinine 5.41 03/10/2017 03:40 AM    Calcium 7.7 03/10/2017 03:40 AM    Magnesium 2.3 03/10/2017 03:40 AM    Phosphorus 5.7 03/10/2017 03:40 AM      Lab Results   Component Value Date/Time    AST (SGOT) 11 03/07/2017 04:35 AM    Alk.  phosphatase 147 03/07/2017 04:35 AM    Protein, total 5.1 03/07/2017 04:35 AM    Albumin 1.7 03/07/2017 04:35 AM    Globulin 3.4 03/07/2017 04:35 AM     Lab Results   Component Value Date/Time    INR 1.6 02/25/2017 03:30 AM    Prothrombin time 18.3 02/25/2017 03:30 AM    aPTT 41.1 02/23/2017 10:15 PM      No results found for: IRON, FE, TIBC, IBCT, PSAT, FERR   No results found for: PH, PCO2, PO2  No components found for: [place]   Lab Results   Component Value Date/Time    CK 53 02/24/2017 02:40 AM    CK - MB 4.9 02/24/2017 02:40 AM              Total time: 70 minutes   Counseling / coordination time: 45 minutes   > 50% counseling / coordination?: yes     Prolonged service was provided for  [x]30 min   []75 min in face to face time in the presence of the patient. Time Start:  12:15  Time End:  1pm  Note: this can only be billed with  (initial) or 93010 (follow up). If multiple start / stop times, list each separately.

## 2017-03-10 NOTE — PROGRESS NOTES
RENAL PROGRESS NOTE        Evalina Castleman       Explained prognosis to daughter at bedside yesterday. Dense SUSAN/ATN in the elderly could take weeks to recover. No indication for RRT, neither pt a good candidate in my opinion. She verbalized understanding.          Assessment/Plan:      SUSAN on CKD3: Nonoliguric. Good outputs. Creat stable. BUN higher. HypoAlbuminemia: unlikely to tolerate aggressive diuresis. Breathing comfortably. His edema will never be fully mobilized with diuretics without risking SUSAN. Not RRT candidate. No overt indication. Met Acidosis: better on oral bicarb. Afib: Rate controlled. AC is on hold. Aspiration pneumonia/sepsis. Resp failure: compensated. No SOB. S/p small bowel resection for SBO. Anemia: S/p PRBC. Subjective:  Patient complaints of: No complaints. No SOB/CP/N/V.       Patient Active Problem List   Diagnosis Code    Small bowel obstruction (HCC) K56.69    Atrial fibrillation with rapid ventricular response (HCC) I48.91    Postoperative anemia D64.9    Feeding difficulties R63.3    Debility R53.81       Current Facility-Administered Medications   Medication Dose Route Frequency Provider Last Rate Last Dose    ferrous sulfate tablet 325 mg  1 Tab Oral DAILY WITH BREAKFAST Natalee Vlea MD   325 mg at 03/10/17 0916    sodium bicarbonate tablet 650 mg  650 mg Oral TID Richar Robbins MD   650 mg at 03/10/17 0916    pantoprazole (PROTONIX) granules for oral suspension 40 mg  40 mg Per G Tube BID Edwardo Lomeli DO   40 mg at 03/10/17 0916    sevelamer carbonate (RENVELA) oral powder 2,400 mg  2.4 g Oral TID WITH MEALS Mitali WU MD   2,400 mg at 03/10/17 0916    epoetin liam (EPOGEN;PROCRIT) injection 40,000 Units  40,000 Units SubCUTAneous Nighat Villasenor MD   40,000 Units at 03/03/17 3207    HYDROcodone-acetaminophen (HYCET) 0.5-21.7 mg/mL oral solution 5-7.5 mg  5-7.5 mg Per G Tube Q4H PRN Jeanine Jimenez PA-C   5 mg at 03/09/17 2135    haloperidol lactate (HALDOL) injection 1-2 mg  1-2 mg IntraVENous Q2H PRN King Gotti MD   1 mg at 03/08/17 0450    0.9% sodium chloride infusion 250 mL  250 mL IntraVENous PRN Ayanna Rapp NP        HYDROmorphone (PF) (DILAUDID) injection 0.5 mg  0.5 mg IntraVENous Q3H PRN Ayanna Rapp NP   0.5 mg at 03/04/17 0547    chlorhexidine (PERIDEX) 0.12 % mouthwash 15 mL  15 mL Oral BID King Gotti MD   15 mL at 03/10/17 0916    hydroxypropyl methylcellulose (ISOPTO TEARS) 0.5 % ophthalmic solution 1 Drop  1 Drop Both Eyes PRN King Gotti MD   1 Drop at 03/04/17 2129    nystatin (MYCOSTATIN) 100,000 unit/mL oral suspension 500,000 Units  500,000 Units Oral QID Irven Saver, DO   500,000 Units at 03/10/17 0916    sodium chloride (NS) flush 5-10 mL  5-10 mL IntraVENous Q8H Jody Nunez MD   10 mL at 03/10/17 0546    simethicone (MYLICON) 81FN/5.0ZO oral drops 80 mg  1.2 mL Oral Tara Forrest MD        fentaNYL citrate (PF) injection  mcg   mcg IntraVENous Multiple Nick Forrest MD   50 mcg at 03/05/17 2012    piperacillin-tazobactam (ZOSYN) 3.375 g in 0.9% sodium chloride (MBP/ADV) 100 mL MBP EXTENDED 4 HOUR INFUSION###  3.375 g IntraVENous Q12H Irven Saver, DO 25 mL/hr at 03/10/17 0915 3.375 g at 03/10/17 0915    heparin (porcine) injection 5,000 Units  5,000 Units SubCUTAneous Q8H Irven Saver, DO   5,000 Units at 03/10/17 0546    acetaminophen (TYLENOL) suppository 650 mg  650 mg Rectal Q4H PRN Irven Saver, DO   650 mg at 03/06/17 2226    zolpidem (AMBIEN) tablet 5 mg  5 mg Oral QHS PRN Jacob Rodriguez DO        insulin lispro (HUMALOG) injection   SubCUTAneous Q6H Irven Saver, DO   Stopped at 03/10/17 0600    glucose chewable tablet 16 g  4 Tab Oral PRN Melinda Oz, DO        glucagon [facility]) injection 1 mg  1 mg IntraMUSCular PRN Melinda Oz, DO        dextrose (D50W) injection syrg 12.5-25 g  25-50 mL IntraVENous PRN Melinda Oz, DO   25 g at 02/23/17 2205    sodium chloride (NS) flush 10 mL  10 mL InterCATHeter PRN Melinda Oz, DO   40 mL at 02/28/17 0254    bacitracin 500 unit/gram packet 1 Packet  1 Packet Topical PRN Melinda Oz, DO        tamsulosin [facility]) capsule 0.4 mg  0.4 mg Oral DAILY Melinda Oz, DO   0.4 mg at 03/10/17 4435    0.9% sodium chloride infusion 250 mL  250 mL IntraVENous PRN Melinda Oz, DO        phenol throat spray (CHLORASEPTIC) 1 Spray  1 Spray Oral PRN Jarred Angela MD   1 Spray at 02/20/17 1158    sodium chloride (NS) flush 5-10 mL  5-10 mL IntraVENous Q8H Christiana Rhodes MD   Stopped at 03/10/17 0600    0.9% sodium chloride infusion 250 mL  250 mL IntraVENous PRN Thalia Minaya CRNA        sodium chloride (NS) flush 5-10 mL  5-10 mL IntraVENous Q8H Christiana Rhodes MD   Stopped at 03/10/17 0600    ondansetron (ZOFRAN) injection 4 mg  4 mg IntraVENous Q4H PRN Christiana Rhodes MD   4 mg at 02/15/17 0225    0.9% sodium chloride infusion 250 mL  250 mL IntraVENous PRN Christiana Rhodes MD           Objective  Vitals:    03/09/17 1518 03/09/17 2005 03/10/17 0117 03/10/17 0835   BP: 121/63 120/76 95/67 156/82   Pulse: 89 72 98 91   Resp: 19 19 18 18   Temp: 97.4 °F (36.3 °C) 97.3 °F (36.3 °C) 98.1 °F (36.7 °C) 97.3 °F (36.3 °C)   SpO2: 95%  95% 93%   Weight:       Height:             Intake/Output Summary (Last 24 hours) at 03/10/17 1021  Last data filed at 03/10/17 0945   Gross per 24 hour   Intake             1729 ml   Output              500 ml   Net             1229 ml           Admission weight: Weight: 63.5 kg (140 lb) (02/12/17 0603)  Last Weight Metrics:  Weight Loss Metrics 3/9/2017 1/4/2016 11/8/2015   Today's Wt 183 lb 145 lb 140 lb   BMI 24.82 kg/m2 19.66 kg/m2 18.98 kg/m2             Physical Assessment:     General: NAD, alert and oriented. Neck: No jvd. LUNGS: Clear to Auscultation, No rales, rhonchi or wheezes. CVS EXM: S1, S2  RRR, no murmurs/gallops/rubs. Abdomen: soft, non tender. Lower Extremities:  + edema.        Lab    CBC w/Diff Recent Labs      03/10/17   0340  03/09/17   0430 03/08/17   0415   WBC  8.8  8.3  9.0   RBC  2.95*  2.96*  2.79*   HGB  8.3*  8.3*  7.7*   HCT  25.8*  25.7*  24.1*   PLT  387  385  352   GRANS  93*  90*  90*   LYMPH  5*  8*  7*   EOS  1  0  1        Chemistry Recent Labs      03/10/17   0340  03/09/17   0430  03/08/17   0415   GLU  103*  133*  149*   NA  146*  145  145   K  3.4*  3.5  3.3*   CL  110*  109*  108   CO2  20*  20*  20*   BUN  164*  156*  157*   CREA  5.41*  5.36*  5.30*   CA  7.7*  8.1*  7.8*   AGAP  16  16  17   BUCR  30*  29*  30*   PHOS  5.7*  5.5*  5.3*         No results found for: IRON, FE, TIBC, IBCT, PSAT, FERR Lab Results   Component Value Date/Time    Calcium 7.7 03/10/2017 03:40 AM    Phosphorus 5.7 03/10/2017 03:40 AM        Rizwan Nunez MD  [facility]  Pager: [phone]

## 2017-03-11 NOTE — PROGRESS NOTES
RENAL PROGRESS NOTE        Anne Hemphill            Assessment/Plan:      ARF-2/2 ATN, cr was 5.41 yesterday, no labs today, have ordered for tomorrow, recorded uop 900ml yesterday  Metabolic acidosis-bicarb, keep 20 or so  SBO-s/p resection  AF-rate controlled  Volume overload-albumin is 1.7 this week, he is going to have edema, would focus on trying to keep lungs clear and not edema-free  Anemia-s/p PRBC, TRACY                                                                                                                                 Subjective:  Does not speak nor nod to queries      Patient Active Problem List   Diagnosis Code    Small bowel obstruction ([facility] [address] K56.69    Atrial fibrillation with rapid ventricular response ([facility] [address] I48.91    Postoperative anemia D64.9    Feeding difficulties R63.3    Debility R53.81    Acute renal failure superimposed on stage 3 chronic kidney disease (HCC) N17.9, N18.3       Current Facility-Administered Medications   Medication Dose Route Frequency Provider Last Rate Last Dose    guaiFENesin (ROBITUSSIN) 100 mg/5 mL oral liquid 100 mg  100 mg Per G Tube Q8H PRN Edilson Chavez MD   100 mg at 03/11/17 1620    ferrous sulfate tablet 325 mg  1 Tab Oral DAILY WITH BREAKFAST Edilson Chavez MD   325 mg at 03/11/17 0935    sodium bicarbonate tablet 650 mg  650 mg Oral TID Reyna Maciel MD   650 mg at 03/11/17 1620    pantoprazole (PROTONIX) granules for oral suspension 40 mg  40 mg Per G Tube BID Stanley Stroud DO   40 mg at 03/11/17 0934    sevelamer carbonate (RENVELA) oral powder 2,400 mg  2.4 g Oral TID WITH MEALS Imani WU MD   2,400 mg at 03/11/17 1343    epoetin liam (EPOGEN;PROCRIT) injection 40,000 Units  40,000 Units SubCUTAneous Q7D Rafita Cagle MD   40,000 Units at 03/10/17 2255    HYDROcodone-acetaminophen (HYCET) 0.5-21.7 mg/mL oral solution 5-7.5 mg  5-7.5 mg Per G Tube Q4H PRN Estrellita Sampson PA-C   7.5 mg at 03/11/17 0254    haloperidol lactate (HALDOL) injection 1-2 mg  1-2 mg IntraVENous Q2H PRN King Gotti MD   1 mg at 03/08/17 0450    0.9% sodium chloride infusion 250 mL  250 mL IntraVENous PRN Tania Ribera NP        HYDROmorphone (PF) (DILAUDID) injection 0.5 mg  0.5 mg IntraVENous Q3H PRN Tania Ribera NP   0.5 mg at 03/04/17 0547    chlorhexidine (PERIDEX) 0.12 % mouthwash 15 mL  15 mL Oral BID King Gotti MD   15 mL at 03/11/17 0934    hydroxypropyl methylcellulose (ISOPTO TEARS) 0.5 % ophthalmic solution 1 Drop  1 Drop Both Eyes PRN King Gotti MD   1 Drop at 03/04/17 2129    nystatin (MYCOSTATIN) 100,000 unit/mL oral suspension 500,000 Units  500,000 Units Oral QID Lupe Fonder, DO   500,000 Units at 03/11/17 1343    sodium chloride (NS) flush 5-10 mL  5-10 mL IntraVENous Q8H Tez Burrell MD   10 mL at 03/11/17 1620    simethicone (MYLICON) 16CE/8.9CO oral drops 80 mg  1.2 mL Oral Multiple Divya Dowell MD        fentaNYL citrate (PF) injection  mcg   mcg IntraVENous Multiple Divya Dowell MD   50 mcg at 03/05/17 2012    piperacillin-tazobactam (ZOSYN) 3.375 g in 0.9% sodium chloride (MBP/ADV) 100 mL MBP EXTENDED 4 HOUR INFUSION###  3.375 g IntraVENous Q12H Lupe Fonder, DO 25 mL/hr at 03/11/17 0955 3.375 g at 03/11/17 0955    heparin (porcine) injection 5,000 Units  5,000 Units SubCUTAneous Q8H Lupe Fonder, DO   5,000 Units at 03/11/17 1342    acetaminophen (TYLENOL) suppository 650 mg  650 mg Rectal Q4H PRN Lupe Fonder, DO   650 mg at 03/06/17 2226    zolpidem (AMBIEN) tablet 5 mg  5 mg Oral QHS PRN Mary Current, DO        insulin lispro (HUMALOG) injection   SubCUTAneous Q6H Lupe Garcia,    3 Units at 03/11/17 1341    glucose chewable tablet 16 g  4 Tab Oral PRN Mary Current, DO        glucagon (GLUCAGEN) injection 1 mg  1 mg IntraMUSCular PRN Mary Current, DO       24 [facility] dextrose (D50W) injection syrg 12.5-25 g  25-50 mL IntraVENous PRN Randalyn Anis, DO   25 g at 02/23/17 2205    sodium chloride (NS) flush 10 mL  10 mL InterCATHeter PRN Randalyn Anis, DO   40 mL at 02/28/17 0254    bacitracin 500 unit/gram packet 1 Packet  1 Packet Topical PRN Randalyn Anis, DO        tamsulosin [facility]) capsule 0.4 mg  0.4 mg Oral DAILY Randalyn Anis, DO   0.4 mg at 03/11/17 0935    0.9% sodium chloride infusion 250 mL  250 mL IntraVENous PRN Randalyn Anis, DO        phenol throat spray (CHLORASEPTIC) 1 Spray  1 Spray Oral PRN Carito Etienne MD   1 Spray at 02/20/17 1158    sodium chloride (NS) flush 5-10 mL  5-10 mL IntraVENous Q8H Kendall Dimas MD   10 mL at 03/11/17 1621    0.9% sodium chloride infusion 250 mL  250 mL IntraVENous PRN Thalia Minaya CRNA        sodium chloride (NS) flush 5-10 mL  5-10 mL IntraVENous Q8H Kendall Dimas MD   10 mL at 03/11/17 1621    ondansetron (ZOFRAN) injection 4 mg  4 mg IntraVENous Q4H PRN Kendall Dimas MD   4 mg at 02/15/17 0225    0.9% sodium chloride infusion 250 mL  250 mL IntraVENous PRN Kendall Dimas MD           Objective  Vitals:    03/11/17 0806 03/11/17 1132 03/11/17 1155 03/11/17 1624   BP: 119/81 125/77  136/80   Pulse: (!) 102 (!) 105 (!) 106 100   Resp: 14 12 12 12   Temp: 97.8 °F (36.6 °C)  97.6 °F (36.4 °C) 97.9 °F (36.6 °C)   SpO2: 97% 94% 93% 92%   Weight:       Height:             Intake/Output Summary (Last 24 hours) at 03/11/17 1716  Last data filed at 03/11/17 1406   Gross per 24 hour   Intake             1220 ml   Output              625 ml   Net              595 ml           Admission weight: Weight: 63.5 kg (140 lb) (02/12/17 0603)  Last Weight Metrics:  Weight Loss Metrics 3/10/2017 1/4/2016 11/8/2015   Today's Wt 181 lb 145 lb 140 lb   BMI 24.55 kg/m2 19.66 kg/m2 18.98 kg/m2             Physical Assessment:     nonverbal  Neck: No jvd.   LUNGS: Congested  CVS EXM: S1, S2  RRR, no murmurs/gallops/rubs. Abdomen: soft, non tender. Lower Extremities:  + edema.        Lab    CBC w/Diff Recent Labs      03/11/17   0657  03/10/17   0340  03/09/17   0430   WBC  9.5  8.8  8.3   RBC  3.10*  2.95*  2.96*   HGB  8.6*  8.3*  8.3*   HCT  27.3*  25.8*  25.7*   PLT  366  387  385   GRANS  91*  93*  90*   LYMPH  3*  5*  8*   EOS  0  1  0        Chemistry Recent Labs      03/11/17   0657  03/10/17   0340  03/09/17   0430   GLU   --   103*  133*   NA   --   146*  145   K   --   3.4*  3.5   CL   --   110*  109*   CO2   --   20*  20*   BUN   --   164*  156*   CREA   --   5.41*  5.36*   CA   --   7.7*  8.1*   AGAP   --   16  16   BUCR   --   30*  29*   PHOS  5.0*  5.7*  5.5*         No results found for: IRON, FE, TIBC, IBCT, PSAT, FERR   Lab Results   Component Value Date/Time    Calcium 7.7 03/10/2017 03:40 AM    Phosphorus 5.0 03/11/2017 06:57 AM        Malissa Gates MD  [facility]

## 2017-03-11 NOTE — PROGRESS NOTES
Called to RR patient desat and sound coarse MD in room , NT suction patient got moderate tan thin secretions, patient sounds better spo2 91 . concerned about patient being uncomfortable appears to be in some discomfort placed on nonrebreather will place patient on HFNC   spo2 on nonrebreather 98%  Placed patient on HFNC salter 10 lpm spo2 98%

## 2017-03-11 NOTE — ROUTINE PROCESS
0700:  Responded to RRT in patient room. Dr. Kevon He, NS, primary RN and RT present. Pt was apparently hypoxic with O2 sat of 49% however no vital signs were documented nor was there a vital sign machine in the room. RRT team took VS, O2 sats 93-97%, VSS. Chest x-ray ordered. Will continue to monitor. Pt is a DNR, palliative care consult in process, per primry RN family is considering comfort care at this time.

## 2017-03-11 NOTE — ROUTINE PROCESS
Assumed care of patient at bedside. Patient resting in bed quietly with HOB up. Follow command. Lung sound diminished. Peg tube intact and patent. Chest tube to the r chest intact. No sign of distress noted. Call bell within reach. Will continue to monitor.

## 2017-03-11 NOTE — ROUTINE PROCESS
Bedside shift change report given to [address], RN (oncoming nurse) by Davion Godoy RN (offgoing nurse). Report included the following information SBAR, Kardex, MAR and Recent Results.

## 2017-03-11 NOTE — PROGRESS NOTES
Daily Progress Note: 3/11/2017 3:03 PM   Admit Date: 2/12/2017    Patient seen in follow up for multiple medical problems as listed below:  Patient Active Problem List   Diagnosis Code    Small bowel obstruction ([facility] [address] K56.69    Atrial fibrillation with rapid ventricular response ([facility] [address] I48.91    Postoperative anemia D64.9    Feeding difficulties R63.3    Debility R53.81    Acute renal failure superimposed on stage 3 chronic kidney disease (HCC) N17.9, N18.3       Assesment     80 y.o. male with a PMHx of HTN, TIA, SIDNEY, SBO and abdominal hernia who presented to the ED with the acute onset of left upper quadrant abdominal pain. CT scan of the abd/pelvis in the ED that was positive for a SBO. s/p Exlap and small bowel resection (191cm) 2/13 am Dr John Lao. Course complicated by Afib+RVR 5/75 am requiring rate control and cardiology involvement. Patient only responded temporarily to diltiazem boluses and diltiazem drip to 5mg. The evening of 2/14 the patients rate remained 130-140 and he was becoming increasingly hypotensive, cardizem gtt was stopped and patient was given 250mcg of IV Digoxin with HR resolution into 60's. Eventually coverted to lopresser with HTN medication adjustment. Failed MBS 2/16 and placed NPO, no hx of dysphagia this was thought due to significant trauma from NGT placement for surgery. Was to have Dobhoff placed 2/17 with IR, they could not get a tube down. He received D10 overnight is his PIV, then PICC and TPN 2/18 am. He had repeat speech eval on 02/22, but failed this, leading to PEG tube placement on the following day. His renal function has continued to worsen while intubated in the ICU and nephrology was consulted. On 02/23 he went into acute respiratory distress presumed to be 2/2 possible aspiration pneumonia and was transferred to the ICU and later intubated on pressor support for hypotension. He repeatedly failed spontaneous breathing tests 2/2 tachypnea.  His HR remained poorly controlled and diltiazem was ineffective due to hypotension. Amiodarone gtt was tried several times without success due to bradycardia, Cardiology was reconsulted. Patients cardiac and respiratory status eventually improved and was extubated 3/2 evening. He has developed significant LE edema and anasarca and has been slowly being diuresed. Although patient has  PEG,he is still a risk for aspiration as he has frequent secretion accumulation,requiring suction. Recommend continued ST therapy and repeat MBS at some point. On 3//102/107 palliative care was consulted again to address further care decision and met with patient and his daughter. They agreed for DNR status. Daughter prefer patient to go for rehab and if no improvement after 3 weeks,then move to hospice care. Palliative care will meet again with daughter on Monday 3/13.     1-Acute hypoxemic, hypercapnic respiratory failure after brief respiratory arrest-extubated 3/2:resolved,O2sat 100 on 3liters  2-Aspiration Pneumonia/Pneumonitis;on atbx  3-Sepsis criteria with leukocytosis, hypotension, requiring temp pressor support-resolved  4-B/L Pleural Effusions  5-SBO s/p Exlap and Small Bowel Resection 02/13  6-Severe Dysphagia on Tube feeds through PEG  7-Atrial Fibrillation with RVR -Back to Digoxin when GFR improved  8-Acute Metabolic Encephalopathy Likely 2/2 Benzodiazepam, improved  9-SUSAN/ATN on CKD Stage IIIb -Neph consulting. Cr worsening. Nephrology following. Pt has good urine output. Not a candidate for dialysis. 10-Hyperkalemia Possibly 2/2 Renal Failure, Possible Heparin-induced w/ EKG Changes-Resolved. 11-Hypokalemia:will replace  12-L Inguinal Hernia  13-HTN  14-TIA on Eliquis normaly (held)  15-Iron Deficiency Anemia  16-Anasarca and LE edema - due to SUSAN, malnutrition, and relative HF from Afib/RVR on prn lasix  16-Excessive mucus in mouth:suction prn instructions given to nurse. 17-debility:PT/OT. PT has recommended SNF on discharge.   18-Complex medical history with poor prognosis for recovery:palliative care was consulted and saw patient. Palliative care met with patient and his daughter and it was decided DNR. Daughter prefers transition to [place] [id number] hospice if no improvement. Palliative care will meet again with patient's daughter on Monday      DVT Protocol Active: yes  Code Status:  DNR     Disposition: Out of ICU on 3/5. SNF later in week if improving. Subjective:     CC: Abdominal Pain (left lower abdomen) and Leg Pain (left leg)    Interval History: today O2sat 100% on 3 liters. . Great HR control. Patient looks Erling Gill interacting much today. Objective:     Visit Vitals    /77 (BP 1 Location: Left arm, BP Patient Position: At rest)    Pulse (!) 106    Temp 97.6 °F (36.4 °C)    Resp 12    Ht 6' (1.829 m)    Wt 82.1 kg (181 lb)    SpO2 93%    BMI 24.55 kg/m2       Temp (24hrs), Av.8 °F (36.6 °C), Min:97.5 °F (36.4 °C), Max:98.3 °F (36.8 °C)        Intake/Output Summary (Last 24 hours) at 17 1503  Last data filed at 17 1406   Gross per 24 hour   Intake             1420 ml   Output              625 ml   Net              795 ml       Gen: NAD, sleepy today. HEENT:  PATRICIA, EOMI. Dry mouth  Neck: No Bruits/JVD   Lungs:   CTAb  Heart:   RR S1 S2 without M/R/G  Abdomen: ND,NT, BSX4,   Extremities: edema. No cyanosis.   Skin:  no jaundice/lesions      Data Review:     Meds/Labs/Tests reviewed    Current Shift:  701 - 1900  In: 180   Out: 225 [Urine:225]  Last three shifts:  1901 -  07  In: 2040 [I.V.:100]  Out: 900 [Urine:900]  Recent Labs      17   0657  03/10/17   0340  17   0430   WBC  9.5  8.8  8.3   RBC  3.10*  2.95*  2.96*   HGB  8.6*  8.3*  8.3*   HCT  27.3*  25.8*  25.7*   PLT  366  387  385   GRANS  91*  93*  90*   LYMPH  3*  5*  8*   EOS  0  1  0       Recent Labs      17   0657  03/10/17   0340  17   0430   BUN   --   164*  156*   CREA   --   5.41*  5.36*   CA   -- 7. 7*  8.1*   K   --   3.4*  3.5   NA   --   146*  145   CL   --   110*  109*   CO2   --   20*  20*   PHOS  5.0*  5.7*  5.5*   GLU   --   103*  133*        Lab Results   Component Value Date/Time    Glucose 103 03/10/2017 03:40 AM    Glucose 133 03/09/2017 04:30 AM    Glucose 149 03/08/2017 04:15 AM    Glucose 175 03/07/2017 04:35 AM    Glucose 106 03/05/2017 10:00 AM       Procedures/Imaging:  s/p Exlap and small bowel resection 2/13 am Dr Lizette Rowe  [id number]%  Failed XR guided dobhoff 2/17  PICC 2/18 with TPN  PEG 2/23  Intubation 2/23, extubation 3/2    Case d/w nephrology    [address] [facility]  Hospitalist Division  Pager: [phone]

## 2017-03-11 NOTE — PROGRESS NOTES
9422  Am meds given, no acute distress, call bell within reach    1135 daughter at bedside, patient in bed, no distress noted    1939 Bedside and Verbal shift change report given to Jose Fernandez (oncoming nurse) by Ilan Vazquez RN (offgoing nurse). Report included the following information SBAR, Kardex and MAR.

## 2017-03-12 NOTE — PROGRESS NOTES
responded to the death of  Minnie Nelson, who was a 80 y.o.,male,     The  provided the following Interventions:  Provided crisis spiritual support and grief interventions. Offered prayers on behalf of the patient. Chart reviewed. Plan:  Chaplains will continue to follow and will provide spiritual care and grief support for the family.     Estefany Fowler, MPH, [address] [phone]

## 2017-03-12 NOTE — PROGRESS NOTES
2243-RRT called for patient with decreased oxygen saturations, pt is responsive to noxious stimuli, does not follow commands, pt is a DNR, plan is to meet with palliative care on 3/13, requested family to be notified of patient decline and request to come to hospital, morphine IV ordered by Dr Stanley Dhaliwal for comfort/increased work of breathing

## 2017-03-12 NOTE — PROGRESS NOTES
Pt received after report given by Bony Alvarez   Daughter at bedside. Left to go home to take care of her mother.  RRT called do to low O2 stats   Daughter called to let her know his change of status  2310 Daughter in room. Junie Night supervisor in the room to talk to the daughter  [facility] in room with the daughter  0302 Daylight savings time Pt . 025 Dr Kasia Garrison called. Tessa Bacon called  [employer] notified. Eyad Stacy from [place] stated not a good donor. Daughter already refused to have her father as a donor. 0400 Daughter called Flakita Rosales in to see daughter  12 Daughter went home.   Spordi 89 home came to  Mr Rena Zamora

## 2017-03-12 NOTE — PROGRESS NOTES
3:51 AM      Called by nurse to pronounce patient who was found unresponsive, or in asystole on telemetry. DNR order in place. Patient unresponsive to verbal or tactile pain. No breath sounds auscultated and no pulses felt. No spontaneous respirations. Pupils fixed and dilated/midpositioned.     Patient pronounced  at 3:02 Jessica Hussein MD

## 2017-03-12 NOTE — PROGRESS NOTES
Rapid response called 2240. Patient having difficulty breathing. Is on non-rebreather  Vitals:  100/52,  67%, 88, 97.6  Patient is DNR. Daughter is called to bedside.      Lorena Gordon MD

## 2017-03-20 NOTE — DISCHARGE SUMMARY
Discharge Summary    Patient: Dennise Shah               Sex: male          DOA: 2/12/2017         YOB: 1927      Age:  80 y.o.        LOS:  LOS: 27 days                Admit Date: 2/12/2017    Discharge Date: 3/20/2017    Admission Diagnoses: SBO  Small Bowel Obstruction  Small bowel obstruction ([facility] [address]  Failed swallowing evaluations x2    Discharge Diagnoses:    1-Acute hypoxemic, hypercapnic respiratory failure after brief respiratory arrest  2-Aspiration Pneumonia/Pneumonitis  3-Sepsis criteria with leukocytosis, hypotension, requiring temp pressors  4-B/L Pleural Effusions  5-SBO s/p Exlap and Small Bowel Resection 02/13   6-Severe Dysphagia s/p PEG  7-Atrial Fibrillation with RVR   8-Acute Metabolic Encephalopathy Likely 2/2 Benzodiazepam  9-SUSAN/ATN on CKD Stage IIIb  10-Hyperkalemia Possibly 2/2 Renal Failure, Possible Heparin-induced w/ EKG Changes  11-Hypokalemia  12-L Inguinal Hernia  13-HTN  14-TIA on Eliquis   15-Iron Deficiency Anemia  16-Anasarca and LE edema - due to SUSAN, malnutrition, and relative HF   16-Excessive respiratory tract secretion requiring suctions. 17-debility:PT/OT. PT has recommended SNF on discharge.   18.Moderate malnutrition   18-Complex medical history with poor prognosis for recovery.     Problem List as of 3/12/2017  Date Reviewed: 2/23/2017          Codes Class Noted - Resolved    Acute renal failure superimposed on stage 3 chronic kidney disease ([facility] [address] ICD-10-CM: N17.9, N18.3  ICD-9-CM: 584.9, 585.3  3/10/2017 - Present        * (Principal)Feeding difficulties ICD-10-CM: R63.3  ICD-9-CM: 783.3  2/17/2017 - Present        Debility ICD-10-CM: R53.81  ICD-9-CM: 799.3  2/17/2017 - Present        Atrial fibrillation with rapid ventricular response ([facility] [address] ICD-10-CM: I48.91  ICD-9-CM: 427.31  2/14/2017 - Present        Postoperative anemia ICD-10-CM: D64.9  ICD-9-CM: 285.9  2/14/2017 - Present        Small bowel obstruction ([facility] ICD-10-CM: K56.69  ICD-9-CM: 560.9 2017 - Present              Discharge Medications:   Cannot display discharge medications since this patient is not currently admitted. Follow-up:patient  and pronounced dead on 3/12/2017 at 3:02 am      Labs:  Labs: Results:       Chemistry No results for input(s): GLU, NA, K, CL, CO2, BUN, CREA, CA, AGAP, BUCR, TBIL, GPT, AP, TP, ALB, GLOB, AGRAT in the last 72 hours. CBC w/Diff No results for input(s): WBC, RBC, HGB, HCT, PLT, GRANS, LYMPH, EOS, HGBEXT, HCTEXT, PLTEXT in the last 72 hours. Cardiac Enzymes No results for input(s): CPK, CKND1, KEVIN in the last 72 hours. No lab exists for component: CKRMB, TROIP   Coagulation No results for input(s): PTP, INR, APTT in the last 72 hours. No lab exists for component: INREXT    Lipid Panel Lab Results   Component Value Date/Time    Cholesterol, total 136 2010 04:50 AM    HDL Cholesterol 54 2010 04:50 AM    LDL, calculated 71.4 2010 04:50 AM    VLDL, calculated 10.6 2010 04:50 AM    Triglyceride 53 2010 04:50 AM    CHOL/HDL Ratio 2.5 2010 04:50 AM      BNP No results for input(s): BNPP in the last 72 hours. Liver Enzymes No results for input(s): TP, ALB, TBIL, AP, SGOT, GPT in the last 72 hours. No lab exists for component: DBIL   Thyroid Studies Lab Results   Component Value Date/Time    TSH 8.07 2017 06:26 PM          Imaging:  CT abdm/pelv on 2017:  1. Small bowel obstruction with poor delineation of a specific transition point. There is a left inguinal hernia containing a portion of colon but this does not  appear to be the transition point of the small bowel obstruction. Interloop  mesenteric fluid and congestion identified related to the obstruction. Free  fluid also identified in the abdomen and pelvis. Prior ventral hernia repair  with mesh noted.     2. Large left renal pelvic calcification with mild pelviectasis. Hemorrhagic  cyst seen in the right kidney.     3.  Cholelithiasis with mild gallbladder wall thickening    Consults:   Pulmonary  Nephrology  Cardiology  GI  Surgery    Treatment Team: Treatment Team: Consulting Provider: Christiana Rhodes MD; Consulting Provider: Andria Najera DO; Utilization Review: Jessica Cruz; Occupational Therapist: Royal Andrea OT; Consulting Provider: Sergo Layne MD; Consulting Provider: Modesto Martell MD; Care Manager: Carolann Sorto    Significant Diagnostic Studies:see recent lab results    Hospital Course:  80 y.o. male with a PMHx of HTN, TIA, SIDNEY, SBO and abdominal hernia who presented to the ED with the acute onset of left upper quadrant abdominal pain. CT scan of the abd/pelvis in the ED that was positive for a SBO. s/p Exlap and small bowel resection (191cm) 2/13 am Dr Su Valentin. Course complicated by Afib+RVR 1/24 am requiring rate control and cardiology involvement. Patient only responded temporarily to diltiazem boluses and diltiazem drip to 5mg. The evening of 2/14 the patients rate remained 130-140 and he was becoming increasingly hypotensive, cardizem gtt was stopped and patient was given 250mcg of IV Digoxin with HR resolution into 60's. Eventually coverted to lopresser with HTN medication adjustment. Failed MBS 2/16 and placed NPO, no hx of dysphagia this was thought due to significant trauma from NGT placement for surgery. Was to have Dobhoff placed 2/17 with IR, they could not get a tube down. He received D10 overnight is his PIV, then PICC and TPN 2/18 am. He had repeat speech eval on 02/22, but failed this, leading to PEG tube placement on the following day. His renal function has continued to worsen while intubated in the ICU and nephrology was consulted. On 02/23 he went into acute respiratory distress presumed to be 2/2 possible aspiration pneumonia and was transferred to the ICU and later intubated on pressor support for hypotension. He repeatedly failed spontaneous breathing tests 2/2 tachypnea.  His HR remained poorly controlled and diltiazem was ineffective due to hypotension. Amiodarone gtt was tried several times without success due to bradycardia, Cardiology was reconsulted. Patients cardiac and respiratory status eventually improved and was extubated 3/2 evening. He has developed significant LE edema and anasarca and has been slowly being diuresed. Although patient has PEG,he is still a risk for aspiration as he has frequent secretion accumulation,requiring suction. Recommend continued ST therapy and repeat MBS at some point. On 3//10/2107 palliative care was consulted again to address further care decisions and met with patient and his daughter. They agreed for DNR status. Daughter preferred patient to go for rehab and if no improvement after 3 weeks,then move to hospice care. Palliative care had to meet again with daughter on Monday 3/13 to further discuss comfort care. Unfortunately on 3/11/2017,patient was noted to be in respiratory distress and RRT was called but there were no improvement. On 3/12/2017 at 3:02 am,patient was pronounced dead by the hospitalist on duty after he was noted without signs of life.     Hayley Boss MD  March 20, 2017

## 2022-08-29 NOTE — PROGRESS NOTES
Daily Progress Note: 3/10/2017 3:03 PM   Admit Date: 2/12/2017    Patient seen in follow up for multiple medical problems as listed below:  Patient Active Problem List   Diagnosis Code    Small bowel obstruction ([facility] [address] K56.69    Atrial fibrillation with rapid ventricular response ([facility] [address] I48.91    Postoperative anemia D64.9    Feeding difficulties R63.3    Debility R53.81       Assesment     80 y.o. male with a PMHx of HTN, TIA, SIDNEY, SBO and abdominal hernia who presented to the ED with the acute onset of left upper quadrant abdominal pain. CT scan of the abd/pelvis in the ED that was positive for a SBO. s/p Exlap and small bowel resection (191cm) 2/13 am Dr Kandi Sampson. Course complicated by Afib+RVR 6/27 am requiring rate control and cardiology involvement. Patient only responded temporarily to diltiazem boluses and diltiazem drip to 5mg. The evening of 2/14 the patients rate remained 130-140 and he was becoming increasingly hypotensive, cardizem gtt was stopped and patient was given 250mcg of IV Digoxin with HR resolution into 60's. Eventually coverted to lopresser with HTN medication adjustment. Failed MBS 2/16 and placed NPO, no hx of dysphagia this was thought due to significant trauma from NGT placement for surgery. Was to have Dobhoff placed 2/17 with IR, they could not get a tube down. He received D10 overnight is his PIV, then PICC and TPN 2/18 am. He had repeat speech eval on 02/22, but failed this, leading to PEG tube placement on the following day. His renal function has continued to worsen while intubated in the ICU and nephrology was consulted. On 02/23 he went into acute respiratory distress presumed to be 2/2 possible aspiration pneumonia and was transferred to the ICU and later intubated on pressor support for hypotension. He repeatedly failed spontaneous breathing tests 2/2 tachypnea. His HR remains poorly controlled and diltiazem was ineffective due to hypotension.  Amiodarone gtt was tried NP I signed home health order.    Miley Tesfaye MD     several times without success due to bradycardia, Cardiology was reconsulted. Patients cardiac and respiratory status eventually improved and was extubated 3/2 evening. He has developed significant LE edema and anasarca and is slowly being diuresed. His dysphagia and feeding situation is still un-resolved. Recommend continued ST therapy and repeat MBS at some point.     1-Acute hypoxemic, hypercapnic respiratory failure after brief respiratory arrest-extubated 3/2:resolved,O2sat 100 on 3liters  2-Aspiration Pneumonia/Pneumonitis;on atbx  3-Sepsis criteria with leukocytosis, hypotension, requiring temp pressor support-resolved  4-B/L Pleural Effusions  5-SBO s/p Exlap and Small Bowel Resection 02/13  6-Severe Dysphagia on Tube feeds through PEG  7-Atrial Fibrillation with RVR -Back to Digoxin when GFR improved  8-Acute Metabolic Encephalopathy Likely 2/2 Benzodiazepam, improved  9-SUSAN/ATN on CKD Stage IIIb -Neph consulting. Cr worsening. Nephrology following. 10-Hyperkalemia Possibly 2/2 Renal Failure, Possible Heparin-induced w/ EKG Changes-Resolved. 11-Hypokalemia:will replace  12-L Inguinal Hernia  13-HTN  14-TIA on Eliquis normaly (held)  15-Iron Deficiency Anemia  16-Anasarca and LE edema - due to SUSAN, malnutrition, and relative HF from Afib/RVR on prn lasix  16-Excessive mucus in mouth:suction prn instructions given to nurse. 17-debility:PT/OT. PT has recommended SNF on discharge. 18-Complex medical history with poor prognosis for recovery:palliative care was consulted and saw patient. Palliative care met with patient and his daughter and it was decided DNR. Daughter prefers transition to TCC. Palliative care will follow discussion for potential comfort care. DVT Protocol Active: yes  Code Status:  DNR     Disposition: Out of ICU on 3/5. SNF later in week if improving.      Subjective:     CC: Abdominal Pain (left lower abdomen) and Leg Pain (left leg)    Interval History: today O2sat 100% on 3 liters. . Great HR control. Patient looks somewhat sleepy today but wakes up to touch stimulation. Objective:     Visit Vitals    /72 (BP 1 Location: Left arm, BP Patient Position: At rest)    Pulse 88    Temp 97.4 °F (36.3 °C)    Resp 18    Ht 6' (1.829 m)    Wt 83 kg (183 lb)    SpO2 93%    BMI 24.82 kg/m2       Temp (24hrs), Av.5 °F (36.4 °C), Min:97.3 °F (36.3 °C), Max:98.1 °F (36.7 °C)        Intake/Output Summary (Last 24 hours) at 03/10/17 1519  Last data filed at 03/10/17 1130   Gross per 24 hour   Intake             1529 ml   Output              500 ml   Net             1029 ml       Gen: NAD, sleepy today. HEENT:  PATRICIA, EOMI. Dry mouth  Neck: No Bruits/JVD   Lungs:   CTAb  Heart:   RR S1 S2 without M/R/G  Abdomen: ND,NT, BSX4,   Extremities:   Trace edema. No cyanosis.   Skin:  no jaundice/lesions      Data Review:     Meds/Labs/Tests reviewed    Current Shift:  03/10 0701 - 03/10 1900  In: 200   Out: 500 [Urine:500]  Last three shifts:  1901 - 03/10 0700  In: 9967 [I.V.:150]  Out: 1275 [Urine:1200]  Recent Labs      03/10/17   0340  03/09/17   0430  17   0415   WBC  8.8  8.3  9.0   RBC  2.95*  2.96*  2.79*   HGB  8.3*  8.3*  7.7*   HCT  25.8*  25.7*  24.1*   PLT  387  385  352   GRANS  93*  90*  90*   LYMPH  5*  8*  7*   EOS  1  0  1       Recent Labs      03/10/17   0340  03/09/17   0430  17   0415   BUN  164*  156*  157*   CREA  5.41*  5.36*  5.30*   CA  7.7*  8.1*  7.8*   K  3.4*  3.5  3.3*   NA  146*  145  145   CL  110*  109*  108   CO2  20*  20*  20*   PHOS  5.7*  5.5*  5.3*   GLU  103*  133*  149*        Lab Results   Component Value Date/Time    Glucose 103 03/10/2017 03:40 AM    Glucose 133 2017 04:30 AM    Glucose 149 2017 04:15 AM    Glucose 175 2017 04:35 AM    Glucose 106 2017 10:00 AM       Procedures/Imaging:  s/p Exlap and small bowel resection  am Dr Laquita Delacruz  [id number]%  Failed XR guided dobhoff   PICC  with TPN  PEG 2/23  Intubation 2/23, extubation 3/2    Case d/w nephrology    [address] [facility]  Hospitalist Division  Pager: [phone]
